# Patient Record
Sex: MALE | Race: WHITE | NOT HISPANIC OR LATINO | Employment: FULL TIME | URBAN - METROPOLITAN AREA
[De-identification: names, ages, dates, MRNs, and addresses within clinical notes are randomized per-mention and may not be internally consistent; named-entity substitution may affect disease eponyms.]

---

## 2017-06-08 ENCOUNTER — OFFICE VISIT (OUTPATIENT)
Dept: UROLOGY | Facility: CLINIC | Age: 47
End: 2017-06-08
Payer: COMMERCIAL

## 2017-06-08 VITALS
HEART RATE: 62 BPM | DIASTOLIC BLOOD PRESSURE: 86 MMHG | BODY MASS INDEX: 52.48 KG/M2 | TEMPERATURE: 99 F | WEIGHT: 315 LBS | SYSTOLIC BLOOD PRESSURE: 132 MMHG | HEIGHT: 65 IN

## 2017-06-08 DIAGNOSIS — R86.9 SEMEN ABNORMALITY: ICD-10-CM

## 2017-06-08 DIAGNOSIS — Z12.5 PROSTATE CANCER SCREENING: ICD-10-CM

## 2017-06-08 DIAGNOSIS — N50.819 TESTICULAR PAIN: Primary | ICD-10-CM

## 2017-06-08 DIAGNOSIS — Z78.9 MALE-TO-FEMALE TRANSGENDER PERSON: ICD-10-CM

## 2017-06-08 DIAGNOSIS — Q54.9 HYPOSPADIAS, UNSPECIFIED HYPOSPADIAS TYPE: ICD-10-CM

## 2017-06-08 PROCEDURE — 99999 PR PBB SHADOW E&M-NEW PATIENT-LVL III: CPT | Mod: PBBFAC,,, | Performed by: UROLOGY

## 2017-06-08 PROCEDURE — 87086 URINE CULTURE/COLONY COUNT: CPT

## 2017-06-08 PROCEDURE — 99204 OFFICE O/P NEW MOD 45 MIN: CPT | Mod: KX,S$GLB,, | Performed by: UROLOGY

## 2017-06-08 RX ORDER — PROPRANOLOL HYDROCHLORIDE 80 MG/1
CAPSULE, EXTENDED RELEASE ORAL
Refills: 1 | COMMUNITY
Start: 2017-03-31 | End: 2018-07-05 | Stop reason: DRUGHIGH

## 2017-06-08 RX ORDER — HYDROCHLOROTHIAZIDE 12.5 MG/1
12.5 CAPSULE ORAL DAILY
Refills: 1 | COMMUNITY
Start: 2017-05-22 | End: 2022-07-01

## 2017-06-08 NOTE — PROGRESS NOTES
HPI:  Martha Shah is a 46 y.o. year old male that  presents with No chief complaint on file.  .  This patient referred by his PCP for testicular pain.  Patient is a male to female transgender.  He states that he has never had any hormonal manipulation and has never had any reconstructive surgery.  Patient states for approximately 6 months he has had left testicular pain which is intermittent in nature.  He gives no history of trauma.  He is not sure if there is any swelling.  He has no dysuria or gross hematuria and has no hematospermia.  He has noticed some change in the character of his ejaculate.  He states that it is more watery than it has been before.  He has no specific complaints concerning volume of ejaculate.  He denies blood in the ejaculate    He has okay strength of stream with nocturia ×0 no straining to void    Patient states he had hypospadias repair performed as a child.    There is no family history of prostate cancer.    He is on blood pressure medicine and migraine medication.    The only surgery that he is had other than the hypospadias was tonsil and adenoidectomy    Patient is new to the [facility] system and there is no information in the chart      Past Medical History:   Diagnosis Date    Allergy     Urinary tract infection      Social History     Social History    Marital status: Significant Other     Spouse name: N/A    Number of children: N/A    Years of education: N/A     Occupational History    Not on file.     Social History Main Topics    Smoking status: Never Smoker    Smokeless tobacco: Not on file    Alcohol use Yes    Drug use: No    Sexual activity: Yes     Partners: Female     Other Topics Concern    Not on file     Social History Narrative    No narrative on file     History reviewed. No pertinent surgical history.  Family History   Problem Relation Age of Onset    Prostate cancer Neg Hx     Kidney disease Neg Hx            Review of Systems  The patient has no  "chest pains.  The patient has no shortness of breath  Patient wears        glasses.  All other review of systems are negative.      Physical Exam:  /86   Pulse 62   Temp 98.7 °F (37.1 °C)   Ht 5' 5" (1.651 m)   Wt (!) 145.2 kg (320 lb)   BMI 53.25 kg/m²   General appearance: alert, cooperative, no distress.  Patient obese  Constitutional:Oriented to person, place, and time.appears well-developed and well-nourished.   HEENT: Normocephalic, atraumatic, neck symmetrical, no nasal discharge   Eyes: conjunctivae/corneas clear, PERRL, EOM's intact  Lungs: clear to auscultation bilaterally, no dullness to percussion bilaterally  Heart: regular rate and rhythm without rub; no displacement of the PMI   Abdomen: soft, non-tender; bowel sounds normoactive; no organomegaly  :Penis/perineum without lesions, scrotum without rash/cysts, epididymis nontender bilaterally, urethral meatus in normal location normal size, no penile plaques palpated, prostate:      Smooth and minimally enlarged and benign feeling                 seminal vesicles not palpated.  No rectal masses, sphincter tone normal.  Testes equal in size without masses  Extremities: extremities symmetric; no clubbing, cyanosis, or edema  Integument: Skin color, texture, turgor normal; no rashes; hair distrubution normal  Neurologic: Alert and oriented X 3, normal strength, normal coordination and gait  Psychiatric: no pressured speech; normal affect; no evidence of impaired cognition     LABS:    Complete Blood Count  No results found for: RBC, HGB, HCT, MCV, MCH, MCHC, RDW, PLT, MPV, GRAN, LYMPH, MONO, EOS, BASO, GRAN, LYMPH, MONO, EOSINOPHIL, BASOPHIL, DIFFMETHOD    Comprehensive Metabolic Panel  No results found for: GLU, BUN, CREATININE, NA, K, CL, PROT, ALBUMIN, BILITOT, AST, ALKPHOS, CO2, ALT, ANIONGAP, EGFRNONAA, ESTGFRAFRICA    PSA  No results found for: PSA      Assessment:    ICD-10-CM ICD-9-CM    1. Testicular pain N50.819 608.9 Urine culture "   2. Male-to-female transgender person F64.0 302.85    3. Hypospadias, unspecified hypospadias type Q54.9 752.61    4. Semen abnormality R86.9 792.2    5. Prostate cancer screening Z12.5 V76.44      The primary encounter diagnosis was Testicular pain. Diagnoses of Male-to-female transgender person, Hypospadias, unspecified hypospadias type, Semen abnormality, and Prostate cancer screening were also pertinent to this visit.      Plan: 1.Testicular pain.  Plan.  I discussed at length and in detail with the patient that it is often difficult to explain the exact cause of testicular pain.  I reassured the patient that his exam shows no masses.  I recommend that he use Tylenol or Motrin and consider using a jockstrap or jockey type underwear. Patient's urine will be cultured and once results are available ,we will contact the patient if antibiotics are indicated.    #2.  Male to female transgender*.  Patient gives no history of hormonal manipulation or surgery which would be related to his testicular complaint    #3.  Hypospadias.  Plan.  No therapy needed.  Patient status post repair as child    #4.  Change in character of semen.  I discussed with the patient I cannot explain this symptom and that I further discussed that it is not of any clinical significance    #5.  Prostate cancer screening.  Plan.  I discussed with the patient that starting at age 49 he should discuss with his PCP whether not to obtain screening PSAs    At this point follow-up is on an as-needed basis  Orders Placed This Encounter   Procedures    Urine culture           Hoang Hinojosa MD    PLEASE NOTE:  Please be advised that portions of this note were dictated using voice recognition software and may contain dictation related errors in spelling/grammar/appropriate pronouns/syntax or other errors that might have not been found and or corrected on text review.

## 2017-06-08 NOTE — LETTER
June 8, 2017      Dot Daigle NP  [address] [facility] [place] [address]           [facility]  [address] [facility]  [place] [address]  Phone: [phone]          Patient: Martha Shah   MR Number: [id number]   YOB: 1970   Date of Visit: 6/8/2017       Dear Dot Daigle:    Thank you for referring Martha Shah to me for evaluation. Attached you will find relevant portions of my assessment and plan of care.    If you have questions, please do not hesitate to call me. I look forward to following Martha Shah along with you.    Sincerely,    Hoang Hinojosa MD    Enclosure  CC:  No Recipients    If you would like to receive this communication electronically, please contact [email or web address] or [phone] to request more information on [employer] access.    For providers and/or their staff who would like to refer a patient to [facility], please contact us through our one-stop-shop provider referral line, [facility], at [phone].    If you feel you have received this communication in error or would no longer like to receive these types of communications, please e-mail [email or web address]

## 2017-06-09 LAB — BACTERIA UR CULT: NO GROWTH

## 2018-07-05 ENCOUNTER — TELEPHONE (OUTPATIENT)
Dept: SLEEP MEDICINE | Facility: CLINIC | Age: 48
End: 2018-07-05

## 2018-07-05 ENCOUNTER — OFFICE VISIT (OUTPATIENT)
Dept: SLEEP MEDICINE | Facility: CLINIC | Age: 48
End: 2018-07-05
Payer: COMMERCIAL

## 2018-07-05 VITALS
BODY MASS INDEX: 48.7 KG/M2 | HEIGHT: 65 IN | HEART RATE: 59 BPM | SYSTOLIC BLOOD PRESSURE: 126 MMHG | DIASTOLIC BLOOD PRESSURE: 80 MMHG | WEIGHT: 292.31 LBS

## 2018-07-05 DIAGNOSIS — G47.33 OBSTRUCTIVE SLEEP APNEA: Primary | ICD-10-CM

## 2018-07-05 PROCEDURE — 99203 OFFICE O/P NEW LOW 30 MIN: CPT | Mod: S$GLB,,, | Performed by: NURSE PRACTITIONER

## 2018-07-05 PROCEDURE — 99999 PR PBB SHADOW E&M-EST. PATIENT-LVL III: CPT | Mod: PBBFAC,,, | Performed by: NURSE PRACTITIONER

## 2018-07-05 PROCEDURE — 3008F BODY MASS INDEX DOCD: CPT | Mod: CPTII,S$GLB,, | Performed by: NURSE PRACTITIONER

## 2018-07-05 RX ORDER — PROPRANOLOL HYDROCHLORIDE 40 MG/1
40 TABLET ORAL 2 TIMES DAILY
Refills: 1 | COMMUNITY
Start: 2018-06-18 | End: 2023-01-13 | Stop reason: SDUPTHER

## 2018-07-05 NOTE — LETTER
July 5, 2018      DAVID Nascimento  [address] [facility] [place] [address]           [facility]  [address]  [facility] [address]  Phone: [phone]          Patient: Martha Shah   MR Number: [id number]   YOB: 1970   Date of Visit: 7/5/2018       Dear Ginette Jara:    Thank you for referring Martha Shah to me for evaluation. Attached you will find relevant portions of my assessment and plan of care.    If you have questions, please do not hesitate to call me. I look forward to following Martha Shah along with you.    Sincerely,    Angelica Herrera, LINDSAY    Enclosure  CC:  No Recipients    If you would like to receive this communication electronically, please contact [email or web address] or [phone] to request more information on [employer] Link access.    For providers and/or their staff who would like to refer a patient to [facility], please contact us through our one-stop-shop provider referral line, [facility], at [phone].    If you feel you have received this communication in error or would no longer like to receive these types of communications, please e-mail [email or web address]

## 2018-07-05 NOTE — PROGRESS NOTES
"Martha Shah  was seen as a new patient at the request of  Ginette Jara PA for the management of obstructive sleep apnea.    CHIEF COMPLAINT:    Chief Complaint   Patient presents with    Sleep Apnea       07/05/2018 GARRISON Herrera NP: Initial HISTORY OF PRESENT ILLNESS: Martha Shah is a 47 y.o. male is here for sleep evaluation.       Per patient's PCP: "She's had her current CPAP for over 10 years and states that it's working well but desires a follow-up sleep study" She states she is not able to get the machine calibrated since [place]".     Patient complaints include: snoring, witnessed apneas, and unrefreshing sleep, resolved with CPAP.     Previously diagnosed 10 years ago. Has been on same CPAP since  Denies breakthrough symptoms, but would like replacement unit  No records for my review    Elite II, CPAP 6 - 13 cm, Used Hours: 19, 776, Usage: 8:15 hours/day, 6:49 hours/week, 6:19 hours/month, Leak 0:52 L, Predicted AHI 7.2    Last replaced supplies "years ago". Denies overt air leaks, but mask integrity not quiet as it used to be   Prefers FFM due to occasional mouth breathing, usually with rhinitis flare up     Denies symptoms of restless legs or kicking during sleep.    Occupation: , days only     EPWORTH SLEEPINESS SCALE 7/5/2018   Sitting and reading 2   Watching TV 1   Sitting, inactive in a public place (e.g. a theatre or a meeting) 0   As a passenger in a car for an hour without a break 2   Lying down to rest in the afternoon when circumstances permit 3   Sitting and talking to someone 0   Sitting quietly after a lunch without alcohol 1   In a car, while stopped for a few minutes in traffic 0   Total score 9       SLEEP ROUTINE:    Sleep Clinic New Patient 7/5/2018   What time do you go to bed on a week day? (Give a range) 10:30-11:30   What time do you go to bed on a day off? (Give a range) 10:30-12:30   How long does it take you to fall asleep? (Give a " range) 2-5 mins   On average, how many times per night do you wake up? 1   How long does it take you to fall back into sleep? (Give a range) 2-5 mins   What time do you wake up to start your day on a week day? (Give a range) 6:30   What time do you wake up to start your day on a day off? (Give a range) 8:30-9:30   What time do you get out of bed? (Give a range) 6:30-7:00   On average, how many hours do you sleep? 6-8   On average, how many naps do you take per day? 0   Rate your sleep quality from 0 to 5 (0-poor, 5-great). 4   Have you experienced:  Weight loss?   How much weight have you lost or gained (in lbs.) in the last year? 45 lbs lost (on Nutrisystem diet)   On average, how many times per night do you go to the bathroom?  0-1   Have you ever had a sleep study/CPAP machine/surgery for sleep apnea? Yes   Have you ever had a CPAP machine for sleep apnea? Yes   Have you ever had surgery for sleep apnea? No         PAST MEDICAL HISTORY:    Active Ambulatory Problems     Diagnosis Date Noted    No Active Ambulatory Problems     Resolved Ambulatory Problems     Diagnosis Date Noted    No Resolved Ambulatory Problems     Past Medical History:   Diagnosis Date    Allergy     Urinary tract infection                 PAST SURGICAL HISTORY:    No past surgical history on file.      FAMILY HISTORY:                Family History   Problem Relation Age of Onset    Prostate cancer Neg Hx     Kidney disease Neg Hx        SOCIAL HISTORY:          Tobacco:   History   Smoking Status    Never Smoker   Smokeless Tobacco    Not on file       Alcohol use:    History   Alcohol Use    Yes                 ALLERGIES:    Review of patient's allergies indicates:   Allergen Reactions    Sulfa (sulfonamide antibiotics) Shortness Of Breath    Compazine [prochlorperazine] Hallucinations    Penicillins Rash       CURRENT MEDICATIONS:    Current Outpatient Prescriptions   Medication Sig Dispense Refill    hydrochlorothiazide  "(MICROZIDE) 12.5 mg capsule Take 12.5 mg by mouth once daily.  1    propranolol (INDERAL LA) 80 MG 24 hr capsule TAKE 1 CAPSULE (80 MG) BY ORAL ROUTE ONCE DAILY  1     No current facility-administered medications for this visit.                   REVIEW OF SYSTEMS:     Sleep related symptoms as per HPI.  Sleep Clinic ROS  7/5/2018   Difficulty breathing through the nose?  Yes   Sore throat or dry mouth in the morning? Sometimes   Irregular or very fast heart beat?  No   Shortness of breath?  No   Acid reflux? Yes   Body aches and pains?  Sometimes   Morning headaches? No   Dizziness? No   Mood changes?  No   Do you exercise?  No   Do you feel like moving your legs a lot?  Sometimes     Otherwise, a balance of systems reviewed is negative.          PHYSICAL EXAM:  Vitals:    07/05/18 1302   BP: 126/80   Pulse: (!) 59   Weight: 132.6 kg (292 lb 5.3 oz)   Height: 5' 5" (1.651 m)   PainSc: 0-No pain     Body mass index is 48.65 kg/m².     GENERAL: Normal development, well groomed  NECK: Supple.  No thyromegaly. No palpable nodes.    SKIN: On face and neck: No abrasions, no rashes, no lesions.  No subcutaneous nodules are palpable.  RESPIRATORY:  Normal chest expansion and non-labored breathing at rest.  CARDIOVASCULAR: Normal rate  EXTREMITIES: No edema. No clubbing. No cyanosis. Station normal. Gait normal.        NEURO/PSYCH: Oriented to time, place and person. Normal attention span and concentration. Affect is full. Mood is normal.                                              ASSESSMENT:    Obstructive sleep apnea, previously diagnosed, severity unknown. The patient symptomatically has snoring, witnessed apneas, and unrefreshing sleep, r with findings of crowded oral airway and elevated body mass index. Medical co-morbidities: GERD, systemic HTN and obesity.  This warrants treatment for obstructive sleep apnea.  CPAP machine has reached reasonable useful lifetime and needs to be replaced.     Allergic rhinitis, " controlled, daily Zyrtec     PLAN:    Treatment:  -DOC signed to obtain sleep records from [place]   -After reviewing sleep records, order new APAP machine @ [facility]  -Meanwhile, may get replacement supplies in interim; provided supplies Rx  -RTC 31 - 90 days after PAP  - pt to make appt ~ 5 weeks from set up date   -If patient has difficulty with CPAP adherence or ongoing ALFREDO symptoms despite CPAP adherence, then consider an in-lab titration sleep study in order to determine optimal fixed CPAP setting.    Education: During our discussion today, we talked about the etiology of obstructive sleep apnea as well as the potential ramifications of untreated sleep apnea, which could include daytime sleepiness, hypertension, heart disease and/or stroke. We discussed potential treatment options, which could include weight loss, body positioning, continuous positive airway pressure (CPAP), OA, EPAP, or referral for surgical consideration.     Precautions: The patient was advised to abstain from driving should they feel sleepy  or drowsy.     Thank you for allowing me the opportunity to participate in the care of your patient.

## 2018-07-12 ENCOUNTER — TELEPHONE (OUTPATIENT)
Dept: SLEEP MEDICINE | Facility: CLINIC | Age: 48
End: 2018-07-12

## 2018-07-12 NOTE — TELEPHONE ENCOUNTER
----- Message from Maegan Saldaña sent at 7/12/2018  3:00 PM CDT -----  Name of Who is Calling: BURAK GARCÍA [[id number]]      What is the request in detail: Pt is checking on the status  of the order for a new C-PAP machine.       Can the clinic reply by [facility]:   No       What Number to Call Back if not in [facility]: [phone]

## 2018-07-13 NOTE — TELEPHONE ENCOUNTER
Called [facility] To inquire sleep study report At [phone] and was inform they're not open until 8 am [place] time. So I re-faxed DOC at [phone] and [phone]

## 2018-07-13 NOTE — TELEPHONE ENCOUNTER
DOC signed 07/05/2018    Can not order new PAP Machine without sleep records    Sleep records available?

## 2018-07-17 ENCOUNTER — TELEPHONE (OUTPATIENT)
Dept: SLEEP MEDICINE | Facility: CLINIC | Age: 48
End: 2018-07-17

## 2018-07-17 NOTE — TELEPHONE ENCOUNTER
Called [facility] to inquire patient's sleep study report, but Pati stated that pt wasn't found on their file;so I called pt and LVM requesting that he call facility and have them release his information.... If maybe pt is under a different name

## 2018-07-18 ENCOUNTER — TELEPHONE (OUTPATIENT)
Dept: SLEEP MEDICINE | Facility: CLINIC | Age: 48
End: 2018-07-18

## 2018-07-18 DIAGNOSIS — G47.33 OBSTRUCTIVE SLEEP APNEA: Primary | ICD-10-CM

## 2018-07-18 NOTE — TELEPHONE ENCOUNTER
Patient misunderstood my last message for her, stating to call [facility] and have them release sleep study report to sleep clinic here, instead pt came here.  So I set patient up with [facility], to help stay her/him connected.    Patient also requested for new mask  I've already advised pt that it might not be possible to get new supplies Rx without sleep study report

## 2018-07-18 NOTE — TELEPHONE ENCOUNTER
Re-faxed patient DOC to [facility] in [place] at [phone]  P;[phone]  And also got patient started with [facility] to stay connected

## 2018-07-18 NOTE — TELEPHONE ENCOUNTER
CPAP supplies rx ordered on 07/05/2018 same day as consultation visit. Pt may contact [facility] regarding obtaining replacement supplies.     New CPAP machine can not be ordered until baseline PSG received and reviewed.

## 2018-07-19 ENCOUNTER — TELEPHONE (OUTPATIENT)
Dept: SLEEP MEDICINE | Facility: CLINIC | Age: 48
End: 2018-07-19

## 2018-07-19 NOTE — TELEPHONE ENCOUNTER
Received notification from [facility] that pt did not complete sleep study at facility.     Zac,    1) Please notify pt no sleep study records from reported [facility]    2) Please verify that DOC request sent to correct facility    3) Advise patient that a new CPAP machine can not be ordered without record of sleep study. In the event prior sleep study is not available, patient must repeat sleep test. If so, let me know so that I can order sleep test.

## 2018-07-19 NOTE — TELEPHONE ENCOUNTER
Left voice message notifying pt to either personally find sleep study and fax over to sleep clinic.  Otherwise patient has to repeat sleep study.      Requested call back with decision

## 2018-07-19 NOTE — TELEPHONE ENCOUNTER
Receive fax back from [facility] [place] and they wrote that pt didn't complete a sleep study there.

## 2018-07-26 ENCOUNTER — PATIENT MESSAGE (OUTPATIENT)
Dept: SLEEP MEDICINE | Facility: CLINIC | Age: 48
End: 2018-07-26

## 2018-07-27 NOTE — TELEPHONE ENCOUNTER
Faxed DOC to Dr Oumou Gillespie  Office Phone: [phone]  Fax: [phone]    Notified patient

## 2018-07-27 NOTE — TELEPHONE ENCOUNTER
Zac, patient signed DOC during clinic visit, inquire from facility provided whether that is acceptable DOC, if so send. And update pt accordingly.

## 2018-10-22 ENCOUNTER — TELEPHONE (OUTPATIENT)
Dept: SLEEP MEDICINE | Facility: CLINIC | Age: 48
End: 2018-10-22

## 2018-10-22 ENCOUNTER — PATIENT MESSAGE (OUTPATIENT)
Dept: SLEEP MEDICINE | Facility: CLINIC | Age: 48
End: 2018-10-22

## 2018-10-22 NOTE — TELEPHONE ENCOUNTER
Please attempt again to get copy of patient's sleep records. Call Dr. Vaughan's office to inquire if it is even available. If unavailable, please let me know so that I may proceed with ordering repeat sleep test for pt.

## 2018-10-22 NOTE — TELEPHONE ENCOUNTER
Pt left a message regarding her sleep study/CPAP machine. I called  Her to get clarity on the message but she didn't answer so I left a message. I also contacted the clearing house to find out what was going on. I was told that the pt was contacted on 07/18/18 to  machine but declined due to outstanding deductible.

## 2018-10-22 NOTE — TELEPHONE ENCOUNTER
I still cant find a sleep study for this pt. I called her to find out where she had it done but she didn't answer so I left a VM.

## 2018-12-05 ENCOUNTER — PATIENT MESSAGE (OUTPATIENT)
Dept: SLEEP MEDICINE | Facility: CLINIC | Age: 48
End: 2018-12-05

## 2018-12-05 DIAGNOSIS — G47.30 SLEEP APNEA, UNSPECIFIED TYPE: Primary | ICD-10-CM

## 2018-12-05 NOTE — TELEPHONE ENCOUNTER
Unable to locate previous sleep study. Repeat sleep test necessary prior to getting new CPAP machine.     HST ordered.

## 2018-12-13 ENCOUNTER — TELEPHONE (OUTPATIENT)
Dept: SLEEP MEDICINE | Facility: OTHER | Age: 48
End: 2018-12-13

## 2019-01-08 ENCOUNTER — TELEPHONE (OUTPATIENT)
Dept: SLEEP MEDICINE | Facility: OTHER | Age: 49
End: 2019-01-08

## 2019-01-09 ENCOUNTER — HOSPITAL ENCOUNTER (OUTPATIENT)
Dept: SLEEP MEDICINE | Facility: OTHER | Age: 49
Discharge: HOME | End: 2019-01-09
Attending: NURSE PRACTITIONER
Payer: COMMERCIAL

## 2019-01-09 DIAGNOSIS — G47.33 OSA (OBSTRUCTIVE SLEEP APNEA): ICD-10-CM

## 2019-01-09 DIAGNOSIS — G47.30 SLEEP APNEA, UNSPECIFIED TYPE: ICD-10-CM

## 2019-01-09 PROCEDURE — 95800 SLP STDY UNATTENDED: CPT

## 2019-01-09 PROCEDURE — 95800 SLP STDY UNATTENDED: CPT | Mod: 26,,, | Performed by: PSYCHIATRY & NEUROLOGY

## 2019-01-09 PROCEDURE — 95800 PR SLEEP STUDY, UNATTENDED, RECORD HEART RATE/O2 SAT/RESP ANAL/SLEEP TIME: ICD-10-PCS | Mod: 26,,, | Performed by: PSYCHIATRY & NEUROLOGY

## 2019-01-16 NOTE — PROCEDURES
"Dear Referring Provider,    The sleep study that you ordered is complete. You have ordered sleep LAB services to perform the sleep study for Martha Shah.     Please find Sleep Study result in "Chart Review" under the "Media tab."     As the ordering provider, you are responsible for reviewing the results and implementing a treatment plan with your patient. If you need a Sleep Medicine provider to explain the sleep study findings and arrange treatment for the patient, please refer patient for consultation to our Sleep Clinic via [facility] with Ambulatory Consult Sleep.    To do that please place an order for an "Ambulatory Consult Sleep" - it will go to our clinic work queue for our Medical Assistant to contact the patient for an appointment.     For any questions, please contact our clinic staff at [phone] to talk to clinical staff.      "

## 2019-01-17 DIAGNOSIS — G47.33 OBSTRUCTIVE SLEEP APNEA: Primary | ICD-10-CM

## 2021-03-12 ENCOUNTER — TELEPHONE (OUTPATIENT)
Dept: ENDOSCOPY | Facility: HOSPITAL | Age: 51
End: 2021-03-12

## 2021-04-13 ENCOUNTER — TELEPHONE (OUTPATIENT)
Dept: ENDOSCOPY | Facility: HOSPITAL | Age: 51
End: 2021-04-13

## 2021-04-26 ENCOUNTER — PATIENT MESSAGE (OUTPATIENT)
Dept: RESEARCH | Facility: HOSPITAL | Age: 51
End: 2021-04-26

## 2021-05-28 ENCOUNTER — OFFICE VISIT (OUTPATIENT)
Dept: UROLOGY | Facility: CLINIC | Age: 51
End: 2021-05-28
Payer: COMMERCIAL

## 2021-05-28 VITALS
HEART RATE: 68 BPM | DIASTOLIC BLOOD PRESSURE: 74 MMHG | WEIGHT: 303 LBS | SYSTOLIC BLOOD PRESSURE: 119 MMHG | BODY MASS INDEX: 50.48 KG/M2 | HEIGHT: 65 IN

## 2021-05-28 DIAGNOSIS — Z78.9 MALE-TO-FEMALE TRANSGENDER PERSON: ICD-10-CM

## 2021-05-28 DIAGNOSIS — Z12.5 ENCOUNTER FOR PROSTATE CANCER SCREENING: Primary | ICD-10-CM

## 2021-05-28 DIAGNOSIS — R30.0 DYSURIA: ICD-10-CM

## 2021-05-28 LAB
BACTERIA #/AREA URNS HPF: ABNORMAL /HPF
BILIRUB SERPL-MCNC: ABNORMAL MG/DL
BLOOD URINE, POC: 50
CLARITY, POC UA: CLEAR
COLOR, POC UA: YELLOW
GLUCOSE UR QL STRIP: NORMAL
KETONES UR QL STRIP: ABNORMAL
LEUKOCYTE ESTERASE URINE, POC: ABNORMAL
MICROSCOPIC COMMENT: ABNORMAL
NITRITE, POC UA: ABNORMAL
PH, POC UA: 6
POC RESIDUAL URINE VOLUME: 97 ML (ref 0–100)
PROTEIN, POC: ABNORMAL
RBC #/AREA URNS HPF: 4 /HPF (ref 0–4)
SPECIFIC GRAVITY, POC UA: ABNORMAL
UROBILINOGEN, POC UA: ABNORMAL
WBC #/AREA URNS HPF: 8 /HPF (ref 0–5)

## 2021-05-28 PROCEDURE — 99204 OFFICE O/P NEW MOD 45 MIN: CPT | Mod: S$GLB,,, | Performed by: NURSE PRACTITIONER

## 2021-05-28 PROCEDURE — 1126F PR PAIN SEVERITY QUANTIFIED, NO PAIN PRESENT: ICD-10-PCS | Mod: S$GLB,,, | Performed by: NURSE PRACTITIONER

## 2021-05-28 PROCEDURE — 51798 POCT BLADDER SCAN: ICD-10-PCS | Mod: S$GLB,,, | Performed by: NURSE PRACTITIONER

## 2021-05-28 PROCEDURE — 99204 PR OFFICE/OUTPT VISIT, NEW, LEVL IV, 45-59 MIN: ICD-10-PCS | Mod: S$GLB,,, | Performed by: NURSE PRACTITIONER

## 2021-05-28 PROCEDURE — 87077 CULTURE AEROBIC IDENTIFY: CPT | Performed by: NURSE PRACTITIONER

## 2021-05-28 PROCEDURE — 81001 URINALYSIS AUTO W/SCOPE: CPT | Performed by: NURSE PRACTITIONER

## 2021-05-28 PROCEDURE — 87086 URINE CULTURE/COLONY COUNT: CPT | Performed by: NURSE PRACTITIONER

## 2021-05-28 PROCEDURE — 51798 US URINE CAPACITY MEASURE: CPT | Mod: S$GLB,,, | Performed by: NURSE PRACTITIONER

## 2021-05-28 PROCEDURE — 81002 URINALYSIS NONAUTO W/O SCOPE: CPT | Mod: S$GLB,,, | Performed by: NURSE PRACTITIONER

## 2021-05-28 PROCEDURE — 99999 PR PBB SHADOW E&M-EST. PATIENT-LVL III: CPT | Mod: PBBFAC,,, | Performed by: NURSE PRACTITIONER

## 2021-05-28 PROCEDURE — 99999 PR PBB SHADOW E&M-EST. PATIENT-LVL III: ICD-10-PCS | Mod: PBBFAC,,, | Performed by: NURSE PRACTITIONER

## 2021-05-28 PROCEDURE — 3008F PR BODY MASS INDEX (BMI) DOCUMENTED: ICD-10-PCS | Mod: CPTII,S$GLB,, | Performed by: NURSE PRACTITIONER

## 2021-05-28 PROCEDURE — 1126F AMNT PAIN NOTED NONE PRSNT: CPT | Mod: S$GLB,,, | Performed by: NURSE PRACTITIONER

## 2021-05-28 PROCEDURE — 87088 URINE BACTERIA CULTURE: CPT | Performed by: NURSE PRACTITIONER

## 2021-05-28 PROCEDURE — 3008F BODY MASS INDEX DOCD: CPT | Mod: CPTII,S$GLB,, | Performed by: NURSE PRACTITIONER

## 2021-05-28 PROCEDURE — 81002 POCT URINE DIPSTICK WITHOUT MICROSCOPE: ICD-10-PCS | Mod: S$GLB,,, | Performed by: NURSE PRACTITIONER

## 2021-05-28 PROCEDURE — 87186 SC STD MICRODIL/AGAR DIL: CPT | Performed by: NURSE PRACTITIONER

## 2021-05-31 DIAGNOSIS — N30.00 ACUTE CYSTITIS WITHOUT HEMATURIA: Primary | ICD-10-CM

## 2021-05-31 LAB — BACTERIA UR CULT: ABNORMAL

## 2021-05-31 RX ORDER — NITROFURANTOIN 25; 75 MG/1; MG/1
100 CAPSULE ORAL 2 TIMES DAILY
Qty: 14 CAPSULE | Refills: 0 | Status: SHIPPED | OUTPATIENT
Start: 2021-05-31 | End: 2021-06-07

## 2021-06-09 ENCOUNTER — PATIENT MESSAGE (OUTPATIENT)
Dept: UROLOGY | Facility: CLINIC | Age: 51
End: 2021-06-09

## 2021-07-22 ENCOUNTER — PATIENT MESSAGE (OUTPATIENT)
Dept: SLEEP MEDICINE | Facility: CLINIC | Age: 51
End: 2021-07-22

## 2022-07-01 ENCOUNTER — OFFICE VISIT (OUTPATIENT)
Dept: GASTROENTEROLOGY | Facility: CLINIC | Age: 52
End: 2022-07-01
Payer: COMMERCIAL

## 2022-07-01 VITALS
HEIGHT: 65 IN | DIASTOLIC BLOOD PRESSURE: 71 MMHG | SYSTOLIC BLOOD PRESSURE: 138 MMHG | HEART RATE: 57 BPM | OXYGEN SATURATION: 98 % | BODY MASS INDEX: 49.81 KG/M2 | WEIGHT: 298.94 LBS

## 2022-07-01 DIAGNOSIS — R19.7 DIARRHEA, UNSPECIFIED TYPE: ICD-10-CM

## 2022-07-01 DIAGNOSIS — Z12.11 SCREENING FOR COLON CANCER: Primary | ICD-10-CM

## 2022-07-01 PROCEDURE — 3075F SYST BP GE 130 - 139MM HG: CPT | Mod: CPTII,S$GLB,, | Performed by: NURSE PRACTITIONER

## 2022-07-01 PROCEDURE — 3078F DIAST BP <80 MM HG: CPT | Mod: CPTII,S$GLB,, | Performed by: NURSE PRACTITIONER

## 2022-07-01 PROCEDURE — 99204 OFFICE O/P NEW MOD 45 MIN: CPT | Mod: S$GLB,,, | Performed by: NURSE PRACTITIONER

## 2022-07-01 PROCEDURE — 3078F PR MOST RECENT DIASTOLIC BLOOD PRESSURE < 80 MM HG: ICD-10-PCS | Mod: CPTII,S$GLB,, | Performed by: NURSE PRACTITIONER

## 2022-07-01 PROCEDURE — 3008F PR BODY MASS INDEX (BMI) DOCUMENTED: ICD-10-PCS | Mod: CPTII,S$GLB,, | Performed by: NURSE PRACTITIONER

## 2022-07-01 PROCEDURE — 1159F MED LIST DOCD IN RCRD: CPT | Mod: CPTII,S$GLB,, | Performed by: NURSE PRACTITIONER

## 2022-07-01 PROCEDURE — 3075F PR MOST RECENT SYSTOLIC BLOOD PRESS GE 130-139MM HG: ICD-10-PCS | Mod: CPTII,S$GLB,, | Performed by: NURSE PRACTITIONER

## 2022-07-01 PROCEDURE — 99204 PR OFFICE/OUTPT VISIT, NEW, LEVL IV, 45-59 MIN: ICD-10-PCS | Mod: S$GLB,,, | Performed by: NURSE PRACTITIONER

## 2022-07-01 PROCEDURE — 99999 PR PBB SHADOW E&M-EST. PATIENT-LVL III: ICD-10-PCS | Mod: PBBFAC,,, | Performed by: NURSE PRACTITIONER

## 2022-07-01 PROCEDURE — 1159F PR MEDICATION LIST DOCUMENTED IN MEDICAL RECORD: ICD-10-PCS | Mod: CPTII,S$GLB,, | Performed by: NURSE PRACTITIONER

## 2022-07-01 PROCEDURE — 99999 PR PBB SHADOW E&M-EST. PATIENT-LVL III: CPT | Mod: PBBFAC,,, | Performed by: NURSE PRACTITIONER

## 2022-07-01 PROCEDURE — 3008F BODY MASS INDEX DOCD: CPT | Mod: CPTII,S$GLB,, | Performed by: NURSE PRACTITIONER

## 2022-07-01 RX ORDER — HYOSCYAMINE SULFATE 0.12 MG/1
0.12 TABLET SUBLINGUAL EVERY 4 HOURS PRN
Qty: 30 TABLET | Refills: 0 | Status: ON HOLD | OUTPATIENT
Start: 2022-07-01 | End: 2022-09-14

## 2022-07-01 RX ORDER — FAMOTIDINE 20 MG/1
20 TABLET, FILM COATED ORAL 2 TIMES DAILY
COMMUNITY
End: 2022-09-27

## 2022-07-01 RX ORDER — SODIUM, POTASSIUM,MAG SULFATES 17.5-3.13G
1 SOLUTION, RECONSTITUTED, ORAL ORAL DAILY
Qty: 1 KIT | Refills: 0 | Status: SHIPPED | OUTPATIENT
Start: 2022-07-01 | End: 2022-07-03

## 2022-07-01 RX ORDER — HYDROCHLOROTHIAZIDE 25 MG/1
25 TABLET ORAL DAILY
COMMUNITY
Start: 2022-06-16 | End: 2022-09-27 | Stop reason: SDUPTHER

## 2022-07-01 NOTE — PROGRESS NOTES
GASTROENTEROLOGY CLINIC NOTE    Chief Complaint: The primary encounter diagnosis was Screening for colon cancer. A diagnosis of Diarrhea, unspecified type was also pertinent to this visit.  Referring provider/PCP: Juanita Skinner MD    HPI:  Martha Shah is a new patient to me without a significant GI PMH.  She is here today to establish care for reflux and change in bowel habits. Reflux has been ongoing for several years. Change in bowel habits has been ongoing for over a year but has become more frequent recently. She reports flares of soft/loose bowel movements that occur 2 to 3 times per month.  Stool described as Millard Type 5-6 in consistency during these episodes. She will have 2 to 3 bowel movements per day with some urgency.  Also reports mucus in stool, foul smelling stool, and some undigested food is noted in her bowel movements.These flares last 2 to 3 days and then bowel movements return back to normal (soft, daily bowel movements). Denies Melana or hematochezia. She has noted coffee, fried food, meat, and oily foods as triggers to symptoms. Reflux is currently under control with Pepcid 20 mg b.i.d. Reports if she stops Pepcid reflux symptoms quickly return. She has never had an EGD or colonoscopy.    Treatments Tried: Pepcid 20mg BID (currently controlling reflux)  Imodium and Pepto Bismol without any relief for loose bowel movements.   NSAIDs: No  Anticoagulation or Antiplatelet: No    Prior Upper Endoscopy: No  Prior Colonoscopy: No  Family h/o Colon Cancer: No  Family h/o Crohn's Disease or Ulcerative Colitis: No  Family h/o Celiac Sprue: No  Abdominal Surgeries: No    Review of Systems   Constitutional: Negative for weight loss.   HENT: Negative for sore throat.    Eyes: Negative for blurred vision.   Respiratory: Negative for cough.    Cardiovascular: Negative for chest pain.   Gastrointestinal: Positive for abdominal pain (lower cramping), diarrhea and heartburn. Negative for blood in stool,  constipation, melena, nausea and vomiting.   Genitourinary: Negative for dysuria.   Musculoskeletal: Negative for myalgias.   Skin: Negative for rash.   Neurological: Negative for headaches.   Endo/Heme/Allergies: Negative for environmental allergies.   Psychiatric/Behavioral: Negative for suicidal ideas. The patient is not nervous/anxious.        Past Medical History: has a past medical history of Allergy and Urinary tract infection.    Past Surgical History: has no past surgical history on file.    Family History:family history is not on file.    Allergies:   Review of patient's allergies indicates:   Allergen Reactions    Sulfa (sulfonamide antibiotics) Shortness Of Breath    Compazine [prochlorperazine] Hallucinations    Penicillins Rash       Social History: reports that he has never smoked. He has never used smokeless tobacco. He reports current alcohol use. He reports that he does not use drugs.    Home medications:   Current Outpatient Medications on File Prior to Visit   Medication Sig Dispense Refill    hydrochlorothiazide (MICROZIDE) 12.5 mg capsule Take 12.5 mg by mouth once daily.  1    propranolol (INDERAL) 40 MG tablet Take 40 mg by mouth 2 (two) times daily.  1     No current facility-administered medications on file prior to visit.       Vital signs:  There were no vitals taken for this visit.    Physical Exam  Vitals reviewed.   Constitutional:       General: He is not in acute distress.     Appearance: Normal appearance. He is not ill-appearing.   HENT:      Head: Normocephalic.   Cardiovascular:      Rate and Rhythm: Normal rate and regular rhythm.      Heart sounds: Normal heart sounds. No murmur heard.  Pulmonary:      Effort: Pulmonary effort is normal. No respiratory distress.      Breath sounds: Normal breath sounds.   Chest:      Chest wall: No tenderness.   Abdominal:      General: Bowel sounds are normal. There is no distension.      Palpations: Abdomen is soft.      Tenderness:  There is no abdominal tenderness. Negative signs include Shelley's sign.      Hernia: No hernia is present.   Skin:     General: Skin is warm.   Neurological:      Mental Status: He is alert and oriented to person, place, and time.   Psychiatric:         Mood and Affect: Mood normal.         Behavior: Behavior normal.         Routine labs:  No results found for: WBC, HGB, HCT, MCV, PLT  No results found for: INR  No results found for: IRON, FERRITIN, TIBC, FESATURATED  No results found for: NA, K, CL, CO2, BUN, CREATININE, GLUF  No results found for: ALBUMIN, ALT, AST, GGT, ALKPHOS, BILITOT  No results found for: GLUCOSE  No results found for: TSH  No results found for: CALCIUM, PHOS    Imaging:      I have reviewed prior labs, imaging, and notes.      Assessment:  1. Screening for colon cancer    2. Diarrhea, unspecified type        Plan:  Orders Placed This Encounter    Clostridium difficile EIA    Stool culture    Calprotectin, Stool    H. pylori antigen, stool    Giardia / Cryptosporidum, EIA    Pancreatic elastase, fecal    IgA    Tissue Transglutaminase, IgA    Stool Exam-Ova,Cysts,Parasites    Rotavirus antigen, stool    WBC, Stool    hyoscyamine 0.125 mg Subl    sodium,potassium,mag sulfates (SUPREP BOWEL PREP KIT) 17.5-3.13-1.6 gram SolR    Case Request Endoscopy: COLONOSCOPY, EGD (ESOPHAGOGASTRODUODENOSCOPY)     Celiac Labs  Stool Studies  EGD d/t h/o reflux.  Colonoscopy for colon cancer screening. Suprep.  Levsin as needed for abdominal cramping.   Start Metamucil daily to help bulk up stool.       Plan of care discussed with patient who is in agreement and verbalized understanding.     I have explained the planned procedures to the patient.The risks, benefits and alternatives of the procedure were also explained in detail. Patient verbalized understanding, all questions were answered. The patient agrees to proceed as planned    Follow Up: As Needed Pending Above Workup          Dariana  ANGELLA Tang,FNP-BC  [facility]

## 2022-07-01 NOTE — PATIENT INSTRUCTIONS
Schedule EGD and Colonoscopy  Stool Studies  Celiac Labs  Start Metamucil daily  Levsin (hyocyamine) for spasms as needed

## 2022-07-22 ENCOUNTER — PATIENT MESSAGE (OUTPATIENT)
Dept: GASTROENTEROLOGY | Facility: CLINIC | Age: 52
End: 2022-07-22
Payer: COMMERCIAL

## 2022-08-08 ENCOUNTER — OFFICE VISIT (OUTPATIENT)
Dept: PODIATRY | Facility: CLINIC | Age: 52
End: 2022-08-08
Payer: COMMERCIAL

## 2022-08-08 VITALS
BODY MASS INDEX: 49.92 KG/M2 | SYSTOLIC BLOOD PRESSURE: 130 MMHG | HEART RATE: 58 BPM | WEIGHT: 300 LBS | DIASTOLIC BLOOD PRESSURE: 79 MMHG

## 2022-08-08 DIAGNOSIS — M76.62 ACHILLES TENDINITIS, LEFT LEG: Primary | ICD-10-CM

## 2022-08-08 DIAGNOSIS — M77.8 ENTHESOPATHY OF LEFT FOOT: ICD-10-CM

## 2022-08-08 DIAGNOSIS — E66.01 OBESITY, CLASS III, BMI 40-49.9 (MORBID OBESITY): ICD-10-CM

## 2022-08-08 DIAGNOSIS — M79.672 PAIN OF LEFT HEEL: ICD-10-CM

## 2022-08-08 DIAGNOSIS — M77.32 POSTERIOR CALCANEAL SPUR OF LEFT FOOT: ICD-10-CM

## 2022-08-08 PROCEDURE — 1159F MED LIST DOCD IN RCRD: CPT | Mod: CPTII,S$GLB,, | Performed by: PODIATRIST

## 2022-08-08 PROCEDURE — 3075F SYST BP GE 130 - 139MM HG: CPT | Mod: CPTII,S$GLB,, | Performed by: PODIATRIST

## 2022-08-08 PROCEDURE — 1159F PR MEDICATION LIST DOCUMENTED IN MEDICAL RECORD: ICD-10-PCS | Mod: CPTII,S$GLB,, | Performed by: PODIATRIST

## 2022-08-08 PROCEDURE — 3078F PR MOST RECENT DIASTOLIC BLOOD PRESSURE < 80 MM HG: ICD-10-PCS | Mod: CPTII,S$GLB,, | Performed by: PODIATRIST

## 2022-08-08 PROCEDURE — 99999 PR PBB SHADOW E&M-EST. PATIENT-LVL III: ICD-10-PCS | Mod: PBBFAC,,, | Performed by: PODIATRIST

## 2022-08-08 PROCEDURE — 1160F RVW MEDS BY RX/DR IN RCRD: CPT | Mod: CPTII,S$GLB,, | Performed by: PODIATRIST

## 2022-08-08 PROCEDURE — 99203 PR OFFICE/OUTPT VISIT, NEW, LEVL III, 30-44 MIN: ICD-10-PCS | Mod: S$GLB,,, | Performed by: PODIATRIST

## 2022-08-08 PROCEDURE — 3075F PR MOST RECENT SYSTOLIC BLOOD PRESS GE 130-139MM HG: ICD-10-PCS | Mod: CPTII,S$GLB,, | Performed by: PODIATRIST

## 2022-08-08 PROCEDURE — 3008F PR BODY MASS INDEX (BMI) DOCUMENTED: ICD-10-PCS | Mod: CPTII,S$GLB,, | Performed by: PODIATRIST

## 2022-08-08 PROCEDURE — 1160F PR REVIEW ALL MEDS BY PRESCRIBER/CLIN PHARMACIST DOCUMENTED: ICD-10-PCS | Mod: CPTII,S$GLB,, | Performed by: PODIATRIST

## 2022-08-08 PROCEDURE — 99999 PR PBB SHADOW E&M-EST. PATIENT-LVL III: CPT | Mod: PBBFAC,,, | Performed by: PODIATRIST

## 2022-08-08 PROCEDURE — 3078F DIAST BP <80 MM HG: CPT | Mod: CPTII,S$GLB,, | Performed by: PODIATRIST

## 2022-08-08 PROCEDURE — 99203 OFFICE O/P NEW LOW 30 MIN: CPT | Mod: S$GLB,,, | Performed by: PODIATRIST

## 2022-08-08 PROCEDURE — 3008F BODY MASS INDEX DOCD: CPT | Mod: CPTII,S$GLB,, | Performed by: PODIATRIST

## 2022-08-08 RX ORDER — DICLOFENAC SODIUM 10 MG/G
2 GEL TOPICAL 4 TIMES DAILY PRN
Qty: 350 G | Refills: 2 | Status: SHIPPED | OUTPATIENT
Start: 2022-08-08

## 2022-08-08 NOTE — PROGRESS NOTES
Subjective:      Patient ID: Martha Shah is a 51 y.o. male.    Chief Complaint: Foot Pain and Heel Pain (F/u from ER)    Ms. Shah is a 51 y.o. who presents to the clinic complaining of heel pain in the left foot, onset 2 days prior to ED visit 7/11/22 - x-rays taken, given tramadol and ibuprofen; instructed to follow-up with PCP.  Bought new shoes but states cannot really attribute to cause. Usually in shoes as has to wear inserts for bottom of foot. The pain is described as settled down - using an elastic brace that helps. Stabbing pain & dull, throbbing when not walking. The onset of the pain was sudden and has improved over the past several days. Martha rates the pain as 7/10 at worst. She denies a history of trauma. Prior treatments include as above ED visit and for flare up L heel pain due to plantar fasciitis, including ibuprofen and icing. Was seeing a DPM @ [facility] last year - given inserts for plantar that helped.  Has had no change in activity other than decreased-states that they (wife) were going to the gym until COVID; they were doing circuit weights and treadmill.  Currently using a cane for assistance with ambulation p.r.n. pain.  Also has a stretch ankle brace    [facility]MD [facility], usually yearly but not yet this year for blood work.    Past Medical History:   Diagnosis Date    Allergy     Urinary tract infection      Patient Active Problem List   Diagnosis    ALFREDO (obstructive sleep apnea)    Obesity, Class III, BMI 40-49.9 (morbid obesity)      Objective:         X-Ray Foot Complete Left  Narrative: EXAMINATION:  XR FOOT COMPLETE 3 VIEW LEFT    CLINICAL HISTORY:  Pain in left foot.    TECHNIQUE:  Three views of the left foot.    COMPARISON:  None.    FINDINGS:  No acute fracture, dislocation, or bony erosion.  Enthesopathic changes at the Achilles tendon insertion.  Mild degenerative changes in the midfoot.  Soft tissues are symmetric.  No radiopaque  foreign body.  Impression: No acute bony abnormality.    Electronically signed by: Darwin Hernandez MD  Date:    07/11/2022  Time:    11:36  I independently reviewed the x-ray images.  Small enthesophyte distal insertion of Achilles in area of CC.    Review of Systems   Constitutional: Positive for weight gain. Negative for malaise/fatigue.   Cardiovascular: Negative for claudication and leg swelling.   Musculoskeletal: Positive for joint pain and myalgias. Negative for falls, joint swelling and muscle weakness.     Physical Exam  Vitals reviewed.   Constitutional:       General: He is not in acute distress.     Appearance: He is well-developed. He is morbidly obese.   Cardiovascular:      Pulses:           Dorsalis pedis pulses are 2+ on the right side and 2+ on the left side.   Musculoskeletal:         General: Swelling and tenderness present. No signs of injury.      Left lower leg: No edema.      Left ankle:      Left Achilles Tendon: Tenderness present. No defects. Abdul's test negative.      Left foot: Normal range of motion. Swelling and bony tenderness present. No deformity or tenderness.        Feet:       Comments: Equinus L ankles with < 90 deg foot to leg noted with knees extended.   Musculoskeletal strength of extrinsics to foot and ankle + 5/5 in DF/PF/Inv/Ev to resistance with no reproduction of pain in any direction.   Passive ROM of ankle and pedal joints is painless and without crepitation L.   Feet:      Right foot:      Protective Sensation: 2 sites tested. 2 sites sensed.      Skin integrity: Skin integrity normal.      Left foot:      Protective Sensation: 2 sites tested. 2 sites sensed.      Skin integrity: Skin integrity normal.      Comments: Insertional tenderness along the Achilles into the posterior heel both medial and laterally with no calor nor any erythema.  Also some tenderness to palpation but no palpable edema plantar medial anterior aspect of the left heel.  Skin:     General:  Skin is warm and dry.      Findings: No bruising, erythema or signs of injury.   Neurological:      Mental Status: He is alert and oriented to person, place, and time.      Sensory: No sensory deficit.      Motor: Motor function is intact. No weakness.      Gait: Gait is intact. Gait normal.   Psychiatric:         Attention and Perception: He is inattentive.         Mood and Affect: Mood and affect normal.         Behavior: Behavior normal. Behavior is cooperative.         Assessment:      Encounter Diagnoses   Name Primary?    Achilles tendinitis, left leg Yes    Posterior calcaneal spur of left foot     Obesity, Class III, BMI 40-49.9 (morbid obesity)     Enthesopathy of left foot     Pain of left heel        Problem List Items Addressed This Visit        Endocrine    Obesity, Class III, BMI 40-49.9 (morbid obesity)    Current Assessment & Plan     BMI 49.92 kg/M2-follows with PCP on an annual basis             Other Visit Diagnoses     Achilles tendinitis, left leg    -  Primary    Relevant Medications    diclofenac sodium (VOLTAREN) 1 % Gel    Other Relevant Orders    HEEL LIFTS FOR HOME USE    Posterior calcaneal spur of left foot        Enthesopathy of left foot        Pain of left heel            Plan:       Martha was seen today for foot pain and heel pain.    Diagnoses and all orders for this visit:    Achilles tendinitis, left leg  -     diclofenac sodium (VOLTAREN) 1 % Gel; Apply 2 g topically 4 (four) times daily as needed.  -     HEEL LIFTS FOR HOME USE    Posterior calcaneal spur of left foot    Obesity, Class III, BMI 40-49.9 (morbid obesity)    Enthesopathy of left foot    Pain of left heel    I counseled the patient on his conditions, their implications and medical management.    Advised patient on appropriate supportive shoes including heel and medial arch bilaterally at all times weight-bearing.    Follow-up in 3-4 weeks, sooner p.r.n.

## 2022-08-10 PROBLEM — E66.01 OBESITY, CLASS III, BMI 40-49.9 (MORBID OBESITY): Status: ACTIVE | Noted: 2022-08-10

## 2022-08-10 PROBLEM — E66.813 OBESITY, CLASS III, BMI 40-49.9 (MORBID OBESITY): Status: ACTIVE | Noted: 2022-08-10

## 2022-08-22 ENCOUNTER — PATIENT MESSAGE (OUTPATIENT)
Dept: PODIATRY | Facility: CLINIC | Age: 52
End: 2022-08-22
Payer: COMMERCIAL

## 2022-08-22 NOTE — TELEPHONE ENCOUNTER
Try the ice ,ibuprofen and diclofenac cream for 2-3 weeks, or until the next appointment and we will re-evaluate. It is likely a shoe that is not adequately supportive or too flat.

## 2022-09-07 ENCOUNTER — OFFICE VISIT (OUTPATIENT)
Dept: PODIATRY | Facility: CLINIC | Age: 52
End: 2022-09-07
Payer: COMMERCIAL

## 2022-09-07 VITALS
WEIGHT: 300 LBS | HEIGHT: 65 IN | DIASTOLIC BLOOD PRESSURE: 80 MMHG | BODY MASS INDEX: 49.98 KG/M2 | SYSTOLIC BLOOD PRESSURE: 125 MMHG | HEART RATE: 52 BPM

## 2022-09-07 DIAGNOSIS — M77.8 ENTHESOPATHY OF LEFT FOOT: ICD-10-CM

## 2022-09-07 DIAGNOSIS — M77.32 POSTERIOR CALCANEAL SPUR OF LEFT FOOT: Primary | ICD-10-CM

## 2022-09-07 DIAGNOSIS — M72.2 PLANTAR FASCIITIS, LEFT: ICD-10-CM

## 2022-09-07 DIAGNOSIS — L60.0 INGROWN NAIL: ICD-10-CM

## 2022-09-07 PROCEDURE — 3074F SYST BP LT 130 MM HG: CPT | Mod: CPTII,S$GLB,, | Performed by: PODIATRIST

## 2022-09-07 PROCEDURE — 99999 PR PBB SHADOW E&M-EST. PATIENT-LVL III: CPT | Mod: PBBFAC,,, | Performed by: PODIATRIST

## 2022-09-07 PROCEDURE — 99999 PR PBB SHADOW E&M-EST. PATIENT-LVL III: ICD-10-PCS | Mod: PBBFAC,,, | Performed by: PODIATRIST

## 2022-09-07 PROCEDURE — 3079F PR MOST RECENT DIASTOLIC BLOOD PRESSURE 80-89 MM HG: ICD-10-PCS | Mod: CPTII,S$GLB,, | Performed by: PODIATRIST

## 2022-09-07 PROCEDURE — 99214 PR OFFICE/OUTPT VISIT, EST, LEVL IV, 30-39 MIN: ICD-10-PCS | Mod: S$GLB,,, | Performed by: PODIATRIST

## 2022-09-07 PROCEDURE — 3079F DIAST BP 80-89 MM HG: CPT | Mod: CPTII,S$GLB,, | Performed by: PODIATRIST

## 2022-09-07 PROCEDURE — 99214 OFFICE O/P EST MOD 30 MIN: CPT | Mod: S$GLB,,, | Performed by: PODIATRIST

## 2022-09-07 PROCEDURE — 3008F PR BODY MASS INDEX (BMI) DOCUMENTED: ICD-10-PCS | Mod: CPTII,S$GLB,, | Performed by: PODIATRIST

## 2022-09-07 PROCEDURE — 3074F PR MOST RECENT SYSTOLIC BLOOD PRESSURE < 130 MM HG: ICD-10-PCS | Mod: CPTII,S$GLB,, | Performed by: PODIATRIST

## 2022-09-07 PROCEDURE — 3008F BODY MASS INDEX DOCD: CPT | Mod: CPTII,S$GLB,, | Performed by: PODIATRIST

## 2022-09-07 NOTE — PROGRESS NOTES
Subjective:      Patient ID: Martha Shah is a 51 y.o. male.    Chief Complaint: No chief complaint on file.    Ms. Shah is a 51 y.o. who presents for 1 month f/u of heel pain L foot, onset 2 days prior to ED visit 7/11/22. Prior treatments include includes IB, Tramadol & icing. DPM @ [facility] last yr - given inserts. Also has a stretch ankle brace.  Seen here as a new patient last month; given heel lifts, Rx Voltaren gel & advised on supportive shoes w/ arch support.  States much better except when she hasn't been wearing shoes for 2 hrs.or in different tennis shoes.  Also, c/o ingrown toe nails.    PCP: [facility] - MD Yesica.    Past Medical History:   Diagnosis Date    Allergy     Urinary tract infection      Patient Active Problem List   Diagnosis    ALFREDO (obstructive sleep apnea)    Obesity, Class III, BMI 40-49.9 (morbid obesity)      Objective:       X-Ray Foot Complete Left  Narrative: EXAMINATION:  XR FOOT COMPLETE 3 VIEW LEFT    CLINICAL HISTORY:  Pain in left foot.    TECHNIQUE:  Three views of the left foot.    COMPARISON:  None.    FINDINGS:  No acute fracture, dislocation, or bony erosion.  Enthesopathic changes at the Achilles tendon insertion.  Mild degenerative changes in the midfoot.  Soft tissues are symmetric.  No radiopaque foreign body.  Impression: No acute bony abnormality.    Electronically signed by: Darwin Hernandez MD  Date:    07/11/2022  Time:    11:36  I independently reviewed the x-ray images.  Small enthesophyte distal insertion of Achilles in area of CC.    Physical Exam  Vitals reviewed.   Constitutional:       Appearance: He is well-developed. He is morbidly obese.   Cardiovascular:      Pulses:           Dorsalis pedis pulses are 2+ on the right side and 2+ on the left side.   Musculoskeletal:         General: Swelling and tenderness present. No signs of injury.      Left ankle:      Left Achilles Tendon: Tenderness present. No defects. Abdul's test  negative.      Left foot: Normal range of motion. Swelling and bony tenderness present. No deformity or tenderness.        Feet:       Comments: Equinus L ankles with < 90 deg foot to leg noted with knees extended.   Musculoskeletal strength of extrinsics to foot and ankle + 5/5 in DF/PF/Inv/Ev to resistance with no reproduction of pain in any direction.   Passive ROM of ankle and pedal joints is painless and without crepitation L.   Feet:      Left foot:      Skin integrity: Skin integrity normal.      Toenail Condition: Left toenails are ingrown.      Comments: Insertional tenderness Achilles into the posterior heel as well as TTP plantar medial fascia insertion L heel.    Tennis shoes w/out support.    Toenails 1st, 2nd, 3rd, 4th, 5th  B/L are hypertrophic, wide & cryptotic; tender to distal nail plate pressure, w/out periungual skin abnormality of each.     Skin:     General: Skin is warm and dry.      Findings: No bruising, erythema or signs of injury.   Neurological:      Mental Status: He is alert and oriented to person, place, and time.      Sensory: No sensory deficit.      Motor: Motor function is intact. No weakness.      Gait: Gait is intact. Gait normal.   Psychiatric:         Attention and Perception: He is inattentive.         Mood and Affect: Mood and affect normal.         Behavior: Behavior normal. Behavior is cooperative.       Assessment:      Encounter Diagnoses   Name Primary?    Posterior calcaneal spur of left foot Yes    Enthesopathy of left foot     Ingrown nail     Plantar fasciitis, left          Problem List Items Addressed This Visit    None  Visit Diagnoses       Posterior calcaneal spur of left foot    -  Primary    Enthesopathy of left foot        Ingrown nail        Plantar fasciitis, left              Plan:       Diagnoses and all orders for this visit:    Posterior calcaneal spur of left foot    Enthesopathy of left foot    Ingrown nail    Plantar fasciitis, left    I counseled  the patient on his conditions, their implications and medical management.    Advised patient on appropriate supportive shoes including heel and medial arch B/L  @ all times WB.    Dispensed gelstrap & PPT arch pad for MLA.    Continue w/ heel lifts.    Continue Voltaren gel up to qid prn.    F/u 4-6 wks., sooner prn        Follow-up in 3-4 weeks, sooner p.r.n.

## 2022-09-15 ENCOUNTER — PATIENT MESSAGE (OUTPATIENT)
Dept: GASTROENTEROLOGY | Facility: CLINIC | Age: 52
End: 2022-09-15
Payer: COMMERCIAL

## 2022-09-15 DIAGNOSIS — K29.70 GASTRITIS DETERMINED BY ENDOSCOPY: Primary | ICD-10-CM

## 2022-09-19 DIAGNOSIS — K20.0 EOSINOPHILIC ESOPHAGITIS: Primary | ICD-10-CM

## 2022-09-20 ENCOUNTER — TELEPHONE (OUTPATIENT)
Dept: GASTROENTEROLOGY | Facility: CLINIC | Age: 52
End: 2022-09-20
Payer: COMMERCIAL

## 2022-09-20 NOTE — TELEPHONE ENCOUNTER
Patient notified of results and she has a  allergist she will contact about the referral.      ----- Message from Jordy Simons MD sent at 9/19/2022  8:48 PM CDT -----  I placed referral to allergy

## 2022-09-27 ENCOUNTER — OFFICE VISIT (OUTPATIENT)
Dept: ALLERGY | Facility: CLINIC | Age: 52
End: 2022-09-27
Payer: COMMERCIAL

## 2022-09-27 VITALS — HEART RATE: 59 BPM | HEIGHT: 65 IN | WEIGHT: 297.63 LBS | OXYGEN SATURATION: 99 % | BODY MASS INDEX: 49.59 KG/M2

## 2022-09-27 DIAGNOSIS — K20.0 EOSINOPHILIC ESOPHAGITIS: ICD-10-CM

## 2022-09-27 PROCEDURE — 1160F PR REVIEW ALL MEDS BY PRESCRIBER/CLIN PHARMACIST DOCUMENTED: ICD-10-PCS | Mod: CPTII,S$GLB,, | Performed by: ALLERGY & IMMUNOLOGY

## 2022-09-27 PROCEDURE — 1159F PR MEDICATION LIST DOCUMENTED IN MEDICAL RECORD: ICD-10-PCS | Mod: CPTII,S$GLB,, | Performed by: ALLERGY & IMMUNOLOGY

## 2022-09-27 PROCEDURE — 1159F MED LIST DOCD IN RCRD: CPT | Mod: CPTII,S$GLB,, | Performed by: ALLERGY & IMMUNOLOGY

## 2022-09-27 PROCEDURE — 1160F RVW MEDS BY RX/DR IN RCRD: CPT | Mod: CPTII,S$GLB,, | Performed by: ALLERGY & IMMUNOLOGY

## 2022-09-27 PROCEDURE — 99203 PR OFFICE/OUTPT VISIT, NEW, LEVL III, 30-44 MIN: ICD-10-PCS | Mod: S$GLB,,, | Performed by: ALLERGY & IMMUNOLOGY

## 2022-09-27 PROCEDURE — 99999 PR PBB SHADOW E&M-EST. PATIENT-LVL III: ICD-10-PCS | Mod: PBBFAC,,, | Performed by: ALLERGY & IMMUNOLOGY

## 2022-09-27 PROCEDURE — 99203 OFFICE O/P NEW LOW 30 MIN: CPT | Mod: S$GLB,,, | Performed by: ALLERGY & IMMUNOLOGY

## 2022-09-27 PROCEDURE — 3008F BODY MASS INDEX DOCD: CPT | Mod: CPTII,S$GLB,, | Performed by: ALLERGY & IMMUNOLOGY

## 2022-09-27 PROCEDURE — 3008F PR BODY MASS INDEX (BMI) DOCUMENTED: ICD-10-PCS | Mod: CPTII,S$GLB,, | Performed by: ALLERGY & IMMUNOLOGY

## 2022-09-27 PROCEDURE — 99999 PR PBB SHADOW E&M-EST. PATIENT-LVL III: CPT | Mod: PBBFAC,,, | Performed by: ALLERGY & IMMUNOLOGY

## 2022-09-27 RX ORDER — GLUCOSAM/CHONDRO/HERB 149/HYAL 750-100 MG
TABLET ORAL
COMMUNITY

## 2022-09-27 RX ORDER — CETIRIZINE HYDROCHLORIDE 10 MG/1
10 TABLET ORAL DAILY
COMMUNITY

## 2022-09-27 RX ORDER — HYDROCHLOROTHIAZIDE 25 MG/1
TABLET ORAL
COMMUNITY
Start: 2022-05-27 | End: 2023-01-13 | Stop reason: SDUPTHER

## 2022-09-27 NOTE — PROGRESS NOTES
Subjective:       Patient ID: Martha Shah is a 51 y.o. male.    Chief Complaint:  No chief complaint on file.      50 yo woman presents for consult from Dr Jordy Simons for EoE. Was having esophageal pain when eating and some trouble swallowing. Had EGD and saw eos about 30 per high power field so referred here. Has noticed pain with dried fruit and occ trouble swallowing rice and bread but eats things with rice flour and fine. She does avoid gluten as wife has celiac. Avoids some dairy but eats yogurt. No H/O hives or angioedema from food, does have rhinitis with nasal congestion, sneeze, runny nose, watery eyes especially in winter. Takes zyrtec daily for this. No asthma. Has sleep apnea and HTN as well. Has T&A in past      Environmental History: see history section for home environment  Review of Systems   Constitutional:  Negative for activity change, appetite change, chills, fatigue, fever and unexpected weight change.   HENT:  Positive for congestion, rhinorrhea, sneezing, sore throat and trouble swallowing. Negative for ear discharge, ear pain, facial swelling, hearing loss, mouth sores, nosebleeds, postnasal drip, sinus pressure, tinnitus and voice change.    Eyes:  Positive for discharge. Negative for redness, itching and visual disturbance.   Respiratory:  Negative for cough, chest tightness, shortness of breath and wheezing.    Cardiovascular:  Negative for chest pain, palpitations and leg swelling.   Gastrointestinal:  Negative for abdominal distention, abdominal pain, constipation, diarrhea, nausea and vomiting.   Genitourinary:  Negative for difficulty urinating.   Musculoskeletal:  Negative for arthralgias, back pain, joint swelling and myalgias.   Skin:  Negative for color change, pallor and rash.   Neurological:  Negative for dizziness, tremors, speech difficulty, weakness, light-headedness and headaches.   Hematological:  Negative for adenopathy. Does not bruise/bleed easily.    Psychiatric/Behavioral:  Negative for agitation, confusion, decreased concentration and sleep disturbance. The patient is not nervous/anxious.       Objective:      Physical Exam  Vitals and nursing note reviewed.   Constitutional:       General: He is not in acute distress.     Appearance: Normal appearance. He is not ill-appearing.   HENT:      Head: Normocephalic and atraumatic.      Right Ear: External ear normal.      Left Ear: External ear normal.      Nose: No rhinorrhea.   Eyes:      General:         Right eye: No discharge.         Left eye: No discharge.      Conjunctiva/sclera: Conjunctivae normal.   Cardiovascular:      Rate and Rhythm: Normal rate and regular rhythm.   Pulmonary:      Effort: Pulmonary effort is normal. No respiratory distress.   Abdominal:      General: There is no distension.   Musculoskeletal:         General: Normal range of motion.      Cervical back: Normal range of motion.   Skin:     General: Skin is warm and dry.      Findings: No erythema or rash.   Neurological:      Mental Status: He is alert and oriented to person, place, and time.   Psychiatric:         Mood and Affect: Mood normal.         Behavior: Behavior normal.         Thought Content: Thought content normal.         Judgment: Judgment normal.       Laboratory:   none performed   Assessment:       1. Eosinophilic esophagitis         Plan:       Advised EOE is likely food allergy trigger, advised only half of time is testing positive but will plan skin test foods and if negative then milk, egg, wheat and soy avoidance  For rhinitis can continue zyrtec daily but hold 1 week prior to skin test and max also skin test select inhalants

## 2022-10-03 ENCOUNTER — TELEPHONE (OUTPATIENT)
Dept: ALLERGY | Facility: CLINIC | Age: 52
End: 2022-10-03
Payer: COMMERCIAL

## 2022-10-03 ENCOUNTER — PATIENT MESSAGE (OUTPATIENT)
Dept: ALLERGY | Facility: CLINIC | Age: 52
End: 2022-10-03
Payer: COMMERCIAL

## 2022-10-03 NOTE — TELEPHONE ENCOUNTER
----- Message from Jacinta Barroso MD sent at 10/3/2022  8:57 AM CDT -----  Please call pt, she scheduled in a 30 minute spot but need an hour for skin test

## 2022-10-11 ENCOUNTER — OFFICE VISIT (OUTPATIENT)
Dept: ALLERGY | Facility: CLINIC | Age: 52
End: 2022-10-11
Payer: COMMERCIAL

## 2022-10-11 VITALS — WEIGHT: 297.38 LBS | BODY MASS INDEX: 49.55 KG/M2 | HEIGHT: 65 IN

## 2022-10-11 DIAGNOSIS — K20.0 EOSINOPHILIC ESOPHAGITIS: Primary | ICD-10-CM

## 2022-10-11 PROCEDURE — 99999 PR PBB SHADOW E&M-EST. PATIENT-LVL III: CPT | Mod: PBBFAC,,, | Performed by: ALLERGY & IMMUNOLOGY

## 2022-10-11 PROCEDURE — 95004 PR ALLERGY SKIN TESTS,ALLERGENS: ICD-10-PCS | Mod: S$GLB,,, | Performed by: ALLERGY & IMMUNOLOGY

## 2022-10-11 PROCEDURE — 1159F MED LIST DOCD IN RCRD: CPT | Mod: CPTII,S$GLB,, | Performed by: ALLERGY & IMMUNOLOGY

## 2022-10-11 PROCEDURE — 1160F PR REVIEW ALL MEDS BY PRESCRIBER/CLIN PHARMACIST DOCUMENTED: ICD-10-PCS | Mod: CPTII,S$GLB,, | Performed by: ALLERGY & IMMUNOLOGY

## 2022-10-11 PROCEDURE — 95004 PERQ TESTS W/ALRGNC XTRCS: CPT | Mod: S$GLB,,, | Performed by: ALLERGY & IMMUNOLOGY

## 2022-10-11 PROCEDURE — 1160F RVW MEDS BY RX/DR IN RCRD: CPT | Mod: CPTII,S$GLB,, | Performed by: ALLERGY & IMMUNOLOGY

## 2022-10-11 PROCEDURE — 99213 OFFICE O/P EST LOW 20 MIN: CPT | Mod: 25,S$GLB,, | Performed by: ALLERGY & IMMUNOLOGY

## 2022-10-11 PROCEDURE — 99213 PR OFFICE/OUTPT VISIT, EST, LEVL III, 20-29 MIN: ICD-10-PCS | Mod: 25,S$GLB,, | Performed by: ALLERGY & IMMUNOLOGY

## 2022-10-11 PROCEDURE — 3008F PR BODY MASS INDEX (BMI) DOCUMENTED: ICD-10-PCS | Mod: CPTII,S$GLB,, | Performed by: ALLERGY & IMMUNOLOGY

## 2022-10-11 PROCEDURE — 1159F PR MEDICATION LIST DOCUMENTED IN MEDICAL RECORD: ICD-10-PCS | Mod: CPTII,S$GLB,, | Performed by: ALLERGY & IMMUNOLOGY

## 2022-10-11 PROCEDURE — 3008F BODY MASS INDEX DOCD: CPT | Mod: CPTII,S$GLB,, | Performed by: ALLERGY & IMMUNOLOGY

## 2022-10-11 PROCEDURE — 99999 PR PBB SHADOW E&M-EST. PATIENT-LVL III: ICD-10-PCS | Mod: PBBFAC,,, | Performed by: ALLERGY & IMMUNOLOGY

## 2022-10-11 NOTE — PROGRESS NOTES
Subjective:       Patient ID: Martha Shah is a 51 y.o. male.    Chief Complaint:  Follow-up (Skin testing)      52 yo woman presents for continued evaluation of EoE. She was last seen 9/27/2022. She is off antihistamines for skin test foods. No new complaints.     Prior History taken 9/27/2022: consult from Dr Jordy Simons for EoE. Was having esophageal pain when eating and some trouble swallowing. Had EGD and saw eos about 30 per high power field so referred here. Has noticed pain with dried fruit and occ trouble swallowing rice and bread but eats things with rice flour and fine. She does avoid gluten as wife has celiac. Avoids some dairy but eats yogurt. No H/O hives or angioedema from food, does have rhinitis with nasal congestion, sneeze, runny nose, watery eyes especially in winter. Takes zyrtec daily for this. No asthma. Has sleep apnea and HTN as well. Has T&A in past      Environmental History: see history section for home environment  Review of Systems   Constitutional:  Negative for activity change, appetite change, chills, fatigue, fever and unexpected weight change.   HENT:  Positive for congestion, rhinorrhea, sneezing, sore throat and trouble swallowing. Negative for ear discharge, ear pain, facial swelling, hearing loss, mouth sores, nosebleeds, postnasal drip, sinus pressure, tinnitus and voice change.    Eyes:  Positive for discharge. Negative for redness, itching and visual disturbance.   Respiratory:  Negative for cough, chest tightness, shortness of breath and wheezing.    Cardiovascular:  Negative for chest pain, palpitations and leg swelling.   Gastrointestinal:  Negative for abdominal distention, abdominal pain, constipation, diarrhea, nausea and vomiting.   Genitourinary:  Negative for difficulty urinating.   Musculoskeletal:  Negative for arthralgias, back pain, joint swelling and myalgias.   Skin:  Negative for color change, pallor and rash.   Neurological:  Negative for dizziness,  tremors, speech difficulty, weakness, light-headedness and headaches.   Hematological:  Negative for adenopathy. Does not bruise/bleed easily.   Psychiatric/Behavioral:  Negative for agitation, confusion, decreased concentration and sleep disturbance. The patient is not nervous/anxious.       Objective:      Physical Exam  Vitals and nursing note reviewed.   Constitutional:       General: He is not in acute distress.     Appearance: Normal appearance. He is not ill-appearing.   HENT:      Head: Normocephalic and atraumatic.      Right Ear: External ear normal.      Left Ear: External ear normal.      Nose: No rhinorrhea.   Eyes:      General:         Right eye: No discharge.         Left eye: No discharge.      Conjunctiva/sclera: Conjunctivae normal.   Cardiovascular:      Rate and Rhythm: Normal rate and regular rhythm.   Pulmonary:      Effort: Pulmonary effort is normal. No respiratory distress.   Abdominal:      General: There is no distension.   Musculoskeletal:         General: Normal range of motion.      Cervical back: Normal range of motion.   Skin:     General: Skin is warm and dry.      Findings: No erythema or rash.   Neurological:      Mental Status: He is alert and oriented to person, place, and time.   Psychiatric:         Mood and Affect: Mood normal.         Behavior: Behavior normal.         Thought Content: Thought content normal.         Judgment: Judgment normal.       Laboratory:   Percutaneous Skin Testing: prick skin test foods #60, 10/11/2022: 3+ histamine control and remainder tested negative, see flow sheet   Assessment:       1. Eosinophilic esophagitis         Plan:       Advised EOE is likely food allergy trigger, however test is all negative, advised only half of time is testing positive when negative then need milk, egg, wheat and soy avoidance  For rhinitis can continue zyrtec daily

## 2022-12-28 ENCOUNTER — TELEPHONE (OUTPATIENT)
Dept: GASTROENTEROLOGY | Facility: CLINIC | Age: 52
End: 2022-12-28
Payer: COMMERCIAL

## 2022-12-28 NOTE — TELEPHONE ENCOUNTER
-Gave patient an appt for 2/15/2023 at 1:00 pm.      ---- Message from Jordy Simons MD sent at 12/27/2022 10:18 PM CST -----  Routine follow up in 2 or so months.

## 2023-01-03 ENCOUNTER — OFFICE VISIT (OUTPATIENT)
Dept: PRIMARY CARE CLINIC | Facility: CLINIC | Age: 53
End: 2023-01-03
Payer: COMMERCIAL

## 2023-01-03 VITALS
OXYGEN SATURATION: 98 % | DIASTOLIC BLOOD PRESSURE: 74 MMHG | SYSTOLIC BLOOD PRESSURE: 138 MMHG | WEIGHT: 297.75 LBS | HEIGHT: 65 IN | TEMPERATURE: 97 F | RESPIRATION RATE: 18 BRPM | BODY MASS INDEX: 49.61 KG/M2 | HEART RATE: 55 BPM

## 2023-01-03 DIAGNOSIS — M79.671 ACUTE FOOT PAIN, RIGHT: Primary | ICD-10-CM

## 2023-01-03 DIAGNOSIS — Z23 NEED FOR VACCINATION: ICD-10-CM

## 2023-01-03 PROCEDURE — 3078F PR MOST RECENT DIASTOLIC BLOOD PRESSURE < 80 MM HG: ICD-10-PCS | Mod: CPTII,S$GLB,, | Performed by: STUDENT IN AN ORGANIZED HEALTH CARE EDUCATION/TRAINING PROGRAM

## 2023-01-03 PROCEDURE — 1159F MED LIST DOCD IN RCRD: CPT | Mod: CPTII,S$GLB,, | Performed by: STUDENT IN AN ORGANIZED HEALTH CARE EDUCATION/TRAINING PROGRAM

## 2023-01-03 PROCEDURE — 3078F DIAST BP <80 MM HG: CPT | Mod: CPTII,S$GLB,, | Performed by: STUDENT IN AN ORGANIZED HEALTH CARE EDUCATION/TRAINING PROGRAM

## 2023-01-03 PROCEDURE — 3008F PR BODY MASS INDEX (BMI) DOCUMENTED: ICD-10-PCS | Mod: CPTII,S$GLB,, | Performed by: STUDENT IN AN ORGANIZED HEALTH CARE EDUCATION/TRAINING PROGRAM

## 2023-01-03 PROCEDURE — 3075F SYST BP GE 130 - 139MM HG: CPT | Mod: CPTII,S$GLB,, | Performed by: STUDENT IN AN ORGANIZED HEALTH CARE EDUCATION/TRAINING PROGRAM

## 2023-01-03 PROCEDURE — 99214 OFFICE O/P EST MOD 30 MIN: CPT | Mod: 25,S$GLB,, | Performed by: STUDENT IN AN ORGANIZED HEALTH CARE EDUCATION/TRAINING PROGRAM

## 2023-01-03 PROCEDURE — 3075F PR MOST RECENT SYSTOLIC BLOOD PRESS GE 130-139MM HG: ICD-10-PCS | Mod: CPTII,S$GLB,, | Performed by: STUDENT IN AN ORGANIZED HEALTH CARE EDUCATION/TRAINING PROGRAM

## 2023-01-03 PROCEDURE — 99214 PR OFFICE/OUTPT VISIT, EST, LEVL IV, 30-39 MIN: ICD-10-PCS | Mod: 25,S$GLB,, | Performed by: STUDENT IN AN ORGANIZED HEALTH CARE EDUCATION/TRAINING PROGRAM

## 2023-01-03 PROCEDURE — 1159F PR MEDICATION LIST DOCUMENTED IN MEDICAL RECORD: ICD-10-PCS | Mod: CPTII,S$GLB,, | Performed by: STUDENT IN AN ORGANIZED HEALTH CARE EDUCATION/TRAINING PROGRAM

## 2023-01-03 PROCEDURE — 99999 PR PBB SHADOW E&M-EST. PATIENT-LVL V: ICD-10-PCS | Mod: PBBFAC,,, | Performed by: STUDENT IN AN ORGANIZED HEALTH CARE EDUCATION/TRAINING PROGRAM

## 2023-01-03 PROCEDURE — 90686 FLU VACCINE (QUAD) GREATER THAN OR EQUAL TO 3YO PRESERVATIVE FREE IM: ICD-10-PCS | Mod: S$GLB,,, | Performed by: STUDENT IN AN ORGANIZED HEALTH CARE EDUCATION/TRAINING PROGRAM

## 2023-01-03 PROCEDURE — 90471 FLU VACCINE (QUAD) GREATER THAN OR EQUAL TO 3YO PRESERVATIVE FREE IM: ICD-10-PCS | Mod: S$GLB,,, | Performed by: STUDENT IN AN ORGANIZED HEALTH CARE EDUCATION/TRAINING PROGRAM

## 2023-01-03 PROCEDURE — 3008F BODY MASS INDEX DOCD: CPT | Mod: CPTII,S$GLB,, | Performed by: STUDENT IN AN ORGANIZED HEALTH CARE EDUCATION/TRAINING PROGRAM

## 2023-01-03 PROCEDURE — 99999 PR PBB SHADOW E&M-EST. PATIENT-LVL V: CPT | Mod: PBBFAC,,, | Performed by: STUDENT IN AN ORGANIZED HEALTH CARE EDUCATION/TRAINING PROGRAM

## 2023-01-03 PROCEDURE — 90686 IIV4 VACC NO PRSV 0.5 ML IM: CPT | Mod: S$GLB,,, | Performed by: STUDENT IN AN ORGANIZED HEALTH CARE EDUCATION/TRAINING PROGRAM

## 2023-01-03 PROCEDURE — 1160F PR REVIEW ALL MEDS BY PRESCRIBER/CLIN PHARMACIST DOCUMENTED: ICD-10-PCS | Mod: CPTII,S$GLB,, | Performed by: STUDENT IN AN ORGANIZED HEALTH CARE EDUCATION/TRAINING PROGRAM

## 2023-01-03 PROCEDURE — 90471 IMMUNIZATION ADMIN: CPT | Mod: S$GLB,,, | Performed by: STUDENT IN AN ORGANIZED HEALTH CARE EDUCATION/TRAINING PROGRAM

## 2023-01-03 PROCEDURE — 1160F RVW MEDS BY RX/DR IN RCRD: CPT | Mod: CPTII,S$GLB,, | Performed by: STUDENT IN AN ORGANIZED HEALTH CARE EDUCATION/TRAINING PROGRAM

## 2023-01-03 NOTE — PROGRESS NOTES
Verified pt ID using name and . YOUSIF Rodriguez. Administered Influenza 6month+ in Left Deltoid per physician order using aseptic technique. Aspirated and no blood return noted. Pt tolerated well with no adverse reactions noted.

## 2023-01-03 NOTE — PROGRESS NOTES
Subjective:       Patient ID: Martha Shah is a 52 y.o. male.    Chief Complaint: Foot Pain    HPI:  52 y.o. male presents to [facility] with complaints of right foot pain    Patient reports right foot pain began 1/2/2022. Flared in morning and worsened in evening. Pain so severe kept up overnight.    Reports history of plantar fasciitis L > R. Wearing inserts in both shoes.    Onset?: 1/2/2023  Progression?: Worsenedd 1/2/2023, now stable and persistent  Inciting incident/new physical activity?: None  Past trauma/surgery?: None  Recurrent?: Different than past past. Mostly in anterior ball of foot  Description?: Throbbing, sore, sharp when stepping, dull ache when off  Radiates?: No  Severity?: 5-6/10 at worst, hasn't gone away since starting 1/2/2023  Worsening factors?: Pressure makes worse, weighted blanket worsens  Interventions?: ibuprofen, inserts, and elevation  Did interventions help?: Ibuprofen some  Numbness/weakness?: None  History of MI, renal disease, or GI bleed/ulcer?: No  History imaging?: Only to left foot.    Patient had x-ray left foot 7/11/2022 which revealed enthesopathic changes at this achilles tendon and arthritic changes of forefoot.    Review of Systems   Constitutional:  Positive for chills. Negative for diaphoresis, fatigue and fever.   Respiratory:  Negative for cough and shortness of breath.    Cardiovascular:  Negative for chest pain and palpitations.   Gastrointestinal:  Positive for nausea (Overnight, feels vagus related). Negative for abdominal pain, diarrhea and vomiting.   Musculoskeletal:  Positive for arthralgias (anterior ball right foot) and joint swelling.   Skin:  Negative for rash.   Neurological:  Negative for dizziness and weakness.     Objective:      Vitals:    01/03/23 1050   BP: 138/74   BP Location: Left arm   Patient Position: Sitting   BP Method: Medium (Manual)   Pulse: (!) 55   Resp: 18   Temp: 96.8 °F (36 °C)   TempSrc: Temporal   SpO2: 98%   Weight: 135.1 kg  "(297 lb 11.7 oz)   Height: 5' 5" (1.651 m)     Physical Exam  Vitals reviewed.   Constitutional:       General: He is not in acute distress.     Appearance: Normal appearance. He is not ill-appearing.   HENT:      Head: Normocephalic and atraumatic.   Eyes:      General:         Right eye: No discharge.         Left eye: No discharge.      Conjunctiva/sclera: Conjunctivae normal.   Cardiovascular:      Rate and Rhythm: Normal rate.      Pulses:           Dorsalis pedis pulses are 2+ on the right side.   Pulmonary:      Effort: Pulmonary effort is normal.   Musculoskeletal:         General: No deformity.      Right foot: Normal range of motion. No deformity or bunion.        Feet:    Feet:      Right foot:      Skin integrity: Skin integrity normal. No ulcer, blister, skin breakdown, erythema, warmth, callus, dry skin or fissure.      Toenail Condition: Right toenails are normal.      Comments: Pain and swelling to anterior ball of right foot. Tenderness greatest at 3rd and 4th MTP joints plantar side  Skin:     Coloration: Skin is not jaundiced or pale.   Neurological:      General: No focal deficit present.      Mental Status: He is alert and oriented to person, place, and time.   Psychiatric:         Mood and Affect: Mood normal.         Behavior: Behavior normal.           No results found for: NA, K, CL, CO2, BUN, CREATININE, GLUCOSE, ANIONGAP  No results found for: HGBA1C  No results found for: BNP, BNPTRIAGEBLO    No results found for: WBC, HGB, HCT, PLT, GRAN  No results found for: CHOL, HDL, LDLCALC, TRIG       Current Outpatient Medications:     BABY ASPIRIN ORAL, , Disp: , Rfl:     cetirizine (ZYRTEC) 10 MG tablet, Take 10 mg by mouth once daily., Disp: , Rfl:     hydroCHLOROthiazide (HYDRODIURIL) 25 MG tablet, hydroCHLOROthiazide 25 MG Oral Tablet QTY: 90 tablet Days: 90 Refills: 1  Written: 05/27/22 Patient Instructions: take 1 tablet once per day, Disp: , Rfl:     omega 3-dha-epa-fish oil 1,000 mg (120 " mg-180 mg) Cap, , Disp: , Rfl:     pantoprazole (PROTONIX) 40 MG tablet, Take 1 tablet (40 mg total) by mouth 2 (two) times daily., Disp: 60 tablet, Rfl: 11    propranolol (INDERAL) 40 MG tablet, Take 40 mg by mouth 2 (two) times daily., Disp: , Rfl: 1    diclofenac sodium (VOLTAREN) 1 % Gel, Apply 2 g topically 4 (four) times daily as needed. (Patient not taking: Reported on 10/11/2022), Disp: 350 g, Rfl: 2        Assessment:       1. Acute foot pain, right    2. Need for vaccination           Plan:       Acute foot pain, right  -     X-Ray Foot Complete 3 view Right; Future; Expected date: 01/03/2023  - Suspect Grace's Neuroma vs fracture vs osteophyte  - Will attempt relief with topical diclofenac, if no relief, will reach out to clinic and can Rx oral diclofenac for pain  - If no relief with conservative measures will have follow-up arranged with Dr. Mccall    Need for vaccination  -     Influenza - Quadrivalent (PF)    RTC PRN

## 2023-01-13 ENCOUNTER — OFFICE VISIT (OUTPATIENT)
Dept: PRIMARY CARE CLINIC | Facility: CLINIC | Age: 53
End: 2023-01-13
Payer: COMMERCIAL

## 2023-01-13 VITALS
BODY MASS INDEX: 50.12 KG/M2 | HEART RATE: 54 BPM | TEMPERATURE: 97 F | HEIGHT: 65 IN | WEIGHT: 300.81 LBS | DIASTOLIC BLOOD PRESSURE: 72 MMHG | SYSTOLIC BLOOD PRESSURE: 132 MMHG | RESPIRATION RATE: 18 BRPM | OXYGEN SATURATION: 100 %

## 2023-01-13 DIAGNOSIS — Z11.4 SCREENING FOR HIV (HUMAN IMMUNODEFICIENCY VIRUS): ICD-10-CM

## 2023-01-13 DIAGNOSIS — Z00.00 ANNUAL PHYSICAL EXAM: Primary | ICD-10-CM

## 2023-01-13 DIAGNOSIS — K21.00 GASTROESOPHAGEAL REFLUX DISEASE WITH ESOPHAGITIS WITHOUT HEMORRHAGE: ICD-10-CM

## 2023-01-13 DIAGNOSIS — G47.33 OBSTRUCTIVE SLEEP APNEA ON CPAP: ICD-10-CM

## 2023-01-13 DIAGNOSIS — Z13.1 SCREENING FOR DIABETES MELLITUS: ICD-10-CM

## 2023-01-13 DIAGNOSIS — Z11.59 NEED FOR HEPATITIS C SCREENING TEST: ICD-10-CM

## 2023-01-13 DIAGNOSIS — K20.0 EOSINOPHILIC ESOPHAGITIS: ICD-10-CM

## 2023-01-13 DIAGNOSIS — I10 PRIMARY HYPERTENSION: ICD-10-CM

## 2023-01-13 DIAGNOSIS — G43.109 MIGRAINE WITH AURA AND WITHOUT STATUS MIGRAINOSUS, NOT INTRACTABLE: ICD-10-CM

## 2023-01-13 DIAGNOSIS — E66.01 MORBID OBESITY WITH BMI OF 50.0-59.9, ADULT: ICD-10-CM

## 2023-01-13 PROCEDURE — 1160F PR REVIEW ALL MEDS BY PRESCRIBER/CLIN PHARMACIST DOCUMENTED: ICD-10-PCS | Mod: CPTII,S$GLB,, | Performed by: FAMILY MEDICINE

## 2023-01-13 PROCEDURE — 3008F PR BODY MASS INDEX (BMI) DOCUMENTED: ICD-10-PCS | Mod: CPTII,S$GLB,, | Performed by: FAMILY MEDICINE

## 2023-01-13 PROCEDURE — 1160F RVW MEDS BY RX/DR IN RCRD: CPT | Mod: CPTII,S$GLB,, | Performed by: FAMILY MEDICINE

## 2023-01-13 PROCEDURE — 3075F PR MOST RECENT SYSTOLIC BLOOD PRESS GE 130-139MM HG: ICD-10-PCS | Mod: CPTII,S$GLB,, | Performed by: FAMILY MEDICINE

## 2023-01-13 PROCEDURE — 99396 PR PREVENTIVE VISIT,EST,40-64: ICD-10-PCS | Mod: S$GLB,,, | Performed by: FAMILY MEDICINE

## 2023-01-13 PROCEDURE — 1159F MED LIST DOCD IN RCRD: CPT | Mod: CPTII,S$GLB,, | Performed by: FAMILY MEDICINE

## 2023-01-13 PROCEDURE — 3075F SYST BP GE 130 - 139MM HG: CPT | Mod: CPTII,S$GLB,, | Performed by: FAMILY MEDICINE

## 2023-01-13 PROCEDURE — 3008F BODY MASS INDEX DOCD: CPT | Mod: CPTII,S$GLB,, | Performed by: FAMILY MEDICINE

## 2023-01-13 PROCEDURE — 99396 PREV VISIT EST AGE 40-64: CPT | Mod: S$GLB,,, | Performed by: FAMILY MEDICINE

## 2023-01-13 PROCEDURE — 1159F PR MEDICATION LIST DOCUMENTED IN MEDICAL RECORD: ICD-10-PCS | Mod: CPTII,S$GLB,, | Performed by: FAMILY MEDICINE

## 2023-01-13 PROCEDURE — 3078F DIAST BP <80 MM HG: CPT | Mod: CPTII,S$GLB,, | Performed by: FAMILY MEDICINE

## 2023-01-13 PROCEDURE — 3078F PR MOST RECENT DIASTOLIC BLOOD PRESSURE < 80 MM HG: ICD-10-PCS | Mod: CPTII,S$GLB,, | Performed by: FAMILY MEDICINE

## 2023-01-13 PROCEDURE — 99999 PR PBB SHADOW E&M-EST. PATIENT-LVL IV: CPT | Mod: PBBFAC,,, | Performed by: FAMILY MEDICINE

## 2023-01-13 PROCEDURE — 99999 PR PBB SHADOW E&M-EST. PATIENT-LVL IV: ICD-10-PCS | Mod: PBBFAC,,, | Performed by: FAMILY MEDICINE

## 2023-01-13 RX ORDER — PROPRANOLOL HYDROCHLORIDE 40 MG/1
40 TABLET ORAL 2 TIMES DAILY
Qty: 180 TABLET | Refills: 3 | Status: ON HOLD | OUTPATIENT
Start: 2023-01-13 | End: 2024-01-08 | Stop reason: HOSPADM

## 2023-01-13 RX ORDER — HYDROCHLOROTHIAZIDE 25 MG/1
25 TABLET ORAL DAILY
Qty: 90 TABLET | Refills: 3 | Status: SHIPPED | OUTPATIENT
Start: 2023-01-13 | End: 2024-01-17

## 2023-01-13 NOTE — PROGRESS NOTES
"Subjective:       Patient ID: Martha Shah is a 52 y.o. male.    Chief Complaint: Establish Care (Pt in to establish care)    Here to establish care.  History of migraines, has been on prophylactic propranolol for many years, has worked very well.  Has not had a disabling migraine in quite some time.  Diagnosed with hypertension several years ago, controlled with hydrochlorothiazide.  Recently diagnosed with eosinophilic esophagitis, follow-up EGD improved.  Still taking PPI b.i.d. most days, trying to decrease to once daily.  No melena or hematochezia.    Review of Systems   Constitutional:  Negative for chills, fatigue and fever.   HENT:  Negative for congestion.    Eyes:  Negative for visual disturbance.   Respiratory:  Negative for cough and shortness of breath.    Cardiovascular:  Negative for chest pain.   Gastrointestinal:  Negative for abdominal pain, blood in stool, nausea and vomiting.   Genitourinary:  Negative for difficulty urinating.   Musculoskeletal:  Positive for arthralgias.   Skin:  Negative for rash.   Neurological:  Negative for dizziness.   Psychiatric/Behavioral:  Negative for agitation, confusion and sleep disturbance.      Objective:      Vitals:    01/13/23 0839   BP: 132/72   BP Location: Right arm   Patient Position: Sitting   BP Method: Large (Manual)   Pulse: (!) 54   Resp: 18   Temp: 97.1 °F (36.2 °C)   TempSrc: Temporal   SpO2: 100%   Weight: (!) 136.4 kg (300 lb 13.1 oz)   Height: 5' 5" (1.651 m)     Physical Exam  Vitals and nursing note reviewed.   Constitutional:       General: He is not in acute distress.     Appearance: Normal appearance. He is well-developed. He is obese.   HENT:      Head: Normocephalic and atraumatic.   Neck:      Vascular: No carotid bruit.   Cardiovascular:      Rate and Rhythm: Normal rate and regular rhythm.      Heart sounds: Normal heart sounds.   Pulmonary:      Effort: Pulmonary effort is normal.      Breath sounds: Normal breath sounds.   Abdominal: "      General: There is no distension.      Tenderness: There is no abdominal tenderness.   Musculoskeletal:      Right lower leg: No edema.      Left lower leg: No edema.   Skin:     General: Skin is warm and dry.   Neurological:      Mental Status: He is alert and oriented to person, place, and time.   Psychiatric:         Mood and Affect: Mood normal.         Behavior: Behavior normal.       No results found for: WBC, HGB, HCT, PLT, CHOL, TRIG, HDL, LDLDIRECT, ALT, AST, NA, K, CL, CREATININE, BUN, CO2, TSH, PSA, INR, GLUF, HGBA1C, MICROALBUR   Assessment:       1. Annual physical exam    2. Primary hypertension    3. Migraine with aura and without status migrainosus, not intractable    4. Obstructive sleep apnea on CPAP    5. Morbid obesity with BMI of 50.0-59.9, adult    6. Eosinophilic esophagitis    7. Gastroesophageal reflux disease with esophagitis without hemorrhage    8. Need for hepatitis C screening test    9. Screening for HIV (human immunodeficiency virus)    10. Screening for diabetes mellitus          Plan:       Annual physical exam  -     CBC Auto Differential; Future; Expected date: 01/13/2023  -     Comprehensive Metabolic Panel; Future; Expected date: 01/13/2023  -     Lipid Panel; Future; Expected date: 01/13/2023  -     Hemoglobin A1C; Future; Expected date: 01/13/2023  -     TSH; Future; Expected date: 01/13/2023  -     Hepatitis C Antibody; Future; Expected date: 01/13/2023  -     HIV 1/2 Ag/Ab (4th Gen); Future; Expected date: 01/13/2023    Primary hypertension  -     hydroCHLOROthiazide (HYDRODIURIL) 25 MG tablet; Take 1 tablet (25 mg total) by mouth once daily.  Dispense: 90 tablet; Refill: 3  Well controlled  Migraine with aura and without status migrainosus, not intractable  -     propranoloL (INDERAL) 40 MG tablet; Take 1 tablet (40 mg total) by mouth 2 (two) times daily.  Dispense: 180 tablet; Refill: 3  Stable on current regimen  Obstructive sleep apnea on CPAP  Stable on CPAP  Morbid  obesity with BMI of 50.0-59.9, adult  -     TSH; Future; Expected date: 01/13/2023  Discussed role of carbohydrate reduction to facilitate weight loss  Eosinophilic esophagitis  -     CBC Auto Differential; Future; Expected date: 01/13/2023  -     Comprehensive Metabolic Panel; Future; Expected date: 01/13/2023  Continue PPI, Management as per GI  Gastroesophageal reflux disease with esophagitis without hemorrhage  As above  Need for hepatitis C screening test  -     Hepatitis C Antibody; Future; Expected date: 01/13/2023    Screening for HIV (human immunodeficiency virus)  -     HIV 1/2 Ag/Ab (4th Gen); Future; Expected date: 01/13/2023    Screening for diabetes mellitus  -     Hemoglobin A1C; Future; Expected date: 01/13/2023      Medication List with Changes/Refills   Current Medications    BABY ASPIRIN ORAL        CETIRIZINE (ZYRTEC) 10 MG TABLET    Take 10 mg by mouth once daily.    DICLOFENAC SODIUM (VOLTAREN) 1 % GEL    Apply 2 g topically 4 (four) times daily as needed.    OMEGA 3-DHA-EPA-FISH OIL 1,000 MG (120 MG-180 MG) CAP        PANTOPRAZOLE (PROTONIX) 40 MG TABLET    Take 1 tablet (40 mg total) by mouth 2 (two) times daily.   Changed and/or Refilled Medications    Modified Medication Previous Medication    HYDROCHLOROTHIAZIDE (HYDRODIURIL) 25 MG TABLET hydroCHLOROthiazide (HYDRODIURIL) 25 MG tablet       Take 1 tablet (25 mg total) by mouth once daily.    hydroCHLOROthiazide 25 MG Oral Tablet QTY: 90 tablet Days: 90 Refills: 1  Written: 05/27/22 Patient Instructions: take 1 tablet once per day    PROPRANOLOL (INDERAL) 40 MG TABLET propranolol (INDERAL) 40 MG tablet       Take 1 tablet (40 mg total) by mouth 2 (two) times daily.    Take 40 mg by mouth 2 (two) times daily.

## 2023-02-14 ENCOUNTER — OFFICE VISIT (OUTPATIENT)
Dept: PODIATRY | Facility: CLINIC | Age: 53
End: 2023-02-14
Payer: COMMERCIAL

## 2023-02-14 VITALS
DIASTOLIC BLOOD PRESSURE: 90 MMHG | HEIGHT: 65 IN | HEART RATE: 51 BPM | SYSTOLIC BLOOD PRESSURE: 137 MMHG | WEIGHT: 300 LBS | BODY MASS INDEX: 49.98 KG/M2

## 2023-02-14 DIAGNOSIS — M72.2 PLANTAR FASCIITIS: ICD-10-CM

## 2023-02-14 DIAGNOSIS — M79.674 PAIN AND SWELLING OF TOE OF RIGHT FOOT: ICD-10-CM

## 2023-02-14 DIAGNOSIS — M79.89 PAIN AND SWELLING OF TOE OF RIGHT FOOT: ICD-10-CM

## 2023-02-14 DIAGNOSIS — G57.61 PLANTAR NEUROMA, RIGHT: ICD-10-CM

## 2023-02-14 DIAGNOSIS — L60.0 INGROWN NAIL: Primary | ICD-10-CM

## 2023-02-14 PROCEDURE — 3008F PR BODY MASS INDEX (BMI) DOCUMENTED: ICD-10-PCS | Mod: CPTII,S$GLB,, | Performed by: PODIATRIST

## 2023-02-14 PROCEDURE — 99999 PR PBB SHADOW E&M-EST. PATIENT-LVL III: ICD-10-PCS | Mod: PBBFAC,,, | Performed by: PODIATRIST

## 2023-02-14 PROCEDURE — 1160F PR REVIEW ALL MEDS BY PRESCRIBER/CLIN PHARMACIST DOCUMENTED: ICD-10-PCS | Mod: CPTII,S$GLB,, | Performed by: PODIATRIST

## 2023-02-14 PROCEDURE — 3008F BODY MASS INDEX DOCD: CPT | Mod: CPTII,S$GLB,, | Performed by: PODIATRIST

## 2023-02-14 PROCEDURE — 1160F RVW MEDS BY RX/DR IN RCRD: CPT | Mod: CPTII,S$GLB,, | Performed by: PODIATRIST

## 2023-02-14 PROCEDURE — 99214 PR OFFICE/OUTPT VISIT, EST, LEVL IV, 30-39 MIN: ICD-10-PCS | Mod: S$GLB,,, | Performed by: PODIATRIST

## 2023-02-14 PROCEDURE — 99999 PR PBB SHADOW E&M-EST. PATIENT-LVL III: CPT | Mod: PBBFAC,,, | Performed by: PODIATRIST

## 2023-02-14 PROCEDURE — 3080F DIAST BP >= 90 MM HG: CPT | Mod: CPTII,S$GLB,, | Performed by: PODIATRIST

## 2023-02-14 PROCEDURE — 3080F PR MOST RECENT DIASTOLIC BLOOD PRESSURE >= 90 MM HG: ICD-10-PCS | Mod: CPTII,S$GLB,, | Performed by: PODIATRIST

## 2023-02-14 PROCEDURE — 1159F PR MEDICATION LIST DOCUMENTED IN MEDICAL RECORD: ICD-10-PCS | Mod: CPTII,S$GLB,, | Performed by: PODIATRIST

## 2023-02-14 PROCEDURE — 3044F HG A1C LEVEL LT 7.0%: CPT | Mod: CPTII,S$GLB,, | Performed by: PODIATRIST

## 2023-02-14 PROCEDURE — 3075F SYST BP GE 130 - 139MM HG: CPT | Mod: CPTII,S$GLB,, | Performed by: PODIATRIST

## 2023-02-14 PROCEDURE — 3075F PR MOST RECENT SYSTOLIC BLOOD PRESS GE 130-139MM HG: ICD-10-PCS | Mod: CPTII,S$GLB,, | Performed by: PODIATRIST

## 2023-02-14 PROCEDURE — 3044F PR MOST RECENT HEMOGLOBIN A1C LEVEL <7.0%: ICD-10-PCS | Mod: CPTII,S$GLB,, | Performed by: PODIATRIST

## 2023-02-14 PROCEDURE — 1159F MED LIST DOCD IN RCRD: CPT | Mod: CPTII,S$GLB,, | Performed by: PODIATRIST

## 2023-02-14 PROCEDURE — 99214 OFFICE O/P EST MOD 30 MIN: CPT | Mod: S$GLB,,, | Performed by: PODIATRIST

## 2023-02-14 NOTE — PROGRESS NOTES
Subjective:      Patient ID: Martha Shah is a 52 y.o. male.    Chief Complaint: No chief complaint on file.      Ms. Shah is a 52 y.o. who presents for 1 month f/u of Urgent care R foot x 1 day. Had sudden onset of pain, swelling & redness 1st MPJ especially plantar and ball of the foot. Was seen 01/03/2023. Xrays negative. Prelim.dx Grace's neuroma.  Advised topical diclofenac intercostal p.r.n. for p.o. States has had some degree of pain for a year but this sudden increase appeared overnight 'like gout'. Did respond to Diclofenc gel but still constant dull ache since parades this wk.end. Pain level 2/10 currently. No cramping.Tennis shoes regularly because hard to find wide or comfortable enough.  Also ingrown toenail bilateral hallux and 2nd.  Previously seen in this clinic about 5 months ago for retrocalcaneal spur, plantar fasciitis L & IGTN - advised on appropriate supportive shoes with arch support and heel lift & topical Voltaren gel.  Did not come in for one-month follow-up.    Shoe gear:  Tennis shoes.  Has inserts for plantar fasciitis but did not bring them in.    PCP: [facility] - [facility] [place].  PCP: Estrada Partida MD 1/13/23    Past Medical History:   Diagnosis Date    Allergy     Hypertension     Urinary tract infection      Patient Active Problem List   Diagnosis    Obstructive sleep apnea on CPAP    Morbid obesity with BMI of 50.0-59.9, adult    Primary hypertension    Migraine with aura and without status migrainosus, not intractable    Eosinophilic esophagitis    Gastroesophageal reflux disease with esophagitis without hemorrhage      Objective:       X-Ray Foot Complete 3 view Right  Narrative: EXAMINATION:  XR FOOT COMPLETE 3 VIEW RIGHT    CLINICAL HISTORY:  . Pain in right foot    TECHNIQUE:  AP, lateral, and oblique views of the right foot were performed.    COMPARISON:  None.    FINDINGS:  No displaced fracture or dislocation.  No destructive osseous process.  Cartilage  spaces appear adequately maintained.  Minimal enthesopathic changes at distal Achilles insertion site.  Tiny plantar calcaneal spur.  Soft tissues are unremarkable.  Impression: As above.    Electronically signed by: Gian Campbell  Date:    01/03/2023  Time:    14:25  I independently reviewed the x-ray images.  No acute concern in area of chief complaint.    Physical Exam  Vitals reviewed.   Constitutional:       Appearance: He is well-developed. He is morbidly obese.   Cardiovascular:      Pulses:           Dorsalis pedis pulses are 2+ on the right side and 2+ on the left side.   Musculoskeletal:         General: Swelling and tenderness present. No signs of injury.      Left ankle:      Left Achilles Tendon: Tenderness present. No defects. Abdul's test negative.      Left foot: Normal range of motion. Swelling and bony tenderness present. No deformity or tenderness.        Feet:       Comments: Equinus L ankles with < 90 deg foot to leg noted with knees extended.   Musculoskeletal strength of extrinsics to foot and ankle + 5/5 in DF/PF/Inv/Ev to resistance with no reproduction of pain in any direction.   Passive ROM of ankle and pedal joints is painless and without crepitation L.   Feet:      Right foot:      Skin integrity: Skin integrity normal.      Toenail Condition: Right toenails are ingrown.      Left foot:      Skin integrity: Skin integrity normal.      Toenail Condition: Left toenails are ingrown.      Comments: Tennis shoes w/out support.    Toenails 1st, 2nd B/L are hypertrophic, wide & cryptotic; tender to distal nail plate pressure, w/out periungual skin abnormality of each.     Skin:     General: Skin is warm and dry.      Findings: No bruising, erythema or signs of injury.   Neurological:      Mental Status: He is alert and oriented to person, place, and time.      Sensory: No sensory deficit.      Motor: Motor function is intact. No weakness.      Gait: Gait is intact. Gait normal.       Comments: Tenderness to palpation 2nd 3rd IMS right foot with no reproducible cramping to plantar flexion.  No increase in discomfort with lateral met head compression no palpable clicking   Psychiatric:         Mood and Affect: Mood and affect normal.         Behavior: Behavior normal. Behavior is cooperative.       Assessment:      Encounter Diagnoses   Name Primary?    Ingrown nail Yes    Plantar neuroma, right     Pain and swelling of toe of right foot        Problem List Items Addressed This Visit    None  Visit Diagnoses       Ingrown nail    -  Primary    Plantar neuroma, right        Pain and swelling of toe of right foot              Plan:       Diagnoses and all orders for this visit:    Ingrown nail    Plantar neuroma, right    Pain and swelling of toe of right foot    I counseled the patient on his conditions, their implications and medical management.    Advised patient on appropriate supportive shoes including heel and medial arch B/L @ all times WB.    Dispensed metatarsal gelstrap R - patient states immediately feels better with WB.    Advised to bring in his inserts for addition of metatarsal pads at next visit (has previously had ppt arch pads added).    Continue Voltaren gel up to qid prn.    Debrided and reduced bilateral hallux and 2nd toenails.  Instructions patient on appropriate self debridement of nails to reduce symptomatology by keeping them shorter and rounding the edges utilizing appropriate instrumentation.    F/u 3-4 wks., sooner prn

## 2023-03-08 ENCOUNTER — OFFICE VISIT (OUTPATIENT)
Dept: GASTROENTEROLOGY | Facility: CLINIC | Age: 53
End: 2023-03-08
Payer: COMMERCIAL

## 2023-03-08 VITALS
HEART RATE: 59 BPM | SYSTOLIC BLOOD PRESSURE: 118 MMHG | BODY MASS INDEX: 49.88 KG/M2 | DIASTOLIC BLOOD PRESSURE: 79 MMHG | HEIGHT: 65 IN | WEIGHT: 299.38 LBS

## 2023-03-08 DIAGNOSIS — K20.0 EOSINOPHILIC ESOPHAGITIS: Primary | ICD-10-CM

## 2023-03-08 DIAGNOSIS — K21.00 GASTROESOPHAGEAL REFLUX DISEASE WITH ESOPHAGITIS WITHOUT HEMORRHAGE: ICD-10-CM

## 2023-03-08 PROCEDURE — 3044F HG A1C LEVEL LT 7.0%: CPT | Mod: CPTII,S$GLB,, | Performed by: INTERNAL MEDICINE

## 2023-03-08 PROCEDURE — 99999 PR PBB SHADOW E&M-EST. PATIENT-LVL III: CPT | Mod: PBBFAC,,, | Performed by: INTERNAL MEDICINE

## 2023-03-08 PROCEDURE — 3008F PR BODY MASS INDEX (BMI) DOCUMENTED: ICD-10-PCS | Mod: CPTII,S$GLB,, | Performed by: INTERNAL MEDICINE

## 2023-03-08 PROCEDURE — 3078F PR MOST RECENT DIASTOLIC BLOOD PRESSURE < 80 MM HG: ICD-10-PCS | Mod: CPTII,S$GLB,, | Performed by: INTERNAL MEDICINE

## 2023-03-08 PROCEDURE — 3044F PR MOST RECENT HEMOGLOBIN A1C LEVEL <7.0%: ICD-10-PCS | Mod: CPTII,S$GLB,, | Performed by: INTERNAL MEDICINE

## 2023-03-08 PROCEDURE — 3008F BODY MASS INDEX DOCD: CPT | Mod: CPTII,S$GLB,, | Performed by: INTERNAL MEDICINE

## 2023-03-08 PROCEDURE — 99214 OFFICE O/P EST MOD 30 MIN: CPT | Mod: S$GLB,,, | Performed by: INTERNAL MEDICINE

## 2023-03-08 PROCEDURE — 3078F DIAST BP <80 MM HG: CPT | Mod: CPTII,S$GLB,, | Performed by: INTERNAL MEDICINE

## 2023-03-08 PROCEDURE — 99999 PR PBB SHADOW E&M-EST. PATIENT-LVL III: ICD-10-PCS | Mod: PBBFAC,,, | Performed by: INTERNAL MEDICINE

## 2023-03-08 PROCEDURE — 1159F PR MEDICATION LIST DOCUMENTED IN MEDICAL RECORD: ICD-10-PCS | Mod: CPTII,S$GLB,, | Performed by: INTERNAL MEDICINE

## 2023-03-08 PROCEDURE — 3074F SYST BP LT 130 MM HG: CPT | Mod: CPTII,S$GLB,, | Performed by: INTERNAL MEDICINE

## 2023-03-08 PROCEDURE — 3074F PR MOST RECENT SYSTOLIC BLOOD PRESSURE < 130 MM HG: ICD-10-PCS | Mod: CPTII,S$GLB,, | Performed by: INTERNAL MEDICINE

## 2023-03-08 PROCEDURE — 1159F MED LIST DOCD IN RCRD: CPT | Mod: CPTII,S$GLB,, | Performed by: INTERNAL MEDICINE

## 2023-03-08 PROCEDURE — 99214 PR OFFICE/OUTPT VISIT, EST, LEVL IV, 30-39 MIN: ICD-10-PCS | Mod: S$GLB,,, | Performed by: INTERNAL MEDICINE

## 2023-03-08 NOTE — PROGRESS NOTES
"Subjective:       Patient ID: Martha Shah is a 52 y.o. male.    Chief Complaint: Follow-up    Patient here today to follow-up with aforementioned complaints.  She was previously seen by LINDSAY Mayes in 2022 with complaints of dysphagia and diarrhea.  She was sent for EGD/colonoscopy which were performed in September.  Exams were significant for EOE and patient was started on b.i.d. PPI and referred to allergy.  Subsequent EGD performed in December showed significant improvement in eosinophils.  She continues to take Protonix daily.  She does continue to experience "phlegm" in her throat about an hour after eating.  She denies further dysphagia or odynophagia.  She was also started on elimination diet, avoiding wheat and dairy.    Review of Systems   HENT:  Negative for trouble swallowing.    Cardiovascular:  Negative for chest pain.   Gastrointestinal:  Negative for abdominal distention and abdominal pain.       The following portions of the patient's history were reviewed and updated as appropriate: allergies, current medications, past family history, past medical history, past social history, past surgical history and problem list.    Objective:      Physical Exam  Constitutional:       Appearance: He is well-developed.   HENT:      Head: Normocephalic and atraumatic.   Eyes:      Conjunctiva/sclera: Conjunctivae normal.   Pulmonary:      Effort: Pulmonary effort is normal. No respiratory distress.   Neurological:      Mental Status: He is alert and oriented to person, place, and time.   Psychiatric:         Behavior: Behavior normal.         Thought Content: Thought content normal.         Judgment: Judgment normal.         Pertinent labs and imaging studies reviewed    Assessment:       1. Eosinophilic esophagitis    2. Gastroesophageal reflux disease with esophagitis without hemorrhage        Plan:       Doing well.  Continue dietary changes and PPI  Monitor for return in dysphagia.  If recurs patient is to call " the office an EGD can be repeated  Up-to-date with colon cancer screening.  Repeat colonoscopy in 2032 recommended by previous endoscopist    (Portions of this note were dictated using voice recognition software and may contain dictation related errors in spelling/grammar/syntax not found on text review)

## 2023-05-15 ENCOUNTER — OFFICE VISIT (OUTPATIENT)
Dept: PODIATRY | Facility: CLINIC | Age: 53
End: 2023-05-15
Payer: COMMERCIAL

## 2023-05-15 VITALS
HEIGHT: 65 IN | BODY MASS INDEX: 49.55 KG/M2 | WEIGHT: 297.38 LBS | HEART RATE: 49 BPM | DIASTOLIC BLOOD PRESSURE: 74 MMHG | SYSTOLIC BLOOD PRESSURE: 121 MMHG

## 2023-05-15 DIAGNOSIS — E66.01 MORBID OBESITY WITH BMI OF 50.0-59.9, ADULT: ICD-10-CM

## 2023-05-15 DIAGNOSIS — G57.61 PLANTAR NEUROMA, RIGHT: Primary | ICD-10-CM

## 2023-05-15 DIAGNOSIS — M77.41 METATARSALGIA, RIGHT FOOT: ICD-10-CM

## 2023-05-15 DIAGNOSIS — M72.2 PLANTAR FASCIITIS: ICD-10-CM

## 2023-05-15 PROCEDURE — 3008F PR BODY MASS INDEX (BMI) DOCUMENTED: ICD-10-PCS | Mod: CPTII,S$GLB,, | Performed by: PODIATRIST

## 2023-05-15 PROCEDURE — 99999 PR PBB SHADOW E&M-EST. PATIENT-LVL III: CPT | Mod: PBBFAC,,, | Performed by: PODIATRIST

## 2023-05-15 PROCEDURE — 3044F PR MOST RECENT HEMOGLOBIN A1C LEVEL <7.0%: ICD-10-PCS | Mod: CPTII,S$GLB,, | Performed by: PODIATRIST

## 2023-05-15 PROCEDURE — 1159F PR MEDICATION LIST DOCUMENTED IN MEDICAL RECORD: ICD-10-PCS | Mod: CPTII,S$GLB,, | Performed by: PODIATRIST

## 2023-05-15 PROCEDURE — 3074F SYST BP LT 130 MM HG: CPT | Mod: CPTII,S$GLB,, | Performed by: PODIATRIST

## 2023-05-15 PROCEDURE — 3074F PR MOST RECENT SYSTOLIC BLOOD PRESSURE < 130 MM HG: ICD-10-PCS | Mod: CPTII,S$GLB,, | Performed by: PODIATRIST

## 2023-05-15 PROCEDURE — 3078F PR MOST RECENT DIASTOLIC BLOOD PRESSURE < 80 MM HG: ICD-10-PCS | Mod: CPTII,S$GLB,, | Performed by: PODIATRIST

## 2023-05-15 PROCEDURE — 1160F RVW MEDS BY RX/DR IN RCRD: CPT | Mod: CPTII,S$GLB,, | Performed by: PODIATRIST

## 2023-05-15 PROCEDURE — 99214 PR OFFICE/OUTPT VISIT, EST, LEVL IV, 30-39 MIN: ICD-10-PCS | Mod: S$GLB,,, | Performed by: PODIATRIST

## 2023-05-15 PROCEDURE — 3078F DIAST BP <80 MM HG: CPT | Mod: CPTII,S$GLB,, | Performed by: PODIATRIST

## 2023-05-15 PROCEDURE — 3044F HG A1C LEVEL LT 7.0%: CPT | Mod: CPTII,S$GLB,, | Performed by: PODIATRIST

## 2023-05-15 PROCEDURE — 99214 OFFICE O/P EST MOD 30 MIN: CPT | Mod: S$GLB,,, | Performed by: PODIATRIST

## 2023-05-15 PROCEDURE — 99999 PR PBB SHADOW E&M-EST. PATIENT-LVL III: ICD-10-PCS | Mod: PBBFAC,,, | Performed by: PODIATRIST

## 2023-05-15 PROCEDURE — 1159F MED LIST DOCD IN RCRD: CPT | Mod: CPTII,S$GLB,, | Performed by: PODIATRIST

## 2023-05-15 PROCEDURE — 1160F PR REVIEW ALL MEDS BY PRESCRIBER/CLIN PHARMACIST DOCUMENTED: ICD-10-PCS | Mod: CPTII,S$GLB,, | Performed by: PODIATRIST

## 2023-05-15 PROCEDURE — 3008F BODY MASS INDEX DOCD: CPT | Mod: CPTII,S$GLB,, | Performed by: PODIATRIST

## 2023-05-15 NOTE — PROGRESS NOTES
Subjective:      Patient ID: Martha Shah is a 52 y.o. male.    Chief Complaint: Follow-up    Ms. Shah is a 52 y.o. who presents for f/u R foot pain. Last seen though over 3 months ago w/ pain, swelling & redness 1st MPJ & plantar ball of the foot. Xrays negative. Prelim.dx Grace's neuroma.  Advised topical diclofenac p.r.n. since did respond to Diclofenc gel but for still constant dull ache after [place] parades this wk.end. Pain level was 2/10; no cramping.  Previously seen in this clinic 5 months prior for retrocalcaneal spur, plantar fasciitis L & IGTN - advised on appropriate supportive shoes w/ arch support, heel lift & topical Voltaren gel.  In both cases, no f/u in a month as had stated condition improved.  However, now states has no pain unless she has any change in her routine - then has heel pain plantar as well as forefoot. States has to wear shoes all day to prevent pain. 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 Wearing new shoes w/ gelstrap previously dispensed.     Shoe gear:  Tennis shoes regularly because hard to find wide enough. Has inserts for plantar fasciitis but did not bring them in.    PCP: [facility] - MD [place].  PCP: Estrada Partida MD 1/13/23    Past Medical History:   Diagnosis Date    Allergy     Hypertension     Urinary tract infection      Patient Active Problem List   Diagnosis    Obstructive sleep apnea on CPAP    Morbid obesity with BMI of 50.0-59.9, adult    Primary hypertension    Migraine with aura and without status migrainosus, not intractable    Eosinophilic esophagitis    Gastroesophageal reflux disease with esophagitis without hemorrhage      Objective:       X-Ray Foot Complete 3 view Right  Narrative: EXAMINATION:  XR FOOT COMPLETE 3 VIEW RIGHT    CLINICAL HISTORY:  . Pain in right  foot    TECHNIQUE:  AP, lateral, and oblique views of the right foot were performed.    COMPARISON:  None.    FINDINGS:  No displaced fracture or dislocation.  No destructive osseous process.  Cartilage spaces appear adequately maintained.  Minimal enthesopathic changes at distal Achilles insertion site.  Tiny plantar calcaneal spur.  Soft tissues are unremarkable.  Impression: As above.    Electronically signed by: Gian Campbell  Date:    01/03/2023  Time:    14:25  I independently reviewed the x-ray images.    Physical Exam  Vitals reviewed.   Constitutional:       General: He is not in acute distress.     Appearance: He is well-developed. He is morbidly obese.   Cardiovascular:      Pulses:           Dorsalis pedis pulses are 2+ on the right side and 2+ on the left side.   Musculoskeletal:         General: No swelling, tenderness or signs of injury.      Left ankle:      Left Achilles Tendon: Tenderness present. No defects. Abdul's test negative.      Left foot: Normal range of motion. Swelling and bony tenderness present. No deformity or tenderness.      Comments: Equinus L ankles with < 90 deg foot to leg noted with knees extended.   Musculoskeletal strength of extrinsics to foot and ankle + 5/5 in DF/PF/Inv/Ev to resistance with no reproduction of pain in any direction.   Passive ROM of ankle and pedal joints is painless and without crepitation L.   Feet:      Right foot:      Skin integrity: Skin integrity normal.      Toenail Condition: Right toenails are ingrown.      Left foot:      Skin integrity: Skin integrity normal.      Toenail Condition: Left toenails are ingrown.      Comments: New shoes w/out support.    Equinus R ankles with < 90 deg foot to leg noted with knees extended.      MS strength of extrinsics to foot & ankle R + 5/5 in DF/PF/Inv/Ev to resistance w/ no reproduction of pain in any direction.   No TTP medial nor plantar heel insertion of fascia nor posterior medial Achilles tendon  insertion R.    Passive ROM of ankle & pedal joints is painless w/out crepitation B/L.  Skin:     General: Skin is warm and dry.      Findings: No bruising, erythema or signs of injury.   Neurological:      Mental Status: He is alert and oriented to person, place, and time.      Sensory: No sensory deficit.      Motor: Motor function is intact. No weakness.      Gait: Gait is intact. Gait normal.      Comments: No TTP 2nd & 3rd IMS R foot nor palpable clicking/ - Renetta's sign.  No increase in discomfort w/ lateral met head compression.   Psychiatric:         Mood and Affect: Mood and affect normal.         Behavior: Behavior normal. Behavior is cooperative.       Assessment:      Encounter Diagnoses   Name Primary?    Plantar neuroma, right Yes    Plantar fasciitis     Morbid obesity with BMI of 50.0-59.9, adult     Metatarsalgia, right foot        Problem List Items Addressed This Visit          Endocrine    Morbid obesity with BMI of 50.0-59.9, adult    Current Assessment & Plan     BMI 49.49kg/m2 - follows w/ PCP.            Other Visit Diagnoses       Plantar neuroma, right    -  Primary    Plantar fasciitis        Metatarsalgia, right foot              Plan:       Martha was seen today for follow-up.    Diagnoses and all orders for this visit:    Plantar neuroma, right    Plantar fasciitis    Morbid obesity with BMI of 50.0-59.9, adult    Metatarsalgia, right foot    I counseled the patient on his conditions, their implications and medical management.    Advised patient on appropriate supportive shoes including heel & medial arch B/L @ all times WB including in home.    Continue met.gelstrap R previousl dispensed  OR   PPT met.bar as added today to current shoe R.    Also, PPT arch pads added to B/L shoes & to add to all shoes for MLA support.    Continue Voltaren gel up to qid prn.    F/u prn since Asx (as long as compliant w/ appropriate supportive shoe gear).        A total of 34 mins.was spent on chart  review, patient visit & documentation.

## 2023-06-16 ENCOUNTER — PATIENT MESSAGE (OUTPATIENT)
Dept: PODIATRY | Facility: CLINIC | Age: 53
End: 2023-06-16
Payer: COMMERCIAL

## 2023-06-26 ENCOUNTER — OFFICE VISIT (OUTPATIENT)
Dept: PODIATRY | Facility: CLINIC | Age: 53
End: 2023-06-26
Payer: COMMERCIAL

## 2023-06-26 VITALS
DIASTOLIC BLOOD PRESSURE: 72 MMHG | HEIGHT: 65 IN | WEIGHT: 302.94 LBS | BODY MASS INDEX: 50.47 KG/M2 | HEART RATE: 50 BPM | SYSTOLIC BLOOD PRESSURE: 124 MMHG

## 2023-06-26 DIAGNOSIS — E66.01 MORBID OBESITY WITH BMI OF 50.0-59.9, ADULT: ICD-10-CM

## 2023-06-26 DIAGNOSIS — S96.111A STRAIN OF MUSCLE AND TENDON OF LONG EXTENSOR MUSCLE OF TOE AT ANKLE AND FOOT LEVEL, RIGHT FOOT, INITIAL ENCOUNTER: ICD-10-CM

## 2023-06-26 DIAGNOSIS — M77.41 METATARSALGIA, RIGHT FOOT: ICD-10-CM

## 2023-06-26 DIAGNOSIS — M79.671 PAIN IN RIGHT FOOT: ICD-10-CM

## 2023-06-26 DIAGNOSIS — M25.471 RIGHT ANKLE SWELLING: ICD-10-CM

## 2023-06-26 DIAGNOSIS — G57.61 PLANTAR NEUROMA, RIGHT: ICD-10-CM

## 2023-06-26 DIAGNOSIS — M21.611 BUNION, RIGHT: Primary | ICD-10-CM

## 2023-06-26 PROCEDURE — 3078F PR MOST RECENT DIASTOLIC BLOOD PRESSURE < 80 MM HG: ICD-10-PCS | Mod: CPTII,S$GLB,, | Performed by: PODIATRIST

## 2023-06-26 PROCEDURE — 1159F PR MEDICATION LIST DOCUMENTED IN MEDICAL RECORD: ICD-10-PCS | Mod: CPTII,S$GLB,, | Performed by: PODIATRIST

## 2023-06-26 PROCEDURE — 1159F MED LIST DOCD IN RCRD: CPT | Mod: CPTII,S$GLB,, | Performed by: PODIATRIST

## 2023-06-26 PROCEDURE — 99999 PR PBB SHADOW E&M-EST. PATIENT-LVL III: ICD-10-PCS | Mod: PBBFAC,,, | Performed by: PODIATRIST

## 2023-06-26 PROCEDURE — 99999 PR PBB SHADOW E&M-EST. PATIENT-LVL III: CPT | Mod: PBBFAC,,, | Performed by: PODIATRIST

## 2023-06-26 PROCEDURE — 3008F BODY MASS INDEX DOCD: CPT | Mod: CPTII,S$GLB,, | Performed by: PODIATRIST

## 2023-06-26 PROCEDURE — 3074F PR MOST RECENT SYSTOLIC BLOOD PRESSURE < 130 MM HG: ICD-10-PCS | Mod: CPTII,S$GLB,, | Performed by: PODIATRIST

## 2023-06-26 PROCEDURE — 3008F PR BODY MASS INDEX (BMI) DOCUMENTED: ICD-10-PCS | Mod: CPTII,S$GLB,, | Performed by: PODIATRIST

## 2023-06-26 PROCEDURE — 3044F PR MOST RECENT HEMOGLOBIN A1C LEVEL <7.0%: ICD-10-PCS | Mod: CPTII,S$GLB,, | Performed by: PODIATRIST

## 2023-06-26 PROCEDURE — 99213 PR OFFICE/OUTPT VISIT, EST, LEVL III, 20-29 MIN: ICD-10-PCS | Mod: S$GLB,,, | Performed by: PODIATRIST

## 2023-06-26 PROCEDURE — 3078F DIAST BP <80 MM HG: CPT | Mod: CPTII,S$GLB,, | Performed by: PODIATRIST

## 2023-06-26 PROCEDURE — 3044F HG A1C LEVEL LT 7.0%: CPT | Mod: CPTII,S$GLB,, | Performed by: PODIATRIST

## 2023-06-26 PROCEDURE — 99213 OFFICE O/P EST LOW 20 MIN: CPT | Mod: S$GLB,,, | Performed by: PODIATRIST

## 2023-06-26 PROCEDURE — 3074F SYST BP LT 130 MM HG: CPT | Mod: CPTII,S$GLB,, | Performed by: PODIATRIST

## 2023-07-03 NOTE — PROGRESS NOTES
Subjective:      Patient ID: Martha Shah is a 52 y.o. male.    Chief Complaint: Bunions and Foot Pain (Right foot)    Ms. Shah is a 52 y.o. who presents for f/u R foot pain. Last seen 6 wks.ago for f/u neuroma/ metatarsalgia R; previous plantar fasciitis L & retrocalcaneal spur improved w/ arch pads, heel lifts & topical Diclofenac.  States metatarsalgia R had resolved including no crampng. However, had flareup to pint could barely walk followed by dorsolateral R foot/ ankle swelling, started 2 wks.ago so went into BAW boot which helped. Took out met.pad from orthotics as it was too far back.   Noticed bunion R foot but currently no symptoms.  Last visit 5/15/23 - Previously seen in this clinic 5 months prior for retrocalcaneal spur, plantar fasciitis L & IGTN - advised on appropriate supportive shoes w/ arch support, heel lift & topical Voltaren gel.   Treatment: advised on continue met.gelstrap or met.bar. Additional PPT arch pads added. Was to call prn as Asx as long as wears appropriate support & padding.  In both cases, no f/u in a month as had stated condition improved.     Shoe gear:  Tennis shoes regularly because wide enough.     PCP: [facility] - [place].  PCP: Estrada Partida MD 1/13/23    Past Medical History:   Diagnosis Date    Allergy     Hypertension     Urinary tract infection      Patient Active Problem List   Diagnosis    Obstructive sleep apnea on CPAP    Morbid obesity with BMI of 50.0-59.9, adult    Primary hypertension    Migraine with aura and without status migrainosus, not intractable    Eosinophilic esophagitis    Gastroesophageal reflux disease with esophagitis without hemorrhage      Objective:       X-Ray Foot Complete 3 view Right  Narrative: EXAMINATION:  XR FOOT COMPLETE 3 VIEW RIGHT    CLINICAL HISTORY:  . Pain in right foot    TECHNIQUE:  AP, lateral, and oblique views of the right foot were performed.    COMPARISON:  None.    FINDINGS:  No displaced  fracture or dislocation.  No destructive osseous process.  Cartilage spaces appear adequately maintained.  Minimal enthesopathic changes at distal Achilles insertion site.  Tiny plantar calcaneal spur.  Soft tissues are unremarkable.  Impression: As above.    Electronically signed by: Gian Campbell  Date:    01/03/2023  Time:    14:25  I independently reviewed the x-ray images.    Physical Exam  Vitals reviewed.   Constitutional:       Appearance: He is well-developed. He is morbidly obese.   Cardiovascular:      Pulses:           Dorsalis pedis pulses are 2+ on the right side and 2+ on the left side.   Musculoskeletal:         General: Swelling and tenderness present. No signs of injury.      Right lower leg: No edema.      Left lower leg: No edema.      Left ankle:      Left Achilles Tendon: Tenderness present. No defects. Abdul's test negative.      Left foot: Swelling and bony tenderness present. No tenderness.      Comments: Equinus L ankles with < 90 deg foot to leg noted with knees extended.   Musculoskeletal strength of extrinsics to foot and ankle + 5/5 in DF/PF/Inv/Ev to resistance with no reproduction of pain in any direction.   Passive ROM of ankle and pedal joints is painless and without crepitation L.   Feet:      Right foot:      Skin integrity: Skin integrity normal.      Toenail Condition: Right toenails are ingrown.      Left foot:      Skin integrity: Skin integrity normal.      Toenail Condition: Left toenails are ingrown.      Comments: Inserts w/ PPT arch pads & heel lift but no met.pad.(states was hurting in arch as too far back).    Equinus R ankles w/ < 90 deg foot to leg noted with knees extended.      MS strength of extrinsics to foot & ankle R + 5/5 in DF/PF/Inv/Ev to resistance w/ no reproduction of pain in any direction.   No TTP midfoot dorsolateral, no STJ, lateral AJ R. Edema lateral rearfoot overlying EDB muscle belly.   No TTP peronei R.    Passive ROM of ankle & pedal joints  is painless w/out crepitation B/L.  Skin:     General: Skin is warm and dry.      Capillary Refill: Capillary refill takes less than 2 seconds.      Findings: No bruising, erythema or signs of injury.   Neurological:      Mental Status: He is alert and oriented to person, place, and time.      Motor: Motor function is intact. No weakness.      Gait: Gait is intact. Gait (short BAW R) normal.      Comments: TTP & fullness distal 3rd IMS>2nd R foot but no palpable clicking/ - Renetta's sign.    Previously placed PPT met.pad has been removed from orthotics.   Psychiatric:         Mood and Affect: Affect normal. Mood is anxious.         Behavior: Behavior normal. Behavior is cooperative.       Assessment:      Encounter Diagnoses   Name Primary?    Bunion, right Yes    Plantar neuroma, right     Metatarsalgia, right foot     Pain in right foot     Morbid obesity with BMI of 50.0-59.9, adult     Right ankle swelling     Strain of muscle and tendon of long extensor muscle of toe at ankle and foot level, right foot, initial encounter        Problem List Items Addressed This Visit          Endocrine    Morbid obesity with BMI of 50.0-59.9, adult    Current Assessment & Plan     BMI 50.41kg/m2 - follows w/ PCP          Other Visit Diagnoses       Bunion, right    -  Primary    Plantar neuroma, right        Metatarsalgia, right foot        Pain in right foot        Relevant Orders    IMMOBILIZER FOR HOME USE    Right ankle swelling        Relevant Orders    IMMOBILIZER FOR HOME USE    Strain of muscle and tendon of long extensor muscle of toe at ankle and foot level, right foot, initial encounter        Relevant Orders    IMMOBILIZER FOR HOME USE          Plan:       Martha was seen today for bunions and foot pain.    Diagnoses and all orders for this visit:    Bunion, right    Plantar neuroma, right    Metatarsalgia, right foot    Pain in right foot  -     IMMOBILIZER FOR HOME USE    Morbid obesity with BMI of 50.0-59.9,  adult    Right ankle swelling  -     IMMOBILIZER FOR HOME USE    Strain of muscle and tendon of long extensor muscle of toe at ankle and foot level, right foot, initial encounter  -     IMMOBILIZER FOR HOME USE    I counseled the patient on his conditions, their implications and medical management.    Advised patient on appropriate supportive shoes w/ supports @ all times WB including in home.    Continue met.gelstrap R prn padding in shoes w/out offloading prn.    PPT arch pad R foot switched out to incorporate a met.bar instead of previous  arch pad & met.egg pad - patient found more comfortable w/ offloading in transverse arch.    Continue heel lifts.    Continue Voltaren gel up to qid prn.    Dispensed ASO ankle brace (w/ Tubigrip) for use in tennis shoes,  in place of BAW for R lateral ankle/ hindfoot - 4-6 wks.prn, prior to return to adjusted orthotics..        A total of 34 mins.was spent on chart review, patient visit & documentation.

## 2023-07-07 ENCOUNTER — OFFICE VISIT (OUTPATIENT)
Dept: PRIMARY CARE CLINIC | Facility: CLINIC | Age: 53
End: 2023-07-07
Payer: COMMERCIAL

## 2023-07-07 VITALS
HEART RATE: 53 BPM | HEIGHT: 65 IN | TEMPERATURE: 97 F | RESPIRATION RATE: 16 BRPM | DIASTOLIC BLOOD PRESSURE: 88 MMHG | WEIGHT: 303.69 LBS | OXYGEN SATURATION: 99 % | SYSTOLIC BLOOD PRESSURE: 130 MMHG | BODY MASS INDEX: 50.6 KG/M2

## 2023-07-07 DIAGNOSIS — H00.012 HORDEOLUM EXTERNUM OF RIGHT LOWER EYELID: Primary | ICD-10-CM

## 2023-07-07 PROCEDURE — 3044F HG A1C LEVEL LT 7.0%: CPT | Mod: CPTII,S$GLB,, | Performed by: NURSE PRACTITIONER

## 2023-07-07 PROCEDURE — 99999 PR PBB SHADOW E&M-EST. PATIENT-LVL IV: CPT | Mod: PBBFAC,,, | Performed by: NURSE PRACTITIONER

## 2023-07-07 PROCEDURE — 1159F MED LIST DOCD IN RCRD: CPT | Mod: CPTII,S$GLB,, | Performed by: NURSE PRACTITIONER

## 2023-07-07 PROCEDURE — 1159F PR MEDICATION LIST DOCUMENTED IN MEDICAL RECORD: ICD-10-PCS | Mod: CPTII,S$GLB,, | Performed by: NURSE PRACTITIONER

## 2023-07-07 PROCEDURE — 3008F BODY MASS INDEX DOCD: CPT | Mod: CPTII,S$GLB,, | Performed by: NURSE PRACTITIONER

## 2023-07-07 PROCEDURE — 99999 PR PBB SHADOW E&M-EST. PATIENT-LVL IV: ICD-10-PCS | Mod: PBBFAC,,, | Performed by: NURSE PRACTITIONER

## 2023-07-07 PROCEDURE — 99214 OFFICE O/P EST MOD 30 MIN: CPT | Mod: S$GLB,,, | Performed by: NURSE PRACTITIONER

## 2023-07-07 PROCEDURE — 3075F SYST BP GE 130 - 139MM HG: CPT | Mod: CPTII,S$GLB,, | Performed by: NURSE PRACTITIONER

## 2023-07-07 PROCEDURE — 3044F PR MOST RECENT HEMOGLOBIN A1C LEVEL <7.0%: ICD-10-PCS | Mod: CPTII,S$GLB,, | Performed by: NURSE PRACTITIONER

## 2023-07-07 PROCEDURE — 3075F PR MOST RECENT SYSTOLIC BLOOD PRESS GE 130-139MM HG: ICD-10-PCS | Mod: CPTII,S$GLB,, | Performed by: NURSE PRACTITIONER

## 2023-07-07 PROCEDURE — 3008F PR BODY MASS INDEX (BMI) DOCUMENTED: ICD-10-PCS | Mod: CPTII,S$GLB,, | Performed by: NURSE PRACTITIONER

## 2023-07-07 PROCEDURE — 3079F DIAST BP 80-89 MM HG: CPT | Mod: CPTII,S$GLB,, | Performed by: NURSE PRACTITIONER

## 2023-07-07 PROCEDURE — 99214 PR OFFICE/OUTPT VISIT, EST, LEVL IV, 30-39 MIN: ICD-10-PCS | Mod: S$GLB,,, | Performed by: NURSE PRACTITIONER

## 2023-07-07 PROCEDURE — 3079F PR MOST RECENT DIASTOLIC BLOOD PRESSURE 80-89 MM HG: ICD-10-PCS | Mod: CPTII,S$GLB,, | Performed by: NURSE PRACTITIONER

## 2023-07-07 RX ORDER — ERYTHROMYCIN 5 MG/G
OINTMENT OPHTHALMIC 3 TIMES DAILY
Qty: 1 G | Refills: 0 | Status: SHIPPED | OUTPATIENT
Start: 2023-07-07 | End: 2023-10-12

## 2023-07-07 NOTE — PROGRESS NOTES
"Chief Complaint  Chief Complaint   Patient presents with    Eye Problem     Right eye       HPI    In today for right eye  redness and itching. Started 2 days ago with no precipitating factors that she can remember. Since that time she has used no drops or medication. Reports "sand" in eye several times a day since this has happened, that is greenish in color. Denies any excessive tearing. Mostly feels like there is a sore at the base of her eye. Denies any feelings of something being in her eye. Denies any blurry or double vision. Denies any new spots or floaters seen. Endorses photophobia. Wears glassess baseline, but denies any contact use. Has began taking plant sterols chews for gallbladder pain. Denies any diarrhea, nausea, vomiting, constipation.     Eye Problem   Associated symptoms include an eye discharge (scant, "sand-like"), eye redness and itching. Pertinent negatives include no fever, nausea, photophobia or vomiting.     PAST MEDICAL HISTORY:  Past Medical History:   Diagnosis Date    Allergy     Hypertension     Urinary tract infection        PAST SURGICAL HISTORY:  Past Surgical History:   Procedure Laterality Date    ADENOIDECTOMY      ADENOIDECTOMY      BIOPSY OF ADENOIDS      COLONOSCOPY N/A 09/14/2022    Procedure: COLONOSCOPY;  Surgeon: Jordy Simons MD;  Location: [facility];  Service: Endoscopy;  Laterality: N/A;    ESOPHAGOGASTRODUODENOSCOPY N/A 09/14/2022    Procedure: EGD (ESOPHAGOGASTRODUODENOSCOPY);  Surgeon: Jordy Simons MD;  Location: [facility];  Service: Endoscopy;  Laterality: N/A;    ESOPHAGOGASTRODUODENOSCOPY  12/19/2022    with biopsy    ESOPHAGOGASTRODUODENOSCOPY N/A 12/19/2022    Procedure: EGD (ESOPHAGOGASTRODUODENOSCOPY);  Surgeon: Jordy Simons MD;  Location: [facility];  Service: Endoscopy;  Laterality: N/A;    TONSILLECTOMY         SOCIAL HISTORY:  Social History     Socioeconomic History    Marital status:    Tobacco Use    Smoking status: Never    Smokeless " "tobacco: Never   Substance and Sexual Activity    Alcohol use: Yes     Comment: social    Drug use: No    Sexual activity: Yes     Partners: Female       FAMILY HISTORY:  Family History   Problem Relation Age of Onset    Lupus Mother     Heart disease Father     Prostate cancer Neg Hx     Kidney disease Neg Hx        ALLERGIES AND MEDICATIONS: updated and reviewed.  Review of patient's allergies indicates:   Allergen Reactions    Sulfa (sulfonamide antibiotics) Shortness Of Breath    Prochlorperazine Hallucinations     Other reaction(s): Compazine  Note:  Adverse Reaction: Other    Terbinafine     Terbinafine hcl      Other reaction(s): terbinafine 250 mg tablet  Note:  Adverse Reaction: Upset Stomach    Penicillins Rash     Other reaction(s): Penicillins  Note:  Adverse Reaction: Rash     Current Outpatient Medications   Medication Sig Dispense Refill    BABY ASPIRIN ORAL       cetirizine (ZYRTEC) 10 MG tablet Take 10 mg by mouth once daily.      diclofenac sodium (VOLTAREN) 1 % Gel Apply 2 g topically 4 (four) times daily as needed. 350 g 2    hydroCHLOROthiazide (HYDRODIURIL) 25 MG tablet Take 1 tablet (25 mg total) by mouth once daily. 90 tablet 3    omega 3-dha-epa-fish oil 1,000 mg (120 mg-180 mg) Cap       pantoprazole (PROTONIX) 40 MG tablet TAKE 1 TABLET BY MOUTH TWICE A  tablet 1    propranoloL (INDERAL) 40 MG tablet Take 1 tablet (40 mg total) by mouth 2 (two) times daily. 180 tablet 3    erythromycin (ROMYCIN) ophthalmic ointment Place into the right eye 3 (three) times daily. 1 g 0     No current facility-administered medications for this visit.         ROS  Review of Systems   Constitutional:  Negative for chills, fatigue and fever.   HENT:  Negative for congestion, sinus pressure, sinus pain and sneezing.    Eyes:  Positive for discharge (scant, "sand-like"), redness and itching. Negative for photophobia, pain and visual disturbance.   Respiratory:  Negative for chest tightness and shortness of " "breath.    Cardiovascular:  Negative for chest pain.   Gastrointestinal:  Negative for constipation, diarrhea, nausea and vomiting.   Genitourinary:  Negative for dysuria.   Skin:  Negative for color change.   Neurological:  Negative for dizziness, light-headedness and headaches.   Psychiatric/Behavioral:  Negative for agitation.          PHYSICAL EXAM  Vitals:    07/07/23 0947   BP: 130/88   BP Location: Right arm   Patient Position: Sitting   BP Method: Medium (Manual)   Pulse: (!) 53   Resp: 16   Temp: 97.1 °F (36.2 °C)   TempSrc: Temporal   SpO2: 99%   Weight: (!) 137.8 kg (303 lb 10.9 oz)   Height: 5' 5" (1.651 m)    Body mass index is 50.54 kg/m².  Weight: (!) 137.8 kg (303 lb 10.9 oz)   Height: 5' 5" (165.1 cm)     Physical Exam  Constitutional:       Appearance: Normal appearance.   HENT:      Head: Normocephalic.      Nose: Nose normal.      Mouth/Throat:      Mouth: Mucous membranes are moist.   Eyes:      General:         Right eye: Hordeolum present. No foreign body or discharge.      Extraocular Movements: Extraocular movements intact.      Conjunctiva/sclera: Conjunctivae normal.      Right eye: No exudate or hemorrhage.     Pupils: Pupils are equal, round, and reactive to light.   Cardiovascular:      Rate and Rhythm: Normal rate.   Musculoskeletal:         General: Normal range of motion.      Cervical back: Normal range of motion.   Skin:     Capillary Refill: Capillary refill takes 2 to 3 seconds.   Neurological:      Mental Status: He is alert.   Psychiatric:         Mood and Affect: Mood normal.       Health Maintenance         Date Due Completion Date    COVID-19 Vaccine (5 - Mixed Product series) 01/03/2024 (Originally 6/5/2021) 4/10/2021    Influenza Vaccine (1) 09/01/2023 1/3/2023    Hemoglobin A1c (Diabetic Prevention Screening) 01/13/2026 1/13/2023    Lipid Panel 01/13/2028 1/13/2023    TETANUS VACCINE 06/14/2028 6/14/2018    Colorectal Cancer Screening 09/14/2032 9/14/2022      "         Assessment & Plan    Problem List Items Addressed This Visit    None  Visit Diagnoses       Hordeolum externum of right lower eyelid    -  Primary    Relevant Medications    erythromycin (ROMYCIN) ophthalmic ointment  Discussed with patient that hordeolum is generally inflammatory in nature and responds well to warm compresses and time. Erythromycin ointment for worsening.         I have reviewed the notes, assessments, and/or procedures performed by this student, I concur with her/his documentation.      Follow-up: Follow up if symptoms worsen or fail to improve.    Mairanne Monsalve    Medication List with Changes/Refills   New Medications    ERYTHROMYCIN (ROMYCIN) OPHTHALMIC OINTMENT    Place into the right eye 3 (three) times daily.   Current Medications    BABY ASPIRIN ORAL        CETIRIZINE (ZYRTEC) 10 MG TABLET    Take 10 mg by mouth once daily.    DICLOFENAC SODIUM (VOLTAREN) 1 % GEL    Apply 2 g topically 4 (four) times daily as needed.    HYDROCHLOROTHIAZIDE (HYDRODIURIL) 25 MG TABLET    Take 1 tablet (25 mg total) by mouth once daily.    OMEGA 3-DHA-EPA-FISH OIL 1,000 MG (120 MG-180 MG) CAP        PANTOPRAZOLE (PROTONIX) 40 MG TABLET    TAKE 1 TABLET BY MOUTH TWICE A DAY    PROPRANOLOL (INDERAL) 40 MG TABLET    Take 1 tablet (40 mg total) by mouth 2 (two) times daily.

## 2023-09-18 ENCOUNTER — PATIENT MESSAGE (OUTPATIENT)
Dept: PRIMARY CARE CLINIC | Facility: CLINIC | Age: 53
End: 2023-09-18
Payer: COMMERCIAL

## 2023-10-03 ENCOUNTER — OFFICE VISIT (OUTPATIENT)
Dept: NEUROLOGY | Facility: CLINIC | Age: 53
End: 2023-10-03
Payer: COMMERCIAL

## 2023-10-03 ENCOUNTER — TELEPHONE (OUTPATIENT)
Dept: NEUROLOGY | Facility: CLINIC | Age: 53
End: 2023-10-03

## 2023-10-03 DIAGNOSIS — R51.9 NONINTRACTABLE HEADACHE, UNSPECIFIED CHRONICITY PATTERN, UNSPECIFIED HEADACHE TYPE: ICD-10-CM

## 2023-10-03 DIAGNOSIS — G43.109 MIGRAINE WITH AURA AND WITHOUT STATUS MIGRAINOSUS, NOT INTRACTABLE: Primary | ICD-10-CM

## 2023-10-03 PROCEDURE — 1160F PR REVIEW ALL MEDS BY PRESCRIBER/CLIN PHARMACIST DOCUMENTED: ICD-10-PCS | Mod: CPTII,95,, | Performed by: PHYSICIAN ASSISTANT

## 2023-10-03 PROCEDURE — 3044F PR MOST RECENT HEMOGLOBIN A1C LEVEL <7.0%: ICD-10-PCS | Mod: CPTII,95,, | Performed by: PHYSICIAN ASSISTANT

## 2023-10-03 PROCEDURE — 3044F HG A1C LEVEL LT 7.0%: CPT | Mod: CPTII,95,, | Performed by: PHYSICIAN ASSISTANT

## 2023-10-03 PROCEDURE — 1159F PR MEDICATION LIST DOCUMENTED IN MEDICAL RECORD: ICD-10-PCS | Mod: CPTII,95,, | Performed by: PHYSICIAN ASSISTANT

## 2023-10-03 PROCEDURE — 99205 OFFICE O/P NEW HI 60 MIN: CPT | Mod: 95,,, | Performed by: PHYSICIAN ASSISTANT

## 2023-10-03 PROCEDURE — 99205 PR OFFICE/OUTPT VISIT, NEW, LEVL V, 60-74 MIN: ICD-10-PCS | Mod: 95,,, | Performed by: PHYSICIAN ASSISTANT

## 2023-10-03 PROCEDURE — 1160F RVW MEDS BY RX/DR IN RCRD: CPT | Mod: CPTII,95,, | Performed by: PHYSICIAN ASSISTANT

## 2023-10-03 PROCEDURE — 1159F MED LIST DOCD IN RCRD: CPT | Mod: CPTII,95,, | Performed by: PHYSICIAN ASSISTANT

## 2023-10-03 NOTE — PROGRESS NOTES
"New Patient     The patient location is: [place]  The chief complaint leading to consultation is: headache     Visit type: audiovisual    Face to Face time with patient: 28 min  79 minutes of total time spent on the encounter, which includes face to face time and non-face to face time preparing to see the patient (eg, review of tests), Obtaining and/or reviewing separately obtained history, Documenting clinical information in the electronic or other health record, Independently interpreting results (not separately reported) and communicating results to the patient/family/caregiver, or Care coordination (not separately reported).     Each patient to whom he or she provides medical services by telemedicine is:  (1) informed of the relationship between the physician and patient and the respective role of any other health care provider with respect to management of the patient; and (2) notified that he or she may decline to receive medical services by telemedicine and may withdraw from such care at any time.    Notes:       SUBJECTIVE:  Patient ID: Martha Shah   MRN: [id number]  Referred By: No ref. provider found  Chief Complaint: Headache      History of Present Illness:   52 y.o. with migraine, HTN, eosinophilic esophagitis, GERD, ALFREDO on cpap, obesity, who presents to virtual visit alone for evaluation of headaches.   PMHx negative for TBI, Meningitis, Aneurysms, Kidney Stones, asthma, GI bleed, osteoporosis, CAD/MI, CVA/TIA, DM, cancer  Family Hx + for Migraines in mother    Pt has a h/o griffith's since 20's. Pt has difficulty describing characteristics of their headaches often endorsing "I dont know" or "hard for me to say". But they do recall HA's described as a b/l or unilateral frontalis throbbing like pain with associated nausea, photophobia, and phonophobia that used to occur 2-3 times a week, lasting up to 6 hrs, improved on daily propranolol.  Pt cannot recall current frequency of this HA, "been a long time" since " "their last one. Pt also recalls experiencing "flashing tiger stripes or spiral" in vision prior to HA.This pain can be triggered by stress. Pt also recalls other  ha's in their lifetime described as follows: pain that travels up from neck that is described as a "soreness" in their occipitalis region with associated stiffness in neck aggravated by turning the head; another HA occurs a couple of times a month triggered by allergies/congestion in the frontalis region also described as a "soreness"; another HA can be head band like in distribution resolving with ibuprofen. However, pt presenting today due to expreiencing last month w/ a respiratory illness a HA that lasted 3-4 days, but has since resolved. It may have lasted a few seconds at a time, occurring multiple times in a day. It was in the left temple and was possibly a mild to moderate "warm, dull, sore" type of pain. Pressure and ibuprofen helped. Does not recall triggers or aggravating factors. May have had a "few twinges" around the time that "felt carmen migrainey". May have also had some eye watering and a spreading "warmth" like feeling in their left occipitalis region. But denies all other ROS including photophobia, phonophobia, osmophobia, nausea, vomiting, rhinorrhea, nasal congestion, blurry vision, diplopia, spots in vision, dizziness, vertigo, confusion, impaired concentration, neck pain, swollen/droopy eyelid, swelling around the eye, conjunctival injection, red/flushed face, facial sweating, tinnitus, whooshing sounds, dysarthria, ataxia, paresthesias, loc, sz's, weakness, trauma, fevers.     Treatments Tried   Propranolol  Ibuprofen     Social History  Alcohol - denies  Smoke - denies  Recreational Drug Use- denies    Current Medications:    Current Outpatient Medications:     BABY ASPIRIN ORAL, , Disp: , Rfl:     cetirizine (ZYRTEC) 10 MG tablet, Take 10 mg by mouth once daily., Disp: , Rfl:     diclofenac sodium (VOLTAREN) 1 % Gel, Apply 2 g " topically 4 (four) times daily as needed., Disp: 350 g, Rfl: 2    erythromycin (ROMYCIN) ophthalmic ointment, Place into the right eye 3 (three) times daily., Disp: 1 g, Rfl: 0    hydroCHLOROthiazide (HYDRODIURIL) 25 MG tablet, Take 1 tablet (25 mg total) by mouth once daily., Disp: 90 tablet, Rfl: 3    omega 3-dha-epa-fish oil 1,000 mg (120 mg-180 mg) Cap, , Disp: , Rfl:     pantoprazole (PROTONIX) 40 MG tablet, TAKE 1 TABLET BY MOUTH TWICE A DAY, Disp: 180 tablet, Rfl: 1    propranoloL (INDERAL) 40 MG tablet, Take 1 tablet (40 mg total) by mouth 2 (two) times daily., Disp: 180 tablet, Rfl: 3    Review of Systems - as per HPI, otherwise a balanced 10 systems review is negative.    OBJECTIVE:  Vitals:  There were no vitals taken for this visit.    Physical Exam: certain limitations due to video visit platform, able to perform the following with the patient's assistance:  Constitutional: patient appears well-developed and well-nourished is well groomed. NAD   HENT:    Head: Normocephalic and atraumatic  Eyes: Conjunctivae and EOM are normal  Musculoskeletal: Normal range of motion. No joint stiffness.   Psychiatric: Mood and affect are normal    Neuro: Patient is alert and oriented to person, place, and time. Language is intact and fluent. Speech is clear and fluent. Recent and remote memory are intact.  Normal attention and concentration.  Facial movement is symmetric. Moves all 4 extremities against gravity.     Review of Data:   Notes from pcp reviewed   Labs:  No visits with results within 3 Month(s) from this visit.   Latest known visit with results is:   Lab Visit on 01/13/2023   Component Date Value Ref Range Status    WBC 01/13/2023 8.49  3.90 - 12.70 K/uL Final    RBC 01/13/2023 4.85  4.60 - 6.20 M/uL Final    Hemoglobin 01/13/2023 14.2  14.0 - 18.0 g/dL Final    Hematocrit 01/13/2023 42.5  40.0 - 54.0 % Final    MCV 01/13/2023 88  82 - 98 fL Final    MCH 01/13/2023 29.3  27.0 - 31.0 pg Final    MCHC  01/13/2023 33.4  32.0 - 36.0 g/dL Final    RDW 01/13/2023 13.7  11.5 - 14.5 % Final    Platelets 01/13/2023 255  150 - 450 K/uL Final    MPV 01/13/2023 10.8  9.2 - 12.9 fL Final    Immature Granulocytes 01/13/2023 0.2  0.0 - 0.5 % Final    Gran # (ANC) 01/13/2023 5.3  1.8 - 7.7 K/uL Final    Immature Grans (Abs) 01/13/2023 0.02  0.00 - 0.04 K/uL Final    Lymph # 01/13/2023 2.3  1.0 - 4.8 K/uL Final    Mono # 01/13/2023 0.6  0.3 - 1.0 K/uL Final    Eos # 01/13/2023 0.3  0.0 - 0.5 K/uL Final    Baso # 01/13/2023 0.05  0.00 - 0.20 K/uL Final    nRBC 01/13/2023 0  0 /100 WBC Final    Gran % 01/13/2023 61.8  38.0 - 73.0 % Final    Lymph % 01/13/2023 27.1  18.0 - 48.0 % Final    Mono % 01/13/2023 7.2  4.0 - 15.0 % Final    Eosinophil % 01/13/2023 3.1  0.0 - 8.0 % Final    Basophil % 01/13/2023 0.6  0.0 - 1.9 % Final    Differential Method 01/13/2023 Automated   Final    Sodium 01/13/2023 139  136 - 145 mmol/L Final    Potassium 01/13/2023 4.0  3.5 - 5.1 mmol/L Final    Chloride 01/13/2023 100  95 - 110 mmol/L Final    CO2 01/13/2023 29  23 - 29 mmol/L Final    Glucose 01/13/2023 100  70 - 110 mg/dL Final    BUN 01/13/2023 9  6 - 20 mg/dL Final    Creatinine 01/13/2023 1.1  0.5 - 1.4 mg/dL Final    Calcium 01/13/2023 9.5  8.7 - 10.5 mg/dL Final    Total Protein 01/13/2023 7.6  6.0 - 8.4 g/dL Final    Albumin 01/13/2023 3.8  3.5 - 5.2 g/dL Final    Total Bilirubin 01/13/2023 0.7  0.1 - 1.0 mg/dL Final    Alkaline Phosphatase 01/13/2023 109  55 - 135 U/L Final    AST 01/13/2023 25  10 - 40 U/L Final    ALT 01/13/2023 31  10 - 44 U/L Final    Anion Gap 01/13/2023 10  8 - 16 mmol/L Final    eGFR 01/13/2023 >60.0  >60 mL/min/1.73 m^2 Final    Cholesterol 01/13/2023 195  120 - 199 mg/dL Final    Triglycerides 01/13/2023 148  30 - 150 mg/dL Final    HDL 01/13/2023 34 (L)  40 - 75 mg/dL Final    LDL Cholesterol 01/13/2023 131.4  63.0 - 159.0 mg/dL Final    HDL/Cholesterol Ratio 01/13/2023 17.4 (L)  20.0 - 50.0 % Final    Total  Cholesterol/HDL Ratio 01/13/2023 5.7 (H)  2.0 - 5.0 Final    Non-HDL Cholesterol 01/13/2023 161  mg/dL Final    Hemoglobin A1C 01/13/2023 5.2  4.0 - 5.6 % Final    Estimated Avg Glucose 01/13/2023 103  68 - 131 mg/dL Final    TSH 01/13/2023 3.456  0.400 - 4.000 uIU/mL Final    Hepatitis C Ab 01/13/2023 Non-reactive  Non-reactive Final    HIV 1/2 Ag/Ab 01/13/2023 Non-reactive  Non-reactive Final     Imaging:  No results found for this or any previous visit.  Note: I have independently reviewed any/all imaging/labs/tests and agree with the report (s) as documented.  Any discrepancies will be as noted/demarcated by free text.  KRYSTAL JERNIGAN 10/3/2023    ASSESSMENT:  1. Migraine with aura and without status migrainosus, not intractable    2. Nonintractable headache, unspecified chronicity pattern, unspecified headache type          PLAN:  - Discussed symptoms appear to be consistent with migraines and tension ha's, Ddx includes ON, cervicogenic ha's, TACs. Discussed this with patient along with treatment options and patient agreed with the following plan  - advised pt to begin tracking ha's for further differentiation   - continue propranolol daily  - donato - continue cpap nightly  - importance of healthy diet, regular exercise and sleep hygiene in the treatment of headaches    - RTC in 3 mo w/ HA diary          I have discussed realistic goals of care with patient at length as well as medication options, and need for lifestyle adjustment. I have explained that treatment will take time. We have agreed that the goal will be to reduce frequency/intensity/quantity of HA, not to be completely HA free. I have explained my non narcotic policy regarding headache treatment.    Patient agreeable to work on lifestyle adjustments.    Questions and concerns were sought and answered to the patient's stated verbal satisfaction.  The patient verbalizes understanding and agreement with the above stated treatment plan.     CC: Estrada Partida  MD Malissa RICO PA-C  [facility]  [phone]    Dr. Pires was available during today's encounter.

## 2023-10-03 NOTE — TELEPHONE ENCOUNTER
----- Message from Malissa Mancia PA-C sent at 10/3/2023  3:52 PM CDT -----  3 mo f/up, in person, 1 hr slot

## 2023-10-11 ENCOUNTER — OFFICE VISIT (OUTPATIENT)
Dept: URGENT CARE | Facility: CLINIC | Age: 53
End: 2023-10-11
Payer: COMMERCIAL

## 2023-10-11 VITALS
TEMPERATURE: 97 F | WEIGHT: 303 LBS | OXYGEN SATURATION: 97 % | RESPIRATION RATE: 20 BRPM | SYSTOLIC BLOOD PRESSURE: 138 MMHG | BODY MASS INDEX: 50.48 KG/M2 | HEART RATE: 68 BPM | DIASTOLIC BLOOD PRESSURE: 84 MMHG | HEIGHT: 65 IN

## 2023-10-11 DIAGNOSIS — J32.9 RHINOSINUSITIS: Primary | ICD-10-CM

## 2023-10-11 LAB
CTP QC/QA: YES
SARS-COV-2 AG RESP QL IA.RAPID: NEGATIVE

## 2023-10-11 PROCEDURE — 99213 OFFICE O/P EST LOW 20 MIN: CPT | Mod: S$GLB,,, | Performed by: NURSE PRACTITIONER

## 2023-10-11 PROCEDURE — 99213 PR OFFICE/OUTPT VISIT, EST, LEVL III, 20-29 MIN: ICD-10-PCS | Mod: S$GLB,,, | Performed by: NURSE PRACTITIONER

## 2023-10-11 PROCEDURE — 87811 SARS CORONAVIRUS 2 ANTIGEN POCT, MANUAL READ: ICD-10-PCS | Mod: QW,S$GLB,, | Performed by: NURSE PRACTITIONER

## 2023-10-11 PROCEDURE — 87811 SARS-COV-2 COVID19 W/OPTIC: CPT | Mod: QW,S$GLB,, | Performed by: NURSE PRACTITIONER

## 2023-10-11 RX ORDER — MONTELUKAST SODIUM 10 MG/1
10 TABLET ORAL NIGHTLY
Qty: 30 TABLET | Refills: 0 | Status: SHIPPED | OUTPATIENT
Start: 2023-10-11 | End: 2023-11-10

## 2023-10-11 NOTE — PROGRESS NOTES
"Subjective:      Patient ID: Martha Shah is a 52 y.o. male.    Vitals:  height is 5' 5" (1.651 m) and weight is 137.4 kg (303 lb) (abnormal). His oral temperature is 97.3 °F (36.3 °C). His blood pressure is 138/84 and his pulse is 68. His respiration is 20 and oxygen saturation is 97%.     Chief Complaint: Nasal Congestion (Yellow/bloody mucus, cough, sore throat, fever off and on - COVID test negative - Entered by patient)    Negative covid test at home      Cough  This is a new problem. The current episode started 1 to 4 weeks ago (3 weeks now). The problem has been gradually worsening. The problem occurs every few minutes. The cough is Productive of sputum (brown). Associated symptoms include nasal congestion and postnasal drip. Pertinent negatives include no chest pain, chills, ear congestion, ear pain, fever, headaches, heartburn, hemoptysis, myalgias, rash, rhinorrhea, sore throat, shortness of breath, sweats, weight loss or wheezing. Nothing aggravates the symptoms. Treatments tried: tylenol. The treatment provided mild relief. There is no history of asthma, bronchitis, COPD or pneumonia.       Constitution: Negative for chills and fever.   HENT:  Positive for postnasal drip. Negative for ear pain and sore throat.    Cardiovascular:  Negative for chest pain.   Respiratory:  Positive for cough. Negative for bloody sputum, shortness of breath and wheezing.    Gastrointestinal:  Negative for heartburn.   Musculoskeletal:  Negative for muscle ache.   Skin:  Negative for rash.   Neurological:  Negative for headaches.      Objective:     Physical Exam   Constitutional: obesity  HENT:   Head: Normocephalic.   Ears:   Right Ear: Tympanic membrane and ear canal normal.   Left Ear: Tympanic membrane and ear canal normal.   Nose: Nose normal.   Mouth/Throat: Mucous membranes are moist. No oropharyngeal exudate or posterior oropharyngeal erythema. Oropharynx is clear.   Cardiovascular: Normal rate and regular rhythm. "   Pulmonary/Chest: Effort normal and breath sounds normal.   Abdominal: Normal appearance.   Neurological: He is alert.       Assessment:     1. Cough, unspecified type        Plan:       Cough, unspecified type  -     SARS Coronavirus 2 Antigen, POCT Manual Read      Results for orders placed or performed in visit on 10/11/23   SARS Coronavirus 2 Antigen, POCT Manual Read   Result Value Ref Range    SARS Coronavirus 2 Antigen Negative Negative     Acceptable Yes      Patient Instructions   Coricidin OTC  Increase fluids  Good Handwashing

## 2023-10-12 ENCOUNTER — ON-DEMAND VIRTUAL (OUTPATIENT)
Dept: URGENT CARE | Facility: CLINIC | Age: 53
End: 2023-10-12
Payer: COMMERCIAL

## 2023-10-12 DIAGNOSIS — L50.9 URTICARIA: Primary | ICD-10-CM

## 2023-10-12 PROCEDURE — 99213 OFFICE O/P EST LOW 20 MIN: CPT | Mod: 95,,, | Performed by: NURSE PRACTITIONER

## 2023-10-12 PROCEDURE — 99213 PR OFFICE/OUTPT VISIT, EST, LEVL III, 20-29 MIN: ICD-10-PCS | Mod: 95,,, | Performed by: NURSE PRACTITIONER

## 2023-10-12 RX ORDER — PREDNISONE 10 MG/1
10 TABLET ORAL 2 TIMES DAILY
Qty: 6 TABLET | Refills: 0 | Status: SHIPPED | OUTPATIENT
Start: 2023-10-12 | End: 2023-10-15

## 2023-10-12 NOTE — PROGRESS NOTES
Subjective:      Patient ID: Martha Shah is a 52 y.o. male.    Vitals:  vitals were not taken for this visit.     Chief Complaint: Allergic Reaction      Visit Type: TELE AUDIOVISUAL    Present with the patient at the time of consultation: TELEMED PRESENT WITH PATIENT: None    Past Medical History:   Diagnosis Date    Allergy     Hypertension     Urinary tract infection      Past Surgical History:   Procedure Laterality Date    ADENOIDECTOMY      ADENOIDECTOMY      BIOPSY OF ADENOIDS      COLONOSCOPY N/A 09/14/2022    Procedure: COLONOSCOPY;  Surgeon: Jordy Simons MD;  Location: [facility];  Service: Endoscopy;  Laterality: N/A;    ESOPHAGOGASTRODUODENOSCOPY N/A 09/14/2022    Procedure: EGD (ESOPHAGOGASTRODUODENOSCOPY);  Surgeon: Jordy Simons MD;  Location: [facility];  Service: Endoscopy;  Laterality: N/A;    ESOPHAGOGASTRODUODENOSCOPY  12/19/2022    with biopsy    ESOPHAGOGASTRODUODENOSCOPY N/A 12/19/2022    Procedure: EGD (ESOPHAGOGASTRODUODENOSCOPY);  Surgeon: Jordy Simons MD;  Location: [facility];  Service: Endoscopy;  Laterality: N/A;    TONSILLECTOMY       Review of patient's allergies indicates:   Allergen Reactions    Sulfa (sulfonamide antibiotics) Shortness Of Breath    Prochlorperazine Hallucinations     Other reaction(s): Compazine  Note:  Adverse Reaction: Other    Terbinafine     Terbinafine hcl      Other reaction(s): terbinafine 250 mg tablet  Note:  Adverse Reaction: Upset Stomach    Penicillins Rash     Other reaction(s): Penicillins  Note:  Adverse Reaction: Rash     Current Outpatient Medications on File Prior to Visit   Medication Sig Dispense Refill    BABY ASPIRIN ORAL       cetirizine (ZYRTEC) 10 MG tablet Take 10 mg by mouth once daily.      diclofenac sodium (VOLTAREN) 1 % Gel Apply 2 g topically 4 (four) times daily as needed. 350 g 2    hydroCHLOROthiazide (HYDRODIURIL) 25 MG tablet Take 1 tablet (25 mg total) by mouth once daily. 90 tablet 3    montelukast (SINGULAIR) 10 mg  tablet Take 1 tablet (10 mg total) by mouth every evening. 30 tablet 0    omega 3-dha-epa-fish oil 1,000 mg (120 mg-180 mg) Cap       pantoprazole (PROTONIX) 40 MG tablet TAKE 1 TABLET BY MOUTH TWICE A  tablet 1    propranoloL (INDERAL) 40 MG tablet Take 1 tablet (40 mg total) by mouth 2 (two) times daily. 180 tablet 3    [DISCONTINUED] erythromycin (ROMYCIN) ophthalmic ointment Place into the right eye 3 (three) times daily. 1 g 0     No current facility-administered medications on file prior to visit.     Family History   Problem Relation Age of Onset    Lupus Mother     Heart disease Father     Prostate cancer Neg Hx     Kidney disease Neg Hx        Medications Ordered                [id number] - [place] - [address]   [address] [facility] [address]    Telephone: [phone]   Fax: [phone]   Hours: Not open 24 hours                         E-Prescribed (1 of 1)              predniSONE (DELTASONE) 10 MG tablet    Sig: Take 1 tablet (10 mg total) by mouth 2 (two) times daily. for 3 days       Start: 10/12/23     Quantity: 6 tablet Refills: 0                           Ohs Peq Odvv Intake    10/12/2023  7:30 AM CDT - Filed by Patient   Describe your reason for todays visit medicine I started yesterday for a cold has given me hives   What is your current physical address in the event of a medical emergency? [address] [place] [address]   Are you able to take your vital signs? Yes   Systolic Blood Pressure: 137   Diastolic Blood Pressure: 86   Weight: 295   Height: 65   Pulse: 61   Temperature: 97.5   Respiration rate:    Pulse Oxygen:    Please attach any relevant images or files          Seen in  yesterday for sinus symptoms. Given Singulair.This AM woke up with hives. +itching. No airway issues. No other associated symptoms to report.    Allergic Reaction  Pertinent negatives include no stridor, trouble swallowing or wheezing. His past  medical history is significant for seasonal allergies.       Constitution: Negative for chills and fever.   HENT:  Positive for congestion. Negative for trouble swallowing and voice change.    Respiratory:  Negative for shortness of breath, stridor and wheezing.    Skin:  Positive for erythema and hives.   Allergic/Immunologic: Positive for seasonal allergies, hives and itching.        Objective:   The physical exam was conducted virtually.  Physical Exam   Constitutional: He is oriented to person, place, and time. He does not appear ill. No distress.   HENT:   Head: Normocephalic and atraumatic.   Nose: Nose normal.   Eyes: Extraocular movement intact   Pulmonary/Chest: Effort normal.   Abdominal: Normal appearance.   Musculoskeletal: Normal range of motion.         General: Normal range of motion.   Neurological: no focal deficit. He is alert and oriented to person, place, and time.   Skin: Skin is rash and urticarial. erythema   Psychiatric: His behavior is normal. Mood normal.   Vitals reviewed.      Assessment:     1. Urticaria        Plan:   Patient encouraged to monitor symptoms closely and instructed to follow-up for new or worsening symptoms. Further, in-person, evaluation may be necessary for continued treatment. Please follow up with your primary care doctor or specialist as needed. Verbally discussed plan. Patient confirms understanding and is in agreement with treatment and plan.     You must understand that you've received a Virtual Care evaluation only and that you may be released before all your medical problems are known or treated. You, the patient, will arrange for follow up care as instructed.      Urticaria  -     predniSONE (DELTASONE) 10 MG tablet; Take 1 tablet (10 mg total) by mouth 2 (two) times daily. for 3 days  Dispense: 6 tablet; Refill: 0      Patient Instructions   Recommendations for Allergic Reaction:     . Over the counter, as directed, Zyrtec, Claritin or Allegra for daytime use.  Benadryl is ok to take at night. Pepcid has also been found to help with allergic reactions. You can take as directed.     . Calamine lotion, Oatmeal baths, Hydrocortisone cream, or cool compresses may be soothing to relieve the itching.     . Keep skin moisturized as well with a mild lotion. Dryness may worsen the itching.        FOR SINUS  OVER THE COUNTER RECOMMENDATIONS/SUGGESTIONS (IF NO CONTRAINDICATIONS).     ·         Make sure to stay well hydrated.     ·         Use Nasal Saline to mechanically move any post nasal drip from your eustachian tube or from the back of your throat.     ·         Use warm saltwater gargles to ease your throat pain. Warm saltwater gargles as needed for sore throat-  1/2 tsp salt to 1 cup warm water, gargle as desired. Warm fluids tend to relieve a sore throat.     .         Throat lozenges, Chloraseptic spray or other over the counter treatments are ok to use as well. Use as directed.     ·         Use an antihistamine such as Claritin, Zyrtec or Allegra to dry you out.     ·         Use pseudoephedrine (behind the counter) to decongest. Pseudoephedrine  30 mg up to 240 mg /day. It can raise your blood pressure and give you palpitations.     ·         Use Mucinex (guaifenesin) to break up mucous up to 2400mg/day to loosen any mucous.     ·         The Mucinex DM pill has a cough suppressant that can be sedating. It can be used at night to stop the tickle at the back of your throat.     ·         You can use Mucinex D (it has guaifenesin and a high dose of pseudoephedrine) in the mornings to help decongest.     ·         Use Afrin (oxymetazoline) in each nare for no longer than 3 days, as it is addictive. It can also dry out your mucous membranes and cause elevated blood pressure. This is especially useful if you are flying.     ·         Use Flonase 1-2 sprays/nostril per day. It is a local acting steroid nasal spray, if you develop a bloody nose, stop using the medication  immediately.     ·         Sometimes Nyquil at night is beneficial to help you get some rest, however it is sedating, and it does have an antihistamine, and Tylenol.     ·         Honey is a natural cough suppressant that can be used.     ·         Tylenol up to 4,000 mg a day is safe for short periods and can be used for body aches, pain, and fever. However, in high doses and prolonged use it can cause liver irritation.     ·         Ibuprofen is a non-steroidal anti-inflammatory that can be used for body aches, pain, and fever. However, it can also cause stomach irritation if overused.          Hives Discharge Instructions   About this topic   Hives are itchy red bumps or welts on the skin. They are your bodys response to an allergen. Hives can be caused by things like:  Food.  Medicine.  Exercise  Latex.  Triggers that cant be identified at the time.  Some people can come in contact with these things and have no problems. But when you have an allergy to something, your body acts as if the substance is harming you. This causes symptoms.  Hives may fade after a few hours but then new ones may appear. Hives are often gone within a few days, but some may last for weeks.     What care is needed at home?   Ask your doctor what you need to do when you go home. Make sure you ask questions if you do not understand what the doctor says.  If you were told to see an allergist, ask your regular doctor for a referral.  If you were prescribed an epinephrine autoinjector, fill the prescription right away. Make sure you know how to use it.  Avoid the allergen if you know what caused your reaction. You may need to work with your regular doctor or an allergist to find what has caused your reaction. Then you can try to avoid it in the future.  Use a cool washcloth on your eyes or skin.  If possible, rest for the next few days.  Use over-the-counter medicines to help with your milder symptoms.  Use antihistamine eye drops to help  with itching and hives.  What follow-up care is needed?   Your doctor may ask you to make visits to the office to check on your progress. Be sure to keep these visits. You may need to see a special doctor to find out what you are allergic to.  What drugs may be needed?   The doctor may order drugs to:  Treat allergies  Relieve itching and decrease the size of your hives  If your hives are bad, the doctor may give you a drug called steroids. This will help ease itching and swelling. These steroids are different from the drugs athletes take to build muscle. You will only take these for a short time.  What changes to diet are needed?   Do not eat any food that causes you to get hives. These foods may include eggs, shellfish, nuts, chocolate, tomatoes, fresh berries, and milk.  What problems could happen?   Sometimes, hives can cause serious problems. Your throat, lips, or tongue may swell or you can have a total body allergy called anaphylaxis. Both problems make it hard for you to breathe. If you have trouble breathing, chest pain, fast or irregular heartbeat, confusion, fainting, collapse, or seizures go to the ER right way.  What can be done to prevent this health problem?   You can prevent hives if you know what causes them. Stay away from anything that gives you hives. If you do not know what causes your hives, it can be hard to prevent them.  When do I need to call the doctor?   Call for emergency help right away if:   You have signs of a severe reaction like:  You have trouble breathing, wheezing, or have a cough that wont stop.  You feel like your throat is closing or your lips or tongue are swelling.  You feel very weak like you are going to pass out, or actually pass out.  If you have signs of a severe allergic reaction, use your epinephrine autoinjector right away if you have one, then call for an ambulance.  When do I need to call the doctor?   You are not  having a severe reaction but do have other signs  of an allergic reaction, such as:  You have a rash, skin redness, flushing, or hives.  Your skin itches.  You have belly pain or an upset stomach.  You are not feeling better in 2 to 3 days.  Teach Back: Helping You Understand   The Teach Back Method helps you understand the information we are giving you. After you talk with the staff, tell them in your own words what you learned. This helps to make sure the staff has described each thing clearly. It also helps to explain things that may have been confusing. Before going home, make sure you can do these:  I can tell you about my condition.  I can tell you what may help me feel better.  I can tell you what I will do if I suddenly have trouble breathing.  Where can I learn more?   American Academy of Allergy Asthma & Immunology  http://Aaaai.org/conditions-and-treatments/library/allergy-library/hives-angioedema   American Academy of Dermatology  http://www.aad.org/skin-conditions/wmivuvgsqsx-i-ya-z/hives   KidsHealth  http://kidshealth.org/parent/infections/skin/hives.html   NHS Choices  http://www.nhs.uk/conditions/nettle-rash/pages/introduction.aspx   UpToDate  http://panpan.PowerCard/contents/hives-urticaria-beyond-the-basics   [facility]  [email or web address] [email or web address]   Last Reviewed Date   2021-06-16  Consumer Information Use and Disclaimer   This information is not specific medical advice and does not replace information you receive from your health care provider. This is only a brief summary of general information. It does NOT include all information about conditions, illnesses, injuries, tests, procedures, treatments, therapies, discharge instructions or life-style choices that may apply to you. You must talk with your health care provider for complete information about your health and treatment options. This information should not be used to decide whether or not to accept your health care providers advice, instructions or  recommendations. Only your health care provider has the knowledge and training to provide advice that is right for you.  Copyright   Copyright © 2021 [employer] and its affiliates and/or licensors. All rights reserved.

## 2023-10-12 NOTE — PATIENT INSTRUCTIONS
Recommendations for Allergic Reaction:     . Over the counter, as directed, Zyrtec, Claritin or Allegra for daytime use. Benadryl is ok to take at night. Pepcid has also been found to help with allergic reactions. You can take as directed.     . Calamine lotion, Oatmeal baths, Hydrocortisone cream, or cool compresses may be soothing to relieve the itching.     . Keep skin moisturized as well with a mild lotion. Dryness may worsen the itching.        FOR SINUS  OVER THE COUNTER RECOMMENDATIONS/SUGGESTIONS (IF NO CONTRAINDICATIONS).     ·         Make sure to stay well hydrated.     ·         Use Nasal Saline to mechanically move any post nasal drip from your eustachian tube or from the back of your throat.     ·         Use warm saltwater gargles to ease your throat pain. Warm saltwater gargles as needed for sore throat-  1/2 tsp salt to 1 cup warm water, gargle as desired. Warm fluids tend to relieve a sore throat.     .         Throat lozenges, Chloraseptic spray or other over the counter treatments are ok to use as well. Use as directed.     ·         Use an antihistamine such as Claritin, Zyrtec or Allegra to dry you out.     ·         Use pseudoephedrine (behind the counter) to decongest. Pseudoephedrine  30 mg up to 240 mg /day. It can raise your blood pressure and give you palpitations.     ·         Use Mucinex (guaifenesin) to break up mucous up to 2400mg/day to loosen any mucous.     ·         The Mucinex DM pill has a cough suppressant that can be sedating. It can be used at night to stop the tickle at the back of your throat.     ·         You can use Mucinex D (it has guaifenesin and a high dose of pseudoephedrine) in the mornings to help decongest.     ·         Use Afrin (oxymetazoline) in each nare for no longer than 3 days, as it is addictive. It can also dry out your mucous membranes and cause elevated blood pressure. This is especially useful if you are flying.     ·         Use Flonase 1-2  sprays/nostril per day. It is a local acting steroid nasal spray, if you develop a bloody nose, stop using the medication immediately.     ·         Sometimes Nyquil at night is beneficial to help you get some rest, however it is sedating, and it does have an antihistamine, and Tylenol.     ·         Honey is a natural cough suppressant that can be used.     ·         Tylenol up to 4,000 mg a day is safe for short periods and can be used for body aches, pain, and fever. However, in high doses and prolonged use it can cause liver irritation.     ·         Ibuprofen is a non-steroidal anti-inflammatory that can be used for body aches, pain, and fever. However, it can also cause stomach irritation if overused.          Hives Discharge Instructions   About this topic   Hives are itchy red bumps or welts on the skin. They are your bodys response to an allergen. Hives can be caused by things like:  Food.  Medicine.  Exercise  Latex.  Triggers that cant be identified at the time.  Some people can come in contact with these things and have no problems. But when you have an allergy to something, your body acts as if the substance is harming you. This causes symptoms.  Hives may fade after a few hours but then new ones may appear. Hives are often gone within a few days, but some may last for weeks.     What care is needed at home?   Ask your doctor what you need to do when you go home. Make sure you ask questions if you do not understand what the doctor says.  If you were told to see an allergist, ask your regular doctor for a referral.  If you were prescribed an epinephrine autoinjector, fill the prescription right away. Make sure you know how to use it.  Avoid the allergen if you know what caused your reaction. You may need to work with your regular doctor or an allergist to find what has caused your reaction. Then you can try to avoid it in the future.  Use a cool washcloth on your eyes or skin.  If possible, rest for  the next few days.  Use over-the-counter medicines to help with your milder symptoms.  Use antihistamine eye drops to help with itching and hives.  What follow-up care is needed?   Your doctor may ask you to make visits to the office to check on your progress. Be sure to keep these visits. You may need to see a special doctor to find out what you are allergic to.  What drugs may be needed?   The doctor may order drugs to:  Treat allergies  Relieve itching and decrease the size of your hives  If your hives are bad, the doctor may give you a drug called steroids. This will help ease itching and swelling. These steroids are different from the drugs athletes take to build muscle. You will only take these for a short time.  What changes to diet are needed?   Do not eat any food that causes you to get hives. These foods may include eggs, shellfish, nuts, chocolate, tomatoes, fresh berries, and milk.  What problems could happen?   Sometimes, hives can cause serious problems. Your throat, lips, or tongue may swell or you can have a total body allergy called anaphylaxis. Both problems make it hard for you to breathe. If you have trouble breathing, chest pain, fast or irregular heartbeat, confusion, fainting, collapse, or seizures go to the ER right way.  What can be done to prevent this health problem?   You can prevent hives if you know what causes them. Stay away from anything that gives you hives. If you do not know what causes your hives, it can be hard to prevent them.  When do I need to call the doctor?   Call for emergency help right away if:   You have signs of a severe reaction like:  You have trouble breathing, wheezing, or have a cough that wont stop.  You feel like your throat is closing or your lips or tongue are swelling.  You feel very weak like you are going to pass out, or actually pass out.  If you have signs of a severe allergic reaction, use your epinephrine autoinjector right away if you have one, then  call for an ambulance.  When do I need to call the doctor?   You are not  having a severe reaction but do have other signs of an allergic reaction, such as:  You have a rash, skin redness, flushing, or hives.  Your skin itches.  You have belly pain or an upset stomach.  You are not feeling better in 2 to 3 days.  Teach Back: Helping You Understand   The Teach Back Method helps you understand the information we are giving you. After you talk with the staff, tell them in your own words what you learned. This helps to make sure the staff has described each thing clearly. It also helps to explain things that may have been confusing. Before going home, make sure you can do these:  I can tell you about my condition.  I can tell you what may help me feel better.  I can tell you what I will do if I suddenly have trouble breathing.  Where can I learn more?   American Academy of Allergy Asthma & Immunology  http://Aaaai.org/conditions-and-treatments/library/allergy-library/hives-angioedema   American Academy of Dermatology  http://www.aad.org/skin-conditions/eylaypqjobb-n-us-z/hives   KidsHealth  http://kidshealth.org/parent/infections/skin/hives.html   NHS Choices  http://www.nhs.uk/conditions/nettle-rash/pages/introduction.aspx   UpToDate  http://AVAST Software.mySBX/contents/hives-urticaria-beyond-the-basics   Communication ScienceNew [facility]  http://Pumant.mySBX/urticaria-signs-symptoms-6490555   Last Reviewed Date   2021-06-16  Consumer Information Use and Disclaimer   This information is not specific medical advice and does not replace information you receive from your health care provider. This is only a brief summary of general information. It does NOT include all information about conditions, illnesses, injuries, tests, procedures, treatments, therapies, discharge instructions or life-style choices that may apply to you. You must talk with your health care provider for complete information about your health and treatment options. This  information should not be used to decide whether or not to accept your health care providers advice, instructions or recommendations. Only your health care provider has the knowledge and training to provide advice that is right for you.  Copyright   Copyright © 2021 UpToDate, Inc. and its affiliates and/or licensors. All rights reserved.

## 2023-10-18 ENCOUNTER — PATIENT MESSAGE (OUTPATIENT)
Dept: CARDIOLOGY | Facility: CLINIC | Age: 53
End: 2023-10-18
Payer: COMMERCIAL

## 2023-10-31 ENCOUNTER — OFFICE VISIT (OUTPATIENT)
Dept: SLEEP MEDICINE | Facility: CLINIC | Age: 53
End: 2023-10-31
Payer: COMMERCIAL

## 2023-10-31 VITALS — BODY MASS INDEX: 50.31 KG/M2 | WEIGHT: 302 LBS | HEIGHT: 65 IN

## 2023-10-31 DIAGNOSIS — G47.33 OSA (OBSTRUCTIVE SLEEP APNEA): Primary | ICD-10-CM

## 2023-10-31 PROCEDURE — 3044F HG A1C LEVEL LT 7.0%: CPT | Mod: CPTII,S$GLB,, | Performed by: NURSE PRACTITIONER

## 2023-10-31 PROCEDURE — 3008F PR BODY MASS INDEX (BMI) DOCUMENTED: ICD-10-PCS | Mod: CPTII,S$GLB,, | Performed by: NURSE PRACTITIONER

## 2023-10-31 PROCEDURE — 99999 PR PBB SHADOW E&M-EST. PATIENT-LVL III: CPT | Mod: PBBFAC,,, | Performed by: NURSE PRACTITIONER

## 2023-10-31 PROCEDURE — 99999 PR PBB SHADOW E&M-EST. PATIENT-LVL III: ICD-10-PCS | Mod: PBBFAC,,, | Performed by: NURSE PRACTITIONER

## 2023-10-31 PROCEDURE — 3008F BODY MASS INDEX DOCD: CPT | Mod: CPTII,S$GLB,, | Performed by: NURSE PRACTITIONER

## 2023-10-31 PROCEDURE — 1159F PR MEDICATION LIST DOCUMENTED IN MEDICAL RECORD: ICD-10-PCS | Mod: CPTII,S$GLB,, | Performed by: NURSE PRACTITIONER

## 2023-10-31 PROCEDURE — 99214 PR OFFICE/OUTPT VISIT, EST, LEVL IV, 30-39 MIN: ICD-10-PCS | Mod: S$GLB,,, | Performed by: NURSE PRACTITIONER

## 2023-10-31 PROCEDURE — 3044F PR MOST RECENT HEMOGLOBIN A1C LEVEL <7.0%: ICD-10-PCS | Mod: CPTII,S$GLB,, | Performed by: NURSE PRACTITIONER

## 2023-10-31 PROCEDURE — 1159F MED LIST DOCD IN RCRD: CPT | Mod: CPTII,S$GLB,, | Performed by: NURSE PRACTITIONER

## 2023-10-31 PROCEDURE — 99214 OFFICE O/P EST MOD 30 MIN: CPT | Mod: S$GLB,,, | Performed by: NURSE PRACTITIONER

## 2023-10-31 NOTE — PROGRESS NOTES
"Cc:ALFREDO    Been adherent with cpap since dx'd 2000. During recall she ended up buying a brijesh machine and quality of life not as good. Finally got DS2 machine but not begun using it. FFM hurting teeth. Occasional rls. Slept/felt better when was using former respironics machine    Remote 60d 2021 avg 6:19h/n AHI 3.5, 90% tile 10.7cm  ESS=12    HST 2019 AHI 16  Dx previously 2000, had also repeat study [place] 2008    Ht 5' 5" (1.651 m)   Wt (!) 137 kg (302 lb 0.5 oz)   BMI 50.26 kg/m²     Assessment:  ALFREDO, adherent, benefited from therapy with former respironics machine, ready to use replacement machine 6-18cm. Needs new mask  HTN      Plan:  Continue cpap 6-18cm,. Get new mask/[facility] DME  Denies provigil prn, notify me if worsening rls  Rtc otherwise 12mos, sooner if needed    "

## 2023-11-16 ENCOUNTER — OFFICE VISIT (OUTPATIENT)
Dept: GASTROENTEROLOGY | Facility: CLINIC | Age: 53
End: 2023-11-16
Payer: COMMERCIAL

## 2023-11-16 VITALS — WEIGHT: 301.5 LBS | BODY MASS INDEX: 50.23 KG/M2 | HEIGHT: 65 IN

## 2023-11-16 DIAGNOSIS — K20.0 EOSINOPHILIC ESOPHAGITIS: Primary | ICD-10-CM

## 2023-11-16 PROCEDURE — 3044F PR MOST RECENT HEMOGLOBIN A1C LEVEL <7.0%: ICD-10-PCS | Mod: CPTII,S$GLB,, | Performed by: INTERNAL MEDICINE

## 2023-11-16 PROCEDURE — 3008F BODY MASS INDEX DOCD: CPT | Mod: CPTII,S$GLB,, | Performed by: INTERNAL MEDICINE

## 2023-11-16 PROCEDURE — 99214 OFFICE O/P EST MOD 30 MIN: CPT | Mod: S$GLB,,, | Performed by: INTERNAL MEDICINE

## 2023-11-16 PROCEDURE — 99999 PR PBB SHADOW E&M-EST. PATIENT-LVL III: ICD-10-PCS | Mod: PBBFAC,,, | Performed by: INTERNAL MEDICINE

## 2023-11-16 PROCEDURE — 3008F PR BODY MASS INDEX (BMI) DOCUMENTED: ICD-10-PCS | Mod: CPTII,S$GLB,, | Performed by: INTERNAL MEDICINE

## 2023-11-16 PROCEDURE — 1159F PR MEDICATION LIST DOCUMENTED IN MEDICAL RECORD: ICD-10-PCS | Mod: CPTII,S$GLB,, | Performed by: INTERNAL MEDICINE

## 2023-11-16 PROCEDURE — 3044F HG A1C LEVEL LT 7.0%: CPT | Mod: CPTII,S$GLB,, | Performed by: INTERNAL MEDICINE

## 2023-11-16 PROCEDURE — 1159F MED LIST DOCD IN RCRD: CPT | Mod: CPTII,S$GLB,, | Performed by: INTERNAL MEDICINE

## 2023-11-16 PROCEDURE — 99999 PR PBB SHADOW E&M-EST. PATIENT-LVL III: CPT | Mod: PBBFAC,,, | Performed by: INTERNAL MEDICINE

## 2023-11-16 PROCEDURE — 99214 PR OFFICE/OUTPT VISIT, EST, LEVL IV, 30-39 MIN: ICD-10-PCS | Mod: S$GLB,,, | Performed by: INTERNAL MEDICINE

## 2023-11-16 NOTE — PROGRESS NOTES
Attended community meeting shared goal for the day as to be more confident. Reinaldo Reed Subjective:       Patient ID: Martha Shah is a 52 y.o. male.    Chief Complaint: eosinophillic esophagitis    Patient here today to follow-up with aforementioned complaints.  She was previously seen by me in March in follow-up of EoE.  At that time she was doing very well, compliant with dietary changes and daily PPI dosing.  However since patient reports    Increased phmlem. Trouble swallowing: pills in particular, sometimes meats or breads.  Also reports recent worsening in GERD.    Taking protonix bid.       Review of Systems   HENT:  Positive for trouble swallowing.    Cardiovascular:  Positive for chest pain.   Gastrointestinal:  Positive for abdominal pain.         The following portions of the patient's history were reviewed and updated as appropriate: allergies, current medications, past family history, past medical history, past social history, past surgical history and problem list.    Objective:      Physical Exam  Constitutional:       Appearance: He is well-developed.   HENT:      Head: Normocephalic and atraumatic.   Eyes:      Conjunctiva/sclera: Conjunctivae normal.   Pulmonary:      Effort: Pulmonary effort is normal. No respiratory distress.   Neurological:      Mental Status: He is alert and oriented to person, place, and time.   Psychiatric:         Behavior: Behavior normal.         Thought Content: Thought content normal.         Judgment: Judgment normal.           Pertinent labs and imaging studies reviewed    Assessment:       1. Eosinophilic esophagitis        Plan:       Repeat EGD   Continue PPI      (Portions of this note were dictated using voice recognition software and may contain dictation related errors in spelling/grammar/syntax not found on text review)

## 2024-01-07 PROBLEM — I21.4 NSTEMI (NON-ST ELEVATED MYOCARDIAL INFARCTION): Status: ACTIVE | Noted: 2024-01-07

## 2024-01-09 ENCOUNTER — TELEPHONE (OUTPATIENT)
Dept: PRIMARY CARE CLINIC | Facility: CLINIC | Age: 54
End: 2024-01-09
Payer: COMMERCIAL

## 2024-01-09 NOTE — TELEPHONE ENCOUNTER
----- Message from Martha Anderson sent at 1/8/2024  4:06 PM CST -----  Contact: [phone]  [facility] is calling for a HFU for the pt discharging today please advise

## 2024-01-10 ENCOUNTER — TELEPHONE (OUTPATIENT)
Dept: PRIMARY CARE CLINIC | Facility: CLINIC | Age: 54
End: 2024-01-10
Payer: COMMERCIAL

## 2024-01-10 NOTE — TELEPHONE ENCOUNTER
----- Message from Valerie Helms sent at 1/10/2024  3:35 PM CST -----  Contact: self  [phone]  Pt requesting a call regarding hosp f/u    Please call and advise

## 2024-01-11 PROBLEM — R06.02 SOB (SHORTNESS OF BREATH): Status: ACTIVE | Noted: 2024-01-11

## 2024-01-11 PROBLEM — R29.818 ACUTE FOCAL NEUROLOGICAL DEFICIT: Status: ACTIVE | Noted: 2024-01-11

## 2024-01-12 PROBLEM — R06.02 SOB (SHORTNESS OF BREATH): Status: RESOLVED | Noted: 2024-01-11 | Resolved: 2024-01-12

## 2024-01-12 PROBLEM — I63.9 ACUTE STROKE DUE TO ISCHEMIA: Status: ACTIVE | Noted: 2024-01-12

## 2024-01-12 PROBLEM — E78.5 HYPERLIPIDEMIA: Status: ACTIVE | Noted: 2024-01-12

## 2024-01-16 DIAGNOSIS — I10 PRIMARY HYPERTENSION: ICD-10-CM

## 2024-01-16 NOTE — TELEPHONE ENCOUNTER
No care due was identified.  [facility] Embedded Care Due Messages. Reference number: [id number].   1/16/2024 12:27:54 AM CST

## 2024-01-17 RX ORDER — HYDROCHLOROTHIAZIDE 25 MG/1
25 TABLET ORAL
Qty: 90 TABLET | Refills: 0 | Status: SHIPPED | OUTPATIENT
Start: 2024-01-17 | End: 2024-01-24 | Stop reason: SDUPTHER

## 2024-01-17 NOTE — TELEPHONE ENCOUNTER
Refill Routing Note   Medication(s) are not appropriate for processing by [facility] for the following reason(s):        ED/Hospital Visit since last OV with provider  Required labs abnormal    [facility] action(s):  Defer               Appointments  past 12m or future 3m with PCP    Date Provider   Last Visit   1/13/2023 Estrada Partida MD   Next Visit   Visit date not found Estrada Partida MD   ED visits in past 90 days: 0        Note composed:10:14 AM 01/17/2024

## 2024-01-19 ENCOUNTER — TELEPHONE (OUTPATIENT)
Dept: NEUROLOGY | Facility: CLINIC | Age: 54
End: 2024-01-19
Payer: COMMERCIAL

## 2024-01-19 ENCOUNTER — OFFICE VISIT (OUTPATIENT)
Dept: PRIMARY CARE CLINIC | Facility: CLINIC | Age: 54
End: 2024-01-19
Payer: COMMERCIAL

## 2024-01-19 VITALS
BODY MASS INDEX: 48.8 KG/M2 | HEIGHT: 65 IN | OXYGEN SATURATION: 99 % | TEMPERATURE: 96 F | RESPIRATION RATE: 18 BRPM | DIASTOLIC BLOOD PRESSURE: 88 MMHG | SYSTOLIC BLOOD PRESSURE: 124 MMHG | HEART RATE: 55 BPM | WEIGHT: 292.88 LBS

## 2024-01-19 DIAGNOSIS — K20.0 EOSINOPHILIC ESOPHAGITIS: ICD-10-CM

## 2024-01-19 DIAGNOSIS — Z23 NEED FOR VACCINATION: ICD-10-CM

## 2024-01-19 DIAGNOSIS — I63.9 CEREBROVASCULAR ACCIDENT (CVA), UNSPECIFIED MECHANISM: ICD-10-CM

## 2024-01-19 DIAGNOSIS — Z09 FOLLOW-UP EXAM: Primary | ICD-10-CM

## 2024-01-19 PROCEDURE — 3044F HG A1C LEVEL LT 7.0%: CPT | Mod: CPTII,S$GLB,, | Performed by: STUDENT IN AN ORGANIZED HEALTH CARE EDUCATION/TRAINING PROGRAM

## 2024-01-19 PROCEDURE — 1159F MED LIST DOCD IN RCRD: CPT | Mod: CPTII,S$GLB,, | Performed by: STUDENT IN AN ORGANIZED HEALTH CARE EDUCATION/TRAINING PROGRAM

## 2024-01-19 PROCEDURE — 99214 OFFICE O/P EST MOD 30 MIN: CPT | Mod: 25,S$GLB,, | Performed by: STUDENT IN AN ORGANIZED HEALTH CARE EDUCATION/TRAINING PROGRAM

## 2024-01-19 PROCEDURE — 1160F RVW MEDS BY RX/DR IN RCRD: CPT | Mod: CPTII,S$GLB,, | Performed by: STUDENT IN AN ORGANIZED HEALTH CARE EDUCATION/TRAINING PROGRAM

## 2024-01-19 PROCEDURE — 3008F BODY MASS INDEX DOCD: CPT | Mod: CPTII,S$GLB,, | Performed by: STUDENT IN AN ORGANIZED HEALTH CARE EDUCATION/TRAINING PROGRAM

## 2024-01-19 PROCEDURE — 1111F DSCHRG MED/CURRENT MED MERGE: CPT | Mod: CPTII,S$GLB,, | Performed by: STUDENT IN AN ORGANIZED HEALTH CARE EDUCATION/TRAINING PROGRAM

## 2024-01-19 PROCEDURE — 90471 IMMUNIZATION ADMIN: CPT | Mod: S$GLB,,, | Performed by: STUDENT IN AN ORGANIZED HEALTH CARE EDUCATION/TRAINING PROGRAM

## 2024-01-19 PROCEDURE — 90686 IIV4 VACC NO PRSV 0.5 ML IM: CPT | Mod: S$GLB,,, | Performed by: STUDENT IN AN ORGANIZED HEALTH CARE EDUCATION/TRAINING PROGRAM

## 2024-01-19 PROCEDURE — 3074F SYST BP LT 130 MM HG: CPT | Mod: CPTII,S$GLB,, | Performed by: STUDENT IN AN ORGANIZED HEALTH CARE EDUCATION/TRAINING PROGRAM

## 2024-01-19 PROCEDURE — 3079F DIAST BP 80-89 MM HG: CPT | Mod: CPTII,S$GLB,, | Performed by: STUDENT IN AN ORGANIZED HEALTH CARE EDUCATION/TRAINING PROGRAM

## 2024-01-19 PROCEDURE — 99999 PR PBB SHADOW E&M-EST. PATIENT-LVL V: CPT | Mod: PBBFAC,,, | Performed by: STUDENT IN AN ORGANIZED HEALTH CARE EDUCATION/TRAINING PROGRAM

## 2024-01-19 RX ORDER — PANTOPRAZOLE SODIUM 40 MG/1
40 TABLET, DELAYED RELEASE ORAL 2 TIMES DAILY
Qty: 180 TABLET | Refills: 1 | Status: SHIPPED | OUTPATIENT
Start: 2024-01-19

## 2024-01-19 NOTE — PROGRESS NOTES
Subjective:       Patient ID: Martha Shah is a 53 y.o. adult.    Chief Complaint: Follow-up (Hospital )    HPI:  53 y.o. adult presents to [facility] for hospital follow-up of stroke    Patient seen in [facility] ED 1/11/2024 with right hemiparesis and word finding difficulty.    Echocardiogram 1/11/2024 without evident shunt, though technically difficulty. US legs without evidence of DVT.     MRI brain with early/subacute ischemic foci to left corona radiata.    CTA head/neck revealed no significant stenosis of neck vessels and mild plaque to distal left vertebral artery    Scheduled with follow-up in Cardiology 1/24/2024 and Holter monitor performed for 24 hours without concerning finding.    Today reports that she feels that she is using her right arm and leg normally at this time. Attempted treadmill at home, did feel fatigued with time. PT evaluation in hospital occurred.    Hasn't been driving yet. Playing video games made her feel off and had to quit. Does not feel that word finding is still an issue.    Review of Systems   Constitutional:  Negative for chills, diaphoresis, fatigue and fever.   HENT:  Positive for sore throat (Intermittent, mild). Negative for congestion, sinus pressure and sneezing.    Respiratory:  Negative for cough and shortness of breath.    Cardiovascular:  Negative for chest pain and palpitations.   Gastrointestinal:  Negative for abdominal pain, diarrhea, nausea and vomiting.        Had a couple episodes with nausea and fatigue, worse in ER/hospital. Has improved some since, but still low   Musculoskeletal:  Positive for arthralgias (Intermittent in feet, known plantar fasciitis and Grace's. No requesting intervention at this time.). Negative for joint swelling and myalgias.        Had a couple brief episodes of numbness to left arm that she is uncertain if is due to positioning and can also feel trembling/mild weakness in left hand when balling fist when present. Noticed  "Sunday Morning (Discharged Friday). Hasn't occurres since Sunday   Skin:  Negative for rash and wound.        Nothing new   Neurological:  Positive for weakness. Negative for dizziness and numbness.       Objective:      Vitals:    01/19/24 0906   BP: 124/88   BP Location: Left arm   Patient Position: Sitting   BP Method: Large (Manual)   Pulse: (!) 55   Resp: 18   Temp: 96 °F (35.6 °C)   TempSrc: Temporal   SpO2: 99%   Weight: 132.9 kg (292 lb 14.1 oz)   Height: 5' 5" (1.651 m)     Physical Exam  Vitals reviewed.   Constitutional:       General: She is not in acute distress.     Appearance: Normal appearance. She is not ill-appearing.   HENT:      Head: Normocephalic and atraumatic.   Eyes:      General:         Right eye: No discharge.         Left eye: No discharge.      Conjunctiva/sclera: Conjunctivae normal.   Cardiovascular:      Rate and Rhythm: Normal rate and regular rhythm.      Heart sounds: Normal heart sounds. No murmur heard.  Pulmonary:      Effort: Pulmonary effort is normal.   Musculoskeletal:         General: No deformity.      Right hand: Normal range of motion. Normal strength.      Left hand: Normal range of motion. Normal strength.   Skin:     General: Skin is warm and dry.      Coloration: Skin is not jaundiced.   Neurological:      General: No focal deficit present.      Mental Status: She is alert and oriented to person, place, and time.   Psychiatric:         Mood and Affect: Mood normal.         Behavior: Behavior normal.             Lab Results   Component Value Date     01/12/2024    K 3.4 (L) 01/12/2024     01/12/2024    CO2 22 (L) 01/12/2024    BUN 11 01/12/2024    CREATININE 1.3 01/12/2024    ANIONGAP 15 01/12/2024     Lab Results   Component Value Date    HGBA1C 5.4 01/11/2024     Lab Results   Component Value Date    BNP 45 01/07/2024       Lab Results   Component Value Date    WBC 9.01 01/12/2024    HGB 13.1 (L) 01/12/2024    HCT 39.5 (L) 01/12/2024     " 01/12/2024    GRAN 5.2 01/12/2024    GRAN 57.8 01/12/2024     Lab Results   Component Value Date    CHOL 144 01/12/2024    HDL 28 (L) 01/12/2024    LDLCALC 91.6 01/12/2024    TRIG 122 01/12/2024          Current Outpatient Medications:     aspirin (ECOTRIN) 81 MG EC tablet, Take 1 tablet (81 mg total) by mouth once daily., Disp: 360 tablet, Rfl: 0    atorvastatin (LIPITOR) 80 MG tablet, Take 1 tablet (80 mg total) by mouth every evening., Disp: 90 tablet, Rfl: 3    cetirizine (ZYRTEC) 10 MG tablet, Take 10 mg by mouth once daily., Disp: , Rfl:     clopidogreL (PLAVIX) 75 mg tablet, Take 1 tablet (75 mg total) by mouth once daily. for 21 days, Disp: 21 tablet, Rfl: 0    diclofenac sodium (VOLTAREN) 1 % Gel, Apply 2 g topically 4 (four) times daily as needed., Disp: 350 g, Rfl: 2    hydroCHLOROthiazide (HYDRODIURIL) 25 MG tablet, TAKE 1 TABLET BY MOUTH EVERY DAY, Disp: 90 tablet, Rfl: 0    metoprolol tartrate (LOPRESSOR) 25 MG tablet, Take 1 tablet (25 mg total) by mouth 2 (two) times daily., Disp: 60 tablet, Rfl: 11    multivitamin (ONE DAILY MULTIVITAMIN) per tablet, Take 1 tablet by mouth once daily., Disp: , Rfl:     omega 3-dha-epa-fish oil 1,000 mg (120 mg-180 mg) Cap, , Disp: , Rfl:     pantoprazole (PROTONIX) 40 MG tablet, Take 1 tablet (40 mg total) by mouth 2 (two) times daily., Disp: 180 tablet, Rfl: 1  No current facility-administered medications for this visit.    Facility-Administered Medications Ordered in Other Visits:     0.9%  NaCl infusion, , Intravenous, Continuous, Vinay Bourgeois MD    LIDOcaine (PF) 10 mg/ml (1%) injection 10 mg, 1 mL, Intradermal, Once PRN, Vinay Bourgeois MD        Assessment:       1. Follow-up exam    2. Cerebrovascular accident (CVA), unspecified mechanism    3. Eosinophilic esophagitis           Plan:       Follow-up exam    Cerebrovascular accident (CVA), unspecified mechanism  -     Ambulatory referral/consult to Physical/Occupational Therapy; Future;  Expected date: 01/26/2024  -     Ambulatory referral/consult to Neurology; Future; Expected date: 01/26/2024  - Keep follow-up with Cardiology 1/24/2024  - Will schedule follow-up with Neurology  - With exercise fatigue at home, will refer for PT and can form plan with treatment goals outpatient    Eosinophilic esophagitis  -     pantoprazole (PROTONIX) 40 MG tablet; Take 1 tablet (40 mg total) by mouth 2 (two) times daily.  Dispense: 180 tablet; Refill: 1    RTC PRN

## 2024-01-19 NOTE — PROGRESS NOTES
Verified pt ID using name and . YOUSIF Wood. Administered Influenza  in Left Deltoid per physician order using aseptic technique. Aspirated and no blood return noted. Pt tolerated well with no adverse reactions noted.

## 2024-01-19 NOTE — TELEPHONE ENCOUNTER
----- Message from Millie Oconnor sent at 1/19/2024  9:41 AM CST -----  Cerebrovascular accident (CVA), unspecified mechanism [I63.9] please call patient back to schedule appointment

## 2024-01-24 ENCOUNTER — OFFICE VISIT (OUTPATIENT)
Dept: CARDIOLOGY | Facility: CLINIC | Age: 54
End: 2024-01-24
Payer: COMMERCIAL

## 2024-01-24 VITALS
DIASTOLIC BLOOD PRESSURE: 84 MMHG | HEIGHT: 65 IN | WEIGHT: 291.69 LBS | OXYGEN SATURATION: 99 % | HEART RATE: 65 BPM | SYSTOLIC BLOOD PRESSURE: 126 MMHG | BODY MASS INDEX: 48.6 KG/M2

## 2024-01-24 DIAGNOSIS — Z86.73 HISTORY OF CVA (CEREBROVASCULAR ACCIDENT): ICD-10-CM

## 2024-01-24 DIAGNOSIS — I25.10 CORONARY ARTERY DISEASE INVOLVING NATIVE CORONARY ARTERY OF NATIVE HEART WITHOUT ANGINA PECTORIS: ICD-10-CM

## 2024-01-24 DIAGNOSIS — E78.5 HYPERLIPIDEMIA, UNSPECIFIED HYPERLIPIDEMIA TYPE: Primary | ICD-10-CM

## 2024-01-24 DIAGNOSIS — I77.819 AORTIC DILATATION: ICD-10-CM

## 2024-01-24 DIAGNOSIS — I10 PRIMARY HYPERTENSION: ICD-10-CM

## 2024-01-24 DIAGNOSIS — I21.4 NSTEMI (NON-ST ELEVATED MYOCARDIAL INFARCTION): ICD-10-CM

## 2024-01-24 PROCEDURE — 1160F RVW MEDS BY RX/DR IN RCRD: CPT | Mod: CPTII,S$GLB,, | Performed by: INTERNAL MEDICINE

## 2024-01-24 PROCEDURE — 1159F MED LIST DOCD IN RCRD: CPT | Mod: CPTII,S$GLB,, | Performed by: INTERNAL MEDICINE

## 2024-01-24 PROCEDURE — 3044F HG A1C LEVEL LT 7.0%: CPT | Mod: CPTII,S$GLB,, | Performed by: INTERNAL MEDICINE

## 2024-01-24 PROCEDURE — 3079F DIAST BP 80-89 MM HG: CPT | Mod: CPTII,S$GLB,, | Performed by: INTERNAL MEDICINE

## 2024-01-24 PROCEDURE — 1111F DSCHRG MED/CURRENT MED MERGE: CPT | Mod: CPTII,S$GLB,, | Performed by: INTERNAL MEDICINE

## 2024-01-24 PROCEDURE — 99999 PR PBB SHADOW E&M-EST. PATIENT-LVL V: CPT | Mod: PBBFAC,,, | Performed by: INTERNAL MEDICINE

## 2024-01-24 PROCEDURE — 3008F BODY MASS INDEX DOCD: CPT | Mod: CPTII,S$GLB,, | Performed by: INTERNAL MEDICINE

## 2024-01-24 PROCEDURE — 3074F SYST BP LT 130 MM HG: CPT | Mod: CPTII,S$GLB,, | Performed by: INTERNAL MEDICINE

## 2024-01-24 PROCEDURE — 99214 OFFICE O/P EST MOD 30 MIN: CPT | Mod: S$GLB,,, | Performed by: INTERNAL MEDICINE

## 2024-01-24 RX ORDER — HYDROCHLOROTHIAZIDE 25 MG/1
25 TABLET ORAL DAILY
Qty: 90 TABLET | Refills: 3 | Status: SHIPPED | OUTPATIENT
Start: 2024-01-24 | End: 2024-04-10

## 2024-01-24 RX ORDER — NITROGLYCERIN 0.4 MG/1
0.4 TABLET SUBLINGUAL EVERY 5 MIN PRN
Qty: 25 TABLET | Refills: 3 | Status: SHIPPED | OUTPATIENT
Start: 2024-01-24 | End: 2025-01-23

## 2024-01-24 NOTE — PROGRESS NOTES
[facility]  Cardiology Clinic Note      Chief Complaint  Chief Complaint   Patient presents with    Hospital Follow Up     angiogram       HPI:      53-year-old female with past medical history morbid obesity, GERD, eosinophilic esophagitis, ALFREDO, hypertension, Holter 01/2024 short nonsustained atrial run possible nonconducted PAC, coronary angiogram 01/2024 following elevated troponin mild-to-moderate nonobstructive coronary artery disease some ectasia noted, echo 01/2024 normal EF mild-to-moderate LVH normal diastolic function left atrium mildly dilated likely normal RV CVP 8 ascending aorta 3.6 subsequent bubble study performed no evidence for intracardiac shunt    Recent admission for NSTEMI no culprit could be found on angiogram  Subsequent admission for CVA placed on dual antiplatelet therapy for 21 days    Doing well FND improved    Medications  Current Outpatient Medications   Medication Sig Dispense Refill    aspirin (ECOTRIN) 81 MG EC tablet Take 1 tablet (81 mg total) by mouth once daily. 360 tablet 0    atorvastatin (LIPITOR) 80 MG tablet Take 1 tablet (80 mg total) by mouth every evening. 90 tablet 3    cetirizine (ZYRTEC) 10 MG tablet Take 10 mg by mouth once daily.      clopidogreL (PLAVIX) 75 mg tablet Take 1 tablet (75 mg total) by mouth once daily. for 21 days 21 tablet 0    metoprolol tartrate (LOPRESSOR) 25 MG tablet Take 1 tablet (25 mg total) by mouth 2 (two) times daily. 60 tablet 11    multivitamin (ONE DAILY MULTIVITAMIN) per tablet Take 1 tablet by mouth once daily.      omega 3-dha-epa-fish oil 1,000 mg (120 mg-180 mg) Cap       pantoprazole (PROTONIX) 40 MG tablet Take 1 tablet (40 mg total) by mouth 2 (two) times daily. 180 tablet 1    diclofenac sodium (VOLTAREN) 1 % Gel Apply 2 g topically 4 (four) times daily as needed. 350 g 2    hydroCHLOROthiazide (HYDRODIURIL) 25 MG tablet Take 1 tablet (25 mg total) by mouth once daily. 90 tablet 3    nitroGLYCERIN  (NITROSTAT) 0.4 MG SL tablet Place 1 tablet (0.4 mg total) under the tongue every 5 (five) minutes as needed for Chest pain. Call the 911 after the 3rd dose. 25 tablet 3     No current facility-administered medications for this visit.     Facility-Administered Medications Ordered in Other Visits   Medication Dose Route Frequency Provider Last Rate Last Admin    0.9%  NaCl infusion   Intravenous Continuous Vinay Bourgeois MD        LIDOcaine (PF) 10 mg/ml (1%) injection 10 mg  1 mL Intradermal Once PRN Vinay Bourgeois MD            History  Past Medical History:   Diagnosis Date    Allergy     Hypertension     Sleep apnea, unspecified     Urinary tract infection      Past Surgical History:   Procedure Laterality Date    ADENOIDECTOMY      ADENOIDECTOMY      BIOPSY OF ADENOIDS      COLONOSCOPY N/A 09/14/2022    Procedure: COLONOSCOPY;  Surgeon: Jordy Simons MD;  Location: [facility];  Service: Endoscopy;  Laterality: N/A;    CORONARY ANGIOGRAPHY Right 1/8/2024    Procedure: ANGIOGRAM, CORONARY ARTERY;  Surgeon: Estrada Ojeda MD;  Location: [facility] CATH LAB;  Service: Cardiology;  Laterality: Right;    EGD, WITH CLOSED BIOPSY  11/30/2023    ESOPHAGOGASTRODUODENOSCOPY N/A 09/14/2022    Procedure: EGD (ESOPHAGOGASTRODUODENOSCOPY);  Surgeon: Jordy Simons MD;  Location: [facility];  Service: Endoscopy;  Laterality: N/A;    ESOPHAGOGASTRODUODENOSCOPY  12/19/2022    with biopsy    ESOPHAGOGASTRODUODENOSCOPY N/A 12/19/2022    Procedure: EGD (ESOPHAGOGASTRODUODENOSCOPY);  Surgeon: Jordy Simons MD;  Location: [facility];  Service: Endoscopy;  Laterality: N/A;    ESOPHAGOGASTRODUODENOSCOPY N/A 11/30/2023    Procedure: EGD (ESOPHAGOGASTRODUODENOSCOPY);  Surgeon: Vinay Bourgeois MD;  Location: [facility];  Service: Endoscopy;  Laterality: N/A;    TONSILLECTOMY       Social History     Socioeconomic History    Marital status:    Tobacco Use    Smoking status: Never    Smokeless tobacco:  Never   Substance and Sexual Activity    Alcohol use: Not Currently     Comment: social    Drug use: No    Sexual activity: Yes     Partners: Female     Social Determinants of Health     Financial Resource Strain: Low Risk  (1/22/2024)    Overall Financial Resource Strain (CARDIA)     Difficulty of Paying Living Expenses: Not hard at all   Food Insecurity: No Food Insecurity (1/22/2024)    Hunger Vital Sign     Worried About Running Out of Food in the Last Year: Never true     Ran Out of Food in the Last Year: Never true   Transportation Needs: No Transportation Needs (1/22/2024)    PRAPARE - Transportation     Lack of Transportation (Medical): No     Lack of Transportation (Non-Medical): No   Physical Activity: Inactive (1/22/2024)    Exercise Vital Sign     Days of Exercise per Week: 0 days     Minutes of Exercise per Session: 0 min   Stress: Stress Concern Present (1/22/2024)    [employer] [place] [employer] - Occupational Stress Questionnaire     Feeling of Stress : To some extent   Social Connections: Unknown (1/22/2024)    Social Connection and Isolation Panel [NHANES]     Frequency of Communication with Friends and Family: Never     Frequency of Social Gatherings with Friends and Family: Never     Active Member of Clubs or Organizations: No     Attends Club or Organization Meetings: Never     Marital Status:    Housing Stability: Low Risk  (1/22/2024)    Housing Stability Vital Sign     Unable to Pay for Housing in the Last Year: No     Number of Places Lived in the Last Year: 1     Unstable Housing in the Last Year: No     Family History   Problem Relation Age of Onset    Lupus Mother     Heart disease Father     Prostate cancer Neg Hx     Kidney disease Neg Hx         Allergies  Review of patient's allergies indicates:   Allergen Reactions    Sulfa (sulfonamide antibiotics) Shortness Of Breath    Coconut     Guaifenesin     Milk containing products (dairy)     Prochlorperazine  Hallucinations     Other reaction(s): Compazine  Note:  Adverse Reaction: Other    Terbinafine hcl      Other reaction(s): terbinafine 250 mg tablet  Note:  Adverse Reaction: Upset Stomach    Wheat containing prod     Penicillins Rash     Other reaction(s): Penicillins  Note:  Adverse Reaction: Rash       Review of Systems   Review of Systems   Constitutional: Negative for fever.   HENT:  Negative for nosebleeds.    Eyes:  Negative for visual disturbance.   Cardiovascular:  Positive for chest pain. Negative for claudication, dyspnea on exertion, palpitations and syncope.   Respiratory:  Negative for cough, hemoptysis and wheezing.    Endocrine: Negative for cold intolerance, heat intolerance, polyphagia and polyuria.   Hematologic/Lymphatic: Negative for bleeding problem.   Skin:  Negative for rash.   Musculoskeletal:  Negative for myalgias.   Gastrointestinal:  Negative for hematemesis, hematochezia, nausea and vomiting.   Genitourinary:  Negative for dysuria.   Neurological:  Positive for focal weakness. Negative for sensory change.   Psychiatric/Behavioral:  Negative for altered mental status.        Physical Exam  Vitals:    01/24/24 0830   BP: 126/84   Pulse: 65     Wt Readings from Last 1 Encounters:   01/24/24 132.3 kg (291 lb 10.7 oz)     Physical Exam  Constitutional:       General: She is not in acute distress.  HENT:      Head: Normocephalic and atraumatic.      Mouth/Throat:      Mouth: Mucous membranes are moist.   Eyes:      Extraocular Movements: Extraocular movements intact.      Pupils: Pupils are equal, round, and reactive to light.   Neck:      Vascular: No carotid bruit or JVD.   Cardiovascular:      Rate and Rhythm: Normal rate and regular rhythm.      Heart sounds: No murmur heard.     No friction rub. No gallop.   Pulmonary:      Effort: Pulmonary effort is normal.      Breath sounds: Normal breath sounds.   Abdominal:      Tenderness: There is no abdominal tenderness. There is no guarding or  rebound.   Musculoskeletal:      Right lower leg: No edema.      Left lower leg: No edema.   Skin:     General: Skin is warm and dry.      Capillary Refill: Capillary refill takes less than 2 seconds.   Neurological:      Mental Status: She is alert. Mental status is at baseline.   Psychiatric:         Mood and Affect: Mood normal.         Labs  Hospital Outpatient Visit on 01/12/2024   Component Date Value Ref Range Status    Event Monitor Day 01/12/2024 1   Final    holter length minutes 01/12/2024 59   Final    Holter length hours 01/12/2024 23   Final    holter length dec hours 01/12/2024 47.98   Final    Sinus min HR 01/12/2024 41   Final    Sinus max hr 01/12/2024 145   Final    Sinus avg hr 01/12/2024 69   Final   Admission on 01/11/2024, Discharged on 01/12/2024   Component Date Value Ref Range Status    POCT Glucose 01/11/2024 112 (H)  70 - 110 mg/dL Final    WBC 01/11/2024 8.13  3.90 - 12.70 K/uL Final    RBC 01/11/2024 5.03  4.60 - 6.20 M/uL Final    Hemoglobin 01/11/2024 15.2  14.0 - 18.0 g/dL Final    Hematocrit 01/11/2024 45.5  40.0 - 54.0 % Final    MCV 01/11/2024 91  82 - 98 fL Final    MCH 01/11/2024 30.2  27.0 - 31.0 pg Final    MCHC 01/11/2024 33.4  32.0 - 36.0 g/dL Final    RDW 01/11/2024 13.6  11.5 - 14.5 % Final    Platelets 01/11/2024 236  150 - 450 K/uL Final    MPV 01/11/2024 11.6  9.2 - 12.9 fL Final    Immature Granulocytes 01/11/2024 0.2  0.0 - 0.5 % Final    Gran # (ANC) 01/11/2024 5.5  1.8 - 7.7 K/uL Final    Immature Grans (Abs) 01/11/2024 0.02  0.00 - 0.04 K/uL Final    Comment: Mild elevation in immature granulocytes is non specific and   can be seen in a variety of conditions including stress response,   acute inflammation, trauma and pregnancy. Correlation with other   laboratory and clinical findings is essential.      Lymph # 01/11/2024 1.9  1.0 - 4.8 K/uL Final    Mono # 01/11/2024 0.6  0.3 - 1.0 K/uL Final    Eos # 01/11/2024 0.2  0.0 - 0.5 K/uL Final    Baso # 01/11/2024 0.05   0.00 - 0.20 K/uL Final    nRBC 01/11/2024 0  0 /100 WBC Final    Gran % 01/11/2024 67.1  38.0 - 73.0 % Final    Lymph % 01/11/2024 23.0  18.0 - 48.0 % Final    Mono % 01/11/2024 7.3  4.0 - 15.0 % Final    Eosinophil % 01/11/2024 1.8  0.0 - 8.0 % Final    Basophil % 01/11/2024 0.6  0.0 - 1.9 % Final    Differential Method 01/11/2024 Automated   Final    Sodium 01/11/2024 140  136 - 145 mmol/L Final    Potassium 01/11/2024 4.1  3.5 - 5.1 mmol/L Final    Chloride 01/11/2024 102  95 - 110 mmol/L Final    CO2 01/11/2024 24  23 - 29 mmol/L Final    Glucose 01/11/2024 108  70 - 110 mg/dL Final    BUN 01/11/2024 9  6 - 20 mg/dL Final    Creatinine 01/11/2024 1.3  0.5 - 1.4 mg/dL Final    Calcium 01/11/2024 9.6  8.7 - 10.5 mg/dL Final    Total Protein 01/11/2024 8.2  6.0 - 8.4 g/dL Final    Albumin 01/11/2024 3.9  3.5 - 5.2 g/dL Final    Total Bilirubin 01/11/2024 0.8  0.1 - 1.0 mg/dL Final    Comment: For infants and newborns, interpretation of results should be based  on gestational age, weight and in agreement with clinical  observations.    Premature Infant recommended reference ranges:  Up to 24 hours.............<8.0 mg/dL  Up to 48 hours............<12.0 mg/dL  3-5 days..................<15.0 mg/dL  6-29 days.................<15.0 mg/dL      Alkaline Phosphatase 01/11/2024 115  55 - 135 U/L Final    AST 01/11/2024 40  10 - 40 U/L Final    ALT 01/11/2024 43  10 - 44 U/L Final    eGFR 01/11/2024 >60.0  >60 mL/min/1.73 m^2 Final    Anion Gap 01/11/2024 14  8 - 16 mmol/L Final    Prothrombin Time 01/11/2024 11.1  9.0 - 12.5 sec Final    INR 01/11/2024 1.0  0.8 - 1.2 Final    Comment: Coumadin Therapy:  2.0 - 3.0 for INR for all indicators except mechanical heart valves  and antiphospholipid syndromes which should use 2.5 - 3.5.  LOT^040^PT Inn^[id number]      TSH 01/11/2024 3.616  0.400 - 4.000 uIU/mL Final    Cholesterol 01/11/2024 164  120 - 199 mg/dL Final    Comment: The National Cholesterol Education Program (NCEP) has  set the  following guidelines (reference ranges) for Cholesterol:  Optimal.....................<200 mg/dL  Borderline High.............200-239 mg/dL  High........................> or = 240 mg/dL      Triglycerides 01/11/2024 129  30 - 150 mg/dL Final    Comment: The National Cholesterol Education Program (NCEP) has set the  following guidelines (reference values) for triglycerides:  Normal......................<150 mg/dL  Borderline High.............150-199 mg/dL  High........................200-499 mg/dL      HDL 01/11/2024 32 (L)  40 - 75 mg/dL Final    Comment: The National Cholesterol Education Program (NCEP) has set the  following guidelines (reference values) for HDL Cholesterol:  Low...............<40 mg/dL  Optimal...........>60 mg/dL      LDL Cholesterol 01/11/2024 106.2  63.0 - 159.0 mg/dL Final    Comment: The National Cholesterol Education Program (NCEP) has set the  following guidelines (reference values) for LDL Cholesterol:  Optimal.......................<130 mg/dL  Borderline High...............130-159 mg/dL  High..........................160-189 mg/dL  Very High.....................>190 mg/dL      HDL/Cholesterol Ratio 01/11/2024 19.5 (L)  20.0 - 50.0 % Final    Total Cholesterol/HDL Ratio 01/11/2024 5.1 (H)  2.0 - 5.0 Final    Non-HDL Cholesterol 01/11/2024 132  mg/dL Final    Comment: Risk category and Non-HDL cholesterol goals:  Coronary heart disease (CHD)or equivalent (10-year risk of CHD >20%):  Non-HDL cholesterol goal     <130 mg/dL  Two or more CHD risk factors and 10-year risk of CHD <= 20%:  Non-HDL cholesterol goal     <160 mg/dL  0 to 1 CHD risk factor:  Non-HDL cholesterol goal     <190 mg/dL      POC Molecular Influenza A Ag 01/11/2024 Negative  Negative, Not Reported Final    POC Molecular Influenza B Ag 01/11/2024 Negative  Negative, Not Reported Final     Acceptable 01/11/2024 Yes   Final    POC Rapid COVID 01/11/2024 Negative  Negative Final      Acceptable 01/11/2024 Yes   Final    Specimen UA 01/11/2024 Urine, Clean Catch   Final    Color, UA 01/11/2024 Yellow  Yellow, Straw, [place] Final    Appearance, UA 01/11/2024 Clear  Clear Final    pH, UA 01/11/2024 8.0  5.0 - 8.0 Final    Specific Gravity, UA 01/11/2024 >1.030 (A)  1.005 - 1.030 Final    Protein, UA 01/11/2024 Negative  Negative Final    Comment: Recommend a 24 hour urine protein or a urine   protein/creatinine ratio if globulin induced proteinuria is  clinically suspected.      Glucose, UA 01/11/2024 Negative  Negative Final    Ketones, UA 01/11/2024 Negative  Negative Final    Bilirubin (UA) 01/11/2024 Negative  Negative Final    Occult Blood UA 01/11/2024 Negative  Negative Final    Nitrite, UA 01/11/2024 Negative  Negative Final    Urobilinogen, UA 01/11/2024 Negative  Negative EU/dL Final    Leukocytes, UA 01/11/2024 Negative  Negative Final    Hemoglobin A1C 01/11/2024 5.4  4.0 - 5.6 % Final    Comment: ADA Screening Guidelines:  5.7-6.4%  Consistent with prediabetes  >or=6.5%  Consistent with diabetes    High levels of fetal hemoglobin interfere with the HbA1C  assay. Heterozygous hemoglobin variants (HbS, HgC, etc)do  not significantly interfere with this assay.   However, presence of multiple variants may affect accuracy.      Estimated Avg Glucose 01/11/2024 108  68 - 131 mg/dL Final    BSA 01/11/2024 2.5  m2 Final    Sodium 01/12/2024 140  136 - 145 mmol/L Final    Potassium 01/12/2024 3.4 (L)  3.5 - 5.1 mmol/L Final    Chloride 01/12/2024 103  95 - 110 mmol/L Final    CO2 01/12/2024 22 (L)  23 - 29 mmol/L Final    Glucose 01/12/2024 96  70 - 110 mg/dL Final    BUN 01/12/2024 11  6 - 20 mg/dL Final    Creatinine 01/12/2024 1.3  0.5 - 1.4 mg/dL Final    Calcium 01/12/2024 9.1  8.7 - 10.5 mg/dL Final    Total Protein 01/12/2024 7.3  6.0 - 8.4 g/dL Final    Albumin 01/12/2024 3.5  3.5 - 5.2 g/dL Final    Total Bilirubin 01/12/2024 0.9  0.1 - 1.0 mg/dL Final    Comment: For infants and  newborns, interpretation of results should be based  on gestational age, weight and in agreement with clinical  observations.    Premature Infant recommended reference ranges:  Up to 24 hours.............<8.0 mg/dL  Up to 48 hours............<12.0 mg/dL  3-5 days..................<15.0 mg/dL  6-29 days.................<15.0 mg/dL      Alkaline Phosphatase 01/12/2024 101  55 - 135 U/L Final    AST 01/12/2024 29  10 - 40 U/L Final    ALT 01/12/2024 36  10 - 44 U/L Final    eGFR 01/12/2024 >60.0  >60 mL/min/1.73 m^2 Final    Anion Gap 01/12/2024 15  8 - 16 mmol/L Final    Magnesium 01/12/2024 1.9  1.6 - 2.6 mg/dL Final    Phosphorus 01/12/2024 3.6  2.7 - 4.5 mg/dL Final    WBC 01/12/2024 9.01  3.90 - 12.70 K/uL Final    RBC 01/12/2024 4.35 (L)  4.60 - 6.20 M/uL Final    Hemoglobin 01/12/2024 13.1 (L)  14.0 - 18.0 g/dL Final    Hematocrit 01/12/2024 39.5 (L)  40.0 - 54.0 % Final    MCV 01/12/2024 91  82 - 98 fL Final    MCH 01/12/2024 30.1  27.0 - 31.0 pg Final    MCHC 01/12/2024 33.2  32.0 - 36.0 g/dL Final    RDW 01/12/2024 13.5  11.5 - 14.5 % Final    Platelets 01/12/2024 220  150 - 450 K/uL Final    MPV 01/12/2024 11.7  9.2 - 12.9 fL Final    Immature Granulocytes 01/12/2024 0.2  0.0 - 0.5 % Final    Gran # (ANC) 01/12/2024 5.2  1.8 - 7.7 K/uL Final    Immature Grans (Abs) 01/12/2024 0.02  0.00 - 0.04 K/uL Final    Comment: Mild elevation in immature granulocytes is non specific and   can be seen in a variety of conditions including stress response,   acute inflammation, trauma and pregnancy. Correlation with other   laboratory and clinical findings is essential.      Lymph # 01/12/2024 2.7  1.0 - 4.8 K/uL Final    Mono # 01/12/2024 0.7  0.3 - 1.0 K/uL Final    Eos # 01/12/2024 0.3  0.0 - 0.5 K/uL Final    Baso # 01/12/2024 0.06  0.00 - 0.20 K/uL Final    nRBC 01/12/2024 0  0 /100 WBC Final    Gran % 01/12/2024 57.8  38.0 - 73.0 % Final    Lymph % 01/12/2024 30.2  18.0 - 48.0 % Final    Mono % 01/12/2024 7.4  4.0 -  15.0 % Final    Eosinophil % 01/12/2024 3.7  0.0 - 8.0 % Final    Basophil % 01/12/2024 0.7  0.0 - 1.9 % Final    Differential Method 01/12/2024 Automated   Final    Troponin I 01/12/2024 0.031 (H)  0.000 - 0.026 ng/mL Final    Comment: The reference interval for Troponin I represents the 99th percentile   cutoff   for our facility and is consistent with 3rd generation assay   performance.      aPTT 01/12/2024 28.6  21.0 - 32.0 sec Final    Comment: Refer to local heparin nomogram for intensity/dose specific   therapeutic   range.  LOT^050^APTT FSL^[id number]      Prothrombin Time 01/12/2024 11.6  9.0 - 12.5 sec Final    INR 01/12/2024 1.1  0.8 - 1.2 Final    Comment: Coumadin Therapy:  2.0 - 3.0 for INR for all indicators except mechanical heart valves  and antiphospholipid syndromes which should use 2.5 - 3.5.  LOT^040^PT Inn^[id number]      Cholesterol 01/12/2024 144  120 - 199 mg/dL Final    Comment: The National Cholesterol Education Program (NCEP) has set the  following guidelines (reference ranges) for Cholesterol:  Optimal.....................<200 mg/dL  Borderline High.............200-239 mg/dL  High........................> or = 240 mg/dL      Triglycerides 01/12/2024 122  30 - 150 mg/dL Final    Comment: The National Cholesterol Education Program (NCEP) has set the  following guidelines (reference values) for triglycerides:  Normal......................<150 mg/dL  Borderline High.............150-199 mg/dL  High........................200-499 mg/dL      HDL 01/12/2024 28 (L)  40 - 75 mg/dL Final    Comment: The National Cholesterol Education Program (NCEP) has set the  following guidelines (reference values) for HDL Cholesterol:  Low...............<40 mg/dL  Optimal...........>60 mg/dL      LDL Cholesterol 01/12/2024 91.6  63.0 - 159.0 mg/dL Final    Comment: The National Cholesterol Education Program (NCEP) has set the  following guidelines (reference values) for LDL  Cholesterol:  Optimal.......................<130 mg/dL  Borderline High...............130-159 mg/dL  High..........................160-189 mg/dL  Very High.....................>190 mg/dL      HDL/Cholesterol Ratio 01/12/2024 19.4 (L)  20.0 - 50.0 % Final    Total Cholesterol/HDL Ratio 01/12/2024 5.1 (H)  2.0 - 5.0 Final    Non-HDL Cholesterol 01/12/2024 116  mg/dL Final    Comment: Risk category and Non-HDL cholesterol goals:  Coronary heart disease (CHD)or equivalent (10-year risk of CHD >20%):  Non-HDL cholesterol goal     <130 mg/dL  Two or more CHD risk factors and 10-year risk of CHD <= 20%:  Non-HDL cholesterol goal     <160 mg/dL  0 to 1 CHD risk factor:  Non-HDL cholesterol goal     <190 mg/dL      TSH 01/12/2024 3.642  0.400 - 4.000 uIU/mL Final    D-Dimer 01/12/2024 0.53 (H)  <0.50 mg/L FEU Final    Comment: The quantitative D-dimer assay should be used as an aid in   the diagnosis of deep vein thrombosis and pulmonary embolism  in patients with the appropriate presentation and clinical  history. The upper limit of the reference interval and the clinical   cut off   point are identical. Causes of a positive (>0.50 mg/L FEU) D-Dimer   test  include, but are not limited to: DVT, PE, DIC, thrombolytic   therapy, anticoagulant therapy, recent surgery, trauma, or   pregnancy, disseminated malignancy, aortic aneurysm, cirrhosis,  and severe infection. False negative results may occur in   patients with distal DVT.  BELEN^612^^9^  LOT^610^DDiSup^[id number]\DDiBuf^[id number]\DDiReag^[id number]     Admission on 01/07/2024, Discharged on 01/08/2024   Component Date Value Ref Range Status    WBC 01/07/2024 9.73  3.90 - 12.70 K/uL Final    RBC 01/07/2024 4.62  4.60 - 6.20 M/uL Final    Hemoglobin 01/07/2024 13.6 (L)  14.0 - 18.0 g/dL Final    Hematocrit 01/07/2024 41.5  40.0 - 54.0 % Final    MCV 01/07/2024 90  82 - 98 fL Final    MCH 01/07/2024 29.4  27.0 - 31.0 pg Final    MCHC 01/07/2024 32.8  32.0 - 36.0 g/dL Final    RDW  01/07/2024 13.7  11.5 - 14.5 % Final    Platelets 01/07/2024 236  150 - 450 K/uL Final    MPV 01/07/2024 11.6  9.2 - 12.9 fL Final    Immature Granulocytes 01/07/2024 0.2  0.0 - 0.5 % Final    Gran # (ANC) 01/07/2024 6.2  1.8 - 7.7 K/uL Final    Immature Grans (Abs) 01/07/2024 0.02  0.00 - 0.04 K/uL Final    Comment: Mild elevation in immature granulocytes is non specific and   can be seen in a variety of conditions including stress response,   acute inflammation, trauma and pregnancy. Correlation with other   laboratory and clinical findings is essential.      Lymph # 01/07/2024 2.5  1.0 - 4.8 K/uL Final    Mono # 01/07/2024 0.7  0.3 - 1.0 K/uL Final    Eos # 01/07/2024 0.3  0.0 - 0.5 K/uL Final    Baso # 01/07/2024 0.06  0.00 - 0.20 K/uL Final    nRBC 01/07/2024 0  0 /100 WBC Final    Gran % 01/07/2024 64.0  38.0 - 73.0 % Final    Lymph % 01/07/2024 25.5  18.0 - 48.0 % Final    Mono % 01/07/2024 7.1  4.0 - 15.0 % Final    Eosinophil % 01/07/2024 2.6  0.0 - 8.0 % Final    Basophil % 01/07/2024 0.6  0.0 - 1.9 % Final    Differential Method 01/07/2024 Automated   Final    Sodium 01/07/2024 140  136 - 145 mmol/L Final    Potassium 01/07/2024 3.7  3.5 - 5.1 mmol/L Final    Chloride 01/07/2024 104  95 - 110 mmol/L Final    CO2 01/07/2024 24  23 - 29 mmol/L Final    Glucose 01/07/2024 86  70 - 110 mg/dL Final    BUN 01/07/2024 11  6 - 20 mg/dL Final    Creatinine 01/07/2024 1.2  0.5 - 1.4 mg/dL Final    Calcium 01/07/2024 8.9  8.7 - 10.5 mg/dL Final    Total Protein 01/07/2024 7.3  6.0 - 8.4 g/dL Final    Albumin 01/07/2024 3.5  3.5 - 5.2 g/dL Final    Total Bilirubin 01/07/2024 0.5  0.1 - 1.0 mg/dL Final    Comment: For infants and newborns, interpretation of results should be based  on gestational age, weight and in agreement with clinical  observations.    Premature Infant recommended reference ranges:  Up to 24 hours.............<8.0 mg/dL  Up to 48 hours............<12.0 mg/dL  3-5 days..................<15.0  mg/dL  6-29 days.................<15.0 mg/dL      Alkaline Phosphatase 01/07/2024 112  55 - 135 U/L Final    AST 01/07/2024 29  10 - 40 U/L Final    ALT 01/07/2024 29  10 - 44 U/L Final    eGFR 01/07/2024 >60.0  >60 mL/min/1.73 m^2 Final    Anion Gap 01/07/2024 12  8 - 16 mmol/L Final    BNP 01/07/2024 45  0 - 99 pg/mL Final    Values of less than 100 pg/ml are consistent with non-CHF populations.    Troponin I 01/07/2024 0.039 (H)  0.000 - 0.026 ng/mL Final    Comment: The reference interval for Troponin I represents the 99th percentile   cutoff   for our facility and is consistent with 3rd generation assay   performance.      Prothrombin Time 01/07/2024 10.3  9.0 - 12.5 sec Final    INR 01/07/2024 1.0  0.8 - 1.2 Final    Comment: Coumadin Therapy:  2.0 - 3.0 for INR for all indicators except mechanical heart valves  and antiphospholipid syndromes which should use 2.5 - 3.5.  LOT^040^PT Inn^[id number]      POC Rapid COVID 01/07/2024 Negative  Negative Final     Acceptable 01/07/2024 Yes   Final    POC Molecular Influenza A Ag 01/07/2024 Negative  Negative, Not Reported Final    POC Molecular Influenza B Ag 01/07/2024 Negative  Negative, Not Reported Final     Acceptable 01/07/2024 Yes   Final    TSH 01/07/2024 2.274  0.400 - 4.000 uIU/mL Final    D-Dimer 01/07/2024 1.18 (H)  <0.50 mg/L FEU Final    Comment: The quantitative D-dimer assay should be used as an aid in   the diagnosis of deep vein thrombosis and pulmonary embolism  in patients with the appropriate presentation and clinical  history. The upper limit of the reference interval and the clinical   cut off   point are identical. Causes of a positive (>0.50 mg/L FEU) D-Dimer   test  include, but are not limited to: DVT, PE, DIC, thrombolytic   therapy, anticoagulant therapy, recent surgery, trauma, or   pregnancy, disseminated malignancy, aortic aneurysm, cirrhosis,  and severe infection. False negative results may occur in    patients with distal DVT.  BELEN^612^^9^  LOT^610^DDiSup^[id number]\DDiBuf^[id number]\DDiReag^[id number]      Troponin I 01/07/2024 0.481 (H)  0.000 - 0.026 ng/mL Final    Comment: The reference interval for Troponin I represents the 99th percentile   cutoff   for our facility and is consistent with 3rd generation assay   performance.      aPTT 01/07/2024 25.1  21.0 - 32.0 sec Final    Comment: Refer to local heparin nomogram for intensity/dose specific   therapeutic   range.  LOT^050^APTT [facility]^[id number]      Prothrombin Time 01/07/2024 10.8  9.0 - 12.5 sec Final    INR 01/07/2024 1.0  0.8 - 1.2 Final    Comment: Coumadin Therapy:  2.0 - 3.0 for INR for all indicators except mechanical heart valves  and antiphospholipid syndromes which should use 2.5 - 3.5.  LOT^040^PT Inn^[id number]      BSA 01/08/2024 2.48  m2 Final    LVOT stroke volume 01/08/2024 58.99  cm3 Final    LVIDd 01/08/2024 4.38  3.5 - 6.0 cm Final    LV Systolic Volume 01/08/2024 25.20  mL Final    LV Systolic Volume Index 01/08/2024 10.8  mL/m2 Final    LVIDs 01/08/2024 2.63  2.1 - 4.0 cm Final    LV Diastolic Volume 01/08/2024 86.86  mL Final    LV Diastolic Volume Index 01/08/2024 37.12  mL/m2 Final    IVS 01/08/2024 1.00  0.6 - 1.1 cm Final    LVOT diameter 01/08/2024 1.91  cm Final    LVOT area 01/08/2024 2.9  cm2 Final    FS 01/08/2024 40  28 - 44 % Final    Left Ventricle Relative Wall Thick* 01/08/2024 0.61  cm Final    Posterior Wall 01/08/2024 1.33 (A)  0.6 - 1.1 cm Final    LV mass 01/08/2024 182.05  g Final    LV Mass Index 01/08/2024 78  g/m2 Final    MV Peak E Philip 01/08/2024 1.02  m/s Final    TDI LATERAL 01/08/2024 0.11  m/s Final    TDI SEPTAL 01/08/2024 0.08  m/s Final    E/E' ratio 01/08/2024 10.74  m/s Final    MV Peak A Philip 01/08/2024 0.68  m/s Final    E/A ratio 01/08/2024 1.50   Final    E wave deceleration time 01/08/2024 185.15  msec Final    LV SEPTAL E/E' RATIO 01/08/2024 12.75  m/s Final    LV LATERAL E/E' RATIO 01/08/2024 9.27  m/s Final     LVOT peak shagufta 01/08/2024 0.72  m/s Final    Left Ventricular Outflow Tract Aleyda* 01/08/2024 0.50  cm/s Final    Left Ventricular Outflow Tract Aleyda* 01/08/2024 1.17  mmHg Final    RVDD 01/08/2024 1.85  cm Final    LA size 01/08/2024 2.76  cm Final    Left Atrium Minor Axis 01/08/2024 5.05  cm Final    Left Atrium Major Axis 01/08/2024 4.93  cm Final    RA Major Axis 01/08/2024 4.29  cm Final    AV mean gradient 01/08/2024 2  mmHg Final    AV peak gradient 01/08/2024 5  mmHg Final    Ao peak shagufta 01/08/2024 1.09  m/s Final    Ao VTI 01/08/2024 28.30  cm Final    LVOT peak VTI 01/08/2024 20.60  cm Final    AV valve area 01/08/2024 2.08  cm² Final    AV Velocity Ratio 01/08/2024 0.66   Final    AV index (prosthetic) 01/08/2024 0.73   Final    MICKEY by Velocity Ratio 01/08/2024 1.89  cm² Final    MV stenosis pressure 1/2 time 01/08/2024 53.69  ms Final    MV valve area p 1/2 method 01/08/2024 4.10  cm2 Final    PV PEAK VELOCITY 01/08/2024 1.03  m/s Final    PV peak gradient 01/08/2024 4  mmHg Final    Pulmonary Valve Mean Velocity 01/08/2024 0.67  m/s Final    Ao root annulus 01/08/2024 3.39  cm Final    Ascending aorta 01/08/2024 3.6  cm Final    IVC diameter 01/08/2024 1.69  cm Final    Mean e' 01/08/2024 0.10  m/s Final    ZLVIDS 01/08/2024 -6.24   Final    ZLVIDD 01/08/2024 -7.88   Final    AORTIC VALVE CUSP SEPERATION 01/08/2024 1.81  cm Final    LA Volume Index 01/08/2024 16.5  mL/m2 Final    LA volume 01/08/2024 38.63  cm3 Final    LA volume (mod) 01/08/2024 83.00  cm3 Final    LA WIDTH 01/08/2024 3.3  cm Final    LA Volume Index (Mod) 01/08/2024 35.5  mL/m2 Final    RA Width 01/08/2024 2.8  cm Final    Est. RA pres 01/08/2024 8  mmHg Final    WBC 01/08/2024 9.08  3.90 - 12.70 K/uL Final    RBC 01/08/2024 4.32 (L)  4.60 - 6.20 M/uL Final    Hemoglobin 01/08/2024 12.7 (L)  14.0 - 18.0 g/dL Final    Hematocrit 01/08/2024 38.2 (L)  40.0 - 54.0 % Final    MCV 01/08/2024 88  82 - 98 fL Final    MCH 01/08/2024 29.4  27.0 -  31.0 pg Final    MCHC 01/08/2024 33.2  32.0 - 36.0 g/dL Final    RDW 01/08/2024 13.4  11.5 - 14.5 % Final    Platelets 01/08/2024 201  150 - 450 K/uL Final    MPV 01/08/2024 11.3  9.2 - 12.9 fL Final    Immature Granulocytes 01/08/2024 0.3  0.0 - 0.5 % Final    Gran # (ANC) 01/08/2024 5.3  1.8 - 7.7 K/uL Final    Immature Grans (Abs) 01/08/2024 0.03  0.00 - 0.04 K/uL Final    Comment: Mild elevation in immature granulocytes is non specific and   can be seen in a variety of conditions including stress response,   acute inflammation, trauma and pregnancy. Correlation with other   laboratory and clinical findings is essential.      Lymph # 01/08/2024 3.0  1.0 - 4.8 K/uL Final    Mono # 01/08/2024 0.5  0.3 - 1.0 K/uL Final    Eos # 01/08/2024 0.2  0.0 - 0.5 K/uL Final    Baso # 01/08/2024 0.05  0.00 - 0.20 K/uL Final    nRBC 01/08/2024 0  0 /100 WBC Final    Gran % 01/08/2024 58.7  38.0 - 73.0 % Final    Lymph % 01/08/2024 33.1  18.0 - 48.0 % Final    Mono % 01/08/2024 5.4  4.0 - 15.0 % Final    Eosinophil % 01/08/2024 1.9  0.0 - 8.0 % Final    Basophil % 01/08/2024 0.6  0.0 - 1.9 % Final    Differential Method 01/08/2024 Automated   Final    Sodium 01/08/2024 139  136 - 145 mmol/L Final    Potassium 01/08/2024 3.4 (L)  3.5 - 5.1 mmol/L Final    Chloride 01/08/2024 103  95 - 110 mmol/L Final    CO2 01/08/2024 25  23 - 29 mmol/L Final    Glucose 01/08/2024 107  70 - 110 mg/dL Final    BUN 01/08/2024 10  6 - 20 mg/dL Final    Creatinine 01/08/2024 1.1  0.5 - 1.4 mg/dL Final    Calcium 01/08/2024 8.6 (L)  8.7 - 10.5 mg/dL Final    Anion Gap 01/08/2024 11  8 - 16 mmol/L Final    eGFR 01/08/2024 >60.0  >60 mL/min/1.73 m^2 Final    Troponin I 01/08/2024 0.313 (H)  0.000 - 0.026 ng/mL Final    Comment: The reference interval for Troponin I represents the 99th percentile   cutoff   for our facility and is consistent with 3rd generation assay   performance.      aPTT 01/08/2024 38.6 (H)  21.0 - 32.0 sec Final    Comment: Refer  to local heparin nomogram for intensity/dose specific   therapeutic   range.  LOT^050^APTT [facility]^[id number]      Magnesium 01/08/2024 1.9  1.6 - 2.6 mg/dL Final    Phosphorus 01/08/2024 2.6 (L)  2.7 - 4.5 mg/dL Final    aPTT 01/08/2024 25.8  21.0 - 32.0 sec Final    Comment: Refer to local heparin nomogram for intensity/dose specific   therapeutic   range.  LOT^050^APTT [facility]^[id number]     Admission on 11/30/2023, Discharged on 11/30/2023   Component Date Value Ref Range Status    Final Pathologic Diagnosis 11/30/2023    Final                    Value:1. Distal esophagus (biopsy):  No intestinal metaplasia, no dysplasia  Squamous mucosa with minimal reactive changes, nonspecific  No support for eosinophilic esophagitis     2. Proximal esophagus (biopsy):  Squamous mucosa with no significant histopathologic changes  No support for eosinophilic esophagitis      Interp By Mariana Mccormack M.D., Signed on 12/04/2023 at 11:38    Gross 11/30/2023    Final                    Value:Pathology ID:  [id number]  Patient ID:  [id number]  Received in 2 parts  Part 1:  Pathology ID:  [id number]  Patient ID:  [id number]  The specimen is received in formalin labeled &quot;distal esophagus&quot;.  The specimen consists of 4 white fragments of soft tissue measuring 0.7 x 0.2 x 0.1 cm in aggregate.  The specimen is submitted entirely in cassette SBS--1-A.    Part 2:  Pathology ID:  [id number]  Patient ID:  [id number]  The specimen is received in formalin labeled &quot;proximal esophagus&quot;.  The specimen consists of 2 tan fragments of soft tissue measuring 0.4 x 0.2 x 0.1 cm in aggregate.  The specimen is submitted entirely in cassette BEB--2-A.  Patrice Madison      Disclaimer 11/30/2023 Unless the case is a 'gross only' or additional testing only, the final diagnosis for each specimen is based on a microscopic examination of appropriate tissue sections.   Final   Office Visit on 10/11/2023   Component Date Value Ref Range Status    SARS  Coronavirus 2 Antigen 10/11/2023 Negative  Negative Final     Acceptable 10/11/2023 Yes   Final       EKG  01/11/2024 sinus bradycardia nonspecific ST-T changes    Echo   Results for orders placed or performed during the hospital encounter of 01/11/24   Echo Saline Bubble? Yes   Result Value Ref Range    BSA 2.5 m2    Narrative      Limited exam for bubble study.    No evidence of intracardiac shunt.    Technically difficult limited study.         Imaging  Holter monitor - 24 hour    Result Date: 1/19/2024    The predominant rhythm is sinus.   The average heart rate was 69 BPM. The minimum heart rate was 41 BPM, occurring at 8:13:09 AM (in the context of sinus arrhythmia). The maximum heart rate was 145 BPM, occurring at 2:43:05 PM.   Longest R-R interval 1.7 seconds at 8:13:08 AM.   No ventricular ectopy.   Rare supraventricular ectopic activity.  Short nonsustained atrial run.   There appear to be some aberrantly conducted beats on a few of the rhythm strips.   Possible nonconducted PAC.     US Lower Extremity Veins Bilateral    Result Date: 1/12/2024  EXAMINATION: US LOWER EXTREMITY VEINS BILATERAL CLINICAL HISTORY: elevated d dimer, concern for DVT; FINDINGS: All veins demonstrate flow, compressibility, and waveform.  No popliteal fossa mass, cyst, or aneurysm seen.     No evidence of DVT bilaterally. Electronically signed by: Jamarcus Marin MD Date:    01/12/2024 Time:    12:55    Echo Saline Bubble? Yes    Result Date: 1/11/2024    Limited exam for bubble study.   No evidence of intracardiac shunt.   Technically difficult limited study.     MRI Brain Without Contrast    Result Date: 1/11/2024  EXAMINATION: MRI BRAIN WITHOUT CONTRAST CLINICAL HISTORY: Stroke, follow up; TECHNIQUE: Multiplanar multisequence MR imaging of the brain was performed without contrast. COMPARISON: CT head and CTA head neck performed 01/11/2024. FINDINGS: Parenchyma: Small focus of restricted diffusion and T2 weighted  signal abnormality in the left corona radiata in keeping with acute to early subacute infarct.  No associated hemorrhage or mass effect at this region of presumed cytotoxic edema. Elsewhere, no definite additional areas of acute ischemia or recent infarction are appreciated.  Few additional areas of nonspecific T2/FLAIR hyperintense signal the frontoparietal white matter are identified. Additional comments: There is no midline shift, abnormal extra-axial fluid collection, or acute intracranial hemorrhage. The basal cisterns are patent. Ventricles: Normal. Flow voids: The normal major intracranial arterial flow voids are visualized. Sinuses and mastoid air cells: Essentially clear Orbits: Normal Midline structures: The pituitary and craniocervical junction are normal. Marrow: Normal     Small focus of acute to early subacute ischemia in the left corona radiata.  No associated hemorrhage or mass effect. This report was flagged in Epic as abnormal. Electronically signed by: Harish Benitez Date:    01/11/2024 Time:    13:58    CTA Head and Neck (xpd)    Result Date: 1/11/2024  EXAMINATION: CTA HEAD AND NECK (XPD) CLINICAL HISTORY: Stroke/TIA, determine embolic source; TECHNIQUE: Axial CT images obtained throughout the region of the head before and after the administration of intravenous contrast.  CT angiogram was performed through the cervical and intracranial vasculature during the IV bolus administration of 80mL of Omnipaque 350.  Multiplanar MPR and MIP reformats were performed. COMPARISON: None FINDINGS: Head CT without and with contrast: The ventricles are normal in size without evidence of hydrocephalus. The brain appears within normal limits. No parenchymal mass, hemorrhage, edema or major vascular distribution infarct. No extra-axial blood or fluid collections. The cranium appears intact. Mastoid air cells and paranasal sinuses are essentially clear. Right external ear cerumen. Moderate left foraminal narrowing  at C3-4. CTA NECK: ARCH: Normal arch anatomy. COMMON CAROTIDS: Normal. EXTERNAL CAROTIDS: Normal. INTERNAL CAROTIDS: Normal. VERTEBRALS: Patent, there is mild calcified atherosclerotic plaque at the distal left vertebral artery. CTA HEAD: INTERNAL CAROTIDS: Normal. ANTERIOR CEREBRALS: Normal. ANTERIOR COMMUNICATING: Normal. MIDDLE CEREBRALS: Normal. POSTERIOR COMMUNICATING ARTERIES: Normal bilaterally. POSTERIOR CEREBRALS: Normal. VERTEBROBASILAR AND BRANCH VESSELS: Patent     No CT evidence of large territorial infarction. No large vessel occlusion in the intracranial circulation. No hemodynamically significant stenosis of the neck vessels. Mild calcified atherosclerotic plaque distal left vertebral artery. Note that MRI has greater sensitivity to detect small acute infarction and be done if clinically warranted. Electronically signed by: Chantale Tavarez MD Date:    01/11/2024 Time:    12:24    X-Ray Chest PA And Lateral    Result Date: 1/11/2024  EXAMINATION: XR CHEST PA AND LATERAL CLINICAL HISTORY: Shortness of breath TECHNIQUE: PA and lateral views of the chest were performed. COMPARISON: Chest radiograph from 01/07/2024 FINDINGS: The lungs are clear, with normal appearance of pulmonary vasculature and no pleural effusion or pneumothorax. The cardiac silhouette is normal in size. The hilar and mediastinal contours are unremarkable. Bones are intact.     No acute abnormality. Electronically signed by: Harish Mckeon MD Date:    01/11/2024 Time:    11:51    CT Head Without Contrast    Result Date: 1/11/2024  EXAMINATION: CT HEAD WITHOUT CONTRAST CLINICAL HISTORY: Neuro deficit, acute, stroke suspected; TECHNIQUE: Low dose axial images were obtained through the head.  Coronal and sagittal reformations were also performed. Contrast was not administered. COMPARISON: None. FINDINGS: Blood: No acute intracranial hemorrhage. Parenchyma: No definite loss of gray-white differentiation to suggest acute or subacute  transcortical infarct. Ventricles/Extra-axial spaces: No abnormal extra-axial fluid collection. Basal cisterns are patent. Vessels: Mild atherosclerotic calcifications. Orbits: Unremarkable. Scalp: Unremarkable. Skull: There are no depressed skull fractures or destructive bone lesions. Sinuses and mastoids: Essentially clear. Other findings: Soft tissue attenuation within the right external auditory canal may relate to cerumen.     No acute intracranial findings by noncontrast CT. Electronically signed by: Harish Benitez Date:    01/11/2024 Time:    11:02    Cardiac catheterization    Result Date: 1/8/2024    Mild-to-moderate nonobstructive coronary artery disease.   Ectasia noted involving the right coronary artery and distal left main. The procedure log was documented by Documenter: Marianne Olsen RT and verified by Estrada Ojeda MD. Date: 1/8/2024  Time: 12:08 PM     Echo    Result Date: 1/8/2024    Left Ventricle: The left ventricle is normal in size.  Mildly to moderately increased wall thickness. Unable to assess wall motion. There is normal systolic function with a visually estimated ejection fraction of 55 - 60%. There is normal diastolic function.   Left Atrium: Left atrium is mildly dilated.   Right Ventricle: Right ventricle was not well visualized due to poor acoustic window. Normal right ventricular cavity size. Systolic function is normal.   IVC/SVC: Intermediate venous pressure at 8 mmHg.   Aorta: Ascending aorta is mildly dilated measuring 3.6 cm.     CTA Chest Non-Coronary (PE Studies)    Result Date: 1/7/2024  EXAMINATION: CTA CHEST NON CORONARY (PE STUDIES) CLINICAL HISTORY: Pulmonary embolism (PE) suspected, positive D-dimer; TECHNIQUE: Following the intravenous administration of 75 cc of Omnipaque 350 contrast material, 1.25 mm contiguous axial images were acquired through the chest utilizing the CT pulmonary angiogram protocol.  2-D coronal and sagittal reconstructed images of the chest and  sagittal oblique MIP images of the pulmonary arterial system were generated from the source data. COMPARISON: None. FINDINGS: Pulmonary arterial system: This is a good quality examination for the evaluation of pulmonary thromboembolism with good opacification of the pulmonary arteries to the level of the segmental branches of the pulmonary arterial system. There is no evidence of acute pulmonary thromboembolism. No large saddle pulmonary embolism, enlargement of the main pulmonary artery, or secondary findings of right ventricular strain. Aorta and heart: The heart is normal in size.  No pericardial fluid.  No thoracic aortic aneurysm.  No thoracic aortic dissection. Airways: Unremarkable. Lymph nodes: No pathologically enlarged lymph nodes in the chest or axilla. Lungs: The lungs are clear with no evidence of airspace consolidation, mass, or bronchiectasis.  No emphysematous lung architecture. Pleura: No significant volume of pleural fluid or pneumothorax.  No pleural based masses. Visualized upper abdominal soft tissues: No significant abnormalities appreciated. Bones: There is degenerative change of the thoracic spine.     1. No evidence of acute pulmonary thromboembolism. 2. No acute findings evident elsewhere in the chest. Electronically signed by: Elie Quan Date:    01/07/2024 Time:    21:33    X-Ray Chest 1 View    Result Date: 1/7/2024  EXAMINATION: XR CHEST 1 VIEW CLINICAL HISTORY: chest pain; TECHNIQUE: Single frontal view of the chest was performed. COMPARISON: 01/26/2021 FINDINGS: Heart is not enlarged the trachea is midline.  The lungs and pleural spaces are clear.  The bony structures are intact.     Stable and negative chest Electronically signed by: Elie Quan Date:    01/07/2024 Time:    18:53      Prior coronary angiogram / intervention:  See above    Assessment and Plan  1. NSTEMI (non-ST elevated myocardial infarction)  No culprit on angiogram will perform cardiac MRI  Continue  antithrombotic with high-dose statin and beta-blocker  - LIPID PANEL; Future  - MRI Cardiac Morphology Function W WO; Future    2. Primary hypertension  Hydrochlorothiazide beta-blocker controlled  - hydroCHLOROthiazide (HYDRODIURIL) 25 MG tablet; Take 1 tablet (25 mg total) by mouth once daily.  Dispense: 90 tablet; Refill: 3    3. Hyperlipidemia, unspecified hyperlipidemia type  Continue high-dose statin recheck lipid panel in 6 weeks    4. History of CVA (cerebrovascular accident)  Currently on dual antiplatelet therapy for 21 days then single antiplatelet therapy with high-dose statin neurology follow up pending    5. Aortic dilatation  Continue beta-blocker blood pressure control surveillance    6. Coronary artery disease involving native coronary artery of native heart without angina pectoris  As above antithrombotic high-dose statin beta-blocker  - nitroGLYCERIN (NITROSTAT) 0.4 MG SL tablet; Place 1 tablet (0.4 mg total) under the tongue every 5 (five) minutes as needed for Chest pain. Call the 911 after the 3rd dose.  Dispense: 25 tablet; Refill: 3        Follow Up  Follow up in about 3 months (around 4/24/2024).      Estrada Ojeda MD, FACC, [facility]  Interventional Cardiology     Total professional time spent for the encounter: 30 minutes  Time was spent preparing to see the patient, reviewing results of prior testing, obtaining and/or reviewing separately obtained history, performing a medically appropriate examination and interview, counseling and educating the patient/family, ordering medications/tests/procedures, referring and communicating with other health care professionals, documenting clinical information in the electronic health record, and independently interpreting results.

## 2024-01-29 ENCOUNTER — CLINICAL SUPPORT (OUTPATIENT)
Dept: REHABILITATION | Facility: HOSPITAL | Age: 54
End: 2024-01-29
Payer: COMMERCIAL

## 2024-01-29 DIAGNOSIS — Z74.09 DECREASED FUNCTIONAL MOBILITY AND ENDURANCE: Primary | ICD-10-CM

## 2024-01-29 DIAGNOSIS — I63.9 CEREBROVASCULAR ACCIDENT (CVA), UNSPECIFIED MECHANISM: ICD-10-CM

## 2024-01-29 PROCEDURE — 97530 THERAPEUTIC ACTIVITIES: CPT | Mod: PN

## 2024-01-29 PROCEDURE — 97162 PT EVAL MOD COMPLEX 30 MIN: CPT | Mod: PN

## 2024-01-29 NOTE — PATIENT INSTRUCTIONS
Please walk in safe environments without treacherous ground and walk with a family member if you have balance or CV concerns.  Walk when the weather permits at the cool hours of the day in the summer and during rainy seasons.  PT recommends a HR monitor or pedometer to track levels of fitness daily and weekly.  Please do not exceed heart rate max when walking.  See AHA for Heart rate range recommendations for someone your age.

## 2024-01-29 NOTE — PLAN OF CARE
[facility]  Physical Therapy Neurological Rehabilitation Initial Evaluation     Name: Martha Shah  [facility] Number: [id number]    Therapy Diagnosis:   Encounter Diagnoses   Name Primary?    Cerebrovascular accident (CVA), unspecified mechanism     Decreased functional mobility and endurance Yes     Physician: Roverto Ackerman MD    Physician Orders: PT Eval and Treat   Medical Diagnosis from Referral: Cerebrovascular accident (CVA), unspecified mechanism [I63.9]   Evaluation Date: 1/29/2024  Authorization Period Expiration: 12/31/2024  Plan of Care Expiration: 3/8/2024  Progress Note Due: 2/29/2024  Date of Surgery: N/A  Visit # / Visits authorized: 1/1  FOTO: 1/3    Precautions: Standard and Recent MI (monitor vitals as needed)    Time In: 9:30AM  Time Out: 10:20AM  Total Billable Time: 50 minutes (1 mod eval; 1 TA)    Subjective      Date of onset: 1/7/2024; 1/11/2024    History of current condition - Martha reports: Had a heart attack on 1/7 and stroke on 1/11. For CVA, she experienced symptoms of right sided weakness. Feels like these symptoms have largely resolved but is feeling leg fatigue with return to light exercise. Feels back to baseline from cognitive standpoint. Returned to driving this morning. Playing video games (a common hobby of hers) was originally making her feel foggy; has now improved, but still having eye fatigue.      Imaging:   - MRI Brain (1/11/2024): Small focus of acute to early subacute ischemia in the left corona radiata.  No associated hemorrhage or mass effect. This report was flagged in Epic as abnormal.    Prior Therapy: in the past for other complaints  Social History: lives with wife   Falls: no   DME: CPAP machine; was sent home with walker but no longer using; uses cane seldomly when having foot pain  Home Environment: single story home with 4 steps to enter  Exercise Routine / History: treadmill walking   Family Present at time of Eval: no  "  Occupation:   Prior Level of Function: independent   Current Level of Function: independent    Pain:  Current 0/10, worst 2/10, best 0/10   Location: bilateral feet, plantar surfaces (R>L)  Description: Aching  Aggravating Factors: standing, walking, foods that aggravated inflammation  Easing Factors: cutting out foods that caused inflammation    Patient's goals: "to see if there's anything left that needs to be addressed"; wants to get into exercise program as well    Medical History:   Past Medical History:   Diagnosis Date    Allergy     Hypertension     Sleep apnea, unspecified     Urinary tract infection      Surgical History:   Martha Shah  has a past surgical history that includes Tonsillectomy; Biopsy of adenoids; Adenoidectomy; Colonoscopy (N/A, 09/14/2022); Esophagogastroduodenoscopy (N/A, 09/14/2022); Adenoidectomy; Esophagogastroduodenoscopy (12/19/2022); Esophagogastroduodenoscopy (N/A, 12/19/2022); egd, with closed biopsy (11/30/2023); Esophagogastroduodenoscopy (N/A, 11/30/2023); and Coronary angiography (Right, 1/8/2024).    Medications:   Martha has a current medication list which includes the following prescription(s): aspirin, atorvastatin, cetirizine, clopidogrel, diclofenac sodium, hydrochlorothiazide, metoprolol tartrate, multivitamin, nitroglycerin, omega 3-dha-epa-fish oil, and pantoprazole, and the following Facility-Administered Medications: sodium chloride 0.9% and lidocaine (pf) 10 mg/ml (1%).    Allergies:   Review of patient's allergies indicates:   Allergen Reactions    Sulfa (sulfonamide antibiotics) Shortness Of Breath    Coconut     Guaifenesin     Milk containing products (dairy)     Prochlorperazine Hallucinations     Other reaction(s): Compazine  Note:  Adverse Reaction: Other    Terbinafine hcl      Other reaction(s): terbinafine 250 mg tablet  Note:  Adverse Reaction: Upset Stomach    Wheat containing prod     Penicillins Rash     Other reaction(s): " Penicillins  Note:  Adverse Reaction: Rash      Objective      - Command followin% simple and complex   - Speech: no deficits     Mental status: alert, oriented to person, place, and time, normal mood, behavior, speech, dress, motor activity, and thought processes  Behavior:  calm, cooperative, and adequate rapport can be established  Attention Span and Concentration:  Normal    Dominant hand:  right     Tone: 0 - No increase in muscle tone  Limbs/muscles affected: major muscle groups of bilateral upper extremity    Visual/Auditory:   - Smooth Pursuits: Reports eye fatigue but movement quality normal  - Saccades: Normal movement quality but decreased speed; patient reports horizontal saccades take more effort  - Convergence: Normal  - VOR: Normal with self paced assessment    Coordination:   - fine motor: Intact opposition  - UE coordination: Intact rapid alternating movement    - LE coordination:  Intact rapid alternating movement    ROM:   UPPER EXTREMITY--AROM/PROM  (R) UE: WNLs  (L) UE: WNLs         RANGE OF MOTION--LOWER EXTREMITIES  (R) LE moderately reduced hip internal rotation  (L) LE: moderately reduced hip internal rotation    Upper Extremity Strength   RUE LUE   Shoulder Flexion: 5/5 5/5   Shoulder Abduction: 5/5 5/5   Elbow Flexion:     5/5 5/5   Elbow Extension: 5/5 5/5   Wrist Extension: 5/5 5/5     Lower Extremity Strength   RLE LLE   Hip Flexion: 4/5 4/5   Hip Extension:  See 30-Second Sit to Stand See 30-Second Sit to Stand   Hip Abduction: Max resistance in sitting position Max resistance in sitting position   Hip Adduction: Max resistance in sitting position Max resistance in sitting position   Knee Extension: 4+/5 5/5   Knee Flexion: 4+/5 4+/5   Ankle Dorsiflexion: 4+/5 4+/5     30-Second Sit to Stand: 13 reps with bilateral upper extremity assist    Timed Up and Go: 7.1 seconds without AD    Six-Minute Walk Test: 1195 feet without AD  -Pre Vitals: O2 = 99% / HR = 73bpm  -Post Vitals: O2 =  98% / HR = 94bpm    Functional Gait Assessment:     1. Gait on level surface =  3   (3) Normal: less than 5.5 sec, no A.D., no imbalance, normal gait pattern, deviates <6in   (2) Mild impairment: 7-5.6 sec, uses A.D., mild gait deviations, or deviates 6-10 in   (1) Moderate impairment: > 7 sec, slow speed, imbalance, deviates 10-15 in.   (0) Severe impairment: needs assist, deviates >15 in, reach/touch wall  2. Change in Gait Speed = 3   (3) Normal: smooth change w/o loss of balance or gait deviation, deviates < 6 in, significant difference between speeds   (2) Mild impairment: changes speed, but demonstrates mild gait deviations, deviates 6-10 in, OR no deviations but unable to significantly speed, OR uses A.D.   (1) Moderate impairment: minor changes to speed, OR changes speed w/ significant deviations, deviates 10-15 in, OR  Changes speed , but loses balance & recovers   (0) Severe impairment: cannot change speed, deviates >15 in, or loses balance & needs assist  3. Gait with horizontal head turns  = 2   (3) Normal: no change in gait, deviates <6 in   (2) Mild impairment: slight change in speed, deviates 6-10 in, OR uses A.D.   (1) Moderate impairment: moderate change in speed, deviates 10-15 in   (0) Severe impairment: severe disruption of gait, deviates >15in  4. Gait with vertical head turns = 2   (3) Normal: no change in gait, deviates <6 in   (2) Mild impairment: slight change in speed, deviates 6-10 in OR uses A.D.   (1) Moderate impairment: moderate change in speed, deviates 10-15 in   (0) Severe impairment: severe disruption of gait, deviates >15 in  5. Gait with pivot turns = 3   (3) Normal: performs safely in 3 sec, no LOB   (2) Mild impairment: performs in >3 sec & no LOB, OR turns safely & requires several steps to regain LOB   (1) Moderate impairment: turns slow, OR requires several small steps for balance following turn & stop   (0) Severe impairment: cannot turn safely, needs assist  6. Step over  obstacle = 3   (3) Normal: steps over 2 stacked boxes w/o change in speed or LOB   (2) Mild impairment: able to step over 1 box w/o change in speed or LOB   (1) Moderate impairment: steps over 1 box but must slow down, may require VC   (0) Severe impairment: cannot perform w/o assist  7. Gait with Narrow GÓMEZ = 3   (3) Normal: 10 steps no staggering   (2) Mild impairment: 7-9 steps   (1) Moderate impairment: 4-7 steps   (0) Severe impairment: < 4 steps or cannot perform w/o assist  8. Gait with eyes closed = 2   (3) Normal: < 7 sec, no A.D., no LOB, normal gait pattern, deviates <6 in   (2) Mild impairment: 7.1-9 sec, mild gait deviations, deviates 6-10 in   (1) Moderate impairment: > 9 sec, abnormal pattern, LOB, deviates 10-15 in   (0) Severe impairment: cannot perform w/o assist, LOB, deviates >15in  9. Ambulating Backwards = 2   (3) Normal: no A.D., no LOB, normal gait pattern, deviates <6in   (2) Mild impairment: uses A.D., slower speed, mild gait deviations, deviates 6-10 in   (1) Moderate impairment: slow speed, abnormal gait pattern, LOB, deviates 10-15 in   (0) Severe impairment: severe gait deviations or LOB, deviates >15in  10. Steps = 3   (3) Normal: alternating feet, no rail   (2) Mild Impairment: alternating feet, uses rail   (1) Moderate impairment: step-to, uses rail   (0) Severe impairment: cannot perform safely    Score 26/30     Cutoffs:   <22/30 fall risk in older adults  <18/30 fall risk in Parkinsons    MDC/MCID:  Stroke = 4.2 points (MDC)  Vestibular = 6 points (MDC)  Geriatric = 4 points (MCID)  Parkinson's = 4 points (MDC)     Gait Assessment:   - AD used: none   - Assistance: independent  - Distance: see 6MWT  - Stairs: see FGA    Gait Analysis:  Deviations noted: no major deviations noted    Intake Outcome Measure for FOTO Stroke Upper Extremity Survey    Therapist reviewed FOTO scores for Martha Shah on 1/29/2024.   FOTO report - see Media section or FOTO account episode  details.    Intake Score: 67%       Treatment     Total Treatment time separate from Evaluation: 8 minutes    Martha received the treatments listed below:      therapeutic activities to improve functional performance for 8  minutes, including:  - Instruction on [facility] walking program; options for self-progression; various types of cardiovascular/aerobic activities  - Instruction on oculomotor exercise program (saccades, horizontal and vertical / smooth pursuits, horizontal and vertical); options for self-progression and recommended volume    Patient Education and Home Exercises     Education provided:   - PT plan of care  - HEP instruction  - Role of physical therapy in outpatient setting    Written Home Exercises Provided: yes.  Exercises were reviewed and Martha was able to demonstrate them prior to the end of the session.  Martha demonstrated good  understanding of the education provided.     See EMR under Patient Instructions for exercises provided 1/29/2024.    Assessment     Martha is a 53 y.o. adult referred to outpatient Physical Therapy with a medical diagnosis of Cerebrovascular accident (CVA), unspecified mechanism [I63.9] . Patient presents with mild deficits in bilateral lower extremity strength; higher level balance impairments; impaired cardiovascular endurance considering age; and decreased self-perception of functional mobility per FOTO. While deficits are mild, she would benefit from skilled physical therapy services to address listed impairments, return to prior level of function, improve independence with regular fitness program, receive education/intervention geared toward secondary stroke prevention, and improve overall quality of life.    Patient prognosis is Good.   Patient will benefit from skilled outpatient Physical Therapy to address the deficits stated above and in the chart below, provide patient /family education, and to maximize patient's level of independence.     Plan of  care discussed with patient: Yes  Patient's spiritual, cultural and educational needs considered and patient is agreeable to the plan of care and goals as stated below:     Anticipated Barriers for therapy: Co-morbidities; recent medical history    Medical Necessity is demonstrated by the following  History  Co-morbidities and personal factors that may impact the plan of care [] LOW: no personal factors / co-morbidities  [x] MODERATE: 1-2 personal factors / co-morbidities  [] HIGH: 3+ personal factors / co-morbidities    Moderate / High Support Documentation:   Co-morbidities affecting plan of care: history of Hypertension; history of recent MI    Personal Factors:   no deficits     Examination  Body Structures and Functions, activity limitations and participation restrictions that may impact the plan of care [] LOW: addressing 1-2 elements  [] MODERATE: 3+ elements  [x] HIGH: 4+ elements (please support below)    Moderate / High Support Documentation: See above     Clinical Presentation [] LOW: stable  [x] MODERATE: Evolving  [] HIGH: Unstable     Decision Making/ Complexity Score: moderate       Goals:  Short Term Goals: 2 weeks   Patient will be 100% compliant with original HEP in order to maximize PT benefits  Patient will score >/=70% on FOTO survey in order to improve self-perception of functional mobility    Long Term Goals: 5 weeks   Patient will begin some form of home/community fitness in order to sustain PT benefits post-discharge  Patient will score >/=29/30 on FGA in order to improve postural control with community mobility  Patient will walk >/=1400 feet on Six-Minute Walk Test in order to improve cardiovascular endurance for community mobility  Patient will score >/=80% on FOTO survey in order to improve self-perception of functional mobility  Plan     Plan of care Certification: 1/29/2024 to 3/8/2024.    Outpatient Physical Therapy 2 times weekly for 5 weeks to include the following interventions:  Gait Training, Manual Therapy, Moist Heat/ Ice, Neuromuscular Re-ed, Patient Education, Self Care, Therapeutic Activities, Therapeutic Exercise, and Modalities PRN .     RAD OSEI, PT        Physician's Signature: _________________________________________ Date: ________________

## 2024-01-30 ENCOUNTER — NURSE TRIAGE (OUTPATIENT)
Dept: ADMINISTRATIVE | Facility: CLINIC | Age: 54
End: 2024-01-30
Payer: COMMERCIAL

## 2024-01-30 ENCOUNTER — OFFICE VISIT (OUTPATIENT)
Dept: PODIATRY | Facility: CLINIC | Age: 54
End: 2024-01-30
Payer: COMMERCIAL

## 2024-01-30 ENCOUNTER — TELEPHONE (OUTPATIENT)
Dept: PODIATRY | Facility: CLINIC | Age: 54
End: 2024-01-30
Payer: COMMERCIAL

## 2024-01-30 VITALS
BODY MASS INDEX: 49.46 KG/M2 | HEART RATE: 81 BPM | HEIGHT: 65 IN | DIASTOLIC BLOOD PRESSURE: 80 MMHG | SYSTOLIC BLOOD PRESSURE: 128 MMHG | WEIGHT: 296.88 LBS

## 2024-01-30 DIAGNOSIS — E66.01 OBESITY, CLASS III, BMI 40-49.9 (MORBID OBESITY): ICD-10-CM

## 2024-01-30 DIAGNOSIS — M21.611 BUNION OF GREAT TOE OF RIGHT FOOT: ICD-10-CM

## 2024-01-30 DIAGNOSIS — Z79.01 CURRENT USE OF ANTICOAGULANT THERAPY: ICD-10-CM

## 2024-01-30 DIAGNOSIS — M10.071 ACUTE IDIOPATHIC GOUT INVOLVING TOE OF RIGHT FOOT: ICD-10-CM

## 2024-01-30 DIAGNOSIS — M79.674 PAIN AND SWELLING OF TOE OF RIGHT FOOT: Primary | ICD-10-CM

## 2024-01-30 DIAGNOSIS — M79.89 PAIN AND SWELLING OF TOE OF RIGHT FOOT: Primary | ICD-10-CM

## 2024-01-30 DIAGNOSIS — I21.4 NSTEMI (NON-ST ELEVATED MYOCARDIAL INFARCTION): ICD-10-CM

## 2024-01-30 DIAGNOSIS — Z86.73 RECENT CEREBROVASCULAR ACCIDENT (CVA): ICD-10-CM

## 2024-01-30 PROCEDURE — 3079F DIAST BP 80-89 MM HG: CPT | Mod: CPTII,S$GLB,, | Performed by: PODIATRIST

## 2024-01-30 PROCEDURE — 1111F DSCHRG MED/CURRENT MED MERGE: CPT | Mod: CPTII,S$GLB,, | Performed by: PODIATRIST

## 2024-01-30 PROCEDURE — 3044F HG A1C LEVEL LT 7.0%: CPT | Mod: CPTII,S$GLB,, | Performed by: PODIATRIST

## 2024-01-30 PROCEDURE — 99214 OFFICE O/P EST MOD 30 MIN: CPT | Mod: S$GLB,,, | Performed by: PODIATRIST

## 2024-01-30 PROCEDURE — 3008F BODY MASS INDEX DOCD: CPT | Mod: CPTII,S$GLB,, | Performed by: PODIATRIST

## 2024-01-30 PROCEDURE — 99999 PR PBB SHADOW E&M-EST. PATIENT-LVL III: CPT | Mod: PBBFAC,,, | Performed by: PODIATRIST

## 2024-01-30 PROCEDURE — 3074F SYST BP LT 130 MM HG: CPT | Mod: CPTII,S$GLB,, | Performed by: PODIATRIST

## 2024-01-30 RX ORDER — COLCHICINE 0.6 MG/1
0.6 TABLET ORAL DAILY
Qty: 24 TABLET | Refills: 2 | Status: SHIPPED | OUTPATIENT
Start: 2024-01-30 | End: 2024-02-06

## 2024-01-30 RX ORDER — INDOMETHACIN 25 MG/1
25 CAPSULE ORAL
Qty: 15 CAPSULE | Refills: 1 | Status: SHIPPED | OUTPATIENT
Start: 2024-01-30 | End: 2024-03-05

## 2024-01-30 NOTE — PROGRESS NOTES
Subjective:      Patient ID: Martha Shah is a 53 y.o. adult.    Chief Complaint: Bunions    Patient states had pain & swelling R great toe for a day. Previous h/o gout base of toes. Started eating leans foods & walking since recent MI. Drinks about 3-4 tall glasses water daily. Taking Plavix for recent CVA.  Last here > 7 months ago. Previously seen for metatarsalgia w/ neuroma R, plantar fasciitis L & retrocalcaneal enthesopathy. Had bunion R but asx then. Was to continue met.gelstrap R OR PPT met.bar instead of arch pad & met.egg pad - transverse arch, heel lifts & Voltaren gel up to qid prn. ASO ankle brace (w/ Tubigrip) dispensed fo tennis shoes, in place of BAW for R lateral ankle/ hindfoot 4-6 wks.prn, prior to return to orthotics (removed met.pad as not tolerated).  6/26/23  Ms. Shah is a 53 y.o. who presents for f/u R foot pain. Last seen 6 wks.ago for f/u neuroma/ metatarsalgia R; previous plantar fasciitis L & retrocalcaneal spur improved w/ arch pads, heel lifts & topical Diclofenac.  States metatarsalgia R had resolved including no crampng. However, had flareup to pint could barely walk followed by dorsolateral R foot/ ankle swelling, started 2 wks.ago so went into BAW boot which helped. Took out met.pad from orthotics as it was too far back.   Noticed bunion R foot but currently no symptoms.  Last visit 5/15/23 - Previously seen in this clinic 5 months prior for retrocalcaneal spur, plantar fasciitis L & IGTN - advised on appropriate supportive shoes w/ arch support, heel lift & topical Voltaren gel.   Treatment: advised on continue met.gelstrap or met.bar. Additional PPT arch pads added. Was to call prn as Asx as long as wears appropriate support & padding.  In both cases, no f/u in a month as had stated condition improved.     Shoe gear:  Tennis shoes regularly because wide enough.     PCP: [facility] - MD Yesica.  PCP: Estrada Partida MD 1/13/23    Past Medical History:    Diagnosis Date    Allergy     Hypertension     NSTEMI (non-ST elevated myocardial infarction) 01/07/2024    Sleep apnea, unspecified     Urinary tract infection      Patient Active Problem List   Diagnosis    Obstructive sleep apnea on CPAP    Morbid obesity with BMI of 45.0-49.9, adult    Hypertension    Migraine with aura and without status migrainosus, not intractable    Eosinophilic esophagitis    Gastroesophageal reflux disease with esophagitis without hemorrhage    Acute focal neurological deficit    Acute stroke due to ischemia    Hyperlipidemia    History of CVA (cerebrovascular accident)    Aortic dilatation    Coronary artery disease involving native coronary artery of native heart without angina pectoris    Decreased functional mobility and endurance      Objective:       X-Ray Foot Complete 3 view Right  Order: [id number]  Status: Final result       Visible to patient: Yes (seen)       Next appt: 02/09/2024 at 07:15 AM in Outpatient Rehab (RAD OSEI PT)       Dx: Acute foot pain, right    0 Result Notes       1 Patient Communication  Details  Reading Physician Reading Date Result Priority   Gian Campbell IV, MD  [phone] 1/3/2023 Routine     Narrative & Impression  EXAMINATION:  XR FOOT COMPLETE 3 VIEW RIGHT     CLINICAL HISTORY:  . Pain in right foot     TECHNIQUE:  AP, lateral, and oblique views of the right foot were performed.     COMPARISON:  None.     FINDINGS:  No displaced fracture or dislocation.  No destructive osseous process.  Cartilage spaces appear adequately maintained.  Minimal enthesopathic changes at distal Achilles insertion site.  Tiny plantar calcaneal spur.  Soft tissues are unremarkable.     Impression:     As above.     Electronically signed by: Gian Campbell  Date:                                            01/03/2023  Time:                                           14:25   I independently reviewed the x-ray images.    Physical Exam  Vitals reviewed.    Constitutional:       Appearance: She is well-developed. She is morbidly obese.   Cardiovascular:      Pulses:           Dorsalis pedis pulses are 2+ on the right side.   Musculoskeletal:         General: Swelling and tenderness present. No signs of injury.      Right lower leg: No edema.      Left lower leg: No edema.      Left ankle:      Left Achilles Tendon: Tenderness present. No defects. Abdul's test negative.      Right foot: Decreased range of motion.      Left foot: Swelling and bony tenderness present. No tenderness.      Comments: Equinus L ankles with < 90 deg foot to leg noted with knees extended.   Musculoskeletal strength of extrinsics to foot and ankle + 5/5 in DF/PF/Inv/Ev to resistance with no reproduction of pain in any direction.   Passive ROM of ankle and pedal joints is painless and without crepitation L.   Feet:      Right foot:      Skin integrity: Erythema and warmth present.      Comments: Inserts w/ PPT arch pads & heel lift but no met.pad.(states was hurting in arch as too far back).    Equinus R ankles w/ < 90 deg foot to leg noted w/ knees extended.      MS strength of extrinsics to foot & ankle R + 5/5 in DF/PF/Inv/Ev to resistance w/ no reproduction of pain in any direction.     Passive ROM of ankle & pedal joints is painless w/out crepitation except R 1st MPJ.  Skin:     General: Skin is warm and dry.      Capillary Refill: Capillary refill takes less than 2 seconds.      Findings: No bruising, erythema or signs of injury.   Neurological:      Mental Status: She is alert and oriented to person, place, and time.      Motor: Motor function is intact. No weakness.      Gait: Gait is intact. Gait (short BAW R) normal.      Comments: Previous TTP & fullness distal 3rd IMS>2nd R foot but no palpable clicking/ - Renetta's sign.    Previously placed PPT met.pad has been removed from orthotics.   Psychiatric:         Mood and Affect: Affect normal. Mood is anxious.         Behavior:  Behavior normal. Behavior is cooperative.         Assessment:      Encounter Diagnoses   Name Primary?    Pain and swelling of toe of right foot Yes    Acute idiopathic gout involving toe of right foot     Current use of anticoagulant therapy     Recent cerebrovascular accident (CVA)     NSTEMI (non-ST elevated myocardial infarction)     Obesity, Class III, BMI 40-49.9 (morbid obesity)     Bunion of great toe of right foot        Problem List Items Addressed This Visit          Cardiac/Vascular    RESOLVED: NSTEMI (non-ST elevated myocardial infarction)     Other Visit Diagnoses       Pain and swelling of toe of right foot    -  Primary    Relevant Orders    Uric acid (Completed)    Acute idiopathic gout involving toe of right foot        Relevant Medications    indomethacin (INDOCIN) 25 MG capsule    Current use of anticoagulant therapy        Recent cerebrovascular accident (CVA)        Obesity, Class III, BMI 40-49.9 (morbid obesity)        Bunion of great toe of right foot              Plan:       I counseled the patient on her conditions, their implications & medical mgmt.    Advised patient on appropriate supportive shoes w/ supports @ all times WB including in home.    The nature of gout is explained. Indications for the use of colchicine to prevent or treat flare-ups is discussed. Proper use of indomethacin for acute attacks discussed, & its side effects. Call if further attacks occur, or this one does not resolve promptly.     Rx Colcrys x 2 wks.as directed & Indocin 25mg tid x 5 days.    Uric acid levels pending.     F/u 2 wks. prn.        A total of 35 mins.was spent on chart review, patient visit & documentation.       Uric acid  Order: [id number]  Status: Final result       Visible to patient: Yes (seen)       Next appt: 02/06/2024 at 03:45 PM in Primary Care (Estrada Partida MD)       Dx: Pain and swelling of toe of right foot    0 Result Notes       Component Ref Range & Units 14:24   Uric Acid 3.4 -  7.0 mg/dL 8.6 High    Resulting Agency  [email or web address]              Specimen Collected: 01/30/24 14:24 CST Last Resulted: 01/30/24 15:14 CST          Patient notified of results by phone.

## 2024-01-30 NOTE — TELEPHONE ENCOUNTER
"Spoke with provider, "it is safe for patient to use voltaren gel with oral plavix".  Spoke with patient, informed of provider response.  Patient verbalized understanding.    "

## 2024-01-30 NOTE — TELEPHONE ENCOUNTER
Spoke with patient informed her that her uric acid was elvated so it is gout. Patient stated pharmacy texted her that her medication was ready and she is going to pick it up. Verbalized understanding and no further issues discussed.

## 2024-01-30 NOTE — TELEPHONE ENCOUNTER
Patient states he is s/p CVA on 01/11/24 and was prescribed Plavix. Patient states he uses Voltaren topically prn for chronic foot pain and has used for several years. Patient called to inquire about the compatibility of Voltaren and Plavix.     Patient advised to Call his Pharmacist to discuss the compatibility of Voltaren and Plavix. Patient also advised that a message will be sent to his Cardiologist and Podiatrist regarding use of both medications.     Patient also c/o chronic nausea. Patient denies vomiting. Care Advice per Nausea Adult Guideline provided to patient and patient states understanding at this time. Patient also advised to contact the [facility] Triage Service for any additional concerns.     Reason for Disposition   Caller has medication question about med NOT prescribed by PCP and triager unable to answer question (e.g., compatibility with other med, storage)   Nausea is a chronic symptom (recurrent or ongoing AND present > 4 weeks)    Additional Information   Negative: Intentional drug overdose and suicidal thoughts or ideas   Negative: Drug overdose and triager unable to answer question   Negative: Caller requesting a renewal or refill of a medicine patient is currently taking   Negative: Caller requesting information unrelated to medicine   Negative: Caller requesting information about COVID-19 Vaccine   Negative: Caller requesting information about Emergency Contraception   Negative: Caller requesting information about Combined Birth Control Pills   Negative: Caller requesting information about Progestin Birth Control Pills   Negative: Caller requesting information about Post-Op pain or medicines   Negative: Caller requesting a prescription antibiotic (such as penicillin) for Strep throat and has a positive culture result   Negative: Caller requesting a prescription anti-viral med (such as Tamiflu) and has influenza (flu) symptoms   Negative: Immunization reaction suspected   Negative: Rash while  taking a medicine or within 3 days of stopping it   Negative: Asthma and having symptoms of asthma (cough, wheezing, etc.)   Negative: Symptom of illness (e.g., headache, abdominal pain, earache, vomiting) that are more than mild   Negative: Breastfeeding questions about mother's medicines and diet   Negative: MORE THAN A DOUBLE DOSE of a prescription or over-the-counter (OTC) drug   Negative: DOUBLE DOSE (an extra dose or lesser amount) of prescription drug and any symptoms (e.g., dizziness, nausea, pain, sleepiness)   Negative: DOUBLE DOSE (an extra dose or lesser amount) of over-the-counter (OTC) drug and any symptoms (e.g., dizziness, nausea, pain, sleepiness)   Negative: Took another person's prescription drug   Negative: DOUBLE DOSE (an extra dose or lesser amount) of prescription drug and NO symptoms  (Exception: A double dose of antibiotics.)   Negative: Diabetes drug error or overdose (e.g., took wrong type of insulin or took extra dose)   Negative: Shock suspected (e.g., cold/pale/clammy skin, too weak to stand, low BP, rapid pulse)   Negative: Sounds like a life-threatening emergency to the triager   Negative: Nausea or vomiting and pregnancy < 20 weeks   Negative: Menstrual Period - Missed or Late (i.e., pregnancy suspected)   Negative: Heat exhaustion suspected (i.e., dehydration from heat exposure)   Negative: Anxiety or stress suspected (i.e., nausea with anxiety attacks or stressful situations)   Negative: Traumatic Brain Injury (TBI) suspected   Negative: Vomiting occurs   Negative: Other symptom is present, see that guideline.  (e.g., chest pain, headache, dizziness, abdominal pain, colds, sore throat, etc.).   Negative: Unable to walk, or can only walk with assistance (e.g., requires support)   Negative: Difficulty breathing   Negative: Insulin-dependent diabetes (Type I) and glucose > 400 mg/dL (22 mmol/L)   Negative: Drinking very little and dehydration suspected (e.g., no urine > 12 hours, very  dry mouth, very lightheaded)   Negative: Patient sounds very sick or weak to the triager   Negative: Fever > 104 F  (40 C)   Negative: Fever > 101 F  (38.3 C) and over 60 years of age   Negative: Fever > 100.0 F  (37.8 C) and bedridden (e.g., CVA, chronic illness, recovering from surgery)   Negative: Fever > 100.0 F  (37.8 C) and diabetes mellitus or weak immune system (e.g., HIV positive, cancer chemo, splenectomy, chronic steroids)   Negative: Taking any of the following medications: digoxin (Lanoxin), lithium, theophylline, phenytoin (Dilantin)   Negative: Yellowish color of the skin or white of the eye (i.e., jaundice)   Negative: Fever present > 3 days (72 hours)   Negative: Patient wants to be seen   Negative: Receiving cancer chemotherapy medication   Negative: Taking prescription medication that could cause nausea (e.g., narcotics/opiates, antibiotics, OCPs, many others)   Negative: Nausea lasts > 1 week   Negative: Substance use (drug use) or unhealthy alcohol use, known or suspected    Protocols used: Medication Question Call-A-OH, Nausea-A-OH

## 2024-01-31 ENCOUNTER — OFFICE VISIT (OUTPATIENT)
Dept: URGENT CARE | Facility: CLINIC | Age: 54
End: 2024-01-31
Payer: COMMERCIAL

## 2024-01-31 ENCOUNTER — NURSE TRIAGE (OUTPATIENT)
Dept: ADMINISTRATIVE | Facility: CLINIC | Age: 54
End: 2024-01-31
Payer: COMMERCIAL

## 2024-01-31 VITALS
WEIGHT: 296.75 LBS | BODY MASS INDEX: 49.44 KG/M2 | HEIGHT: 65 IN | OXYGEN SATURATION: 100 % | TEMPERATURE: 98 F | RESPIRATION RATE: 17 BRPM | DIASTOLIC BLOOD PRESSURE: 86 MMHG | SYSTOLIC BLOOD PRESSURE: 153 MMHG | HEART RATE: 68 BPM

## 2024-01-31 DIAGNOSIS — T50.905A ADVERSE EFFECT OF DRUG, INITIAL ENCOUNTER: Primary | ICD-10-CM

## 2024-01-31 DIAGNOSIS — R11.0 NAUSEA: ICD-10-CM

## 2024-01-31 DIAGNOSIS — K29.70 GASTRITIS, PRESENCE OF BLEEDING UNSPECIFIED, UNSPECIFIED CHRONICITY, UNSPECIFIED GASTRITIS TYPE: ICD-10-CM

## 2024-01-31 PROCEDURE — S0119 ONDANSETRON 4 MG: HCPCS | Mod: S$GLB,,,

## 2024-01-31 PROCEDURE — 99213 OFFICE O/P EST LOW 20 MIN: CPT | Mod: S$GLB,,,

## 2024-01-31 RX ORDER — ONDANSETRON 4 MG/1
4 TABLET, ORALLY DISINTEGRATING ORAL EVERY 8 HOURS PRN
Qty: 9 TABLET | Refills: 0 | Status: SHIPPED | OUTPATIENT
Start: 2024-01-31 | End: 2024-02-03

## 2024-01-31 RX ORDER — LIDOCAINE HYDROCHLORIDE 20 MG/ML
10 SOLUTION OROPHARYNGEAL
Status: COMPLETED | OUTPATIENT
Start: 2024-01-31 | End: 2024-01-31

## 2024-01-31 RX ORDER — ALUMINUM HYDROXIDE, MAGNESIUM HYDROXIDE, AND SIMETHICONE 1200; 120; 1200 MG/30ML; MG/30ML; MG/30ML
30 SUSPENSION ORAL
Status: COMPLETED | OUTPATIENT
Start: 2024-01-31 | End: 2024-01-31

## 2024-01-31 RX ORDER — ONDANSETRON 4 MG/1
4 TABLET, ORALLY DISINTEGRATING ORAL
Status: COMPLETED | OUTPATIENT
Start: 2024-01-31 | End: 2024-01-31

## 2024-01-31 RX ADMIN — LIDOCAINE HYDROCHLORIDE 10 ML: 20 SOLUTION OROPHARYNGEAL at 07:01

## 2024-01-31 RX ADMIN — ONDANSETRON 4 MG: 4 TABLET, ORALLY DISINTEGRATING ORAL at 07:01

## 2024-01-31 RX ADMIN — ALUMINUM HYDROXIDE, MAGNESIUM HYDROXIDE, AND SIMETHICONE 30 ML: 1200; 120; 1200 SUSPENSION ORAL at 07:01

## 2024-01-31 NOTE — TELEPHONE ENCOUNTER
[place]    PCP:  Dr. Estrada Partida    She reports saw Podiatrist yesterday for Rt foot Gout and Colchicine and Indocin.  C/O diarrhea x 3, nausea, upper abdominal pain, dry mouth, pain with urination, and chills.  Denies fever, vomiting, dizziness/lightheadedness, and blood in stool.  She reports that she is drinking plenty of fluids.  She states that she is drinking plenty of fluids and urinating.  Per protocol, care advised is go to C/ED now.  [place] [facility] [place]  Advised to call for worsening/questions/concerns.  VU.    Reason for Disposition   Patient sounds very sick or weak to the triager    Additional Information   Negative: Shock suspected (e.g., cold/pale/clammy skin, too weak to stand, low BP, rapid pulse)   Negative: Difficult to awaken or acting confused (e.g., disoriented, slurred speech)   Negative: Sounds like a life-threatening emergency to the triager   Negative: SEVERE abdominal pain (e.g., excruciating)   Negative: [1] Blood in the stool AND [2] moderate or large amount of blood (e.g., any blood clots, passing blood without stool, toilet water turns red)   Negative: Black or tarry bowel movements  (Exception: Chronic-unchanged black-grey BMs AND is taking iron pills or Pepto-Bismol.)   Negative: [1] Drinking very little AND [2] dehydration suspected (e.g., no urine > 12 hours, very dry mouth, very lightheaded)    Protocols used: Diarrhea on Antibiotics-A-

## 2024-02-01 NOTE — PATIENT INSTRUCTIONS
Discontinue colchicine.     Try a bland diet for the next few days. I included information on this in your discharge paperwork. Avoid acidic/spicy/processed foods.     Take zofran as directed for nausea and vomiting.     You can take over the counter pepto or imodium for symptoms but please understand that this may cause constipation. Drink plenty of fluids---water and electrolyte replacement drinks like Pedialyte.    Try tylenol 650-1000 mg every 8 hours as needed for pain. Avoid NSAIDs.     Increase water intake. The recommended daily fluid intake for women is 2.7 liters per day (five 16 oz bottles of water).     Getting plenty of rest but completing light activities daily, drinking plenty of fluids, eating a nutritious diet, and managing stress levels can aid in faster recovery.      Follow up with PCP for ongoing symptoms past 7 days.    Go to the ER if your abdominal pain becomes severe, unable to orally hydrate, chest pain, shortness of breath, palpitations, dizziness, large blood loss.        ([facility] recommendation)  The Mediterranean diet is based on plant-based foods and healthy fats.     You eat LOTS of vegetables, fruits, beans, lentils, nuts, whole grains (wheat bread, brown rice) & extra virgin olive oil.   ---- These foods are high in fiber and antioxidants.   ---- These nutrients help reduce inflammation.   ---- Fiber helps regulate bowel movements.   ---- Antioxidants protect you against cancer.     Eat a moderate amount of fish (salmon, herring, mackerel, sardines, anchovies, halibut, rainbow trout, tuna)   ---- (fish are high in omega-3 fatty acids)    Eat a moderate amount of cheese and yogurt.    Eat little or no meat-- eat more poultry (baked chicken, grilled chicken, ground turkey)  ---- avoid red meat and pork (causes inflammation and linked to colon cancer)    Eat little or no sweats, sugary drinks or butter.     Limit your sodium intake. A diet high in salt can increase your blood  pressure and predisposes you to a heart attack or stroke.     BENEFITS OF DIET  --- Lowers your risk of cardiovascular disease (heart attack, stroke) and many other diseases.   --- Supports a healthy body weight.   --- Supports a healthy blood sugar, blood pressure and cholesterol.   --- Lowers your risk of metabolic syndrome   --- Supports a heathy balance of good gut bacteria in your digestive system.   --- Lowers your risk for cancer.   --- Helps with brain function and slows decline as we age.   --- Helps you live longer.     Talk to your primary care doctor about a dietician referral for further guidance.

## 2024-02-01 NOTE — PROGRESS NOTES
"      Patient ID: Martha Shah is a 53 y.o. adult.    Vitals:  height is 5' 5" (1.651 m) and weight is 134.6 kg (296 lb 11.8 oz). Her temperature is 98 °F (36.7 °C). Her blood pressure is 153/86 (abnormal) and her pulse is 68. Her respiration is 17 and oxygen saturation is 100%.     Chief Complaint: Medication Reaction    Pt states she was diagnosed with gout on yesterday when she was seen by her pediatrist. Pt states they prescribed her Colchicin 0.6 mg tablet for her symptoms. Pt states she took a total of 5 pills ( 4 yesterday and 1 today).  Pt states that her symptoms started around 2:30 pm today but thinks she had diarrhea last night as well (3 episodes total of diarrhea). She had upper abdominal pain/cramping and nausea without vomiting. Pt states she talked to a nurse and was told to come in for her symptoms due to starting a new medication. Pt states she haven't taken any medications for her symptoms. Toe/gout pain has significantly improved.     Medication Reaction  This is a new problem. The current episode started yesterday. Associated symptoms include abdominal pain, chills and nausea. Pertinent negatives include no anorexia, arthralgias, change in bowel habit, chest pain, congestion, coughing, diaphoresis, fatigue, fever, headaches, joint swelling, myalgias, neck pain, numbness, rash, sore throat, swollen glands, urinary symptoms or vomiting. Associated symptoms comments: diarrhea. Nothing aggravates the symptoms. She has tried nothing for the symptoms.       Constitution: Positive for chills. Negative for sweating, fatigue and fever.   HENT:  Negative for congestion and sore throat.    Neck: Negative for neck pain.   Cardiovascular:  Negative for chest pain.   Respiratory:  Negative for cough.    Gastrointestinal:  Positive for abdominal pain, nausea and diarrhea. Negative for vomiting.   Musculoskeletal:  Negative for joint pain, joint swelling and muscle ache.   Skin:  Negative for rash. "   Neurological:  Negative for headaches and numbness.      Objective:     Physical Exam   Constitutional: She is oriented to person, place, and time. She appears well-developed.   HENT:   Head: Normocephalic and atraumatic.   Ears:   Right Ear: External ear normal.   Left Ear: External ear normal.   Nose: Nose normal.   Mouth/Throat: Mucous membranes are normal.   Eyes: Conjunctivae and lids are normal.   Neck: Trachea normal. Neck supple.   Cardiovascular: Normal rate, regular rhythm and normal heart sounds.   Pulmonary/Chest: Effort normal and breath sounds normal. No stridor. No respiratory distress. She has no wheezes. She has no rhonchi. She has no rales.   Abdominal: Normal appearance and bowel sounds are normal. She exhibits no distension and no mass. Soft. There is generalized abdominal tenderness. There is no rebound and no guarding.   Musculoskeletal: Normal range of motion.         General: Normal range of motion.   Neurological: She is alert and oriented to person, place, and time. She has normal strength.   Skin: Skin is warm, dry, intact, not diaphoretic and not pale.   Psychiatric: Her speech is normal and behavior is normal. Judgment and thought content normal.   Nursing note and vitals reviewed.      Assessment:     1. Adverse effect of drug, initial encounter    2. Gastritis, presence of bleeding unspecified, unspecified chronicity, unspecified gastritis type    3. Nausea        Plan:       Adverse effect of drug, initial encounter    Gastritis, presence of bleeding unspecified, unspecified chronicity, unspecified gastritis type  -     aluminum-magnesium hydroxide-simethicone 200-200-20 mg/5 mL suspension 30 mL  -     LIDOcaine viscous HCl 2% oral solution 10 mL    Nausea  -     ondansetron disintegrating tablet 4 mg  -     ondansetron (ZOFRAN-ODT) 4 MG TbDL; Take 1 tablet (4 mg total) by mouth every 8 (eight) hours as needed (nausea).  Dispense: 9 tablet; Refill: 0            Discussed  results/diagnosis/plan with patient in clinic. Strict precautions given to patient to monitor for worsening signs and symptoms. Advised to follow up with PCP or specialist.  Explained side effects of medications prescribed with patient and informed him/her to discontinue use if he/she has any side effects and to inform UC or PCP if this occurs. All questions answered. Strict ED verses clinic return precautions stressed and given in depth. Advised if symptoms worsens of fail to improve he/she should go to the Emergency Room. Discharge and follow-up instructions given verbally/printed with the patient who expressed understanding and willingness to comply with my recommendations. Patient voiced understanding and in agreement with current treatment plan. Patient exits the exam room in no acute distress. Conversant and engaged during discharge discussion, verbalized understanding.

## 2024-02-06 ENCOUNTER — OFFICE VISIT (OUTPATIENT)
Dept: PRIMARY CARE CLINIC | Facility: CLINIC | Age: 54
End: 2024-02-06
Payer: COMMERCIAL

## 2024-02-06 VITALS
BODY MASS INDEX: 48.82 KG/M2 | TEMPERATURE: 98 F | HEIGHT: 65 IN | RESPIRATION RATE: 18 BRPM | OXYGEN SATURATION: 96 % | WEIGHT: 293 LBS | HEART RATE: 62 BPM | SYSTOLIC BLOOD PRESSURE: 134 MMHG | DIASTOLIC BLOOD PRESSURE: 82 MMHG

## 2024-02-06 DIAGNOSIS — S46.812A STRAIN OF LEFT TRAPEZIUS MUSCLE, INITIAL ENCOUNTER: Primary | ICD-10-CM

## 2024-02-06 DIAGNOSIS — E87.6 HYPOKALEMIA: ICD-10-CM

## 2024-02-06 PROBLEM — I21.4 NSTEMI (NON-ST ELEVATED MYOCARDIAL INFARCTION): Status: RESOLVED | Noted: 2024-01-07 | Resolved: 2024-02-06

## 2024-02-06 PROCEDURE — 3079F DIAST BP 80-89 MM HG: CPT | Mod: CPTII,S$GLB,, | Performed by: FAMILY MEDICINE

## 2024-02-06 PROCEDURE — 3008F BODY MASS INDEX DOCD: CPT | Mod: CPTII,S$GLB,, | Performed by: FAMILY MEDICINE

## 2024-02-06 PROCEDURE — 99214 OFFICE O/P EST MOD 30 MIN: CPT | Mod: S$GLB,,, | Performed by: FAMILY MEDICINE

## 2024-02-06 PROCEDURE — 1159F MED LIST DOCD IN RCRD: CPT | Mod: CPTII,S$GLB,, | Performed by: FAMILY MEDICINE

## 2024-02-06 PROCEDURE — 1111F DSCHRG MED/CURRENT MED MERGE: CPT | Mod: CPTII,S$GLB,, | Performed by: FAMILY MEDICINE

## 2024-02-06 PROCEDURE — 3075F SYST BP GE 130 - 139MM HG: CPT | Mod: CPTII,S$GLB,, | Performed by: FAMILY MEDICINE

## 2024-02-06 PROCEDURE — 1160F RVW MEDS BY RX/DR IN RCRD: CPT | Mod: CPTII,S$GLB,, | Performed by: FAMILY MEDICINE

## 2024-02-06 PROCEDURE — 3044F HG A1C LEVEL LT 7.0%: CPT | Mod: CPTII,S$GLB,, | Performed by: FAMILY MEDICINE

## 2024-02-06 PROCEDURE — 99999 PR PBB SHADOW E&M-EST. PATIENT-LVL IV: CPT | Mod: PBBFAC,,, | Performed by: FAMILY MEDICINE

## 2024-02-06 RX ORDER — TIZANIDINE 4 MG/1
4 TABLET ORAL 3 TIMES DAILY PRN
Qty: 30 TABLET | Refills: 1 | Status: SHIPPED | OUTPATIENT
Start: 2024-02-06 | End: 2024-03-05

## 2024-02-06 NOTE — PROGRESS NOTES
"Subjective:       Patient ID: Martha Shah is a 53 y.o. adult.    Chief Complaint: Back Pain (Started 2 weeks ago), Medication Refill, and Shoulder Pain (Left/Started 2 weeks ago)    Left upper back pain for the past few weeks, nonradiating. Stiff and sore when she wakes up. No meds for it. No known injury. Pain nonradiating  Had MI and CVA last month. No residual symptoms, though energy decreased overall. Back to work, driving.    Back Pain  Pertinent negatives include no chest pain, fever or weakness.   Medication Refill  Pertinent negatives include no chest pain, fever, vomiting or weakness.   Shoulder Pain   Pertinent negatives include no fever.     Review of Systems   Constitutional:  Negative for fever.   HENT:  Negative for trouble swallowing.    Respiratory:  Negative for shortness of breath.    Cardiovascular:  Negative for chest pain.   Gastrointestinal:  Negative for diarrhea and vomiting.   Genitourinary:  Negative for difficulty urinating.   Musculoskeletal:  Positive for back pain.   Allergic/Immunologic: Negative for immunocompromised state.   Neurological:  Negative for weakness.   Hematological:  Does not bruise/bleed easily.   Psychiatric/Behavioral:  Negative for agitation and confusion.        Objective:      Vitals:    02/06/24 1627   BP: 134/82   BP Location: Right arm   Patient Position: Sitting   BP Method: Medium (Manual)   Pulse: 62   Resp: 18   Temp: 97.6 °F (36.4 °C)   TempSrc: Temporal   SpO2: 96%   Weight: 132.9 kg (292 lb 15.9 oz)   Height: 5' 5" (1.651 m)     BP Readings from Last 5 Encounters:   02/06/24 134/82   01/31/24 (!) 153/86   01/30/24 128/80   01/24/24 126/84   01/19/24 124/88     Wt Readings from Last 5 Encounters:   02/06/24 132.9 kg (292 lb 15.9 oz)   01/31/24 134.6 kg (296 lb 11.8 oz)   01/30/24 134.6 kg (296 lb 13.6 oz)   01/24/24 132.3 kg (291 lb 10.7 oz)   01/19/24 132.9 kg (292 lb 14.1 oz)     Physical Exam  Vitals and nursing note reviewed.   Constitutional:       " General: She is not in acute distress.     Appearance: Normal appearance. She is well-developed.   HENT:      Head: Normocephalic and atraumatic.   Cardiovascular:      Rate and Rhythm: Normal rate and regular rhythm.      Heart sounds: Normal heart sounds.   Pulmonary:      Effort: Pulmonary effort is normal.      Breath sounds: Normal breath sounds.   Musculoskeletal:      Cervical back: Spasms and tenderness present. Decreased range of motion.        Back:       Right lower leg: No edema.      Left lower leg: No edema.   Skin:     General: Skin is warm and dry.   Neurological:      Mental Status: She is alert and oriented to person, place, and time.   Psychiatric:         Mood and Affect: Mood normal.         Behavior: Behavior normal.         Lab Results   Component Value Date    WBC 9.01 01/12/2024    HGB 13.1 (L) 01/12/2024    HCT 39.5 (L) 01/12/2024     01/12/2024    CHOL 144 01/12/2024    TRIG 122 01/12/2024    HDL 28 (L) 01/12/2024    ALT 36 01/12/2024    AST 29 01/12/2024     01/12/2024    K 3.4 (L) 01/12/2024     01/12/2024    CREATININE 1.3 01/12/2024    BUN 11 01/12/2024    CO2 22 (L) 01/12/2024    TSH 3.642 01/12/2024    INR 1.1 01/12/2024    HGBA1C 5.4 01/11/2024      Assessment:       1. Strain of left trapezius muscle, initial encounter    2. Hypokalemia        Plan:       Strain of left trapezius muscle, initial encounter  -     tiZANidine (ZANAFLEX) 4 MG tablet; Take 1 tablet (4 mg total) by mouth 3 (three) times daily as needed (muscle spasm).  Dispense: 30 tablet; Refill: 1  Alternate ice/heat, prn muscle relaxers  Hypokalemia  -     Renal Function Panel; Future; Expected date: 02/06/2024      Medication List with Changes/Refills   New Medications    TIZANIDINE (ZANAFLEX) 4 MG TABLET    Take 1 tablet (4 mg total) by mouth 3 (three) times daily as needed (muscle spasm).   Current Medications    ASPIRIN (ECOTRIN) 81 MG EC TABLET    Take 1 tablet (81 mg total) by mouth once  daily.    ATORVASTATIN (LIPITOR) 80 MG TABLET    Take 1 tablet (80 mg total) by mouth every evening.    CETIRIZINE (ZYRTEC) 10 MG TABLET    Take 10 mg by mouth once daily.    DICLOFENAC SODIUM (VOLTAREN) 1 % GEL    Apply 2 g topically 4 (four) times daily as needed.    HYDROCHLOROTHIAZIDE (HYDRODIURIL) 25 MG TABLET    Take 1 tablet (25 mg total) by mouth once daily.    INDOMETHACIN (INDOCIN) 25 MG CAPSULE    Take 1 capsule (25 mg total) by mouth 3 (three) times daily with meals.    METOPROLOL TARTRATE (LOPRESSOR) 25 MG TABLET    Take 1 tablet (25 mg total) by mouth 2 (two) times daily.    MULTIVITAMIN (ONE DAILY MULTIVITAMIN) PER TABLET    Take 1 tablet by mouth once daily.    NITROGLYCERIN (NITROSTAT) 0.4 MG SL TABLET    Place 1 tablet (0.4 mg total) under the tongue every 5 (five) minutes as needed for Chest pain. Call the 911 after the 3rd dose.    OMEGA 3-DHA-EPA-FISH OIL 1,000 MG (120 MG-180 MG) CAP        PANTOPRAZOLE (PROTONIX) 40 MG TABLET    Take 1 tablet (40 mg total) by mouth 2 (two) times daily.   Discontinued Medications    CLOPIDOGREL (PLAVIX) 75 MG TABLET    Take 1 tablet (75 mg total) by mouth once daily. for 21 days    COLCHICINE (COLCRYS) 0.6 MG TABLET    Take 1 tablet (0.6 mg total) by mouth once daily. Take 2 tablets loading dose then 1 tablet in an hour, then 1 tablet bid for a week, then 1 tablet daily until complete

## 2024-02-09 ENCOUNTER — CLINICAL SUPPORT (OUTPATIENT)
Dept: REHABILITATION | Facility: HOSPITAL | Age: 54
End: 2024-02-09
Payer: COMMERCIAL

## 2024-02-09 DIAGNOSIS — Z74.09 DECREASED FUNCTIONAL MOBILITY AND ENDURANCE: Primary | ICD-10-CM

## 2024-02-09 PROCEDURE — 97110 THERAPEUTIC EXERCISES: CPT | Mod: PN

## 2024-02-09 PROCEDURE — 97530 THERAPEUTIC ACTIVITIES: CPT | Mod: PN

## 2024-02-09 PROCEDURE — 97112 NEUROMUSCULAR REEDUCATION: CPT | Mod: PN

## 2024-02-09 NOTE — PROGRESS NOTES
"[facility]   Physical Therapy Treatment Note      Name: Martha [facility] Number: [id number]    Therapy Diagnosis:   Encounter Diagnosis   Name Primary?    Decreased functional mobility and endurance Yes     Physician: Roverto Ackerman MD    Visit Date: 2/9/2024    Physician Orders: PT Eval and Treat   Medical Diagnosis from Referral: Cerebrovascular accident (CVA), unspecified mechanism [I63.9]   Evaluation Date: 1/29/2024  Authorization Period Expiration: 12/31/2024  Plan of Care Expiration: 3/8/2024  Progress Note Due: 2/29/2024  Date of Surgery: N/A  Visit # / Visits authorized: 1/1  FOTO: 1/3     Precautions: Standard and Recent MI (monitor vitals as needed)     Time In: 7:27AM (late arrival)  Time Out: 8:00AM  Total Billable Time: 33 minutes (1 TA + 1 TE + 1 NMR)    PTA Visit #: 0/5     Subjective     Patient reports: Gout flare in right great toe as well as adverse reaction to gout medication. Also strained left upper trap. Toe feeling better today but it did limit her from performing walking program prescribed at evaluation  She  will be  compliant with home exercise program.  Response to previous treatment: Last session was initial evaluation   Functional change: Last session was initial evaluation    Pain: 0/10  Location: right foot    Objective      Objective Measures updated at progress report unless specified.     Treatment     Martha received the treatments listed below:      therapeutic exercises to develop strength and endurance for 8 minutes including:  - Hip 3-way; red theraband 2x10B each direction  - Calf raises edge of green foam disc 2x10    neuromuscular re-education activities to improve: Balance, Coordination, Kinesthetic, Sense, and Proprioception for 17 minutes. The following activities were included:  - Narrow base of support on Airex + head turns x30" each direction (horizontal and vertical)  - Narrow base of support on Airex + eyes closed x30"  - Narrow " "base of support on Airex + head turns + eyes x30" each direction (horizontal and vertical)  - Tandem walking Airex beam x 4 lengths x 10 feet each  - Side stepping on Airex beam + reciprocal cone taps x 4 lengths x 10 feet    therapeutic activities to improve functional performance for 8 minutes, including:  - Reciprocal step ups to 6" step + contralateral knee  2x45"B each leg with 2# ankle weights  - Sit to stands from blue chair + 5.5# med ball shoulder press 2x8    Patient Education and Home Exercises       Education provided:   - Continue HEP    Written Home Exercises Provided: Patient instructed to cont prior HEP. Exercises were reviewed and Martha was able to demonstrate them prior to the end of the session.  Martha demonstrated good  understanding of the education provided. See Electronic Medical Record under Patient Instructions for exercises provided during therapy sessions    Assessment     Martha tolerated first follow up appointment within appropriate fatigue parameters and no adverse response. Good postural control noted with higher level balance interventions. Noted heart rate increase to 140s with reciprocal step up activity; good challenge also noted with 3-way hip exercise. She would benefit from additional PT services to achieve goals established at evaluation.    Martha Is progressing well towards her goals.   Patient prognosis is Good.     Patient will continue to benefit from skilled outpatient physical therapy to address the deficits listed in the problem list box on initial evaluation, provide pt/family education and to maximize pt's level of independence in the home and community environment.     Patient's spiritual, cultural and educational needs considered and pt agreeable to plan of care and goals.     Anticipated barriers to physical therapy: Co-morbidities; recent medical history     Goals:     Short Term Goals: 2 weeks   Patient will be 100% compliant with original HEP in " order to maximize PT benefits (progressing, not met)  Patient will score >/=70% on FOTO survey in order to improve self-perception of functional mobility  (progressing, not met)     Long Term Goals: 5 weeks   Patient will begin some form of home/community fitness in order to sustain PT benefits post-discharge  (progressing, not met)  Patient will score >/=29/30 on FGA in order to improve postural control with community mobility  (progressing, not met)  Patient will walk >/=1400 feet on Six-Minute Walk Test in order to improve cardiovascular endurance for community mobility  (progressing, not met)  Patient will score >/=80% on FOTO survey in order to improve self-perception of functional mobility  (progressing, not met)    Plan     Continue to progress as per plan of care; continue general endurance, bilateral lower extremity strength, high level balance    RAD OSEI PT

## 2024-02-13 ENCOUNTER — PATIENT MESSAGE (OUTPATIENT)
Dept: REHABILITATION | Facility: HOSPITAL | Age: 54
End: 2024-02-13
Payer: COMMERCIAL

## 2024-02-14 ENCOUNTER — TELEPHONE (OUTPATIENT)
Dept: CARDIOLOGY | Facility: CLINIC | Age: 54
End: 2024-02-14
Payer: COMMERCIAL

## 2024-02-14 ENCOUNTER — NURSE TRIAGE (OUTPATIENT)
Dept: ADMINISTRATIVE | Facility: CLINIC | Age: 54
End: 2024-02-14
Payer: COMMERCIAL

## 2024-02-14 ENCOUNTER — PATIENT MESSAGE (OUTPATIENT)
Dept: PRIMARY CARE CLINIC | Facility: CLINIC | Age: 54
End: 2024-02-14
Payer: COMMERCIAL

## 2024-02-14 NOTE — TELEPHONE ENCOUNTER
Pt started taking Lipitor last month. This started a week ago and it got much worse over the weekend she has been having muscle cramps in her left shoulder and now in her arm. She is also having some strange muscle twitching in left shoulder and arm. She saw pcp last Wednesday and was given Zanaflex. She went to  because it was getting worse and was placed on Toradol. It is controlling it now but it hasn't gotten better. She has been applying ice and heat constantly. Pain gets up to 6-7/10. Saturday it was 8/10. Today its 7/10. Sometimes she gets chest pain in the arm pit area. The last time she felt it was Friday night for which she took a nitroglycerin and it didn't respond to that. But denies chest pain at present time. Pain is present even when she doesn't move. Pain is worse when she wakes up. When she is lying on a heating pad is when she feels the least amount of pain.    Pt advised to stop the Lipitor and to make an appt with her PCP for further recommendations. All questions answered. Pt verbalized understanding.

## 2024-02-14 NOTE — TELEPHONE ENCOUNTER
----- Message from Kathy Stubbs sent at 2/14/2024 10:19 AM CST -----  Regarding: Medication side effect  Contact: [phone]  Patient calling requesting a callback from nurse or provider in regards to a question about the medication she was placed on last month. She stated she possibly having a side effect from medication. Please call back as soon as possible.

## 2024-02-14 NOTE — TELEPHONE ENCOUNTER
Pt started taking Lipitor last month. This started a week ago and it got much worse over the weekend she has been having muscle cramps in her left shoulder and now in her arm. She is also having some strange muscle twitching in left shoulder and arm. She saw pcp last Wednesday and was given Zanaflex. She went to  because it was getting worse and was placed on Toradol. It is controlling it now but it hasn't gotten better. She has been applying ice and heat constantly. Pain gets up to 6-7/10. Saturday it was 8/10. Today its 7/10. Sometimes she gets chest pain in the arm pit area. The last time she felt it was Friday night for which she took a nitroglycerin and it didn't respond to that. But denies chest pain at present time. Pain is present even when she doesn't move. Pain is worse when she wakes up. When she is lying on a heating pad is when she feels the least amount of pain. Care advice recommend pt go to the Er. Pt verbalized understanding.   Reason for Disposition   Age > 40 and no obvious cause and pain even when not moving the arm  (Exception: Pain is clearly made worse by moving arm or bending neck.)    Additional Information   Negative: Shock suspected (e.g., cold/pale/clammy skin, too weak to stand, low BP, rapid pulse)   Negative: Similar pain previously and it was from 'heart attack'   Negative: Similar pain previously and it was from 'angina' and not relieved by nitroglycerin   Negative: Sounds like a life-threatening emergency to the triager   Negative: Chest pain   Negative: Difficulty breathing or unusual sweating (e.g., sweating without exertion)   Negative: Pain lasting > 5 minutes and pain also present in chest  (Exception: Pain is clearly made worse by movement.)    Protocols used: Shoulder Pain-A-[place]

## 2024-02-15 ENCOUNTER — CLINICAL SUPPORT (OUTPATIENT)
Dept: REHABILITATION | Facility: HOSPITAL | Age: 54
End: 2024-02-15
Payer: COMMERCIAL

## 2024-02-15 DIAGNOSIS — Z74.09 DECREASED FUNCTIONAL MOBILITY AND ENDURANCE: Primary | ICD-10-CM

## 2024-02-15 DIAGNOSIS — I63.9 ACUTE STROKE DUE TO ISCHEMIA: Primary | ICD-10-CM

## 2024-02-15 DIAGNOSIS — Z86.73 HISTORY OF CVA (CEREBROVASCULAR ACCIDENT): ICD-10-CM

## 2024-02-15 DIAGNOSIS — I25.10 CORONARY ARTERY DISEASE INVOLVING NATIVE CORONARY ARTERY OF NATIVE HEART WITHOUT ANGINA PECTORIS: ICD-10-CM

## 2024-02-15 PROCEDURE — 97530 THERAPEUTIC ACTIVITIES: CPT | Mod: PN,CQ

## 2024-02-15 PROCEDURE — 97110 THERAPEUTIC EXERCISES: CPT | Mod: PN,CQ

## 2024-02-15 PROCEDURE — 97112 NEUROMUSCULAR REEDUCATION: CPT | Mod: PN,CQ

## 2024-02-15 RX ORDER — ROSUVASTATIN CALCIUM 40 MG/1
40 TABLET, COATED ORAL NIGHTLY
Qty: 90 TABLET | Refills: 3 | Status: SHIPPED | OUTPATIENT
Start: 2024-02-15 | End: 2024-06-04

## 2024-02-15 NOTE — PROGRESS NOTES
"[facility]   Physical Therapy Treatment Note      Name: Martha [facility] Number: [id number]    Therapy Diagnosis:   Encounter Diagnosis   Name Primary?    Decreased functional mobility and endurance Yes     Physician: Roverto Ackerman MD    Visit Date: 2/15/2024    Physician Orders: PT Eval and Treat   Medical Diagnosis from Referral: Cerebrovascular accident (CVA), unspecified mechanism [I63.9]   Evaluation Date: 1/29/2024  Authorization Period Expiration: 12/31/2024  Plan of Care Expiration: 3/8/2024  Progress Note Due: 2/29/2024  Date of Surgery: N/A  Visit # / Visits authorized: 2/20 (+eval)  FOTO: 1/3     Precautions: Standard and Recent MI (monitor vitals as needed)     Time In: 7:23 AM (late arrival)  Time Out: 8:03 AM  Total Billable Time: 40 minutes (1 TA + 1 TE + 1 NMR)    PTA Visit #: 1/5     Subjective     Patient reports: Went to urgent care this weekend due to pain/spasms in left upper trap which is now in shoulder and upper arm as well.  Cardiology has told her to pause Lipitor to rule out possible side effect from medication.    She  will be  compliant with home exercise program.  Response to previous treatment: Soreness 1-2 days   Functional change: no change    Pain: 0/10  Location: right foot    Objective      Objective Measures updated at progress report unless specified.     Treatment     Martha received the treatments listed below:      therapeutic exercises to develop strength and endurance for 10 minutes including:  - Nustep   - Hip 3-way; red theraband 2x10B each direction  - Calf raises edge of green foam disc 2x10  - Lateral steps with red theraband 6 lengths x 10 feet  - Hamstring curl: 2x10 2# weights    neuromuscular re-education activities to improve: Balance, Coordination, Kinesthetic, Sense, and Proprioception for 20 minutes. The following activities were included:  - Narrow base of support on Airex + head turns x30" each direction (horizontal and " "vertical)  - Narrow base of support on Airex + eyes closed x30"  - Narrow base of support on Airex + head turns + eyes x30" each direction (horizontal and vertical)  - Tandem walking Airex beam x 4 lengths x 10 feet each  - Side stepping on Airex beam + reciprocal cone taps x 4 lengths x 10 feet    therapeutic activities to improve functional performance for 10 minutes, including:  - Reciprocal step ups to 6" step + contralateral knee  2x45"B each leg with 2# ankle weights  - Sit to stands from blue chair + 5.5# med ball shoulder press 2x8  - Heel walking 4 lengths x 10 feet    Patient Education and Home Exercises       Education provided:   - Continue HEP    Written Home Exercises Provided: Patient instructed to cont prior HEP. Exercises were reviewed and Martha was able to demonstrate them prior to the end of the session.  Martha demonstrated good  understanding of the education provided. See Electronic Medical Record under Patient Instructions for exercises provided during therapy sessions    Assessment     Martha arrived to session with some mild left shoulder and upper arm pain but was agreeable to treatment.  She reported some soreness following last session but otherwise good response to treatment.  Session focused on general lower extremity strengthening and endurance based activity without appropriate levels of fatigue achieved.  Only two brief seated rest breaks for fatigue recovery and water required.  Additional resistance band exercises added to HEP and reviewed.  She would benefit from additional PT services to achieve goals established at evaluation.    Martha Is progressing well towards her goals.   Patient prognosis is Good.     Patient will continue to benefit from skilled outpatient physical therapy to address the deficits listed in the problem list box on initial evaluation, provide pt/family education and to maximize pt's level of independence in the home and community environment. "     Patient's spiritual, cultural and educational needs considered and pt agreeable to plan of care and goals.     Anticipated barriers to physical therapy: Co-morbidities; recent medical history     Goals:     Short Term Goals: 2 weeks   Patient will be 100% compliant with original HEP in order to maximize PT benefits (progressing, not met)  Patient will score >/=70% on FOTO survey in order to improve self-perception of functional mobility  (progressing, not met)     Long Term Goals: 5 weeks   Patient will begin some form of home/community fitness in order to sustain PT benefits post-discharge  (progressing, not met)  Patient will score >/=29/30 on FGA in order to improve postural control with community mobility  (progressing, not met)  Patient will walk >/=1400 feet on Six-Minute Walk Test in order to improve cardiovascular endurance for community mobility  (progressing, not met)  Patient will score >/=80% on FOTO survey in order to improve self-perception of functional mobility  (progressing, not met)    Plan     Continue to progress as per plan of care; continue general endurance, bilateral lower extremity strength, high level balance    Natalya Lew, PTA

## 2024-02-15 NOTE — TELEPHONE ENCOUNTER
Called pt regarding message. Informed pt Rosuvastatin has been sent to pharmacy. This is less likely to causing cramping. Discontinue atorvastatin at this time.

## 2024-02-15 NOTE — TELEPHONE ENCOUNTER
Called pt regarding message. Pt stated she started taking Lipitor on 1/8. Pt stated she's been having muscle cramps in her left shoulder and left arm. Symptoms started one week ago. Pt went to  and was placed on Toradol. Pt stated she saw  on 2/6 and was put on Zanaflex 4 MG. Pt stated the muscle cramps got worse.Pt has been applying ice and heat.

## 2024-02-21 ENCOUNTER — CLINICAL SUPPORT (OUTPATIENT)
Dept: REHABILITATION | Facility: HOSPITAL | Age: 54
End: 2024-02-21
Payer: COMMERCIAL

## 2024-02-21 ENCOUNTER — PATIENT MESSAGE (OUTPATIENT)
Dept: CARDIOLOGY | Facility: CLINIC | Age: 54
End: 2024-02-21
Payer: COMMERCIAL

## 2024-02-21 DIAGNOSIS — Z74.09 DECREASED FUNCTIONAL MOBILITY AND ENDURANCE: Primary | ICD-10-CM

## 2024-02-21 PROCEDURE — 97530 THERAPEUTIC ACTIVITIES: CPT | Mod: PN,CQ

## 2024-02-21 PROCEDURE — 97112 NEUROMUSCULAR REEDUCATION: CPT | Mod: PN,CQ

## 2024-02-21 PROCEDURE — 97110 THERAPEUTIC EXERCISES: CPT | Mod: PN,CQ

## 2024-02-21 NOTE — PROGRESS NOTES
" [facility]   Physical Therapy Treatment Note      Name: Martha [facility] Number: [id number]    Therapy Diagnosis:   Encounter Diagnosis   Name Primary?    Decreased functional mobility and endurance Yes     Physician: Roverto Ackerman MD    Visit Date: 2/21/2024    Physician Orders: PT Eval and Treat   Medical Diagnosis from Referral: Cerebrovascular accident (CVA), unspecified mechanism [I63.9]   Evaluation Date: 1/29/2024  Authorization Period Expiration: 12/31/2024  Plan of Care Expiration: 3/8/2024  Progress Note Due: 2/29/2024  Date of Surgery: N/A  Visit # / Visits authorized: 3/20 (+eval)  FOTO: 1/3     Precautions: Standard and Recent MI (monitor vitals as needed)     Time In: 7:20 AM   Time Out: 8:00 AM  Total Billable Time: 40 minutes (1 TA + 1 TE + 1 NMR)    PTA Visit #: 2/5     Subjective     Patient reports: L shoulder/UT pain has since resolved and no new complaints  She  will be  compliant with home exercise program.  Response to previous treatment: Soreness  Functional change: no change    Pain: 0/10  Location: right foot    Objective      Objective Measures updated at progress report unless specified.     Treatment     Martha received the treatments listed below:      therapeutic exercises to develop strength and endurance for 10 minutes including:  - Nustep level 4.0 progressive levels  - Hip 3-way; red theraband 2x10B each direction  - Calf raises edge of green foam disc 2x10  - Lateral steps with red theraband 6 lengths x 10 feet  - Hamstring curl: 2x10 3# ankle weights    neuromuscular re-education activities to improve: Balance, Coordination, Kinesthetic, Sense, and Proprioception for 20 minutes. The following activities were included:  - Narrow base of support on Airex + head turns x30" each direction (horizontal and vertical)  - Narrow base of support on Airex + eyes closed x30"  - Narrow base of support on Airex + head turns + eyes x30" each direction " "(horizontal and vertical)  - Tandem walking Airex beam x 4 lengths x 10 feet each over 6" hurdles  - Side stepping on Airex beam + cone taps (4) x 4 lengths x 10 feet    therapeutic activities to improve functional performance for 10 minutes, including:  - Reciprocal step ups to 6" step + contralateral knee  2x45"B each leg with 3# ankle weights  - Sit to stands from blue chair + 5.5# med ball shoulder press 2x10  - Heel walking 4 lengths x 10 feet    Patient Education and Home Exercises       Education provided:   - Continue HEP    Written Home Exercises Provided: Patient instructed to cont prior HEP. Exercises were reviewed and Martha was able to demonstrate them prior to the end of the session.  Martha demonstrated good  understanding of the education provided. See Electronic Medical Record under Patient Instructions for exercises provided during therapy sessions    Assessment     Martha arrived to session without any complaints of pain and was agreeable to treatment.  She reported some soreness following last session but otherwise good response to treatment.  Session focused on general lower extremity strengthening and endurance based activity without appropriate levels of fatigue achieved.  Only two brief seated rest breaks for fatigue recovery and water required.  Good tolerance of weighted progressions noted in bold. She would benefit from additional PT services to achieve goals established at evaluation.    Martha Is progressing well towards her goals.   Patient prognosis is Good.     Patient will continue to benefit from skilled outpatient physical therapy to address the deficits listed in the problem list box on initial evaluation, provide pt/family education and to maximize pt's level of independence in the home and community environment.     Patient's spiritual, cultural and educational needs considered and pt agreeable to plan of care and goals.     Anticipated barriers to physical therapy: " Co-morbidities; recent medical history     Goals:     Short Term Goals: 2 weeks   Patient will be 100% compliant with original HEP in order to maximize PT benefits (progressing, not met)  Patient will score >/=70% on FOTO survey in order to improve self-perception of functional mobility  (progressing, not met)     Long Term Goals: 5 weeks   Patient will begin some form of home/community fitness in order to sustain PT benefits post-discharge  (progressing, not met)  Patient will score >/=29/30 on FGA in order to improve postural control with community mobility  (progressing, not met)  Patient will walk >/=1400 feet on Six-Minute Walk Test in order to improve cardiovascular endurance for community mobility  (progressing, not met)  Patient will score >/=80% on FOTO survey in order to improve self-perception of functional mobility  (progressing, not met)    Plan     Continue to progress as per plan of care; continue general endurance, bilateral lower extremity strength, high level balance    Natalya Lew, PTA

## 2024-02-27 ENCOUNTER — CLINICAL SUPPORT (OUTPATIENT)
Dept: REHABILITATION | Facility: HOSPITAL | Age: 54
End: 2024-02-27
Payer: COMMERCIAL

## 2024-02-27 DIAGNOSIS — Z74.09 DECREASED FUNCTIONAL MOBILITY AND ENDURANCE: Primary | ICD-10-CM

## 2024-02-27 PROCEDURE — 97112 NEUROMUSCULAR REEDUCATION: CPT | Mod: PN

## 2024-02-27 PROCEDURE — 97110 THERAPEUTIC EXERCISES: CPT | Mod: PN

## 2024-02-27 PROCEDURE — 97530 THERAPEUTIC ACTIVITIES: CPT | Mod: PN

## 2024-02-27 NOTE — PROGRESS NOTES
"  [facility]   Physical Therapy Treatment Note      Name: Martha [facility] Number: [id number]    Therapy Diagnosis:   Encounter Diagnosis   Name Primary?    Decreased functional mobility and endurance Yes     Physician: Roverto Ackerman MD    Visit Date: 2/27/2024    Physician Orders: PT Eval and Treat   Medical Diagnosis from Referral: Cerebrovascular accident (CVA), unspecified mechanism [I63.9]   Evaluation Date: 1/29/2024  Authorization Period Expiration: 12/31/2024  Plan of Care Expiration: 3/8/2024  Progress Note Due: 2/29/2024  Date of Surgery: N/A  Visit # / Visits authorized: 3/20 (+eval)  FOTO: 1/3     Precautions: Standard and Recent MI (monitor vitals as needed)     Time In: 9:32 AM   Time Out: 10:12 AM  Total Billable Time: 40 minutes (1 TA + 1 TE + 1 NMR)    PTA Visit #: 2/5     Subjective     Patient reports: No new complaints today. Hasn't had a chance to do entirety of home exercise program yet but working toward it. Agreeable to discharge next visit.  She  will be  compliant with home exercise program.  Response to previous treatment: no adverse response  Functional change: pending reassessment next visit    Pain: 0/10  Location: right foot    Objective      Objective Measures updated at progress report unless specified.     Treatment     Martha received the treatments listed below:      therapeutic exercises to develop strength and endurance for 8 minutes including:  - Nustep level 5.0 sprints x 6 minutes  - Calf raises edge of Fitter board 2x20    neuromuscular re-education activities to improve: Balance, Coordination, Kinesthetic, Sense, and Proprioception for 20 minutes. The following activities were included:  - Narrow base of support on Airex + head turns 2x30" each direction (horizontal and vertical)  - Lateral resistance band steps no upper extremity support; red theraband around   - Full tandem stance + 5.5# med ball chest press x10B  - Full tandem stance " "on blue foam discs 2x30" each leg leading     therapeutic activities to improve functional performance for 12 minutes, including:  - Reciprocal step ups to 6" step + contralateral knee  2x45"B each leg with 3# ankle weights  - Sit to stands from blue chair + 5.5# med ball shoulder press 2x10  - Heel walking 6 lengths x 10 feet no upper extremity support    Patient Education and Home Exercises       Education provided:   - Continue HEP    Written Home Exercises Provided: Patient instructed to cont prior HEP. Exercises were reviewed and Martha was able to demonstrate them prior to the end of the session.  Martha demonstrated good  understanding of the education provided. See Electronic Medical Record under Patient Instructions for exercises provided during therapy sessions    Assessment     Martha arrived to session without any complaints of pain and was agreeable to treatment. Appropriate levels of fatigue achieved with all activity and no loss of balance with progressed balance activity. Minimal cuing needed to correct compensatory patterns during pertinent exercise. She will likely be ready for discharge next visit pending goal reassessment.    Martha Is progressing well towards her goals.   Patient prognosis is Good.     Patient will continue to benefit from skilled outpatient physical therapy to address the deficits listed in the problem list box on initial evaluation, provide pt/family education and to maximize pt's level of independence in the home and community environment.     Patient's spiritual, cultural and educational needs considered and pt agreeable to plan of care and goals.     Anticipated barriers to physical therapy: Co-morbidities; recent medical history     Goals:     Short Term Goals: 2 weeks   Patient will be 100% compliant with original HEP in order to maximize PT benefits (progressing, not met)  Patient will score >/=70% on FOTO survey in order to improve self-perception of " functional mobility  (progressing, not met)     Long Term Goals: 5 weeks   Patient will begin some form of home/community fitness in order to sustain PT benefits post-discharge  (progressing, not met)  Patient will score >/=29/30 on FGA in order to improve postural control with community mobility  (progressing, not met)  Patient will walk >/=1400 feet on Six-Minute Walk Test in order to improve cardiovascular endurance for community mobility  (progressing, not met)  Patient will score >/=80% on FOTO survey in order to improve self-perception of functional mobility  (progressing, not met)    Plan     Likely discharge next visit.    RAD OSEI, PT

## 2024-02-28 ENCOUNTER — TELEPHONE (OUTPATIENT)
Dept: NEUROLOGY | Facility: CLINIC | Age: 54
End: 2024-02-28
Payer: COMMERCIAL

## 2024-02-29 ENCOUNTER — PATIENT MESSAGE (OUTPATIENT)
Dept: PRIMARY CARE CLINIC | Facility: CLINIC | Age: 54
End: 2024-02-29
Payer: COMMERCIAL

## 2024-02-29 ENCOUNTER — OFFICE VISIT (OUTPATIENT)
Dept: NEUROLOGY | Facility: CLINIC | Age: 54
End: 2024-02-29
Payer: COMMERCIAL

## 2024-02-29 VITALS
HEIGHT: 65 IN | SYSTOLIC BLOOD PRESSURE: 184 MMHG | WEIGHT: 296.94 LBS | BODY MASS INDEX: 49.47 KG/M2 | HEART RATE: 55 BPM | DIASTOLIC BLOOD PRESSURE: 87 MMHG

## 2024-02-29 DIAGNOSIS — E78.5 HYPERLIPIDEMIA, UNSPECIFIED HYPERLIPIDEMIA TYPE: ICD-10-CM

## 2024-02-29 DIAGNOSIS — I63.9 CEREBROVASCULAR ACCIDENT (CVA), UNSPECIFIED MECHANISM: Primary | ICD-10-CM

## 2024-02-29 DIAGNOSIS — I25.10 CORONARY ARTERY DISEASE INVOLVING NATIVE CORONARY ARTERY OF NATIVE HEART WITHOUT ANGINA PECTORIS: ICD-10-CM

## 2024-02-29 DIAGNOSIS — I10 HYPERTENSION, UNSPECIFIED TYPE: ICD-10-CM

## 2024-02-29 PROCEDURE — 3079F DIAST BP 80-89 MM HG: CPT | Mod: CPTII,S$GLB,, | Performed by: STUDENT IN AN ORGANIZED HEALTH CARE EDUCATION/TRAINING PROGRAM

## 2024-02-29 PROCEDURE — 99999 PR PBB SHADOW E&M-EST. PATIENT-LVL IV: CPT | Mod: PBBFAC,,, | Performed by: STUDENT IN AN ORGANIZED HEALTH CARE EDUCATION/TRAINING PROGRAM

## 2024-02-29 PROCEDURE — 3077F SYST BP >= 140 MM HG: CPT | Mod: CPTII,S$GLB,, | Performed by: STUDENT IN AN ORGANIZED HEALTH CARE EDUCATION/TRAINING PROGRAM

## 2024-02-29 PROCEDURE — 99215 OFFICE O/P EST HI 40 MIN: CPT | Mod: S$GLB,,, | Performed by: STUDENT IN AN ORGANIZED HEALTH CARE EDUCATION/TRAINING PROGRAM

## 2024-02-29 PROCEDURE — 1159F MED LIST DOCD IN RCRD: CPT | Mod: CPTII,S$GLB,, | Performed by: STUDENT IN AN ORGANIZED HEALTH CARE EDUCATION/TRAINING PROGRAM

## 2024-02-29 PROCEDURE — 3044F HG A1C LEVEL LT 7.0%: CPT | Mod: CPTII,S$GLB,, | Performed by: STUDENT IN AN ORGANIZED HEALTH CARE EDUCATION/TRAINING PROGRAM

## 2024-02-29 PROCEDURE — 3008F BODY MASS INDEX DOCD: CPT | Mod: CPTII,S$GLB,, | Performed by: STUDENT IN AN ORGANIZED HEALTH CARE EDUCATION/TRAINING PROGRAM

## 2024-02-29 RX ORDER — ASPIRIN 81 MG/1
81 TABLET ORAL DAILY
Qty: 30 TABLET | Refills: 0
Start: 2024-02-29 | End: 2024-04-15 | Stop reason: SDUPTHER

## 2024-02-29 NOTE — PROGRESS NOTES
Chief Complaint and Duration     Chief Complaint   Patient presents with    Stroke   Back in January of 2024    History of Present Illness     Martha Shah is a 53 y.o. PMHx significant for NSTEMI s/o LHC on 1/8/2024 with no intervention a few days ago, ALFREDO on CPAP, HTN, HLD, Eosinophilic oesophagitis,  GERD, presenting with right arm and leg weakness and word-finding difficulties.  MRI showed restricted diffusion in the left corona radiata concerning for an infarct, symptoms resolved.  Never had this before.  Was taking aspirin before, discharged with aspirin Plavix for 21 days and statin.  Patient continue on aspirin and statin.  Has follow up with Cardiology again soon, plan for cardiac MRI.  Patient's stroke risk factors include hypertension hyperlipidemia as well as her cardiac history.    Doing well.    Review of patient's allergies indicates:   Allergen Reactions    Sulfa (sulfonamide antibiotics) Shortness Of Breath    Coconut     Guaifenesin     Milk containing products (dairy)     Prochlorperazine Hallucinations     Other reaction(s): Compazine  Note:  Adverse Reaction: Other    Terbinafine hcl      Other reaction(s): terbinafine 250 mg tablet  Note:  Adverse Reaction: Upset Stomach    Wheat containing prod     Penicillins Rash     Other reaction(s): Penicillins  Note:  Adverse Reaction: Rash     Current Outpatient Medications   Medication Sig Dispense Refill    cetirizine (ZYRTEC) 10 MG tablet Take 10 mg by mouth once daily.      diclofenac sodium (VOLTAREN) 1 % Gel Apply 2 g topically 4 (four) times daily as needed. 350 g 2    hydroCHLOROthiazide (HYDRODIURIL) 25 MG tablet Take 1 tablet (25 mg total) by mouth once daily. 90 tablet 3    indomethacin (INDOCIN) 25 MG capsule Take 1 capsule (25 mg total) by mouth 3 (three) times daily with meals. 15 capsule 1    metoprolol tartrate (LOPRESSOR) 25 MG tablet Take 1 tablet (25 mg total) by mouth 2 (two) times daily. 60 tablet 11    multivitamin (ONE DAILY  MULTIVITAMIN) per tablet Take 1 tablet by mouth once daily.      nitroGLYCERIN (NITROSTAT) 0.4 MG SL tablet Place 1 tablet (0.4 mg total) under the tongue every 5 (five) minutes as needed for Chest pain. Call the 911 after the 3rd dose. 25 tablet 3    omega 3-dha-epa-fish oil 1,000 mg (120 mg-180 mg) Cap       pantoprazole (PROTONIX) 40 MG tablet Take 1 tablet (40 mg total) by mouth 2 (two) times daily. 180 tablet 1    rosuvastatin (CRESTOR) 40 MG Tab Take 1 tablet (40 mg total) by mouth every evening. 90 tablet 3    tiZANidine (ZANAFLEX) 4 MG tablet Take 1 tablet (4 mg total) by mouth 3 (three) times daily as needed (muscle spasm). 30 tablet 1    aspirin (ECOTRIN) 81 MG EC tablet Take 1 tablet (81 mg total) by mouth once daily. 30 tablet 0     No current facility-administered medications for this visit.       Medical History     Past Medical History:   Diagnosis Date    Allergy     Hypertension     NSTEMI (non-ST elevated myocardial infarction) 01/07/2024    Sleep apnea, unspecified     Urinary tract infection      Past Surgical History:   Procedure Laterality Date    ADENOIDECTOMY      ADENOIDECTOMY      BIOPSY OF ADENOIDS      COLONOSCOPY N/A 09/14/2022    Procedure: COLONOSCOPY;  Surgeon: Jordy Simons MD;  Location: [facility];  Service: Endoscopy;  Laterality: N/A;    CORONARY ANGIOGRAPHY Right 1/8/2024    Procedure: ANGIOGRAM, CORONARY ARTERY;  Surgeon: Estrada Ojeda MD;  Location: [facility] CATH LAB;  Service: Cardiology;  Laterality: Right;    EGD, WITH CLOSED BIOPSY  11/30/2023    ESOPHAGOGASTRODUODENOSCOPY N/A 09/14/2022    Procedure: EGD (ESOPHAGOGASTRODUODENOSCOPY);  Surgeon: Jordy Simons MD;  Location: [facility] ENDO;  Service: Endoscopy;  Laterality: N/A;    ESOPHAGOGASTRODUODENOSCOPY  12/19/2022    with biopsy    ESOPHAGOGASTRODUODENOSCOPY N/A 12/19/2022    Procedure: EGD (ESOPHAGOGASTRODUODENOSCOPY);  Surgeon: Jordy Simons MD;  Location: [facility] ENDO;  Service: Endoscopy;  Laterality: N/A;     ESOPHAGOGASTRODUODENOSCOPY N/A 11/30/2023    Procedure: EGD (ESOPHAGOGASTRODUODENOSCOPY);  Surgeon: Vinay Bourgeois MD;  Location: [facility];  Service: Endoscopy;  Laterality: N/A;    TONSILLECTOMY       Family History   Problem Relation Age of Onset    Lupus Mother     Heart disease Father     Prostate cancer Neg Hx     Kidney disease Neg Hx      Social History     Socioeconomic History    Marital status:    Tobacco Use    Smoking status: Never     Passive exposure: Never    Smokeless tobacco: Never   Substance and Sexual Activity    Alcohol use: Not Currently     Comment: social    Drug use: No    Sexual activity: Yes     Partners: Female     Social Determinants of Health     Financial Resource Strain: Low Risk  (1/22/2024)    Overall Financial Resource Strain (CARDIA)     Difficulty of Paying Living Expenses: Not hard at all   Food Insecurity: No Food Insecurity (1/22/2024)    Hunger Vital Sign     Worried About Running Out of Food in the Last Year: Never true     Ran Out of Food in the Last Year: Never true   Transportation Needs: No Transportation Needs (1/22/2024)    PRAPARE - Transportation     Lack of Transportation (Medical): No     Lack of Transportation (Non-Medical): No   Physical Activity: Inactive (1/22/2024)    Exercise Vital Sign     Days of Exercise per Week: 0 days     Minutes of Exercise per Session: 0 min   Stress: Stress Concern Present (1/22/2024)    [employer] - Occupational Stress Questionnaire     Feeling of Stress : To some extent   Social Connections: Unknown (1/22/2024)    Social Connection and Isolation Panel [NHANES]     Frequency of Communication with Friends and Family: Never     Frequency of Social Gatherings with Friends and Family: Never     Active Member of Clubs or Organizations: No     Attends Club or Organization Meetings: Never     Marital Status:    Housing Stability: Low Risk  (1/22/2024)    Housing Stability Vital Sign      Unable to Pay for Housing in the Last Year: No     Number of Places Lived in the Last Year: 1     Unstable Housing in the Last Year: No       Exam     Vitals:    02/29/24 0808   BP: (!) 184/87   Pulse: (!) 55      Physical Exam:  General: Not in acute distress. Not ill-appearing.   HENT: Normocephalic and atraumatic. Moist mucous membranes.  Eyes: Conjunctivae normal.   Pulmonary: Pulmonary effort is normal.   Abdominal: Abdomen is soft and flat.   Skin: Skin is warm and dry. No rashes.   Psychiatric: Mood normal.        Neurologic Exam   Mental status: oriented to person, place, and time  Attention: Normal. Concentration: normal.  Speech: speech is normal.  Cranial Nerves: PERRL, EOMI intact, V1-V3 Facial sensation intact. Symmetric facies. Hearing grossly intact. Palate and uvula midline, symmetric. No tongue deviation. Trapezius strength intact.     Motor exam: bulk and tone normal. Strength 5/5 in bilateral upper extremities: deltoids, biceps, triceps, wrist flexion/extension, finger abduction/adduction. Strength 5/5 in bilateral lower extremities: hip flexion/extension, thigh adduction/abduction, knee flexion/extension, dorsiflexion/plantarflexion, foot eversion/inversion.    Reflexes: 2+ in bilateral upper extremities: biceps and brachiaradialis, 2+ in bilateral lower extremities: patellar and achilles  Plantar reflex: normal  Stephen's/Clonus: negative    Sensory exam: light touch intact    Gait exam: normal  Romberg: negative  Coordination: normal    Tremor: none  Cogwheel rigidity: none    Labs and Imaging     Labs: reviewed  No results found for this or any previous visit (from the past 24 hour(s)).    Thyroid normal  HgA1C%:  5.4  LDL:  91    Imaging:   I have personally reviewed the images performed.   MRI of the brain in January of 2024 shows a subacute infarct in the left corona radiata  CTA of the head and neck back in January of 2024 with no significant stenosis    Assessment and Plan     Problem  List Items Addressed This Visit          Neuro    Cerebrovascular accident (CVA) - Primary    Relevant Medications    aspirin (ECOTRIN) 81 MG EC tablet       Cardiac/Vascular    Hypertension    Hyperlipidemia    Coronary artery disease involving native coronary artery of native heart without angina pectoris     53-year-old female with PMHx significant for NSTEMI s/o LHC on 1/8/2024 with no intervention a few days ago, ALFREDO on CPAP, HTN, HLD, Eosinophilic oesophagitis,  GERD, presenting with right arm and leg weakness and word-finding difficulties.  MRI showed restricted diffusion in the left corona radiata.  Symptoms resolved.  Patient has follow up with Cardiology, plan for cardiac MRI.  Continue on aspirin and statin.  For secondary stroke prevention.    Patient finished dual antiplatelet aspirin Plavix for 21 days and statin.  Continue on aspirin and statin.  States that her cholesterol medication was reduced as she was having was cramping, discussed with the patient to follow along if she continues to have muscle cramping.  May need to be off of his statin.  Concern for a statin induced myopathy.  Otherwise doing well.  Continue to follow up with PCP for control vascular risk factors, follow up with Cardiology.    Stroke risk factors were discussed with the patient, patient has symptoms concerning for stroke including new onset word-finding difficulties, numbness, sensory changes, motor weakness, patient to present to the ER for evaluation for stroke that may require intervention.  Questions answered.  Chart reviewed at length.    Follow-up:  PCP, Cardiology    Time spent on this encounter:  45 minutes. This includes face to face time and non-face to face time preparing to see the patient (eg, review of tests), obtaining and/or reviewing separately obtained history, documenting clinical information in the electronic or other health record, independently interpreting results and communicating results to the  patient/family/caregiver, or care coordinator.     This note was created by combination of typed  and M-Modal dictation. Transcription and phonetic errors may be present.  If there are any questions, please contact me.

## 2024-03-04 ENCOUNTER — CLINICAL SUPPORT (OUTPATIENT)
Dept: REHABILITATION | Facility: HOSPITAL | Age: 54
End: 2024-03-04
Payer: COMMERCIAL

## 2024-03-04 DIAGNOSIS — Z74.09 DECREASED FUNCTIONAL MOBILITY AND ENDURANCE: Primary | ICD-10-CM

## 2024-03-04 PROCEDURE — 97530 THERAPEUTIC ACTIVITIES: CPT | Mod: PN

## 2024-03-04 NOTE — PROGRESS NOTES
[facility]   Physical Therapy Treatment Note / Discharge Summary     Name: Martha Shah  [facility] Number: [id number]    Therapy Diagnosis:   Encounter Diagnosis   Name Primary?    Decreased functional mobility and endurance Yes     Physician: Roverto Ackerman MD    Visit Date: 3/4/2024    Physician Orders: PT Eval and Treat   Medical Diagnosis from Referral: Cerebrovascular accident (CVA), unspecified mechanism [I63.9]   Evaluation Date: 1/29/2024  Authorization Period Expiration: 12/31/2024  Plan of Care Expiration: 3/8/2024  Progress Note Due: 2/29/2024  Date of Surgery: N/A  Visit # / Visits authorized: 5/20 (+eval)  FOTO: 1/3     Precautions: Standard and Recent MI (monitor vitals as needed)     Time In: 7:28 AM   Time Out: 7:51 AM  Total Billable Time: 23 minutes (2 TA)    PTA Visit #: 0/5     Subjective     Patient reports: Some left upper trap discomfort/pain today but otherwise no new complaints. Agreeable to discharge today.  She  will be  compliant with home exercise program.  Response to previous treatment: no adverse response  Functional change: see goals    Pain: 0/10  Location: right foot    Objective      Objective Measures updated at progress report unless specified.     FOTO: 98%    Six-Minute Walk Test: 1500 feet with RPE = 6/10    Functional Gait Assessment:     1. Gait on level surface =  3   (3) Normal: less than 5.5 sec, no A.D., no imbalance, normal gait pattern, deviates <6in   (2) Mild impairment: 7-5.6 sec, uses A.D., mild gait deviations, or deviates 6-10 in   (1) Moderate impairment: > 7 sec, slow speed, imbalance, deviates 10-15 in.   (0) Severe impairment: needs assist, deviates >15 in, reach/touch wall  2. Change in Gait Speed = 3   (3) Normal: smooth change w/o loss of balance or gait deviation, deviates < 6 in, significant difference between speeds   (2) Mild impairment: changes speed, but demonstrates mild gait deviations, deviates 6-10 in, OR no  deviations but unable to significantly speed, OR uses A.D.   (1) Moderate impairment: minor changes to speed, OR changes speed w/ significant deviations, deviates 10-15 in, OR  Changes speed , but loses balance & recovers   (0) Severe impairment: cannot change speed, deviates >15 in, or loses balance & needs assist  3. Gait with horizontal head turns  = 3   (3) Normal: no change in gait, deviates <6 in   (2) Mild impairment: slight change in speed, deviates 6-10 in, OR uses A.D.   (1) Moderate impairment: moderate change in speed, deviates 10-15 in   (0) Severe impairment: severe disruption of gait, deviates >15in  4. Gait with vertical head turns = 3   (3) Normal: no change in gait, deviates <6 in   (2) Mild impairment: slight change in speed, deviates 6-10 in OR uses A.D.   (1) Moderate impairment: moderate change in speed, deviates 10-15 in   (0) Severe impairment: severe disruption of gait, deviates >15 in  5. Gait with pivot turns = 3   (3) Normal: performs safely in 3 sec, no LOB   (2) Mild impairment: performs in >3 sec & no LOB, OR turns safely & requires several steps to regain LOB   (1) Moderate impairment: turns slow, OR requires several small steps for balance following turn & stop   (0) Severe impairment: cannot turn safely, needs assist  6. Step over obstacle = 3   (3) Normal: steps over 2 stacked boxes w/o change in speed or LOB   (2) Mild impairment: able to step over 1 box w/o change in speed or LOB   (1) Moderate impairment: steps over 1 box but must slow down, may require VC   (0) Severe impairment: cannot perform w/o assist  7. Gait with Narrow GÓMEZ = 3   (3) Normal: 10 steps no staggering   (2) Mild impairment: 7-9 steps   (1) Moderate impairment: 4-7 steps   (0) Severe impairment: < 4 steps or cannot perform w/o assist  8. Gait with eyes closed = 3   (3) Normal: < 7 sec, no A.D., no LOB, normal gait pattern, deviates <6 in   (2) Mild impairment: 7.1-9 sec, mild gait deviations, deviates 6-10  in   (1) Moderate impairment: > 9 sec, abnormal pattern, LOB, deviates 10-15 in   (0) Severe impairment: cannot perform w/o assist, LOB, deviates >15in  9. Ambulating Backwards = 3   (3) Normal: no A.D., no LOB, normal gait pattern, deviates <6in   (2) Mild impairment: uses A.D., slower speed, mild gait deviations, deviates 6-10 in   (1) Moderate impairment: slow speed, abnormal gait pattern, LOB, deviates 10-15 in   (0) Severe impairment: severe gait deviations or LOB, deviates >15in  10. Steps = 3   (3) Normal: alternating feet, no rail   (2) Mild Impairment: alternating feet, uses rail   (1) Moderate impairment: step-to, uses rail   (0) Severe impairment: cannot perform safely    Score 30/30     Cutoffs:   <22/30 fall risk in older adults  <18/30 fall risk in Parkinsons    MDC/MCID:  Stroke = 4.2 points (MDC)  Vestibular = 6 points (MDC)  Geriatric = 4 points (MCID)  Parkinson's = 4 points (MDC)    Treatment     Martha received the treatments listed below:      therapeutic activities to improve functional performance for 23 minutes, including:  - Functional Gait Assessment, Six-Minute Walk Test, FOTO (see above)  - Patient education on ACSM/CDC recommendations on weekly exercise with examples of each domain provided    Patient Education and Home Exercises       Education provided:   - Continue HEP    Written Home Exercises Provided: Patient instructed to cont prior HEP. Exercises were reviewed and Martha was able to demonstrate them prior to the end of the session.  Martha demonstrated good  understanding of the education provided. See Electronic Medical Record under Patient Instructions for exercises provided during therapy sessions    Assessment     Martha arrived to session ready for discharge. Per reassessment, she has shown good improvement in terms of cardiovascular endurance, self-perception of functional mobility, and higher level balance skills. She is appropriate for discharge today secondary to  degree of goal achievement coupled with capacity for independent home/community exercise. She understands she can contact PT with any questions/concerns post discharge.    Martha Is progressing well towards her goals.   Patient prognosis is Good.     Patient's spiritual, cultural and educational needs considered and pt agreeable to plan of care and goals.     Anticipated barriers to physical therapy: Co-morbidities; recent medical history     Goals:     Short Term Goals: 2 weeks   Patient will be 100% compliant with original HEP in order to maximize PT benefits (progressing, not met)  Patient will score >/=70% on FOTO survey in order to improve self-perception of functional mobility  (met)     Long Term Goals: 5 weeks   Patient will begin some form of home/community fitness in order to sustain PT benefits post-discharge  (progressing, not met)  Patient will score >/=29/30 on FGA in order to improve postural control with community mobility  (met)  Patient will walk >/=1400 feet on Six-Minute Walk Test in order to improve cardiovascular endurance for community mobility  (met)  Patient will score >/=80% on FOTO survey in order to improve self-perception of functional mobility  (met)    Plan     Discharge PT    RAD OSEI, PT

## 2024-03-05 ENCOUNTER — OFFICE VISIT (OUTPATIENT)
Dept: PRIMARY CARE CLINIC | Facility: CLINIC | Age: 54
End: 2024-03-05
Payer: COMMERCIAL

## 2024-03-05 VITALS
HEART RATE: 55 BPM | TEMPERATURE: 98 F | WEIGHT: 290.13 LBS | OXYGEN SATURATION: 98 % | DIASTOLIC BLOOD PRESSURE: 86 MMHG | SYSTOLIC BLOOD PRESSURE: 164 MMHG | BODY MASS INDEX: 48.34 KG/M2 | RESPIRATION RATE: 18 BRPM | HEIGHT: 65 IN

## 2024-03-05 DIAGNOSIS — E66.01 MORBID OBESITY WITH BMI OF 45.0-49.9, ADULT: ICD-10-CM

## 2024-03-05 DIAGNOSIS — I10 PRIMARY HYPERTENSION: Primary | ICD-10-CM

## 2024-03-05 PROCEDURE — 3008F BODY MASS INDEX DOCD: CPT | Mod: CPTII,S$GLB,, | Performed by: FAMILY MEDICINE

## 2024-03-05 PROCEDURE — 3079F DIAST BP 80-89 MM HG: CPT | Mod: CPTII,S$GLB,, | Performed by: FAMILY MEDICINE

## 2024-03-05 PROCEDURE — 3044F HG A1C LEVEL LT 7.0%: CPT | Mod: CPTII,S$GLB,, | Performed by: FAMILY MEDICINE

## 2024-03-05 PROCEDURE — 99999 PR PBB SHADOW E&M-EST. PATIENT-LVL V: CPT | Mod: PBBFAC,,, | Performed by: FAMILY MEDICINE

## 2024-03-05 PROCEDURE — 4010F ACE/ARB THERAPY RXD/TAKEN: CPT | Mod: CPTII,S$GLB,, | Performed by: FAMILY MEDICINE

## 2024-03-05 PROCEDURE — 1159F MED LIST DOCD IN RCRD: CPT | Mod: CPTII,S$GLB,, | Performed by: FAMILY MEDICINE

## 2024-03-05 PROCEDURE — 99214 OFFICE O/P EST MOD 30 MIN: CPT | Mod: S$GLB,,, | Performed by: FAMILY MEDICINE

## 2024-03-05 PROCEDURE — 3077F SYST BP >= 140 MM HG: CPT | Mod: CPTII,S$GLB,, | Performed by: FAMILY MEDICINE

## 2024-03-05 PROCEDURE — 1160F RVW MEDS BY RX/DR IN RCRD: CPT | Mod: CPTII,S$GLB,, | Performed by: FAMILY MEDICINE

## 2024-03-05 RX ORDER — SEMAGLUTIDE 1.7 MG/.75ML
1.7 INJECTION, SOLUTION SUBCUTANEOUS
Qty: 3 ML | Refills: 0 | Status: SHIPPED | OUTPATIENT
Start: 2024-05-28 | End: 2024-05-22

## 2024-03-05 RX ORDER — SEMAGLUTIDE 2.4 MG/.75ML
2.4 INJECTION, SOLUTION SUBCUTANEOUS
Qty: 3 ML | Refills: 5 | Status: SHIPPED | OUTPATIENT
Start: 2024-06-25 | End: 2024-05-22

## 2024-03-05 RX ORDER — SEMAGLUTIDE 1 MG/.5ML
1 INJECTION, SOLUTION SUBCUTANEOUS
Qty: 2 ML | Refills: 0 | Status: SHIPPED | OUTPATIENT
Start: 2024-04-30 | End: 2024-05-22

## 2024-03-05 RX ORDER — SEMAGLUTIDE 0.25 MG/.5ML
0.25 INJECTION, SOLUTION SUBCUTANEOUS
Qty: 2 ML | Refills: 0 | Status: SHIPPED | OUTPATIENT
Start: 2024-03-05 | End: 2024-03-27

## 2024-03-05 RX ORDER — SEMAGLUTIDE 0.5 MG/.5ML
0.5 INJECTION, SOLUTION SUBCUTANEOUS
Qty: 2 ML | Refills: 0 | Status: SHIPPED | OUTPATIENT
Start: 2024-04-02 | End: 2024-04-24

## 2024-03-05 RX ORDER — OLMESARTAN MEDOXOMIL 20 MG/1
20 TABLET ORAL DAILY
Qty: 90 TABLET | Refills: 0 | Status: SHIPPED | OUTPATIENT
Start: 2024-03-05 | End: 2024-05-20

## 2024-03-05 NOTE — PROGRESS NOTES
"Subjective:       Patient ID: Martha Shah is a 53 y.o. adult.    Chief Complaint: Blood Pressure Check and Weight Loss    Blood pressure has been consistently elevated on home monitoring.  Compliant with medications.  No exertional chest pain or shortness of breath.  Also has struggled with weight for quite a while.  Has tried multiple diets without significant success.  Interested in trying Wegovy.  No personal or family history of pancreatitis or thyroid cancer    Hypertension  This is a recurrent problem. The current episode started more than 1 year ago. The problem has been rapidly worsening since onset. The problem is resistant. Pertinent negatives include no anxiety, blurred vision, chest pain, headaches, malaise/fatigue, neck pain, orthopnea, palpitations, peripheral edema, PND, shortness of breath or sweats. There are no associated agents to hypertension. Risk factors for coronary artery disease include family history, obesity and sedentary lifestyle. Past treatments include beta blockers, diuretics and lifestyle changes. The current treatment provides mild improvement. Compliance problems include exercise.      Review of Systems   Constitutional:  Negative for malaise/fatigue.   Eyes:  Negative for blurred vision.   Respiratory:  Negative for shortness of breath.    Cardiovascular:  Negative for chest pain, palpitations, orthopnea and PND.   Musculoskeletal:  Negative for neck pain.   Allergic/Immunologic: Negative for immunocompromised state.   Neurological:  Negative for headaches.       Objective:      Vitals:    03/05/24 0829   BP: (!) 164/86   BP Location: Right arm   Patient Position: Sitting   BP Method: Large (Manual)   Pulse: (!) 55   Resp: 18   Temp: 97.6 °F (36.4 °C)   TempSrc: Temporal   SpO2: 98%   Weight: 131.6 kg (290 lb 2 oz)   Height: 5' 5" (1.651 m)     BP Readings from Last 5 Encounters:   03/05/24 (!) 164/86   02/29/24 (!) 184/87   02/06/24 134/82   01/31/24 (!) 153/86   01/30/24 128/80 "     Wt Readings from Last 5 Encounters:   03/05/24 131.6 kg (290 lb 2 oz)   02/29/24 134.7 kg (296 lb 15.4 oz)   02/06/24 132.9 kg (292 lb 15.9 oz)   01/31/24 134.6 kg (296 lb 11.8 oz)   01/30/24 134.6 kg (296 lb 13.6 oz)     Physical Exam  Vitals and nursing note reviewed.   Constitutional:       General: She is not in acute distress.     Appearance: Normal appearance. She is well-developed.   HENT:      Head: Normocephalic and atraumatic.   Cardiovascular:      Rate and Rhythm: Normal rate and regular rhythm.      Heart sounds: Normal heart sounds.   Pulmonary:      Effort: Pulmonary effort is normal.      Breath sounds: Normal breath sounds.   Musculoskeletal:      Right lower leg: No edema.      Left lower leg: No edema.   Skin:     General: Skin is warm and dry.   Neurological:      Mental Status: She is alert and oriented to person, place, and time.   Psychiatric:         Mood and Affect: Mood normal.         Behavior: Behavior normal.         Lab Results   Component Value Date    WBC 9.01 01/12/2024    HGB 13.1 (L) 01/12/2024    HCT 39.5 (L) 01/12/2024     01/12/2024    CHOL 144 01/12/2024    TRIG 122 01/12/2024    HDL 28 (L) 01/12/2024    ALT 36 01/12/2024    AST 29 01/12/2024     02/06/2024    K 4.3 02/06/2024    CL 99 02/06/2024    CREATININE 1.2 02/06/2024    BUN 17 02/06/2024    CO2 28 02/06/2024    TSH 3.642 01/12/2024    INR 1.1 01/12/2024    HGBA1C 5.4 01/11/2024      Assessment:       1. Primary hypertension    2. Morbid obesity with BMI of 45.0-49.9, adult        Plan:       Primary hypertension  -     olmesartan (BENICAR) 20 MG tablet; Take 1 tablet (20 mg total) by mouth once daily.  Dispense: 90 tablet; Refill: 0  -     Basic Metabolic Panel; Future; Expected date: 03/05/2024  Continue metoprolol and hydrochlorothiazide.  Add olmesartan 20 mg daily.  Continue to monitor blood pressure at home, and return to clinic in 2 weeks for BMP and blood pressure check.  Anticipate increase  olmesartan to 40 mg if blood pressure remains suboptimally controlled  Morbid obesity with BMI of 45.0-49.9, adult  -     semaglutide, weight loss, (WEGOVY) 0.25 mg/0.5 mL PnIj; Inject 0.25 mg into the skin every 7 days. for 4 doses  Dispense: 2 mL; Refill: 0  -     semaglutide, weight loss, (WEGOVY) 0.5 mg/0.5 mL PnIj; Inject 0.5 mg into the skin every 7 days. for 4 doses  Dispense: 2 mL; Refill: 0  -     semaglutide, weight loss, (WEGOVY) 1 mg/0.5 mL PnIj; Inject 1 mg into the skin every 7 days. for 4 doses  Dispense: 2 mL; Refill: 0  -     semaglutide, weight loss, (WEGOVY) 1.7 mg/0.75 mL PnIj; Inject 1.7 mg into the skin every 7 days. for 4 doses  Dispense: 3 mL; Refill: 0  -     semaglutide, weight loss, (WEGOVY) 2.4 mg/0.75 mL PnIj; Inject 2.4 mg into the skin every 7 days.  Dispense: 3 mL; Refill: 5  Counseled regarding potential side effects of Wegovy and stressed importance of reducing carbohydrate intake to facilitate weight loss    Medication List with Changes/Refills   New Medications    OLMESARTAN (BENICAR) 20 MG TABLET    Take 1 tablet (20 mg total) by mouth once daily.    SEMAGLUTIDE, WEIGHT LOSS, (WEGOVY) 0.25 MG/0.5 ML PNIJ    Inject 0.25 mg into the skin every 7 days. for 4 doses    SEMAGLUTIDE, WEIGHT LOSS, (WEGOVY) 0.5 MG/0.5 ML PNIJ    Inject 0.5 mg into the skin every 7 days. for 4 doses    SEMAGLUTIDE, WEIGHT LOSS, (WEGOVY) 1 MG/0.5 ML PNIJ    Inject 1 mg into the skin every 7 days. for 4 doses    SEMAGLUTIDE, WEIGHT LOSS, (WEGOVY) 1.7 MG/0.75 ML PNIJ    Inject 1.7 mg into the skin every 7 days. for 4 doses    SEMAGLUTIDE, WEIGHT LOSS, (WEGOVY) 2.4 MG/0.75 ML PNIJ    Inject 2.4 mg into the skin every 7 days.   Current Medications    ASPIRIN (ECOTRIN) 81 MG EC TABLET    Take 1 tablet (81 mg total) by mouth once daily.    CETIRIZINE (ZYRTEC) 10 MG TABLET    Take 10 mg by mouth once daily.    DICLOFENAC SODIUM (VOLTAREN) 1 % GEL    Apply 2 g topically 4 (four) times daily as needed.     HYDROCHLOROTHIAZIDE (HYDRODIURIL) 25 MG TABLET    Take 1 tablet (25 mg total) by mouth once daily.    METOPROLOL TARTRATE (LOPRESSOR) 25 MG TABLET    Take 1 tablet (25 mg total) by mouth 2 (two) times daily.    MULTIVITAMIN (ONE DAILY MULTIVITAMIN) PER TABLET    Take 1 tablet by mouth once daily.    NITROGLYCERIN (NITROSTAT) 0.4 MG SL TABLET    Place 1 tablet (0.4 mg total) under the tongue every 5 (five) minutes as needed for Chest pain. Call the 911 after the 3rd dose.    OMEGA 3-DHA-EPA-FISH OIL 1,000 MG (120 MG-180 MG) CAP        PANTOPRAZOLE (PROTONIX) 40 MG TABLET    Take 1 tablet (40 mg total) by mouth 2 (two) times daily.    ROSUVASTATIN (CRESTOR) 40 MG TAB    Take 1 tablet (40 mg total) by mouth every evening.   Discontinued Medications    INDOMETHACIN (INDOCIN) 25 MG CAPSULE    Take 1 capsule (25 mg total) by mouth 3 (three) times daily with meals.    TIZANIDINE (ZANAFLEX) 4 MG TABLET    Take 1 tablet (4 mg total) by mouth 3 (three) times daily as needed (muscle spasm).

## 2024-03-06 ENCOUNTER — TELEPHONE (OUTPATIENT)
Dept: PRIMARY CARE CLINIC | Facility: CLINIC | Age: 54
End: 2024-03-06
Payer: COMMERCIAL

## 2024-03-06 ENCOUNTER — PATIENT MESSAGE (OUTPATIENT)
Dept: PRIMARY CARE CLINIC | Facility: CLINIC | Age: 54
End: 2024-03-06
Payer: COMMERCIAL

## 2024-03-15 ENCOUNTER — TELEPHONE (OUTPATIENT)
Dept: PRIMARY CARE CLINIC | Facility: CLINIC | Age: 54
End: 2024-03-15
Payer: COMMERCIAL

## 2024-03-15 NOTE — TELEPHONE ENCOUNTER
----- Message from Madyson Ashley sent at 3/15/2024  2:53 PM CDT -----  Contact: Patient, [phone]  Calling to inquire about the prior authorization for Rx semaglutide, weight loss, (WEGOVY). Please call her. Thanks.

## 2024-03-15 NOTE — TELEPHONE ENCOUNTER
----- Message from Madyson Ashley sent at 3/15/2024  2:53 PM CDT -----  Contact: Patient, [phone]  Calling to inquire about the prior authorization for Rx semaglutide, weight loss, (WEGOVY). Please call her. Thanks.

## 2024-03-18 PROBLEM — I21.4 NSTEMI (NON-ST ELEVATED MYOCARDIAL INFARCTION): Status: ACTIVE | Noted: 2024-03-18

## 2024-03-18 PROBLEM — R79.89 ELEVATED TROPONIN: Status: ACTIVE | Noted: 2024-03-18

## 2024-03-19 PROBLEM — I21.4 NSTEMI (NON-ST ELEVATED MYOCARDIAL INFARCTION): Status: RESOLVED | Noted: 2024-03-18 | Resolved: 2024-03-19

## 2024-03-20 ENCOUNTER — TELEPHONE (OUTPATIENT)
Dept: PRIMARY CARE CLINIC | Facility: CLINIC | Age: 54
End: 2024-03-20
Payer: COMMERCIAL

## 2024-03-20 NOTE — TELEPHONE ENCOUNTER
----- Message from Madyson Ashley sent at 3/20/2024 10:25 AM CDT -----  Contact: [place] with [facility] phone [phone] Case# [id number]  Calling because they have received no information regarding the prior authorization for Ex semaglutide, weight loss, (WEGOVY) 0.25 mg/0.5 mL PnIj. Please advise. Thanks.

## 2024-03-22 ENCOUNTER — PATIENT MESSAGE (OUTPATIENT)
Dept: PRIMARY CARE CLINIC | Facility: CLINIC | Age: 54
End: 2024-03-22
Payer: COMMERCIAL

## 2024-03-22 NOTE — TELEPHONE ENCOUNTER
Called pt regarding message. Informed pt that I spoke with [employer]. Informed pt [employer] was faxing over form. Informed pt did not receive PA form.

## 2024-03-27 ENCOUNTER — TELEPHONE (OUTPATIENT)
Dept: PRIMARY CARE CLINIC | Facility: CLINIC | Age: 54
End: 2024-03-27
Payer: COMMERCIAL

## 2024-03-27 NOTE — TELEPHONE ENCOUNTER
----- Message from Julian Hearn sent at 3/27/2024 11:22 AM CDT -----  1MEDICALADVICE     Patient is calling for Medical Advice regarding: pt needs a prior auth  call back goes to [employer] [phone]   Fax [phone]         How long has patient had these symptoms:    Pharmacy name and phone#:    Would like response via [facility]  call back     Comments:      
Faxed PA form to [phone]  
Statement Selected

## 2024-04-03 ENCOUNTER — TELEPHONE (OUTPATIENT)
Dept: CARDIOLOGY | Facility: HOSPITAL | Age: 54
End: 2024-04-03
Payer: COMMERCIAL

## 2024-04-03 NOTE — TELEPHONE ENCOUNTER
Received call from patient inquiring about her event monitor appt.  She wanted to know if she had to come to clinic or will be mailed to her home.  Confirmed it is a mail to patient.  She verbalized understanding and thanked me for the call.

## 2024-04-04 ENCOUNTER — TELEPHONE (OUTPATIENT)
Dept: ELECTROPHYSIOLOGY | Facility: CLINIC | Age: 54
End: 2024-04-04
Payer: COMMERCIAL

## 2024-04-04 DIAGNOSIS — I49.9 CARDIAC ARRHYTHMIA, UNSPECIFIED CARDIAC ARRHYTHMIA TYPE: Primary | ICD-10-CM

## 2024-04-08 ENCOUNTER — CLINICAL SUPPORT (OUTPATIENT)
Dept: CARDIOLOGY | Facility: HOSPITAL | Age: 54
End: 2024-04-08
Attending: INTERNAL MEDICINE
Payer: COMMERCIAL

## 2024-04-08 ENCOUNTER — PATIENT MESSAGE (OUTPATIENT)
Dept: PODIATRY | Facility: CLINIC | Age: 54
End: 2024-04-08
Payer: COMMERCIAL

## 2024-04-08 DIAGNOSIS — I47.10 PSVT (PAROXYSMAL SUPRAVENTRICULAR TACHYCARDIA): ICD-10-CM

## 2024-04-08 PROCEDURE — 93272 ECG/REVIEW INTERPRET ONLY: CPT | Mod: ,,, | Performed by: INTERNAL MEDICINE

## 2024-04-08 PROCEDURE — 93271 ECG/MONITORING AND ANALYSIS: CPT

## 2024-04-09 ENCOUNTER — PATIENT MESSAGE (OUTPATIENT)
Dept: PRIMARY CARE CLINIC | Facility: CLINIC | Age: 54
End: 2024-04-09
Payer: COMMERCIAL

## 2024-04-09 ENCOUNTER — HOSPITAL ENCOUNTER (OUTPATIENT)
Dept: RADIOLOGY | Facility: HOSPITAL | Age: 54
Discharge: HOME | End: 2024-04-09
Attending: INTERNAL MEDICINE
Payer: COMMERCIAL

## 2024-04-09 DIAGNOSIS — I21.4 NSTEMI (NON-ST ELEVATED MYOCARDIAL INFARCTION): ICD-10-CM

## 2024-04-09 PROCEDURE — 25500020 PHARM REV CODE 255: Performed by: INTERNAL MEDICINE

## 2024-04-09 PROCEDURE — 75561 CARDIAC MRI FOR MORPH W/DYE: CPT | Mod: 26,,, | Performed by: INTERNAL MEDICINE

## 2024-04-09 PROCEDURE — 75565 CARD MRI VELOC FLOW MAPPING: CPT | Mod: 26,,, | Performed by: RADIOLOGY

## 2024-04-09 PROCEDURE — A9577 INJ MULTIHANCE: HCPCS | Performed by: INTERNAL MEDICINE

## 2024-04-09 PROCEDURE — 75565 CARD MRI VELOC FLOW MAPPING: CPT | Mod: TC

## 2024-04-09 PROCEDURE — 75561 CARDIAC MRI FOR MORPH W/DYE: CPT | Mod: 26,,, | Performed by: RADIOLOGY

## 2024-04-09 PROCEDURE — 75561 CARDIAC MRI FOR MORPH W/DYE: CPT | Mod: TC

## 2024-04-09 RX ADMIN — GADOBENATE DIMEGLUMINE 20 ML: 529 INJECTION, SOLUTION INTRAVENOUS at 09:04

## 2024-04-10 ENCOUNTER — TELEPHONE (OUTPATIENT)
Dept: PRIMARY CARE CLINIC | Facility: CLINIC | Age: 54
End: 2024-04-10

## 2024-04-10 ENCOUNTER — OFFICE VISIT (OUTPATIENT)
Dept: PRIMARY CARE CLINIC | Facility: CLINIC | Age: 54
End: 2024-04-10
Payer: COMMERCIAL

## 2024-04-10 ENCOUNTER — PATIENT MESSAGE (OUTPATIENT)
Dept: PRIMARY CARE CLINIC | Facility: CLINIC | Age: 54
End: 2024-04-10

## 2024-04-10 VITALS
HEART RATE: 61 BPM | BODY MASS INDEX: 48.65 KG/M2 | TEMPERATURE: 97 F | WEIGHT: 292 LBS | OXYGEN SATURATION: 100 % | HEIGHT: 65 IN | RESPIRATION RATE: 18 BRPM | SYSTOLIC BLOOD PRESSURE: 97 MMHG | DIASTOLIC BLOOD PRESSURE: 60 MMHG

## 2024-04-10 DIAGNOSIS — M1A.0710 IDIOPATHIC CHRONIC GOUT OF RIGHT FOOT WITHOUT TOPHUS: ICD-10-CM

## 2024-04-10 DIAGNOSIS — R07.9 CHEST PAIN, UNSPECIFIED TYPE: Primary | ICD-10-CM

## 2024-04-10 DIAGNOSIS — I21.4 NSTEMI (NON-ST ELEVATED MYOCARDIAL INFARCTION): ICD-10-CM

## 2024-04-10 DIAGNOSIS — E66.01 MORBID OBESITY WITH BMI OF 45.0-49.9, ADULT: ICD-10-CM

## 2024-04-10 DIAGNOSIS — I49.9 CARDIAC ARRHYTHMIA, UNSPECIFIED CARDIAC ARRHYTHMIA TYPE: ICD-10-CM

## 2024-04-10 DIAGNOSIS — I25.10 CORONARY ARTERY DISEASE INVOLVING NATIVE CORONARY ARTERY OF NATIVE HEART WITHOUT ANGINA PECTORIS: ICD-10-CM

## 2024-04-10 PROBLEM — R79.89 ELEVATED TROPONIN: Status: RESOLVED | Noted: 2024-03-18 | Resolved: 2024-04-10

## 2024-04-10 PROCEDURE — 3044F HG A1C LEVEL LT 7.0%: CPT | Mod: CPTII,S$GLB,, | Performed by: FAMILY MEDICINE

## 2024-04-10 PROCEDURE — 99214 OFFICE O/P EST MOD 30 MIN: CPT | Mod: S$GLB,,, | Performed by: FAMILY MEDICINE

## 2024-04-10 PROCEDURE — 3074F SYST BP LT 130 MM HG: CPT | Mod: CPTII,S$GLB,, | Performed by: FAMILY MEDICINE

## 2024-04-10 PROCEDURE — 3078F DIAST BP <80 MM HG: CPT | Mod: CPTII,S$GLB,, | Performed by: FAMILY MEDICINE

## 2024-04-10 PROCEDURE — 1160F RVW MEDS BY RX/DR IN RCRD: CPT | Mod: CPTII,S$GLB,, | Performed by: FAMILY MEDICINE

## 2024-04-10 PROCEDURE — 99999 PR PBB SHADOW E&M-EST. PATIENT-LVL V: CPT | Mod: PBBFAC,,, | Performed by: FAMILY MEDICINE

## 2024-04-10 PROCEDURE — 1159F MED LIST DOCD IN RCRD: CPT | Mod: CPTII,S$GLB,, | Performed by: FAMILY MEDICINE

## 2024-04-10 PROCEDURE — 1111F DSCHRG MED/CURRENT MED MERGE: CPT | Mod: CPTII,S$GLB,, | Performed by: FAMILY MEDICINE

## 2024-04-10 PROCEDURE — 3008F BODY MASS INDEX DOCD: CPT | Mod: CPTII,S$GLB,, | Performed by: FAMILY MEDICINE

## 2024-04-10 PROCEDURE — 4010F ACE/ARB THERAPY RXD/TAKEN: CPT | Mod: CPTII,S$GLB,, | Performed by: FAMILY MEDICINE

## 2024-04-10 NOTE — TELEPHONE ENCOUNTER
----- Message from Kamini White sent at 4/10/2024 10:07 AM CDT -----  Contact: [phone] Patient  Pt is calling in regards to her appointment today 04/10/2024 at 11:30 AM. Pt is asking if it can be a virtual visit because of the weather please. Please call and advise. Thank you

## 2024-04-10 NOTE — PROGRESS NOTES
"Subjective:       Patient ID: Martha Shah is a 53 y.o. adult.    Chief Complaint: Hospital Follow Up    Hospital Course: Patient admitted for NSTEMI. Initially with elevated troponin up to 0.128 and has since trended down. Cardiology consulted. Patient had angiogram 01/2024 with mild to moderate non obstructive CAD. Concern for arrhythmia, SVT, causing pain with demand ischemia. Repeat echo with no new acute findings. Patient will have outpatient event monitor to be set up by cardiology with referral for electrophysiology. She will continue current medication regimen. Patient's pain has resolved and she reports feeling much better. Patient deemed medically stable for discharge on 03/19/2024. Return precautions advised. Patient in agreement with discharge plan. She will follow up with PCP and cardiology.     Since discharge, she has had continued episodes of nonexertional chest pain relieved by nitroglycerin.  All of these have typically been associated with episodes of palpitations and arrhythmias catch it on her fit bit.  These a peer to be episodes of possible atrial fibrillation and SVT.  She is in the process of being scheduled for a 30 day event monitor, and has follow-up scheduled with Interventional Cardiology and electrophysiology.  Blood pressure has been running low on home monitoring, and she has been getting dizzy when she bends over.  No syncope or near-syncope, however.      Review of Systems   Constitutional:  Positive for fatigue.   Cardiovascular:  Positive for chest pain and palpitations.   Neurological:  Positive for dizziness. Negative for syncope.       Objective:      Vitals:    04/10/24 1137 04/10/24 1159   BP: 118/72 97/60   BP Location: Right arm    Patient Position: Sitting    BP Method: Medium (Manual)    Pulse: 61    Resp: 18    Temp: 97.3 °F (36.3 °C)    TempSrc: Temporal    SpO2: 100%    Weight: 132.5 kg (292 lb)    Height: 5' 5" (1.651 m)      BP Readings from Last 5 Encounters: "   04/10/24 97/60   03/19/24 (!) 105/54   03/05/24 (!) 164/86   02/29/24 (!) 184/87   02/06/24 134/82     Wt Readings from Last 5 Encounters:   04/10/24 132.5 kg (292 lb)   03/19/24 130.2 kg (287 lb 0.6 oz)   03/05/24 131.6 kg (290 lb 2 oz)   02/29/24 134.7 kg (296 lb 15.4 oz)   02/06/24 132.9 kg (292 lb 15.9 oz)     Physical Exam  Vitals and nursing note reviewed.   Constitutional:       General: She is not in acute distress.     Appearance: Normal appearance. She is well-developed.   HENT:      Head: Normocephalic and atraumatic.   Cardiovascular:      Rate and Rhythm: Normal rate and regular rhythm.      Heart sounds: Normal heart sounds.   Pulmonary:      Effort: Pulmonary effort is normal.      Breath sounds: Normal breath sounds.   Musculoskeletal:      Right lower leg: No edema.      Left lower leg: No edema.   Skin:     General: Skin is warm and dry.   Neurological:      Mental Status: She is alert and oriented to person, place, and time.   Psychiatric:         Mood and Affect: Mood normal.         Behavior: Behavior normal.         Lab Results   Component Value Date    WBC 9.13 03/19/2024    HGB 13.9 (L) 03/19/2024    HCT 43.1 03/19/2024     03/19/2024    CHOL 107 (L) 03/05/2024    TRIG 140 03/05/2024    HDL 28 (L) 03/05/2024    ALT 30 03/19/2024    AST 26 03/19/2024     03/19/2024    K 4.1 03/19/2024     03/19/2024    CREATININE 1.1 03/19/2024    BUN 14 03/19/2024    CO2 23 03/19/2024    TSH 3.642 01/12/2024    INR 1.1 01/12/2024    HGBA1C 5.4 01/11/2024    Cardiac MRI:  Conclusion:    LV volumes are normal. LV mass is normal.LVEF = 55%.   RV volumes are normal. RVEF = 47%.  Normal  T1 and T2 maps  No LGE appreciated in the left ventricular myocardium  2mm thick pericardium with normal slippage, no pericardial effusion, and no LGE  Assessment:       1. Chest pain, unspecified type    2. NSTEMI (non-ST elevated myocardial infarction)    3. Cardiac arrhythmia, unspecified cardiac arrhythmia  type    4. Coronary artery disease involving native coronary artery of native heart without angina pectoris    5. Morbid obesity with BMI of 45.0-49.9, adult    6. Idiopathic chronic gout of right foot without tophus        Plan:       Chest pain, unspecified type  Advised to go to the emergency room for any chest pain not relieved by nitroglycerin  NSTEMI (non-ST elevated myocardial infarction)  Medical management  Cardiac arrhythmia, unspecified cardiac arrhythmia type  Workup ongoing, follow-up with EP as scheduled  Coronary artery disease involving native coronary artery of native heart without angina pectoris  Hold HCTZ for now in light of relative hypotension and dizziness.  Will message for update in 2 weeks  Morbid obesity with BMI of 45.0-49.9, adult  Lifestyle modification.  Would benefit greatly from weight loss.  Would benefit from Wegovy, if this can get approved  Idiopathic chronic gout of right foot without tophus  Should benefit from stopping thiazide diuretic    Medication List with Changes/Refills   Current Medications    ASPIRIN (ECOTRIN) 81 MG EC TABLET    Take 1 tablet (81 mg total) by mouth once daily.    CETIRIZINE (ZYRTEC) 10 MG TABLET    Take 10 mg by mouth once daily.    DICLOFENAC SODIUM (VOLTAREN) 1 % GEL    Apply 2 g topically 4 (four) times daily as needed.    METOPROLOL TARTRATE (LOPRESSOR) 25 MG TABLET    Take 1 tablet (25 mg total) by mouth 2 (two) times daily.    MULTIVITAMIN (ONE DAILY MULTIVITAMIN) PER TABLET    Take 1 tablet by mouth once daily.    NITROGLYCERIN (NITROSTAT) 0.4 MG SL TABLET    Place 1 tablet (0.4 mg total) under the tongue every 5 (five) minutes as needed for Chest pain. Call the 911 after the 3rd dose.    OLMESARTAN (BENICAR) 20 MG TABLET    Take 1 tablet (20 mg total) by mouth once daily.    OMEGA 3-DHA-EPA-FISH OIL 1,000 MG (120 MG-180 MG) CAP        PANTOPRAZOLE (PROTONIX) 40 MG TABLET    Take 1 tablet (40 mg total) by mouth 2 (two) times daily.     ROSUVASTATIN (CRESTOR) 40 MG TAB    Take 1 tablet (40 mg total) by mouth every evening.    SEMAGLUTIDE, WEIGHT LOSS, (WEGOVY) 0.5 MG/0.5 ML PNIJ    Inject 0.5 mg into the skin every 7 days. for 4 doses    SEMAGLUTIDE, WEIGHT LOSS, (WEGOVY) 1 MG/0.5 ML PNIJ    Inject 1 mg into the skin every 7 days. for 4 doses    SEMAGLUTIDE, WEIGHT LOSS, (WEGOVY) 1.7 MG/0.75 ML PNIJ    Inject 1.7 mg into the skin every 7 days. for 4 doses    SEMAGLUTIDE, WEIGHT LOSS, (WEGOVY) 2.4 MG/0.75 ML PNIJ    Inject 2.4 mg into the skin every 7 days.   Discontinued Medications    HYDROCHLOROTHIAZIDE (HYDRODIURIL) 25 MG TABLET    Take 1 tablet (25 mg total) by mouth once daily.            128

## 2024-04-15 ENCOUNTER — PATIENT MESSAGE (OUTPATIENT)
Dept: PRIMARY CARE CLINIC | Facility: CLINIC | Age: 54
End: 2024-04-15
Payer: COMMERCIAL

## 2024-04-15 DIAGNOSIS — I63.9 CEREBROVASCULAR ACCIDENT (CVA), UNSPECIFIED MECHANISM: ICD-10-CM

## 2024-04-15 RX ORDER — ASPIRIN 81 MG/1
81 TABLET ORAL DAILY
Qty: 90 TABLET | Refills: 4 | Status: SHIPPED | OUTPATIENT
Start: 2024-04-15

## 2024-04-16 DIAGNOSIS — M10.00 ACUTE IDIOPATHIC GOUT, UNSPECIFIED SITE: Primary | ICD-10-CM

## 2024-04-16 RX ORDER — INDOMETHACIN 50 MG/1
50 CAPSULE ORAL 3 TIMES DAILY
Qty: 15 CAPSULE | Refills: 3 | Status: SHIPPED | OUTPATIENT
Start: 2024-04-16

## 2024-04-17 ENCOUNTER — TELEPHONE (OUTPATIENT)
Dept: PRIMARY CARE CLINIC | Facility: CLINIC | Age: 54
End: 2024-04-17
Payer: COMMERCIAL

## 2024-04-17 NOTE — TELEPHONE ENCOUNTER
----- Message from Sona García sent at 4/17/2024  9:44 AM CDT -----  Contact: [employer]/  [employer] rep called to inform that she will be faxing prior authorization (WEGOVY), please review and return it back. Thank you.

## 2024-04-22 ENCOUNTER — PATIENT MESSAGE (OUTPATIENT)
Dept: PRIMARY CARE CLINIC | Facility: CLINIC | Age: 54
End: 2024-04-22
Payer: COMMERCIAL

## 2024-04-23 ENCOUNTER — TELEPHONE (OUTPATIENT)
Dept: PRIMARY CARE CLINIC | Facility: CLINIC | Age: 54
End: 2024-04-23
Payer: COMMERCIAL

## 2024-04-23 NOTE — TELEPHONE ENCOUNTER
----- Message from Madyosn Ashley sent at 4/23/2024  3:24 PM CDT -----  Contact: Ruth with [employer] phone [phone] Ref# [id number]  Calling to speak with the nurse regarding prior authorization for Rx semaglutide, weight loss, (WEGOVY) 0.25 mg/0.5 mL PnIj. Please fax prior authorization form to [phone]. Thanks.

## 2024-04-24 VITALS — DIASTOLIC BLOOD PRESSURE: 59 MMHG | SYSTOLIC BLOOD PRESSURE: 113 MMHG

## 2024-05-18 DIAGNOSIS — I10 PRIMARY HYPERTENSION: ICD-10-CM

## 2024-05-18 NOTE — TELEPHONE ENCOUNTER
Care Due:                  Date            Visit Type   Department     Provider  --------------------------------------------------------------------------------                                EP -                              PRIMARY      [facility]  Last Visit: 04-      CARE ([facility])   PRIMARY CARE   Estrada Partida                              ESTABLISHED                              PATIENT -    [facility]  Next Visit: 05-      [facility]      PRIMARY CARE   Estrada Partida                                                            Last  Test          Frequency    Reason                     Performed    Due Date  --------------------------------------------------------------------------------    HBA1C.......  6 months...  semaglutide,.............  01-   07-    [facility] Embedded Care Due Messages. Reference number: [id number].   5/18/2024 10:29:22 AM CDT

## 2024-05-20 RX ORDER — OLMESARTAN MEDOXOMIL 20 MG/1
20 TABLET ORAL
Qty: 90 TABLET | Refills: 1 | Status: SHIPPED | OUTPATIENT
Start: 2024-05-20

## 2024-05-20 NOTE — TELEPHONE ENCOUNTER
Refill Routing Note   Refill Routing Note   Medication(s) are not appropriate for processing by [facility] for the following reason(s):        New or recently adjusted medication    [facility] action(s):  Defer   Requires labs : Yes      Medication Therapy Plan: A1C      Appointments  past 12m or future 3m with PCP    Date Provider   Last Visit   4/10/2024 Estrada Partida MD   Next Visit   5/22/2024 Estrada Partida MD   ED visits in past 90 days: 0        Note composed:10:29 PM 05/19/2024

## 2024-05-22 ENCOUNTER — OFFICE VISIT (OUTPATIENT)
Dept: PRIMARY CARE CLINIC | Facility: CLINIC | Age: 54
End: 2024-05-22
Payer: COMMERCIAL

## 2024-05-22 ENCOUNTER — PATIENT MESSAGE (OUTPATIENT)
Dept: PRIMARY CARE CLINIC | Facility: CLINIC | Age: 54
End: 2024-05-22

## 2024-05-22 VITALS — WEIGHT: 287 LBS | BODY MASS INDEX: 47.76 KG/M2

## 2024-05-22 DIAGNOSIS — R73.09 ELEVATED GLUCOSE: Primary | ICD-10-CM

## 2024-05-22 DIAGNOSIS — E66.01 MORBID OBESITY WITH BMI OF 45.0-49.9, ADULT: ICD-10-CM

## 2024-05-22 PROCEDURE — 1160F RVW MEDS BY RX/DR IN RCRD: CPT | Mod: CPTII,95,, | Performed by: FAMILY MEDICINE

## 2024-05-22 PROCEDURE — 99214 OFFICE O/P EST MOD 30 MIN: CPT | Mod: 95,,, | Performed by: FAMILY MEDICINE

## 2024-05-22 PROCEDURE — 3044F HG A1C LEVEL LT 7.0%: CPT | Mod: CPTII,95,, | Performed by: FAMILY MEDICINE

## 2024-05-22 PROCEDURE — 4010F ACE/ARB THERAPY RXD/TAKEN: CPT | Mod: CPTII,95,, | Performed by: FAMILY MEDICINE

## 2024-05-22 PROCEDURE — 1159F MED LIST DOCD IN RCRD: CPT | Mod: CPTII,95,, | Performed by: FAMILY MEDICINE

## 2024-05-22 PROCEDURE — 3008F BODY MASS INDEX DOCD: CPT | Mod: CPTII,95,, | Performed by: FAMILY MEDICINE

## 2024-05-22 RX ORDER — METFORMIN HYDROCHLORIDE 500 MG/1
500 TABLET ORAL 2 TIMES DAILY WITH MEALS
Qty: 180 TABLET | Refills: 1 | Status: SHIPPED | OUTPATIENT
Start: 2024-05-22

## 2024-05-22 NOTE — PROGRESS NOTES
Subjective:       Patient ID: Martha Shah is a 53 y.o. adult.    Chief Complaint: No chief complaint on file.       Has struggled with weight for many years.  Weighed 343 at her heaviest, got down to 255 a few years ago on [employer], currently weighs 287 lb.  Recently completed 30 day event monitor.  Had some episodes of nonexertional chest pain, but no worrisome findings on event monitor.  Wegovy was denied by her insurance.  Has been trying to avoid eating wheat and dairy.      The patient location is: [place]  The chief complaint leading to consultation is: weight management    Visit type: audiovisual    Face to Face time with patient: 18 minutes  25 minutes of total time spent on the encounter, which includes face to face time and non-face to face time preparing to see the patient (eg, review of tests), Obtaining and/or reviewing separately obtained history, Documenting clinical information in the electronic or other health record, Independently interpreting results (not separately reported) and communicating results to the patient/family/caregiver, or Care coordination (not separately reported).         Each patient to whom he or she provides medical services by telemedicine is:  (1) informed of the relationship between the physician and patient and the respective role of any other health care provider with respect to management of the patient; and (2) notified that he or she may decline to receive medical services by telemedicine and may withdraw from such care at any time.    Notes:    Review of Systems   Constitutional:  Negative for unexpected weight change.   Respiratory:  Negative for shortness of breath.    Cardiovascular:  Positive for chest pain.   Psychiatric/Behavioral:  Negative for agitation and confusion.        Objective:      Vitals:    05/22/24 1612   Weight: 130.2 kg (287 lb)     Physical Exam  Constitutional:       General: She is not in acute distress.     Appearance: Normal appearance. She is  well-developed.   Pulmonary:      Effort: No respiratory distress.   Neurological:      Mental Status: She is alert and oriented to person, place, and time.   Psychiatric:         Behavior: Behavior normal.         Lab Results   Component Value Date    WBC 9.13 03/19/2024    HGB 13.9 (L) 03/19/2024    HCT 43.1 03/19/2024     03/19/2024    CHOL 107 (L) 03/05/2024    TRIG 140 03/05/2024    HDL 28 (L) 03/05/2024    ALT 30 03/19/2024    AST 26 03/19/2024     03/19/2024    K 4.1 03/19/2024     03/19/2024    CREATININE 1.1 03/19/2024    BUN 14 03/19/2024    CO2 23 03/19/2024    TSH 3.642 01/12/2024    INR 1.1 01/12/2024    HGBA1C 5.4 01/11/2024      Assessment:       1. Elevated glucose    2. Morbid obesity with BMI of 45.0-49.9, adult        Plan:       Elevated glucose  -     metFORMIN (GLUCOPHAGE) 500 MG tablet; Take 1 tablet (500 mg total) by mouth 2 (two) times daily with meals.  Dispense: 180 tablet; Refill: 1    Morbid obesity with BMI of 45.0-49.9, adult  -     metFORMIN (GLUCOPHAGE) 500 MG tablet; Take 1 tablet (500 mg total) by mouth 2 (two) times daily with meals.  Dispense: 180 tablet; Refill: 1    Trial of low-dose metformin.  Stressed importance of reducing carbohydrate intake to facilitate further weight loss.  If metformin is ineffective or not tolerated, could consider prescription for compounded semaglutide    Medication List with Changes/Refills   New Medications    METFORMIN (GLUCOPHAGE) 500 MG TABLET    Take 1 tablet (500 mg total) by mouth 2 (two) times daily with meals.   Current Medications    ASPIRIN (ECOTRIN) 81 MG EC TABLET    Take 1 tablet (81 mg total) by mouth once daily.    CETIRIZINE (ZYRTEC) 10 MG TABLET    Take 10 mg by mouth once daily.    DICLOFENAC SODIUM (VOLTAREN) 1 % GEL    Apply 2 g topically 4 (four) times daily as needed.    INDOMETHACIN (INDOCIN) 50 MG CAPSULE    Take 1 capsule (50 mg total) by mouth 3 (three) times daily.    METOPROLOL TARTRATE (LOPRESSOR) 25  MG TABLET    Take 1 tablet (25 mg total) by mouth 2 (two) times daily.    MULTIVITAMIN (ONE DAILY MULTIVITAMIN) PER TABLET    Take 1 tablet by mouth once daily.    NITROGLYCERIN (NITROSTAT) 0.4 MG SL TABLET    Place 1 tablet (0.4 mg total) under the tongue every 5 (five) minutes as needed for Chest pain. Call the 911 after the 3rd dose.    OLMESARTAN (BENICAR) 20 MG TABLET    TAKE 1 TABLET BY MOUTH EVERY DAY    OMEGA 3-DHA-EPA-FISH OIL 1,000 MG (120 MG-180 MG) CAP        PANTOPRAZOLE (PROTONIX) 40 MG TABLET    Take 1 tablet (40 mg total) by mouth 2 (two) times daily.    ROSUVASTATIN (CRESTOR) 40 MG TAB    Take 1 tablet (40 mg total) by mouth every evening.   Discontinued Medications    SEMAGLUTIDE, WEIGHT LOSS, (WEGOVY) 1 MG/0.5 ML PNIJ    Inject 1 mg into the skin every 7 days. for 4 doses    SEMAGLUTIDE, WEIGHT LOSS, (WEGOVY) 1.7 MG/0.75 ML PNIJ    Inject 1.7 mg into the skin every 7 days. for 4 doses    SEMAGLUTIDE, WEIGHT LOSS, (WEGOVY) 2.4 MG/0.75 ML PNIJ    Inject 2.4 mg into the skin every 7 days.

## 2024-05-24 ENCOUNTER — PATIENT MESSAGE (OUTPATIENT)
Dept: CARDIOLOGY | Facility: CLINIC | Age: 54
End: 2024-05-24
Payer: COMMERCIAL

## 2024-05-27 NOTE — PROGRESS NOTES
Subjective   Patient ID:  Martha Shah is a 53 y.o. adult who presents for evaluation of SVT      53 yoF here for arrhythmia management. She has had 2 episodes in 2024 (January and March) leading to ER visits and hospitalizations for NSTEMI. Before the March event, there was an elevated HR recorded on her FitBit. Normal EF. No LGE on cardiac MRI. Event monitor did not reveal any arrhythmias. She was on propranolol leading to the January event. She was switched to metoprolol 25 mg bid. Her symptoms have since subsided.     Review of Fitbit shows tachycardia that lasts 10-15s with change in QRS. Morphology on the single lead shows rapid intrinsicoid deflection. Later strips show the same morphology as single ectopic beats. Event monitor has PVCs with rapid intrinsicoid deflection.      LHC 3/24:  ·  Mild-to-moderate nonobstructive coronary artery disease.  ·  Ectasia noted involving the right coronary artery and distal left main.    Echo 3/24:  ·  Left Ventricle: The left ventricle is normal in size. Mildly to moderately increased wall thickness. Unable to assess wall motion. There is normal systolic function with a visually estimated ejection fraction of 55 - 60%. There is normal diastolic function.  ·  Right Ventricle: Normal right ventricular cavity size. Systolic function is normal.  ·  IVC/SVC: Intermediate venous pressure at 8 mmHg.     MRI 4/24:  Conclusion:    1. LV volumes are normal. LV mass is normal.LVEF = 55%.   2. RV volumes are normal. RVEF = 47%.  3. Normal  T1 and T2 maps  4. No LGE appreciated in the left ventricular myocardium  5. 2mm thick pericardium with normal slippage, no pericardial effusion, and no LGE    Past Medical History:  No date: Allergy  No date: Hypertension  01/07/2024: NSTEMI (non-ST elevated myocardial infarction)  No date: Sleep apnea, unspecified  No date: Urinary tract infection    Past Surgical History:  No date: ADENOIDECTOMY  No date: ADENOIDECTOMY  No date: BIOPSY OF  ADENOIDS  09/14/2022: COLONOSCOPY; N/A      Comment:  Procedure: COLONOSCOPY;  Surgeon: Jordy Simons MD;                 Location: [facility] [place] ENDO;  Service: Endoscopy;  Laterality:                N/A;  1/8/2024: CORONARY ANGIOGRAPHY; Right      Comment:  Procedure: ANGIOGRAM, CORONARY ARTERY;  Surgeon: Estrada Ojeda MD;  Location: [facility] [place] CATH LAB;  Service:                Cardiology;  Laterality: Right;  11/30/2023: EGD, WITH CLOSED BIOPSY  09/14/2022: ESOPHAGOGASTRODUODENOSCOPY; N/A      Comment:  Procedure: EGD (ESOPHAGOGASTRODUODENOSCOPY);  Surgeon:                Jordy Simons MD;  Location: [facility] [place] ENDO;  Service:                Endoscopy;  Laterality: N/A;  12/19/2022: ESOPHAGOGASTRODUODENOSCOPY      Comment:  with biopsy  12/19/2022: ESOPHAGOGASTRODUODENOSCOPY; N/A      Comment:  Procedure: EGD (ESOPHAGOGASTRODUODENOSCOPY);  Surgeon:                Jordy Simons MD;  Location: [facility];  Service:                Endoscopy;  Laterality: N/A;  11/30/2023: ESOPHAGOGASTRODUODENOSCOPY; N/A      Comment:  Procedure: EGD (ESOPHAGOGASTRODUODENOSCOPY);  Surgeon:                Vinay Bourgeois MD;  Location: [facility];                 Service: Endoscopy;  Laterality: N/A;  No date: TONSILLECTOMY    Social History    Socioeconomic History      Marital status:     Tobacco Use      Smoking status: Never        Passive exposure: Never      Smokeless tobacco: Never    Substance and Sexual Activity      Alcohol use: Not Currently        Comment: social      Drug use: No      Sexual activity: Yes        Partners: Female    Social Determinants of Health  Financial Resource Strain: Low Risk  (3/19/2024)      Overall Financial Resource Strain (CARDIA)          Difficulty of Paying Living Expenses: Not hard at all  Food Insecurity: No Food Insecurity (1/22/2024)      Hunger Vital Sign          Worried About Running Out of Food in the Last Year: Never true          Ran Out of Food in the Last  Year: Never true  Transportation Needs: No Transportation Needs (3/19/2024)      PRAPARE - Transportation          Lack of Transportation (Medical): No          Lack of Transportation (Non-Medical): No  Physical Activity: Inactive (1/22/2024)      Exercise Vital Sign          Days of Exercise per Week: 0 days          Minutes of Exercise per Session: 0 min  Stress: Stress Concern Present (3/19/2024)      [employer] - Occupational Stress Questionnaire          Feeling of Stress : To some extent  Housing Stability: Low Risk  (3/19/2024)      Housing Stability Vital Sign          Unable to Pay for Housing in the Last Year: No          Number of Places Lived in the Last Year: 1          Unstable Housing in the Last Year: No    Review of patient's family history indicates:  Problem: Lupus      Relation: Mother          Name:               Age of Onset: (Not Specified)  Problem: Heart disease      Relation: Father          Name:               Age of Onset: (Not Specified)  Problem: Prostate cancer      Relation: Neg Hx          Name:               Age of Onset: (Not Specified)  Problem: Kidney disease      Relation: Neg Hx          Name:               Age of Onset: (Not Specified)      Current Outpatient Medications:  aspirin (ECOTRIN) 81 MG EC tablet, Take 1 tablet (81 mg total) by mouth once daily., Disp: 90 tablet, Rfl: 4  cetirizine (ZYRTEC) 10 MG tablet, Take 10 mg by mouth once daily., Disp: , Rfl:   diclofenac sodium (VOLTAREN) 1 % Gel, Apply 2 g topically 4 (four) times daily as needed., Disp: 350 g, Rfl: 2  indomethacin (INDOCIN) 50 MG capsule, Take 1 capsule (50 mg total) by mouth 3 (three) times daily., Disp: 15 capsule, Rfl: 3  metFORMIN (GLUCOPHAGE) 500 MG tablet, Take 1 tablet (500 mg total) by mouth 2 (two) times daily with meals., Disp: 180 tablet, Rfl: 1  metoprolol tartrate (LOPRESSOR) 25 MG tablet, Take 1 tablet (25 mg total) by mouth 2 (two) times daily., Disp: 60 tablet,  Rfl: 11  multivitamin (ONE DAILY MULTIVITAMIN) per tablet, Take 1 tablet by mouth once daily., Disp: , Rfl:   nitroGLYCERIN (NITROSTAT) 0.4 MG SL tablet, Place 1 tablet (0.4 mg total) under the tongue every 5 (five) minutes as needed for Chest pain. Call the 911 after the 3rd dose., Disp: 25 tablet, Rfl: 3  olmesartan (BENICAR) 20 MG tablet, TAKE 1 TABLET BY MOUTH EVERY DAY, Disp: 90 tablet, Rfl: 1  omega 3-dha-epa-fish oil 1,000 mg (120 mg-180 mg) Cap, , Disp: , Rfl:   pantoprazole (PROTONIX) 40 MG tablet, Take 1 tablet (40 mg total) by mouth 2 (two) times daily., Disp: 180 tablet, Rfl: 1  rosuvastatin (CRESTOR) 40 MG Tab, Take 1 tablet (40 mg total) by mouth every evening., Disp: 90 tablet, Rfl: 3    No current facility-administered medications for this visit.        Review of Systems   Constitutional: Negative.   HENT: Negative.     Eyes: Negative.    Cardiovascular:  Negative for chest pain, dyspnea on exertion, leg swelling, near-syncope, palpitations and syncope.   Respiratory: Negative.  Negative for shortness of breath.    Endocrine: Negative.    Hematologic/Lymphatic: Negative.    Skin: Negative.    Musculoskeletal: Negative.    Gastrointestinal: Negative.    Genitourinary: Negative.    Neurological: Negative.  Negative for dizziness and light-headedness.   Psychiatric/Behavioral: Negative.     Allergic/Immunologic: Negative.           Objective     Physical Exam  Vitals reviewed.   Constitutional:       General: She is not in acute distress.     Appearance: She is well-developed.   HENT:      Head: Normocephalic and atraumatic.   Eyes:      Pupils: Pupils are equal, round, and reactive to light.   Neck:      Thyroid: No thyromegaly.      Vascular: No JVD.   Cardiovascular:      Rate and Rhythm: Normal rate and regular rhythm.      Chest Wall: PMI is not displaced.      Heart sounds: Normal heart sounds, S1 normal and S2 normal. No murmur heard.     No friction rub. No gallop.   Pulmonary:      Effort:  Pulmonary effort is normal. No respiratory distress.      Breath sounds: Normal breath sounds. No wheezing or rales.   Abdominal:      General: Bowel sounds are normal. There is no distension.      Palpations: Abdomen is soft.      Tenderness: There is no abdominal tenderness. There is no guarding or rebound.   Musculoskeletal:         General: No tenderness. Normal range of motion.      Cervical back: Normal range of motion.   Skin:     General: Skin is warm and dry.      Findings: No erythema or rash.   Neurological:      Mental Status: She is alert and oriented to person, place, and time.      Cranial Nerves: No cranial nerve deficit.   Psychiatric:         Behavior: Behavior normal.         Thought Content: Thought content normal.         Judgment: Judgment normal.       ECG: NSR nl NV, QRS, QTC       Assessment and Plan     1. Tachycardia    2. PSVT (paroxysmal supraventricular tachycardia)        Plan:  53 yoF here for arrhythmia management. I am not convinced that the arrhythmias recorded on her watch are consistent with SVT. I would favor NSVT. I am not convinced that the duration of the events that I saw would lead to troponin elevation. She has since had normal LHC and MRI. Event monitor with rare PVCs and PACs, no sustained arrhythmia. Her symptoms have subsided. At this time, I would keep her on metoprolol 25 mg bid and reassess in 6m. If further arrhythmia events, I would offer escalation of metoprolol, addition of AAD versus EPS/RFA. RTC 6m

## 2024-05-28 ENCOUNTER — HOSPITAL ENCOUNTER (OUTPATIENT)
Dept: CARDIOLOGY | Facility: CLINIC | Age: 54
Discharge: HOME | End: 2024-05-28
Payer: COMMERCIAL

## 2024-05-28 ENCOUNTER — OFFICE VISIT (OUTPATIENT)
Dept: ELECTROPHYSIOLOGY | Facility: CLINIC | Age: 54
End: 2024-05-28
Payer: COMMERCIAL

## 2024-05-28 VITALS
BODY MASS INDEX: 48.38 KG/M2 | DIASTOLIC BLOOD PRESSURE: 82 MMHG | WEIGHT: 290.38 LBS | SYSTOLIC BLOOD PRESSURE: 118 MMHG | HEIGHT: 65 IN | HEART RATE: 69 BPM

## 2024-05-28 DIAGNOSIS — I47.10 PSVT (PAROXYSMAL SUPRAVENTRICULAR TACHYCARDIA): ICD-10-CM

## 2024-05-28 DIAGNOSIS — R00.0 TACHYCARDIA: Primary | ICD-10-CM

## 2024-05-28 DIAGNOSIS — I49.9 CARDIAC ARRHYTHMIA, UNSPECIFIED CARDIAC ARRHYTHMIA TYPE: ICD-10-CM

## 2024-05-28 LAB
OHS QRS DURATION: 90 MS
OHS QTC CALCULATION: 407 MS

## 2024-05-28 PROCEDURE — 3008F BODY MASS INDEX DOCD: CPT | Mod: CPTII,S$GLB,, | Performed by: INTERNAL MEDICINE

## 2024-05-28 PROCEDURE — 99999 PR PBB SHADOW E&M-EST. PATIENT-LVL IV: CPT | Mod: PBBFAC,,, | Performed by: INTERNAL MEDICINE

## 2024-05-28 PROCEDURE — 93005 ELECTROCARDIOGRAM TRACING: CPT | Mod: S$GLB,,, | Performed by: INTERNAL MEDICINE

## 2024-05-28 PROCEDURE — 99205 OFFICE O/P NEW HI 60 MIN: CPT | Mod: S$GLB,,, | Performed by: INTERNAL MEDICINE

## 2024-05-28 PROCEDURE — 3079F DIAST BP 80-89 MM HG: CPT | Mod: CPTII,S$GLB,, | Performed by: INTERNAL MEDICINE

## 2024-05-28 PROCEDURE — 3044F HG A1C LEVEL LT 7.0%: CPT | Mod: CPTII,S$GLB,, | Performed by: INTERNAL MEDICINE

## 2024-05-28 PROCEDURE — 93010 ELECTROCARDIOGRAM REPORT: CPT | Mod: S$GLB,,, | Performed by: INTERNAL MEDICINE

## 2024-05-28 PROCEDURE — 1160F RVW MEDS BY RX/DR IN RCRD: CPT | Mod: CPTII,S$GLB,, | Performed by: INTERNAL MEDICINE

## 2024-05-28 PROCEDURE — 3074F SYST BP LT 130 MM HG: CPT | Mod: CPTII,S$GLB,, | Performed by: INTERNAL MEDICINE

## 2024-05-28 PROCEDURE — 4010F ACE/ARB THERAPY RXD/TAKEN: CPT | Mod: CPTII,S$GLB,, | Performed by: INTERNAL MEDICINE

## 2024-05-28 PROCEDURE — 1159F MED LIST DOCD IN RCRD: CPT | Mod: CPTII,S$GLB,, | Performed by: INTERNAL MEDICINE

## 2024-05-28 NOTE — Clinical Note
I cannot say that her events on her watch were SVT. I would keep her on metoprolol for now. Will see if her symptoms return, thanks

## 2024-06-03 PROBLEM — I63.9 CEREBROVASCULAR ACCIDENT (CVA): Status: RESOLVED | Noted: 2024-01-12 | Resolved: 2024-06-03

## 2024-06-07 ENCOUNTER — PATIENT MESSAGE (OUTPATIENT)
Dept: ELECTROPHYSIOLOGY | Facility: CLINIC | Age: 54
End: 2024-06-07
Payer: COMMERCIAL

## 2024-06-13 ENCOUNTER — TELEPHONE (OUTPATIENT)
Dept: ELECTROPHYSIOLOGY | Facility: CLINIC | Age: 54
End: 2024-06-13
Payer: COMMERCIAL

## 2024-06-13 ENCOUNTER — TELEPHONE (OUTPATIENT)
Dept: CARDIOLOGY | Facility: CLINIC | Age: 54
End: 2024-06-13
Payer: COMMERCIAL

## 2024-06-13 NOTE — TELEPHONE ENCOUNTER
Called patient back. Dr. Griggs reviewed chart and Appl[facility] isaak tracings. Possible ST elevation in leads II, III, and AVF. Patient has had no further chest pain since 6/7/24. Patient instructed to get an appointment with her cardiologist Dr. Lynette JUNG and if she has any further chest pain to go to the ER. Patient verbalized understanding. Strips scanned into the media tab in her chart.

## 2024-06-13 NOTE — TELEPHONE ENCOUNTER
Spoke with patient, informed that I did review message and recommendation from Dr. Griggs.  I scheduled an appointment with provider for next available date and placed on wait list.   Patient verbalized understanding.

## 2024-06-13 NOTE — TELEPHONE ENCOUNTER
----- Message from Rosalinda Amaro sent at 6/13/2024  2:49 PM CDT -----  Regarding: Urgent Appt Request  Patient called in regards to being told by Electrophysiology to schedule an appt as soon as possible.     Please call back to further assist- [phone]

## 2024-06-28 ENCOUNTER — OFFICE VISIT (OUTPATIENT)
Dept: SLEEP MEDICINE | Facility: CLINIC | Age: 54
End: 2024-06-28
Payer: COMMERCIAL

## 2024-06-28 DIAGNOSIS — Z86.73 HISTORY OF CVA (CEREBROVASCULAR ACCIDENT): ICD-10-CM

## 2024-06-28 DIAGNOSIS — G47.33 OBSTRUCTIVE SLEEP APNEA ON CPAP: ICD-10-CM

## 2024-06-28 DIAGNOSIS — I10 HYPERTENSION, UNSPECIFIED TYPE: ICD-10-CM

## 2024-06-28 DIAGNOSIS — G47.33 OSA (OBSTRUCTIVE SLEEP APNEA): Primary | ICD-10-CM

## 2024-06-28 NOTE — PROGRESS NOTES
The patient location is: [place]  The chief complaint leading to consultation is: ALFREDO    Visit type: audiovisual    Face to Face time with patient: 20 minutes of total time spent on the encounter, which includes face to face time and non-face to face time preparing to see the patient (eg, review of tests), Obtaining and/or reviewing separately obtained history, Documenting clinical information in the electronic or other health record, Independently interpreting results (not separately reported) and communicating results to the patient/family/caregiver, or Care coordination (not separately reported).Each patient to whom he or she provides medical services by telemedicine is:  (1) informed of the relationship between the physician and patient and the respective role of any other health care provider with respect to management of the patient; and (2) notified that he or she may decline to receive medical services by telemedicine and may withdraw from such care at any time.      Been adherent with cpap since dx'd 2000. Using qhs DS2 machine 6-18cm but since stroke and MI she is not sleeping as well. Now avoiding L side sleep which she preferred and on her back most of the time or R side(harder to breath nasally) feels worse. Using FFM. Metformin impacting sleep also, may begin wgt loss med.     Remote 60davg 6:44h/n AHI 8.8, 90% tile 12.9cm  ESS=8    HST 2019 AHI 16 (severe on back, nil left side)  Dx previously 2000, had also repeat study [place] 2008        Assessment:  ALFREDO, adherent, having residual mild AHI despite using 6-18cm.   NSTEMI and CVA 1/2024      Plan:  Continue cpap 6-18cm,. CPAP titration study  [facility] DME supplies  See neuro and cards as advised/continue meds

## 2024-07-10 ENCOUNTER — TELEPHONE (OUTPATIENT)
Dept: SLEEP MEDICINE | Facility: OTHER | Age: 54
End: 2024-07-10
Payer: COMMERCIAL

## 2024-07-13 ENCOUNTER — HOSPITAL ENCOUNTER (OUTPATIENT)
Dept: SLEEP MEDICINE | Facility: OTHER | Age: 54
Discharge: HOME | End: 2024-07-13
Payer: COMMERCIAL

## 2024-07-13 DIAGNOSIS — G47.33 OSA (OBSTRUCTIVE SLEEP APNEA): ICD-10-CM

## 2024-07-13 PROCEDURE — 95811 POLYSOM 6/>YRS CPAP 4/> PARM: CPT

## 2024-07-14 NOTE — PROGRESS NOTES
Martha Shah to [facility] for an overnight sleep study on 07/13/2024.  Pt. education,setup and cpap explanation given prior to testing. Disposable leads and sensors used where applicable.  Titration study w/ pt's own Medium Quatro FFM.  Post test followup information given in AM.

## 2024-07-16 NOTE — PROCEDURES
"[facility]    Titration Interpretation Report    Patient Name:  Martha Shah  MRN#:  [id number]  :  1970  Study Date:  2024  Referring Provider:  TAMIKO Hyatt NP    The patient is a 53 year old Female who is 5' 5" and weighs 290.0 lbs.  Her BMI equals 48.3.  A full night PAP titration was performed.    Polysomnogram Data  A full night polysomnogram recorded the standard physiologic parameters including EEG, EOG, EMG, EKG, nasal and oral airflow.  Respiratory parameters of chest and abdominal movements were recorded with (RIP) Respiratory Inductance Plethsmography.  Oxygen saturation was recorded by pulse oximetry.    Titration Summary  The patient was titrated at pressures ranging from 6* cm/H20 with supplemental oxygen at - up to 15/11/0** cm/H20 with supplemental oxygen at -.  The last pressure used in the study was 12* cm/H20 with supplemental oxygen at -.    Sleep Architecture  The total recording time of the polysomnogram was 429.9 minutes.  The total sleep time was 237.0 minutes.  The patient spent 12.2% of total sleep time in Stage N1, 45.4% in Stage N2, 24.7% in Stages N3, and 17.7% in REM.  Sleep latency was 22.3 minutes.  REM latency was 125.5 minutes.  Sleep Efficiency was 55.1%.  Total wake time was 193.5 minutes for a total wake percentage of 41.4%.  Wake after Sleep Onset was 170.5 minutes.    Respiratory Summary  The polysomnogram revealed a presence of 33 obstructive, 34 central, and 1 mixed apneas resulting in Total Apnea index of 17.2 events per hour.  There were 41 hypopneas resulting in Total Hypopnea index of 10.4 events per hour.  The combined Apnea/Hypopnea index was 27.6 events per hour.  There were a total of - RERA events resulting in a Respiratory Disturbance Index (RDI) of 27.6 events per hour.     Mean oxygen saturation was 95.5%.  The lowest oxygen saturation during sleep was 81.0%.  Time spent ?88% oxygen saturation was 6.3 minutes (1.5%).    End Tidal " CO2 during sleep ranged from - to - mmHg. End Tidal CO2 was greater than 50 mmHg for - minutes and greater than 55 mmHg for - minutes.  Transcutaneous CO2 during sleep ranged from - to - mmHg. Transcutaneous CO2 was greater than 50 mmHg for - minutes and greater than 55 mmHg for - minutes.    Limb Movement Activity  There were - limb movements recorded.  Of this total, - were classified as PLMs.  Of the PLMs, - were associated with arousals.  The Limb Movement index was - per hour while the PLM index was - per hour and PLM with arousals index was - per hour.    Cardiac: single lead EKG revealed normal sinus rhythm     PAP titration:    Mask: pt's own Medium Quatro FFM     CPAP = 11 cwp was largely effective in lateral position in stage N2 and lateral REM sleep  CPAP = 14 cwp was largely effective in supine position in stage N2 sleep  The patient was then titrated on BiPAP due to residual AHI >5  at CPAP =15cwp.   However, the patient requested to be placed back on CPAP    The titration was somewhat limited by mask leak.    No effective pressure was seen in supine REM.    Oxygenation:  At therapeutic levels of PAP therapy, there was no baseline hypoxemia.    Impression:  -obstructive sleep apnea       Recommendations:  -initial auto-CPAP settings CPAP min = 11 cwp  and CPAP max =  20 cwp  -if the patient complains of sleep disruption, CPAP min should be adjusted to 15cwp or higher depending on pressure tolerance due to high pressure requirement in supine REM sleep  -consider mask-fitting due to the leaks seen in this study   -attempts at side sleeping would likely be beneficial  -the patient has follow up with Sleep Medicine        Jose Valverde MD    (This Sleep Study was interpreted by a Board Certified Sleep Specialist who conducted an epoch-by-epoch review of the entire raw data recording.)  (The indication for this sleep study was reviewed and deemed appropriate by AASM Practice Parameters or other reasons by a  "Board Certified Sleep Specialist.)    [facility]    Titration Report    Patient Name: Martha Shah Study Date: 7/13/2024   YOB: 1970 MRN #: [id number]   Age: 53 year RADHA #: [id number]   Sex: Female Referring Provider: TAMIKO Hyatt NP   Height: 5' 5" Recording Tech: Constance Fournet RRT RPSGT   Weight: 290.0 lbs Scoring Tech: Enoc Oneil RRT RPSGT   BMI: 48.3 Interpreting Physician: -   ESS: - Neck Circumference: -     Study Overview    Lights Off: 10:00:54 PM  Count Index   Lights On: 05:10:51 AM Awakenings: 28 7.1   Time in Bed: 429.9 min. Arousals: 112 28.4   Total Sleep Time: 237.0 min. Apneas & Hypopneas: 109 27.6    Sleep Efficiency: 55.1% Limb Movements: - -   Sleep Latency: 22.3 min. Snores: - -   Wake After Sleep Onset: 170.5 min. Desaturations: 93 23.5    REM Latency from Sleep Onset: 125.5 min. Minimum SpO2 TST: 81.0%      Sleep Architecture   % of Time in Bed  Stages Time (mins) % Sleep Time   Wake 193.5    Stage N1 29.0 12.2%   Stage N2 107.5 45.4%   Stage N3 58.5 24.7%   REM 42.0 17.7%         Arousal Summary     NREM REM Sleep Index   Respiratory Arousals 45 18 63 15.9   PLM Arousals - - - -   Isolated Limb Movement Arousals - - - -   Spontaneous Arousals 44 5 49 12.4   Total 89 23 112 28.4       Limb Movement Summary     Count Index   Isolated Limb Movements - -   Periodic Limb Movements (PLMs) - -   Total Limb Movements - -   Respiratory Summary     By Sleep Stage By Body Position Total    NREM REM Supine Non-Supine    Time (min) 195.0 42.0 195.5 41.5 237.0           Obstructive Apnea 4 29 33 - 33   Mixed Apnea 1 - - 1 1   Central Apnea 34 - 30 4 34   Central Apnea Index 10.5 - 11.1 7.3 10.5   Total Apneas 39 29 63 5 68   Total Apnea Index 12.0 41.4 19.3 7.2 17.2           Total Hypopnea 38 3 38 3 41   Total Hypopnea Index 11.7 4.3 11.7 4.3 10.4           Apnea & Hypopnea 77 32 101 8 109   Apnea & Hypopnea Index 23.7 45.7 31.0 11.6 27.6           RERAs - - - - - "   RERA Index - - - - -           RDI 23.7 45.7 31.0 11.6 27.6     Scoring Criteria: Hypopneas scored at 3% desaturation criteria.    Respiratory Event Durations     Apnea Hypopnea    NREM REM NREM REM   Average (seconds) 13.7 22.5 16.3 21.2   Maximum (seconds) 22.5 38.0 40.7 26.8       Oxygen Saturation Summary     Wake NREM REM TST Total   Average SpO2 96.5% 94.9% 94.2% 94.8% 95.5%   Minimum SpO2 85.0% 85.0% 81.0% 81.0% 81.0%   Maximum SpO2 100.0% 98.0% 98.0% 98.0% 100.0%     Oxygen Saturation Distribution    Range (%) Time in range (min) Time in range (%)    90.0 - 100.0 393.9 95.8%   80.0 - 90.0 12.6 3.1%   70.0 - 80.0 - -   60.0 - 70.0 - -   50.0 - 60.0 - -   0.0 - 50.0 - -   Time Spent ?88% SpO2    Range (%) Time in range (min) Time in range (%)   0.0 - 88.0 6.3 1.5%          Count Index   Desaturations 93 23.5      Cardiac Summary     Wake NREM REM Sleep Total   Average Pulse Rate (BPM) 55.6 57.5 59.3 57.8 56.9   Minimum Pulse Rate (BPM) 49.0 49.0 49.0 49.0 49.0   Maximum Pulse Rate (BPM) 88.0 72.0 77.0 77.0 88.0     Pulse Rate Distribution    Range (bpm) Time in range (min) Time in range (%)   0.0 - 40.0 - -   40.0 - 60.0 329.1 79.9%   60.0 - 80.0 82.1 19.9%   80.0 - 100.0 0.6 0.1%   100.0 - 120.0 - -   120.0 - 140.0 - -   140.0 - 200.0 - -     EtCO2 Summary    Stage Min (mmHg) Average (mmHg) Max (mmHg)   Wake - - -   NREM(1+2+3) - - -   REM - - -     Range (mmHg) Time in range (min) Time in range (%)   - - -   - - -   - - -   - - -   - - -     TcCO2 Summary    Stage Min (mmHg) Average (mmHg) Max (mmHg)   Wake - - -   NREM(1+2+3) - - -   REM - - -     Range (mmHg) Time in range (min) Time in range (%)   20.0 - 40.0 - -   40.0 - 50.0 - -   50.0 - 55.0 - -   55.0 - 100.0 - -   Excluded data <20.0 & >65.0 430.5 100.0%     Comments    -    Titration Summary    PAP Device PAP Level O2 Level Time (min) TST (min) NREM (min) REM (min) Wake (min) Sleep Eff% OA# CA# MA# Hyp# AHI RERA RDI Min SpO2 SpO2 ?88% (min) Ar.  Index   CPAP 6 - 26.5 2.5 2.5 0.0 24.0 9.4% - 2 - 2 96.0 - 96.0 93.0  0.0 72.0   CPAP 8 - 86.0 6.5 6.5 0.0 79.5 7.6% - 11 - 4 138.5 - 138.5 87.0  0.0 138.5   CPAP 9 - 25.5 15.0 15.0 0.0 10.5 58.8% 2 14 - 7 92.0 - 92.0 85.0  0.3 80.0   CPAP 10 - 20.0 19.0 19.0 0.0 1.0 95.0% - 3 1 4 25.3 - 25.3 86.0  0.7 34.7   BiLevel 10/6/0 - 32.0 0.0 0.0 0.0 32.0 0.0%             CPAP 11 - 62.5 59.5 54.0 5.5 3.0 95.2% - 3 - 6 9.1 - 9.1 88.0  0.1 11.1   CPAP 12 - 33.5 30.0 23.5 6.5 3.5 89.6% 3 - - 5 16.0 - 16.0 81.0  1.2 20.0   CPAP 13 - 11.5 11.5 0.0 11.5 0.0 100.0% 9 - - 1 52.2 - 52.2 84.0  1.5 41.7   BiLevel 13/9/0 - 17.5 0.0 0.0 0.0 17.5 0.0%             CPAP 14 - 28.5 17.0 14.0 3.0 11.5 59.6% 2 1 - 5 28.2 - 28.2 92.0  0.0 31.8   BiLevel 14/10/0 - 5.0 5.0 0.0 5.0 0.0 100.0% 6 - - - 72.0 - 72.0 84.0  0.3 36.0   CPAP 15 - 66.0 64.5 60.5 4.0 1.5 97.7% 7 - - 6 12.1 - 12.1 90.0  0.0 16.7   BiLevel 15/11/0 - 16.0 6.5 0.0 6.5 9.5 40.6% 4 - - 1 46.2 - 46.2 85.0  0.7 36.9

## 2024-07-31 ENCOUNTER — OFFICE VISIT (OUTPATIENT)
Dept: CARDIOLOGY | Facility: CLINIC | Age: 54
End: 2024-07-31
Payer: COMMERCIAL

## 2024-07-31 VITALS
HEART RATE: 63 BPM | OXYGEN SATURATION: 99 % | WEIGHT: 278.56 LBS | DIASTOLIC BLOOD PRESSURE: 70 MMHG | HEIGHT: 65 IN | BODY MASS INDEX: 46.41 KG/M2 | SYSTOLIC BLOOD PRESSURE: 110 MMHG

## 2024-07-31 DIAGNOSIS — I25.10 CORONARY ARTERY DISEASE INVOLVING NATIVE CORONARY ARTERY OF NATIVE HEART WITHOUT ANGINA PECTORIS: ICD-10-CM

## 2024-07-31 DIAGNOSIS — E78.5 HYPERLIPIDEMIA, UNSPECIFIED HYPERLIPIDEMIA TYPE: ICD-10-CM

## 2024-07-31 DIAGNOSIS — Z86.73 HISTORY OF CVA (CEREBROVASCULAR ACCIDENT): Primary | ICD-10-CM

## 2024-07-31 DIAGNOSIS — I47.29 NSVT (NONSUSTAINED VENTRICULAR TACHYCARDIA): ICD-10-CM

## 2024-07-31 DIAGNOSIS — I77.819 AORTIC DILATATION: ICD-10-CM

## 2024-07-31 DIAGNOSIS — I10 HYPERTENSION, UNSPECIFIED TYPE: ICD-10-CM

## 2024-07-31 PROCEDURE — 1159F MED LIST DOCD IN RCRD: CPT | Mod: CPTII,S$GLB,, | Performed by: INTERNAL MEDICINE

## 2024-07-31 PROCEDURE — 3078F DIAST BP <80 MM HG: CPT | Mod: CPTII,S$GLB,, | Performed by: INTERNAL MEDICINE

## 2024-07-31 PROCEDURE — 1160F RVW MEDS BY RX/DR IN RCRD: CPT | Mod: CPTII,S$GLB,, | Performed by: INTERNAL MEDICINE

## 2024-07-31 PROCEDURE — 4010F ACE/ARB THERAPY RXD/TAKEN: CPT | Mod: CPTII,S$GLB,, | Performed by: INTERNAL MEDICINE

## 2024-07-31 PROCEDURE — 3074F SYST BP LT 130 MM HG: CPT | Mod: CPTII,S$GLB,, | Performed by: INTERNAL MEDICINE

## 2024-07-31 PROCEDURE — 3044F HG A1C LEVEL LT 7.0%: CPT | Mod: CPTII,S$GLB,, | Performed by: INTERNAL MEDICINE

## 2024-07-31 PROCEDURE — 93005 ELECTROCARDIOGRAM TRACING: CPT | Mod: S$GLB,,, | Performed by: INTERNAL MEDICINE

## 2024-07-31 PROCEDURE — 93010 ELECTROCARDIOGRAM REPORT: CPT | Mod: S$GLB,,, | Performed by: INTERNAL MEDICINE

## 2024-07-31 PROCEDURE — 99999 PR PBB SHADOW E&M-EST. PATIENT-LVL IV: CPT | Mod: PBBFAC,,, | Performed by: INTERNAL MEDICINE

## 2024-07-31 PROCEDURE — 3008F BODY MASS INDEX DOCD: CPT | Mod: CPTII,S$GLB,, | Performed by: INTERNAL MEDICINE

## 2024-07-31 PROCEDURE — 99215 OFFICE O/P EST HI 40 MIN: CPT | Mod: S$GLB,,, | Performed by: INTERNAL MEDICINE

## 2024-07-31 NOTE — PROGRESS NOTES
[facility]  Cardiology Clinic Note      Chief Complaint  Chief Complaint   Patient presents with    Follow-up       HPI:      53-year-old female with past medical history morbid obesity, hepatic steatosis, GERD, eosinophilic esophagitis, ALFREDO, hypertension, CVA left corona radiata 1/2024, Holter 01/2024 short nonsustained atrial run possible nonconducted PAC then cardiac event monitor 05/2024 no arrhythmia occasional ectopy, coronary angiogram 01/2024 following elevated troponin mild-to-moderate nonobstructive coronary artery disease some ectasia noted, cardiac MRI 04/2024 normal LVEF and RV EF no LGE normal pericardium echo 03/2024 normal EF normal diastolic function normal RV normal CVP prior echo 01/2024 normal EF mild-to-moderate LVH normal diastolic function left atrium mildly dilated likely normal RV CVP 8 ascending aorta 3.6 subsequent bubble study performed no evidence for intracardiac shunt     Admission 01/2024 for NSTEMI no culprit could be found on angiogram  Subsequent admission for CVA placed on dual antiplatelet therapy for 21 days  Ordered cardiac MRI following above event  Again presented with elevated troponin found to have arrhythmia on fit bit presumed arrhythmia may have caused demand ischemia of the time  Could not tolerate Lipitor changed to Pravachol  Seen by Dr. [facility] electrophysiology thought that the event on her fit bit was NSVT and was not likely due have led to troponin elevation  There were further abnormal fitbit tracings 06/06/2024 uploaded to the chart sent to Dr. [facility]    Medications  Current Outpatient Medications   Medication Sig Dispense Refill    aspirin (ECOTRIN) 81 MG EC tablet Take 1 tablet (81 mg total) by mouth once daily. 90 tablet 4    cetirizine (ZYRTEC) 10 MG tablet Take 10 mg by mouth once daily.      indomethacin (INDOCIN) 50 MG capsule Take 1 capsule (50 mg total) by mouth 3 (three) times daily. 15 capsule 3    metFORMIN (GLUCOPHAGE) 500 MG  tablet Take 1 tablet (500 mg total) by mouth 2 (two) times daily with meals. 180 tablet 1    metoprolol tartrate (LOPRESSOR) 25 MG tablet Take 1 tablet (25 mg total) by mouth 2 (two) times daily. 60 tablet 11    multivitamin (ONE DAILY MULTIVITAMIN) per tablet Take 1 tablet by mouth once daily.      nitroGLYCERIN (NITROSTAT) 0.4 MG SL tablet Place 1 tablet (0.4 mg total) under the tongue every 5 (five) minutes as needed for Chest pain. Call the 911 after the 3rd dose. 25 tablet 3    olmesartan (BENICAR) 20 MG tablet TAKE 1 TABLET BY MOUTH EVERY DAY 90 tablet 1    omega 3-dha-epa-fish oil 1,000 mg (120 mg-180 mg) Cap       pantoprazole (PROTONIX) 40 MG tablet Take 1 tablet (40 mg total) by mouth 2 (two) times daily. 180 tablet 1    pravastatin (PRAVACHOL) 20 MG tablet Take 1 tablet (20 mg total) by mouth once daily. 90 tablet 3    diclofenac sodium (VOLTAREN) 1 % Gel Apply 2 g topically 4 (four) times daily as needed. 350 g 2     No current facility-administered medications for this visit.        History  Past Medical History:   Diagnosis Date    Allergy     Hypertension     NSTEMI (non-ST elevated myocardial infarction) 01/07/2024    Sleep apnea, unspecified     Urinary tract infection      Past Surgical History:   Procedure Laterality Date    ADENOIDECTOMY      ADENOIDECTOMY      BIOPSY OF ADENOIDS      COLONOSCOPY N/A 09/14/2022    Procedure: COLONOSCOPY;  Surgeon: Jordy Simons MD;  Location: [facility] ENDO;  Service: Endoscopy;  Laterality: N/A;    CORONARY ANGIOGRAPHY Right 1/8/2024    Procedure: ANGIOGRAM, CORONARY ARTERY;  Surgeon: Estrada Ojeda MD;  Location: [facility] CATH LAB;  Service: Cardiology;  Laterality: Right;    EGD, WITH CLOSED BIOPSY  11/30/2023    ESOPHAGOGASTRODUODENOSCOPY N/A 09/14/2022    Procedure: EGD (ESOPHAGOGASTRODUODENOSCOPY);  Surgeon: Jordy Simons MD;  Location: [facility] ENDO;  Service: Endoscopy;  Laterality: N/A;    ESOPHAGOGASTRODUODENOSCOPY  12/19/2022    with biopsy     ESOPHAGOGASTRODUODENOSCOPY N/A 12/19/2022    Procedure: EGD (ESOPHAGOGASTRODUODENOSCOPY);  Surgeon: Jordy Simons MD;  Location: [facility];  Service: Endoscopy;  Laterality: N/A;    ESOPHAGOGASTRODUODENOSCOPY N/A 11/30/2023    Procedure: EGD (ESOPHAGOGASTRODUODENOSCOPY);  Surgeon: Vinay Bourgeois MD;  Location: [facility];  Service: Endoscopy;  Laterality: N/A;    TONSILLECTOMY       Social History     Socioeconomic History    Marital status:    Tobacco Use    Smoking status: Never     Passive exposure: Never    Smokeless tobacco: Never   Substance and Sexual Activity    Alcohol use: Not Currently     Comment: social    Drug use: No    Sexual activity: Yes     Partners: Female     Social Determinants of Health     Financial Resource Strain: Low Risk  (3/19/2024)    Overall Financial Resource Strain (CARDIA)     Difficulty of Paying Living Expenses: Not hard at all   Food Insecurity: No Food Insecurity (1/22/2024)    Hunger Vital Sign     Worried About Running Out of Food in the Last Year: Never true     Ran Out of Food in the Last Year: Never true   Transportation Needs: No Transportation Needs (3/19/2024)    PRAPARE - Transportation     Lack of Transportation (Medical): No     Lack of Transportation (Non-Medical): No   Physical Activity: Inactive (1/22/2024)    Exercise Vital Sign     Days of Exercise per Week: 0 days     Minutes of Exercise per Session: 0 min   Stress: Stress Concern Present (3/19/2024)    [employer] [place] - Occupational Stress Questionnaire     Feeling of Stress : To some extent   Housing Stability: Low Risk  (3/19/2024)    Housing Stability Vital Sign     Unable to Pay for Housing in the Last Year: No     Number of Places Lived in the Last Year: 1     Unstable Housing in the Last Year: No     Family History   Problem Relation Name Age of Onset    Lupus Mother      Heart disease Father      Prostate cancer Neg Hx      Kidney disease Neg Hx           Allergies  Review of patient's allergies indicates:   Allergen Reactions    Sulfa (sulfonamide antibiotics) Shortness Of Breath    Coconut     Guaifenesin     Milk containing products (dairy)     Prochlorperazine Hallucinations     Other reaction(s): Compazine  Note:  Adverse Reaction: Other    Terbinafine hcl      Other reaction(s): terbinafine 250 mg tablet  Note:  Adverse Reaction: Upset Stomach    Wheat containing prod     Penicillins Rash     Other reaction(s): Penicillins  Note:  Adverse Reaction: Rash       Review of Systems   Review of Systems   Constitutional: Negative for fever.   HENT:  Negative for nosebleeds.    Eyes:  Negative for visual disturbance.   Cardiovascular:  Positive for chest pain. Negative for claudication, dyspnea on exertion, palpitations and syncope.   Respiratory:  Negative for cough, hemoptysis and wheezing.    Endocrine: Negative for cold intolerance, heat intolerance, polyphagia and polyuria.   Hematologic/Lymphatic: Negative for bleeding problem.   Skin:  Negative for rash.   Musculoskeletal:  Negative for myalgias.   Gastrointestinal:  Negative for hematemesis, hematochezia, nausea and vomiting.   Genitourinary:  Negative for dysuria.   Neurological:  Positive for focal weakness. Negative for sensory change.   Psychiatric/Behavioral:  Negative for altered mental status.        Physical Exam  Vitals:    07/31/24 1519   BP: 110/70   Pulse: 63     Wt Readings from Last 1 Encounters:   07/31/24 126.4 kg (278 lb 8.8 oz)     Physical Exam  Constitutional:       General: She is not in acute distress.  HENT:      Head: Normocephalic and atraumatic.      Mouth/Throat:      Mouth: Mucous membranes are moist.   Eyes:      Extraocular Movements: Extraocular movements intact.      Pupils: Pupils are equal, round, and reactive to light.   Neck:      Vascular: No carotid bruit or JVD.   Cardiovascular:      Rate and Rhythm: Normal rate and regular rhythm.      Heart sounds: No murmur  heard.     No friction rub. No gallop.   Pulmonary:      Effort: Pulmonary effort is normal.      Breath sounds: Normal breath sounds.   Abdominal:      Tenderness: There is no abdominal tenderness. There is no guarding or rebound.   Musculoskeletal:      Right lower leg: No edema.      Left lower leg: No edema.   Skin:     General: Skin is warm and dry.      Capillary Refill: Capillary refill takes less than 2 seconds.   Neurological:      Mental Status: She is alert. Mental status is at baseline.   Psychiatric:         Mood and Affect: Mood normal.         Labs  Hospital Outpatient Visit on 05/28/2024   Component Date Value Ref Range Status    QRS Duration 05/28/2024 90  ms Final    [facility] QTC Calculation 05/28/2024 407  ms Final   Admission on 03/18/2024, Discharged on 03/19/2024   Component Date Value Ref Range Status    QRS Duration 03/18/2024 90  ms Final    [facility] QTC Calculation 03/18/2024 388  ms Final    WBC 03/18/2024 10.76  3.90 - 12.70 K/uL Final    RBC 03/18/2024 4.69  4.60 - 6.20 M/uL Final    Hemoglobin 03/18/2024 13.6 (L)  14.0 - 18.0 g/dL Final    Hematocrit 03/18/2024 41.9  40.0 - 54.0 % Final    MCV 03/18/2024 89  82 - 98 fL Final    MCH 03/18/2024 29.0  27.0 - 31.0 pg Final    MCHC 03/18/2024 32.5  32.0 - 36.0 g/dL Final    RDW 03/18/2024 13.2  11.5 - 14.5 % Final    Platelets 03/18/2024 250  150 - 450 K/uL Final    MPV 03/18/2024 11.1  9.2 - 12.9 fL Final    Immature Granulocytes 03/18/2024 0.3  0.0 - 0.5 % Final    Gran # (ANC) 03/18/2024 7.0  1.8 - 7.7 K/uL Final    Immature Grans (Abs) 03/18/2024 0.03  0.00 - 0.04 K/uL Final    Comment: Mild elevation in immature granulocytes is non specific and   can be seen in a variety of conditions including stress response,   acute inflammation, trauma and pregnancy. Correlation with other   laboratory and clinical findings is essential.      Lymph # 03/18/2024 2.7  1.0 - 4.8 K/uL Final    Mono # 03/18/2024 0.8  0.3 - 1.0 K/uL Final    Eos # 03/18/2024 0.2   0.0 - 0.5 K/uL Final    Baso # 03/18/2024 0.07  0.00 - 0.20 K/uL Final    nRBC 03/18/2024 0  0 /100 WBC Final    Gran % 03/18/2024 64.6  38.0 - 73.0 % Final    Lymph % 03/18/2024 24.9  18.0 - 48.0 % Final    Mono % 03/18/2024 7.5  4.0 - 15.0 % Final    Eosinophil % 03/18/2024 2.0  0.0 - 8.0 % Final    Basophil % 03/18/2024 0.7  0.0 - 1.9 % Final    Differential Method 03/18/2024 Automated   Final    Sodium 03/18/2024 139  136 - 145 mmol/L Final    Potassium 03/18/2024 3.6  3.5 - 5.1 mmol/L Final    Chloride 03/18/2024 102  95 - 110 mmol/L Final    CO2 03/18/2024 24  23 - 29 mmol/L Final    Glucose 03/18/2024 106  70 - 110 mg/dL Final    BUN 03/18/2024 17  6 - 20 mg/dL Final    Creatinine 03/18/2024 1.1  0.5 - 1.4 mg/dL Final    Calcium 03/18/2024 9.5  8.7 - 10.5 mg/dL Final    Total Protein 03/18/2024 7.7  6.0 - 8.4 g/dL Final    Albumin 03/18/2024 3.7  3.5 - 5.2 g/dL Final    Total Bilirubin 03/18/2024 0.5  0.1 - 1.0 mg/dL Final    Comment: For infants and newborns, interpretation of results should be based  on gestational age, weight and in agreement with clinical  observations.    Premature Infant recommended reference ranges:  Up to 24 hours.............<8.0 mg/dL  Up to 48 hours............<12.0 mg/dL  3-5 days..................<15.0 mg/dL  6-29 days.................<15.0 mg/dL      Alkaline Phosphatase 03/18/2024 108  55 - 135 U/L Final    AST 03/18/2024 25  10 - 40 U/L Final    ALT 03/18/2024 32  10 - 44 U/L Final    eGFR 03/18/2024 >60.0  >60 mL/min/1.73 m^2 Final    Anion Gap 03/18/2024 13  8 - 16 mmol/L Final    Troponin I 03/18/2024 <0.006  0.000 - 0.026 ng/mL Final    Comment: The reference interval for Troponin I represents the 99th percentile   cutoff   for our facility and is consistent with 3rd generation assay   performance.      BNP 03/18/2024 17  0 - 99 pg/mL Final    Values of less than 100 pg/ml are consistent with non-CHF populations.    Troponin I 03/18/2024 0.064 (H)  0.000 - 0.026 ng/mL  Final    Comment: The reference interval for Troponin I represents the 99th percentile   cutoff   for our facility and is consistent with 3rd generation assay   performance.      Troponin I 03/18/2024 0.128 (H)  0.000 - 0.026 ng/mL Final    Comment: The reference interval for Troponin I represents the 99th percentile   cutoff   for our facility and is consistent with 3rd generation assay   performance.      Lipase 03/18/2024 24  4 - 60 U/L Final    Troponin I 03/18/2024 0.064 (H)  0.000 - 0.026 ng/mL Final    Comment: The reference interval for Troponin I represents the 99th percentile   cutoff   for our facility and is consistent with 3rd generation assay   performance.      BSA 03/19/2024 2.44  m2 Final    LVOT stroke volume 03/19/2024 66.64  cm3 Final    LVIDd 03/19/2024 5.09  3.5 - 6.0 cm Final    LV Systolic Volume 03/19/2024 64.95  mL Final    LV Systolic Volume Index 03/19/2024 28.1  mL/m2 Final    LVIDs 03/19/2024 3.88  2.1 - 4.0 cm Final    LV Diastolic Volume 03/19/2024 123.34  mL Final    LV Diastolic Volume Index 03/19/2024 53.39  mL/m2 Final    IVS 03/19/2024 1.11 (A)  0.6 - 1.1 cm Final    LVOT diameter 03/19/2024 2.04  cm Final    LVOT area 03/19/2024 3.3  cm2 Final    FS 03/19/2024 24 (A)  28 - 44 % Final    Left Ventricle Relative Wall Thick* 03/19/2024 0.33  cm Final    Posterior Wall 03/19/2024 0.84  0.6 - 1.1 cm Final    LV mass 03/19/2024 181.18  g Final    LV Mass Index 03/19/2024 78  g/m2 Final    MV Peak E Philip 03/19/2024 0.76  m/s Final    TDI LATERAL 03/19/2024 0.09  m/s Final    TDI SEPTAL 03/19/2024 0.06  m/s Final    E/E' ratio 03/19/2024 10.13  m/s Final    MV Peak A Philip 03/19/2024 0.73  m/s Final    E/A ratio 03/19/2024 1.04   Final    IVRT 03/19/2024 79.92  msec Final    E wave deceleration time 03/19/2024 259.96  msec Final    LV SEPTAL E/E' RATIO 03/19/2024 12.67  m/s Final    LV LATERAL E/E' RATIO 03/19/2024 8.44  m/s Final    LVOT peak philip 03/19/2024 0.86  m/s Final    Left  Ventricular Outflow Tract Aleyda* 03/19/2024 0.59  cm/s Final    Left Ventricular Outflow Tract Aleyda* 03/19/2024 1.64  mmHg Final    RVDD 03/19/2024 2.21  cm Final    LA size 03/19/2024 3.37  cm Final    Left Atrium Minor Axis 03/19/2024 4.73  cm Final    Left Atrium Major Axis 03/19/2024 4.79  cm Final    RA Major Axis 03/19/2024 3.97  cm Final    AV mean gradient 03/19/2024 2  mmHg Final    AV peak gradient 03/19/2024 3  mmHg Final    Ao peak shagufta 03/19/2024 0.90  m/s Final    Ao VTI 03/19/2024 18.90  cm Final    LVOT peak VTI 03/19/2024 20.40  cm Final    AV valve area 03/19/2024 3.53  cm² Final    AV Velocity Ratio 03/19/2024 0.96   Final    AV index (prosthetic) 03/19/2024 1.08   Final    MICKEY by Velocity Ratio 03/19/2024 3.12  cm² Final    MV mean gradient 03/19/2024 1  mmHg Final    MV peak gradient 03/19/2024 2  mmHg Final    MV stenosis pressure 1/2 time 03/19/2024 88.72  ms Final    MV valve area p 1/2 method 03/19/2024 2.48  cm2 Final    MV valve area by continuity eq 03/19/2024 2.36  cm2 Final    MV VTI 03/19/2024 28.2  cm Final    PV PEAK VELOCITY 03/19/2024 1.28  m/s Final    PV peak gradient 03/19/2024 7  mmHg Final    Pulmonary Valve Mean Velocity 03/19/2024 0.79  m/s Final    Ao root annulus 03/19/2024 3.44  cm Final    STJ 03/19/2024 2.47  cm Final    Ascending aorta 03/19/2024 3.5  cm Final    IVC diameter 03/19/2024 1.17  cm Final    Mean e' 03/19/2024 0.08  m/s Final    ZLVIDS 03/19/2024 -2.72   Final    ZLVIDD 03/19/2024 -5.79   Final    AORTIC VALVE CUSP SEPERATION 03/19/2024 1.82  cm Final    LA Volume Index 03/19/2024 15.9  mL/m2 Final    LA volume 03/19/2024 36.81  cm3 Final    LA volume (mod) 03/19/2024 57.00  cm3 Final    LA WIDTH 03/19/2024 2.7  cm Final    LA Volume Index (Mod) 03/19/2024 24.7  mL/m2 Final    RA Width 03/19/2024 2.3  cm Final    Est. RA pres 03/19/2024 8  mmHg Final    Troponin I 03/19/2024 0.035 (H)  0.000 - 0.026 ng/mL Final    Comment: The reference interval for Troponin I  represents the 99th percentile   cutoff   for our facility and is consistent with 3rd generation assay   performance.      WBC 03/19/2024 9.13  3.90 - 12.70 K/uL Final    RBC 03/19/2024 4.86  4.60 - 6.20 M/uL Final    Hemoglobin 03/19/2024 13.9 (L)  14.0 - 18.0 g/dL Final    Hematocrit 03/19/2024 43.1  40.0 - 54.0 % Final    MCV 03/19/2024 89  82 - 98 fL Final    MCH 03/19/2024 28.6  27.0 - 31.0 pg Final    MCHC 03/19/2024 32.3  32.0 - 36.0 g/dL Final    RDW 03/19/2024 13.2  11.5 - 14.5 % Final    Platelets 03/19/2024 246  150 - 450 K/uL Final    MPV 03/19/2024 11.3  9.2 - 12.9 fL Final    Immature Granulocytes 03/19/2024 0.2  0.0 - 0.5 % Final    Gran # (ANC) 03/19/2024 6.1  1.8 - 7.7 K/uL Final    Immature Grans (Abs) 03/19/2024 0.02  0.00 - 0.04 K/uL Final    Comment: Mild elevation in immature granulocytes is non specific and   can be seen in a variety of conditions including stress response,   acute inflammation, trauma and pregnancy. Correlation with other   laboratory and clinical findings is essential.      Lymph # 03/19/2024 2.3  1.0 - 4.8 K/uL Final    Mono # 03/19/2024 0.5  0.3 - 1.0 K/uL Final    Eos # 03/19/2024 0.2  0.0 - 0.5 K/uL Final    Baso # 03/19/2024 0.05  0.00 - 0.20 K/uL Final    nRBC 03/19/2024 0  0 /100 WBC Final    Gran % 03/19/2024 67.1  38.0 - 73.0 % Final    Lymph % 03/19/2024 25.1  18.0 - 48.0 % Final    Mono % 03/19/2024 5.1  4.0 - 15.0 % Final    Eosinophil % 03/19/2024 2.0  0.0 - 8.0 % Final    Basophil % 03/19/2024 0.5  0.0 - 1.9 % Final    Differential Method 03/19/2024 Automated   Final    Sodium 03/19/2024 139  136 - 145 mmol/L Final    Potassium 03/19/2024 4.1  3.5 - 5.1 mmol/L Final    Chloride 03/19/2024 102  95 - 110 mmol/L Final    CO2 03/19/2024 23  23 - 29 mmol/L Final    Glucose 03/19/2024 146 (H)  70 - 110 mg/dL Final    BUN 03/19/2024 14  6 - 20 mg/dL Final    Creatinine 03/19/2024 1.1  0.5 - 1.4 mg/dL Final    Calcium 03/19/2024 9.5  8.7 - 10.5 mg/dL Final    Total  Protein 03/19/2024 7.4  6.0 - 8.4 g/dL Final    Albumin 03/19/2024 3.7  3.5 - 5.2 g/dL Final    Total Bilirubin 03/19/2024 0.6  0.1 - 1.0 mg/dL Final    Comment: For infants and newborns, interpretation of results should be based  on gestational age, weight and in agreement with clinical  observations.    Premature Infant recommended reference ranges:  Up to 24 hours.............<8.0 mg/dL  Up to 48 hours............<12.0 mg/dL  3-5 days..................<15.0 mg/dL  6-29 days.................<15.0 mg/dL      Alkaline Phosphatase 03/19/2024 103  55 - 135 U/L Final    AST 03/19/2024 26  10 - 40 U/L Final    ALT 03/19/2024 30  10 - 44 U/L Final    eGFR 03/19/2024 >60.0  >60 mL/min/1.73 m^2 Final    Anion Gap 03/19/2024 14  8 - 16 mmol/L Final   Lab Visit on 03/05/2024   Component Date Value Ref Range Status    Cholesterol 03/05/2024 107 (L)  120 - 199 mg/dL Final    Comment: The National Cholesterol Education Program (NCEP) has set the  following guidelines (reference ranges) for Cholesterol:  Optimal.....................<200 mg/dL  Borderline High.............200-239 mg/dL  High........................> or = 240 mg/dL      Triglycerides 03/05/2024 140  30 - 150 mg/dL Final    Comment: The National Cholesterol Education Program (NCEP) has set the  following guidelines (reference values) for triglycerides:  Normal......................<150 mg/dL  Borderline High.............150-199 mg/dL  High........................200-499 mg/dL      HDL 03/05/2024 28 (L)  40 - 75 mg/dL Final    Comment: The National Cholesterol Education Program (NCEP) has set the  following guidelines (reference values) for HDL Cholesterol:  Low...............<40 mg/dL  Optimal...........>60 mg/dL      LDL Cholesterol 03/05/2024 51.0 (L)  63.0 - 159.0 mg/dL Final    Comment: The National Cholesterol Education Program (NCEP) has set the  following guidelines (reference values) for LDL Cholesterol:  Optimal.......................<130 mg/dL  Borderline  High...............130-159 mg/dL  High..........................160-189 mg/dL  Very High.....................>190 mg/dL      HDL/Cholesterol Ratio 03/05/2024 26.2  20.0 - 50.0 % Final    Total Cholesterol/HDL Ratio 03/05/2024 3.8  2.0 - 5.0 Final    Non-HDL Cholesterol 03/05/2024 79  mg/dL Final    Comment: Risk category and Non-HDL cholesterol goals:  Coronary heart disease (CHD)or equivalent (10-year risk of CHD >20%):  Non-HDL cholesterol goal     <130 mg/dL  Two or more CHD risk factors and 10-year risk of CHD <= 20%:  Non-HDL cholesterol goal     <160 mg/dL  0 to 1 CHD risk factor:  Non-HDL cholesterol goal     <190 mg/dL     Lab Visit on 02/06/2024   Component Date Value Ref Range Status    Glucose 02/06/2024 90  70 - 110 mg/dL Final    Sodium 02/06/2024 140  136 - 145 mmol/L Final    Potassium 02/06/2024 4.3  3.5 - 5.1 mmol/L Final    Chloride 02/06/2024 99  95 - 110 mmol/L Final    CO2 02/06/2024 28  23 - 29 mmol/L Final    BUN 02/06/2024 17  6 - 20 mg/dL Final    Calcium 02/06/2024 9.5  8.7 - 10.5 mg/dL Final    Creatinine 02/06/2024 1.2  0.5 - 1.4 mg/dL Final    Albumin 02/06/2024 3.6  3.5 - 5.2 g/dL Final    Phosphorus 02/06/2024 3.3  2.7 - 4.5 mg/dL Final    eGFR 02/06/2024 >60.0  >60 mL/min/1.73 m^2 Final    Anion Gap 02/06/2024 13  8 - 16 mmol/L Final       EKG  Reviewed prior    Echo   Results for orders placed or performed during the hospital encounter of 03/18/24   Echo   Result Value Ref Range    BSA 2.44 m2    LVOT stroke volume 66.64 cm3    LVIDd 5.09 3.5 - 6.0 cm    LV Systolic Volume 64.95 mL    LV Systolic Volume Index 28.1 mL/m2    LVIDs 3.88 2.1 - 4.0 cm    LV Diastolic Volume 123.34 mL    LV Diastolic Volume Index 53.39 mL/m2    IVS 1.11 (A) 0.6 - 1.1 cm    LVOT diameter 2.04 cm    LVOT area 3.3 cm2    FS 24 (A) 28 - 44 %    Left Ventricle Relative Wall Thickness 0.33 cm    Posterior Wall 0.84 0.6 - 1.1 cm    LV mass 181.18 g    LV Mass Index 78 g/m2    MV Peak E Philip 0.76 m/s    TDI LATERAL  0.09 m/s    TDI SEPTAL 0.06 m/s    E/E' ratio 10.13 m/s    MV Peak A Philip 0.73 m/s    E/A ratio 1.04     IVRT 79.92 msec    E wave deceleration time 259.96 msec    LV SEPTAL E/E' RATIO 12.67 m/s    LV LATERAL E/E' RATIO 8.44 m/s    LVOT peak philip 0.86 m/s    Left Ventricular Outflow Tract Mean Velocity 0.59 cm/s    Left Ventricular Outflow Tract Mean Gradient 1.64 mmHg    RVDD 2.21 cm    LA size 3.37 cm    Left Atrium Minor Axis 4.73 cm    Left Atrium Major Axis 4.79 cm    RA Major Axis 3.97 cm    AV mean gradient 2 mmHg    AV peak gradient 3 mmHg    Ao peak philip 0.90 m/s    Ao VTI 18.90 cm    LVOT peak VTI 20.40 cm    AV valve area 3.53 cm²    AV Velocity Ratio 0.96     AV index (prosthetic) 1.08     MICKEY by Velocity Ratio 3.12 cm²    MV mean gradient 1 mmHg    MV peak gradient 2 mmHg    MV stenosis pressure 1/2 time 88.72 ms    MV valve area p 1/2 method 2.48 cm2    MV valve area by continuity eq 2.36 cm2    MV VTI 28.2 cm    PV PEAK VELOCITY 1.28 m/s    PV peak gradient 7 mmHg    Pulmonary Valve Mean Velocity 0.79 m/s    Ao root annulus 3.44 cm    STJ 2.47 cm    Ascending aorta 3.5 cm    IVC diameter 1.17 cm    Mean e' 0.08 m/s    ZLVIDS -2.72     ZLVIDD -5.79     AORTIC VALVE CUSP SEPERATION 1.82 cm    LA Volume Index 15.9 mL/m2    LA volume 36.81 cm3    LA volume (mod) 57.00 cm3    LA WIDTH 2.7 cm    LA Volume Index (Mod) 24.7 mL/m2    RA Width 2.3 cm    Est. RA pres 8 mmHg    Narrative      Left Ventricle: The left ventricle is normal in size. Mildly to   moderately increased wall thickness. Unable to assess wall motion. There   is normal systolic function with a visually estimated ejection fraction of   55 - 60%. There is normal diastolic function.    Right Ventricle: Normal right ventricular cavity size. Systolic   function is normal.    IVC/SVC: Intermediate venous pressure at 8 mmHg.         Imaging  Holter monitor - 24 hour    Result Date: 1/19/2024    The predominant rhythm is sinus.   The average heart rate  was 69 BPM. The minimum heart rate was 41 BPM, occurring at 8:13:09 AM (in the context of sinus arrhythmia). The maximum heart rate was 145 BPM, occurring at 2:43:05 PM.   Longest R-R interval 1.7 seconds at 8:13:08 AM.   No ventricular ectopy.   Rare supraventricular ectopic activity.  Short nonsustained atrial run.   There appear to be some aberrantly conducted beats on a few of the rhythm strips.   Possible nonconducted PAC.     US Lower Extremity Veins Bilateral    Result Date: 1/12/2024  EXAMINATION: US LOWER EXTREMITY VEINS BILATERAL CLINICAL HISTORY: elevated d dimer, concern for DVT; FINDINGS: All veins demonstrate flow, compressibility, and waveform.  No popliteal fossa mass, cyst, or aneurysm seen.     No evidence of DVT bilaterally. Electronically signed by: Jamarcus Marin MD Date:    01/12/2024 Time:    12:55    Echo Saline Bubble? Yes    Result Date: 1/11/2024    Limited exam for bubble study.   No evidence of intracardiac shunt.   Technically difficult limited study.     MRI Brain Without Contrast    Result Date: 1/11/2024  EXAMINATION: MRI BRAIN WITHOUT CONTRAST CLINICAL HISTORY: Stroke, follow up; TECHNIQUE: Multiplanar multisequence MR imaging of the brain was performed without contrast. COMPARISON: CT head and CTA head neck performed 01/11/2024. FINDINGS: Parenchyma: Small focus of restricted diffusion and T2 weighted signal abnormality in the left corona radiata in keeping with acute to early subacute infarct.  No associated hemorrhage or mass effect at this region of presumed cytotoxic edema. Elsewhere, no definite additional areas of acute ischemia or recent infarction are appreciated.  Few additional areas of nonspecific T2/FLAIR hyperintense signal the frontoparietal white matter are identified. Additional comments: There is no midline shift, abnormal extra-axial fluid collection, or acute intracranial hemorrhage. The basal cisterns are patent. Ventricles: Normal. Flow voids: The normal major  intracranial arterial flow voids are visualized. Sinuses and mastoid air cells: Essentially clear Orbits: Normal Midline structures: The pituitary and craniocervical junction are normal. Marrow: Normal     Small focus of acute to early subacute ischemia in the left corona radiata.  No associated hemorrhage or mass effect. This report was flagged in Epic as abnormal. Electronically signed by: Harish Benitez Date:    01/11/2024 Time:    13:58    CTA Head and Neck (xpd)    Result Date: 1/11/2024  EXAMINATION: CTA HEAD AND NECK (XPD) CLINICAL HISTORY: Stroke/TIA, determine embolic source; TECHNIQUE: Axial CT images obtained throughout the region of the head before and after the administration of intravenous contrast.  CT angiogram was performed through the cervical and intracranial vasculature during the IV bolus administration of 80mL of Omnipaque 350.  Multiplanar MPR and MIP reformats were performed. COMPARISON: None FINDINGS: Head CT without and with contrast: The ventricles are normal in size without evidence of hydrocephalus. The brain appears within normal limits. No parenchymal mass, hemorrhage, edema or major vascular distribution infarct. No extra-axial blood or fluid collections. The cranium appears intact. Mastoid air cells and paranasal sinuses are essentially clear. Right external ear cerumen. Moderate left foraminal narrowing at C3-4. CTA NECK: ARCH: Normal arch anatomy. COMMON CAROTIDS: Normal. EXTERNAL CAROTIDS: Normal. INTERNAL CAROTIDS: Normal. VERTEBRALS: Patent, there is mild calcified atherosclerotic plaque at the distal left vertebral artery. CTA HEAD: INTERNAL CAROTIDS: Normal. ANTERIOR CEREBRALS: Normal. ANTERIOR COMMUNICATING: Normal. MIDDLE CEREBRALS: Normal. POSTERIOR COMMUNICATING ARTERIES: Normal bilaterally. POSTERIOR CEREBRALS: Normal. VERTEBROBASILAR AND BRANCH VESSELS: Patent     No CT evidence of large territorial infarction. No large vessel occlusion in the intracranial circulation.  No hemodynamically significant stenosis of the neck vessels. Mild calcified atherosclerotic plaque distal left vertebral artery. Note that MRI has greater sensitivity to detect small acute infarction and be done if clinically warranted. Electronically signed by: Chantale Tavarez MD Date:    01/11/2024 Time:    12:24    X-Ray Chest PA And Lateral    Result Date: 1/11/2024  EXAMINATION: XR CHEST PA AND LATERAL CLINICAL HISTORY: Shortness of breath TECHNIQUE: PA and lateral views of the chest were performed. COMPARISON: Chest radiograph from 01/07/2024 FINDINGS: The lungs are clear, with normal appearance of pulmonary vasculature and no pleural effusion or pneumothorax. The cardiac silhouette is normal in size. The hilar and mediastinal contours are unremarkable. Bones are intact.     No acute abnormality. Electronically signed by: Harish Mckeon MD Date:    01/11/2024 Time:    11:51    CT Head Without Contrast    Result Date: 1/11/2024  EXAMINATION: CT HEAD WITHOUT CONTRAST CLINICAL HISTORY: Neuro deficit, acute, stroke suspected; TECHNIQUE: Low dose axial images were obtained through the head.  Coronal and sagittal reformations were also performed. Contrast was not administered. COMPARISON: None. FINDINGS: Blood: No acute intracranial hemorrhage. Parenchyma: No definite loss of gray-white differentiation to suggest acute or subacute transcortical infarct. Ventricles/Extra-axial spaces: No abnormal extra-axial fluid collection. Basal cisterns are patent. Vessels: Mild atherosclerotic calcifications. Orbits: Unremarkable. Scalp: Unremarkable. Skull: There are no depressed skull fractures or destructive bone lesions. Sinuses and mastoids: Essentially clear. Other findings: Soft tissue attenuation within the right external auditory canal may relate to cerumen.     No acute intracranial findings by noncontrast CT. Electronically signed by: Harish Benitez Date:    01/11/2024 Time:    11:02    Cardiac  catheterization    Result Date: 1/8/2024    Mild-to-moderate nonobstructive coronary artery disease.   Ectasia noted involving the right coronary artery and distal left main. The procedure log was documented by Documenter: Marianne Olsen RT and verified by Estrada Ojeda MD. Date: 1/8/2024  Time: 12:08 PM     Echo    Result Date: 1/8/2024    Left Ventricle: The left ventricle is normal in size.  Mildly to moderately increased wall thickness. Unable to assess wall motion. There is normal systolic function with a visually estimated ejection fraction of 55 - 60%. There is normal diastolic function.   Left Atrium: Left atrium is mildly dilated.   Right Ventricle: Right ventricle was not well visualized due to poor acoustic window. Normal right ventricular cavity size. Systolic function is normal.   IVC/SVC: Intermediate venous pressure at 8 mmHg.   Aorta: Ascending aorta is mildly dilated measuring 3.6 cm.     CTA Chest Non-Coronary (PE Studies)    Result Date: 1/7/2024  EXAMINATION: CTA CHEST NON CORONARY (PE STUDIES) CLINICAL HISTORY: Pulmonary embolism (PE) suspected, positive D-dimer; TECHNIQUE: Following the intravenous administration of 75 cc of Omnipaque 350 contrast material, 1.25 mm contiguous axial images were acquired through the chest utilizing the CT pulmonary angiogram protocol.  2-D coronal and sagittal reconstructed images of the chest and sagittal oblique MIP images of the pulmonary arterial system were generated from the source data. COMPARISON: None. FINDINGS: Pulmonary arterial system: This is a good quality examination for the evaluation of pulmonary thromboembolism with good opacification of the pulmonary arteries to the level of the segmental branches of the pulmonary arterial system. There is no evidence of acute pulmonary thromboembolism. No large saddle pulmonary embolism, enlargement of the main pulmonary artery, or secondary findings of right ventricular strain. Aorta and heart: The heart  is normal in size.  No pericardial fluid.  No thoracic aortic aneurysm.  No thoracic aortic dissection. Airways: Unremarkable. Lymph nodes: No pathologically enlarged lymph nodes in the chest or axilla. Lungs: The lungs are clear with no evidence of airspace consolidation, mass, or bronchiectasis.  No emphysematous lung architecture. Pleura: No significant volume of pleural fluid or pneumothorax.  No pleural based masses. Visualized upper abdominal soft tissues: No significant abnormalities appreciated. Bones: There is degenerative change of the thoracic spine.     1. No evidence of acute pulmonary thromboembolism. 2. No acute findings evident elsewhere in the chest. Electronically signed by: Elie Quan Date:    01/07/2024 Time:    21:33    X-Ray Chest 1 View    Result Date: 1/7/2024  EXAMINATION: XR CHEST 1 VIEW CLINICAL HISTORY: chest pain; TECHNIQUE: Single frontal view of the chest was performed. COMPARISON: 01/26/2021 FINDINGS: Heart is not enlarged the trachea is midline.  The lungs and pleural spaces are clear.  The bony structures are intact.     Stable and negative chest Electronically signed by: Elie Quan Date:    01/07/2024 Time:    18:53      Prior coronary angiogram / intervention:  See above    Assessment and Plan  1. NSTEMI (non-ST elevated myocardial infarction)  No culprit on angiogram   Cardiac MRI within normal limits  Arrhythmia could be culprit which seems more likely as more data comes from her fitbit  However, vasospasm is also high in the differential since the chest discomfort that occurs every 3 months does respond to nitroglycerin  Continue antithrombotic with statin and beta-blocker  Could not tolerate high-dose statin    2. Primary hypertension  Benicar beta-blocker controlled    3. Hyperlipidemia, unspecified hyperlipidemia type  Continue statin      4. History of CVA (cerebrovascular accident)  Completed dual antiplatelet therapy for 21 days then single antiplatelet therapy  with high-dose statin neurology follow up     5. Aortic dilatation  Continue beta-blocker blood pressure control surveillance    6. Coronary artery disease involving native coronary artery of native heart without angina pectoris  As above antithrombotic statin beta-blocker    7. SVT with aberrancy versus NSVT  Caught on fitbit  Followed by electrophysiology  Has had an event monitor  Will contact Dr. Griggs about abnormal fitbit tracings from 06/06/2024 and see what for further workup as an order    Follow Up  3 months      Estrada Ojeda MD, FAC, [facility]  Interventional Cardiology     Total professional time spent for the encounter: 40 minutes  Time was spent preparing to see the patient, reviewing results of prior testing, obtaining and/or reviewing separately obtained history, performing a medically appropriate examination and interview, counseling and educating the patient/family, ordering medications/tests/procedures, referring and communicating with other health care professionals, documenting clinical information in the electronic health record, and independently interpreting results.

## 2024-08-01 ENCOUNTER — PATIENT MESSAGE (OUTPATIENT)
Dept: SLEEP MEDICINE | Facility: CLINIC | Age: 54
End: 2024-08-01
Payer: COMMERCIAL

## 2024-08-01 LAB
OHS QRS DURATION: 90 MS
OHS QTC CALCULATION: 384 MS

## 2024-08-01 PROCEDURE — 95811 POLYSOM 6/>YRS CPAP 4/> PARM: CPT | Mod: 26,,, | Performed by: INTERNAL MEDICINE

## 2024-08-13 ENCOUNTER — E-VISIT (OUTPATIENT)
Dept: PRIMARY CARE CLINIC | Facility: CLINIC | Age: 54
End: 2024-08-13
Payer: COMMERCIAL

## 2024-08-13 DIAGNOSIS — E66.01 MORBID OBESITY WITH BMI OF 45.0-49.9, ADULT: Primary | ICD-10-CM

## 2024-08-13 RX ORDER — TIRZEPATIDE 2.5 MG/.5ML
2.5 INJECTION, SOLUTION SUBCUTANEOUS
Qty: 2 ML | Refills: 0 | Status: SHIPPED | OUTPATIENT
Start: 2024-08-13 | End: 2024-09-04

## 2024-08-13 RX ORDER — TIRZEPATIDE 5 MG/.5ML
5 INJECTION, SOLUTION SUBCUTANEOUS
Qty: 2 ML | Refills: 0 | Status: SHIPPED | OUTPATIENT
Start: 2024-09-10 | End: 2024-10-02

## 2024-08-13 NOTE — PROGRESS NOTES
Patient ID: Martha Shah is a 53 y.o. adult.    Chief Complaint: General Illness (Entered automatically based on patient selection in [facility].)    The patient initiated a request through [facility] on 8/13/2024 for evaluation and management with a chief complaint of General Illness (Entered automatically based on patient selection in [facility].)     I evaluated the questionnaire submission on 8/13/24.    [facility] Peq Evisit Supergroup-Medication    8/13/2024  1:59 PM CDT - Filed by Patient   What do you need help with? Weight Management   Do you agree to participate in an E-Visit? Yes   If you have any of the following symptoms, please present to your local emergency room or call 911: I acknowledge   Medication requests for narcotics will not be addressed via an E-Visit.  Please schedule an appointment. I acknowledge   Are you pregnant, could you be pregnant, or are you breast feeding? None of the above   What best describes your appetite? Average   Do you have specific food cravings? No   When you eat, how quickly do you feel full Quickly   Give an example of what you have eaten in a day in the past 2 weeks:    Breakfast: keto cereal, almond milk, nonfat greek yogurt   Lunch: soylent shake   Dinner: salad, chicken breast, side veggies   Snacks: hummus & matzo, rice cakes, quest protein cookie   Drinks: water   Have you exercised in the past week? Yes   What type of exercise? treadmill walking   How many days in a week do you exercise? 2   How many minutes do you exericse? 25   Do you have a personal or family history of thyroid cancer? No   Do you have a personal history of kidney stones? No   Do you have a personal history of seizures? No   Do you have a personal history of pancreatitis? No   I would like to address: Medication for weight loss   Do you want to address a new or existing medication? I would like to start a new medication that I do not already take   What is the name of the medication that you would like to  start? compounded semaglutide or tirzepatide   Have you taken a similar medication in the past? No   Why are you requesting this particular medication? we discussed previously, tried metformin instead, but benefits of that have levelled off    What medical condition is the  medication intended to treat? obesity, cardiovascular disease, sleep apnea   Provide any additional information you feel is important.    Please attach any relevant images or files    Are you able to take your vital signs? Yes   Systolic Blood Pressure: 122   Diastolic Blood Pressure: 74   Weight: 277   Height: 65   Pulse: 67   Temperature: 97.5   Respiration rate: 11   Pulse Oxygen: 97         Encounter Diagnosis   Name Primary?    Morbid obesity with BMI of 45.0-49.9, adult Yes        No orders of the defined types were placed in this encounter.     Medications Ordered This Encounter   Medications    tirzepatide (MOUNJARO) 2.5 mg/0.5 mL PnIj     Sig: Inject 2.5 mg into the skin every 7 days. for 4 doses     Dispense:  2 mL     Refill:  0     OK to compound    tirzepatide (MOUNJARO) 5 mg/0.5 mL PnIj     Sig: Inject 5 mg into the skin every 7 days. for 4 doses     Dispense:  2 mL     Refill:  0     OK to compound    tirzepatide 10 mg/0.5 mL PnIj     Sig: Inject 10 mg into the skin every 7 days.     Dispense:  2 mL     Refill:  2     OK to compound    tirzepatide 7.5 mg/0.5 mL PnIj     Sig: Inject 7.5 mg into the skin every 7 days. for 4 doses     Dispense:  2 mL     Refill:  0     OK to compound        No follow-ups on file.      E-Visit Time Tracking:    Day 1 Time (in minutes): 12    Total Time (in minutes): 12

## 2024-09-03 ENCOUNTER — PATIENT MESSAGE (OUTPATIENT)
Dept: NEUROLOGY | Facility: CLINIC | Age: 54
End: 2024-09-03
Payer: COMMERCIAL

## 2024-09-19 ENCOUNTER — PATIENT MESSAGE (OUTPATIENT)
Dept: PRIMARY CARE CLINIC | Facility: CLINIC | Age: 54
End: 2024-09-19
Payer: COMMERCIAL

## 2024-09-20 ENCOUNTER — E-VISIT (OUTPATIENT)
Dept: FAMILY MEDICINE | Facility: CLINIC | Age: 54
End: 2024-09-20
Payer: COMMERCIAL

## 2024-09-20 DIAGNOSIS — R05.2 SUBACUTE COUGH: ICD-10-CM

## 2024-09-20 DIAGNOSIS — U07.1 COVID: Primary | ICD-10-CM

## 2024-09-20 RX ORDER — BENZONATATE 200 MG/1
200 CAPSULE ORAL 3 TIMES DAILY PRN
Qty: 60 CAPSULE | Refills: 1 | Status: SHIPPED | OUTPATIENT
Start: 2024-09-20 | End: 2024-09-30

## 2024-09-20 NOTE — PROGRESS NOTES
Patient ID: Martha Shah is a 53 y.o. adult.    Chief Complaint: General Illness (Entered automatically based on patient selection in [facility].)          274}  The patient initiated a request through [facility] on 9/20/2024 for evaluation and management with a chief complaint of General Illness (Entered automatically based on patient selection in [facility].)     I evaluated the questionnaire submission on 09/20/2024 .    Total Time (in minutes): 12     [facility] Peq Evisit Supergroup-Cough And Cold    9/20/2024  9:45 AM CDT - Filed by Patient   What do you need help with? Covid 19   Do you agree to participate in an E-Visit? Yes   If you have any of the following symptoms, go to your local emergency room or call 911: I acknowledge   Are you pregnant, could you be pregnant, or are you breast feeding? None of the above   What is the main issue you would like addressed today? positive home COVID test. sore throat, stuffy nose, body aches   Do you think you might have COVID or the Flu? Yes COVID   Have you tested positive for COVID or Flu? Yes COVID   What symptoms do you have? Cough;  Fatigue;  Headache;  Nasal congestion;  Muscle or body aches;  Sore throat   When did your symptoms first appear? 9/19/2024   List what you have done or taken to help your symptoms. cough drops and ibuprofen   Provide any additional information you feel is important. I have previously taken paxlovid and found it helpful   Please attach any relevant images or files    Are you able to take your vital signs? Yes   Systolic Blood Pressure: 117   Diastolic Blood Pressure: 64   Weight: 265   Height: 65   Pulse: 76   Temperature: 98.9   Respiration rate: 10   Pulse Oxygen:           Active Problem List with Overview Notes    Diagnosis Date Noted    NSVT (nonsustained ventricular tachycardia) 07/31/2024    Tachycardia 05/28/2024    Idiopathic chronic gout of right foot without tophus 04/10/2024    History of CVA (cerebrovascular accident) 01/24/2024     Aortic dilatation 01/24/2024    Coronary artery disease involving native coronary artery of native heart without angina pectoris 01/24/2024    Hyperlipidemia 01/12/2024    Acute focal neurological deficit 01/11/2024    Hypertension 01/13/2023    Migraine with aura and without status migrainosus, not intractable 01/13/2023    Eosinophilic esophagitis 01/13/2023    Gastroesophageal reflux disease with esophagitis without hemorrhage 01/13/2023    Morbid obesity with BMI of 45.0-49.9, adult 08/10/2022    ALFREDO (obstructive sleep apnea)       Recent Labs Obtained:  Lab Results   Component Value Date    WBC 9.13 03/19/2024    HGB 13.9 (L) 03/19/2024    HCT 43.1 03/19/2024    MCV 89 03/19/2024     03/19/2024     03/19/2024    K 4.1 03/19/2024     (H) 03/19/2024    CREATININE 1.1 03/19/2024    EGFRNORACEVR >60.0 03/19/2024    HGBA1C 5.4 01/11/2024    TSH 3.642 01/12/2024      Review of patient's allergies indicates:   Allergen Reactions    Sulfa (sulfonamide antibiotics) Shortness Of Breath    Coconut     Guaifenesin     Milk containing products (dairy)     Prochlorperazine Hallucinations     Other reaction(s): Compazine  Note:  Adverse Reaction: Other    Terbinafine hcl      Other reaction(s): terbinafine 250 mg tablet  Note:  Adverse Reaction: Upset Stomach    Wheat containing prod     Penicillins Rash     Other reaction(s): Penicillins  Note:  Adverse Reaction: Rash       Encounter Diagnoses   Name Primary?    COVID Yes    Subacute cough         No orders of the defined types were placed in this encounter.     Medications Ordered This Encounter   Medications    benzonatate (TESSALON) 200 MG capsule     Sig: Take 1 capsule (200 mg total) by mouth 3 (three) times daily as needed for Cough.     Dispense:  60 capsule     Refill:  1    nirmatrelvir-ritonavir 300 mg (150 mg x 2)-100 mg copackaged tablets (EUA)     Sig: Take 3 tablets by mouth 2 (two) times daily for 5 days. Each dose contains 2 nirmatrelvir  (pink tablets) and 1 ritonavir (white tablet). Take all 3 tablets together     Dispense:  30 tablet     Refill:  0        E-Visit Time Tracking:    Day 1 Time (in minutes): 12    Total Time (in minutes): 12      274}

## 2024-10-01 ENCOUNTER — PATIENT MESSAGE (OUTPATIENT)
Dept: CARDIOLOGY | Facility: CLINIC | Age: 54
End: 2024-10-01
Payer: COMMERCIAL

## 2024-10-02 ENCOUNTER — OFFICE VISIT (OUTPATIENT)
Dept: PRIMARY CARE CLINIC | Facility: CLINIC | Age: 54
End: 2024-10-02
Payer: COMMERCIAL

## 2024-10-02 DIAGNOSIS — L65.9 ALOPECIA: Primary | ICD-10-CM

## 2024-10-02 DIAGNOSIS — E78.5 HYPERLIPIDEMIA, UNSPECIFIED HYPERLIPIDEMIA TYPE: Primary | ICD-10-CM

## 2024-10-02 PROCEDURE — 1159F MED LIST DOCD IN RCRD: CPT | Mod: CPTII,95,, | Performed by: FAMILY MEDICINE

## 2024-10-02 PROCEDURE — 1160F RVW MEDS BY RX/DR IN RCRD: CPT | Mod: CPTII,95,, | Performed by: FAMILY MEDICINE

## 2024-10-02 PROCEDURE — 99214 OFFICE O/P EST MOD 30 MIN: CPT | Mod: 95,,, | Performed by: FAMILY MEDICINE

## 2024-10-02 PROCEDURE — 3044F HG A1C LEVEL LT 7.0%: CPT | Mod: CPTII,95,, | Performed by: FAMILY MEDICINE

## 2024-10-02 PROCEDURE — 4010F ACE/ARB THERAPY RXD/TAKEN: CPT | Mod: CPTII,95,, | Performed by: FAMILY MEDICINE

## 2024-10-02 RX ORDER — EZETIMIBE 10 MG/1
10 TABLET ORAL DAILY
Qty: 90 TABLET | Refills: 1 | Status: SHIPPED | OUTPATIENT
Start: 2024-10-02 | End: 2025-10-02

## 2024-10-02 NOTE — PROGRESS NOTES
Subjective:       Patient ID: Martha Shah is a 53 y.o. adult.    Chief Complaint: Hair/Scalp Problem (Pt would like to discuss hair loss treatments. )      Has been struggling with hair loss past several years, but seems to be accelerating more recently.  Has gotten worse since she started losing weight and again recently after bout of COVID.  Would like to try to avoid oral therapies to reduce risk of systemic side effects.  Has never taken anything for this other than biotin OTC supplement.  Not on any testosterone blocking medications    Hair/Scalp Problem  Pertinent negatives include no chest pain or rash.     The patient location is: [place]  The chief complaint leading to consultation is: hair loss    Visit type: audiovisual    Face to Face time with patient: 6 min  10 minutes of total time spent on the encounter, which includes face to face time and non-face to face time preparing to see the patient (eg, review of tests), Obtaining and/or reviewing separately obtained history, Documenting clinical information in the electronic or other health record, Independently interpreting results (not separately reported) and communicating results to the patient/family/caregiver, or Care coordination (not separately reported).         Each patient to whom he or she provides medical services by telemedicine is:  (1) informed of the relationship between the physician and patient and the respective role of any other health care provider with respect to management of the patient; and (2) notified that he or she may decline to receive medical services by telemedicine and may withdraw from such care at any time.    Notes:    Review of Systems   Constitutional:  Negative for appetite change and unexpected weight change.   Respiratory:  Negative for shortness of breath.    Cardiovascular:  Negative for chest pain and palpitations.   Skin:  Negative for rash.   Psychiatric/Behavioral:  Negative for agitation and sleep disturbance.         Objective:      There were no vitals filed for this visit.  Physical Exam  Constitutional:       General: She is not in acute distress.     Appearance: Normal appearance. She is well-developed.   Pulmonary:      Effort: No respiratory distress.   Neurological:      Mental Status: She is alert and oriented to person, place, and time.   Psychiatric:         Behavior: Behavior normal.         Lab Results   Component Value Date    WBC 9.13 03/19/2024    HGB 13.9 (L) 03/19/2024    HCT 43.1 03/19/2024     03/19/2024    CHOL 107 (L) 03/05/2024    TRIG 140 03/05/2024    HDL 28 (L) 03/05/2024    ALT 30 03/19/2024    AST 26 03/19/2024     03/19/2024    K 4.1 03/19/2024     03/19/2024    CREATININE 1.1 03/19/2024    BUN 14 03/19/2024    CO2 23 03/19/2024    TSH 3.642 01/12/2024    INR 1.1 01/12/2024    HGBA1C 5.4 01/11/2024      Assessment:       1. Alopecia        Plan:       Alopecia  -     minoxidiL-finasteride 5-0.1 % Soln; Apply 1 application  topically 2 (two) times a day.  Dispense: 60 mL; Refill: 5  Trial of topical formulation for a few months.  If no improvement, switch to low-dose oral finasteride    Medication List with Changes/Refills   New Medications    MINOXIDIL-FINASTERIDE 5-0.1 % SOLN    Apply 1 application  topically 2 (two) times a day.   Current Medications    ASPIRIN (ECOTRIN) 81 MG EC TABLET    Take 1 tablet (81 mg total) by mouth once daily.    CETIRIZINE (ZYRTEC) 10 MG TABLET    Take 10 mg by mouth once daily.    DICLOFENAC SODIUM (VOLTAREN) 1 % GEL    Apply 2 g topically 4 (four) times daily as needed.    EZETIMIBE (ZETIA) 10 MG TABLET    Take 1 tablet (10 mg total) by mouth once daily.    INDOMETHACIN (INDOCIN) 50 MG CAPSULE    Take 1 capsule (50 mg total) by mouth 3 (three) times daily.    METOPROLOL TARTRATE (LOPRESSOR) 25 MG TABLET    Take 1 tablet (25 mg total) by mouth 2 (two) times daily.    MULTIVITAMIN (ONE DAILY MULTIVITAMIN) PER TABLET    Take 1 tablet by mouth once  daily.    NITROGLYCERIN (NITROSTAT) 0.4 MG SL TABLET    Place 1 tablet (0.4 mg total) under the tongue every 5 (five) minutes as needed for Chest pain. Call the 911 after the 3rd dose.    OLMESARTAN (BENICAR) 20 MG TABLET    TAKE 1 TABLET BY MOUTH EVERY DAY    PANTOPRAZOLE (PROTONIX) 40 MG TABLET    Take 1 tablet (40 mg total) by mouth 2 (two) times daily.    TIRZEPATIDE (MOUNJARO) 5 MG/0.5 ML PNIJ    Inject 5 mg into the skin every 7 days. for 4 doses    TIRZEPATIDE 10 MG/0.5 ML PNIJ    Inject 10 mg into the skin every 7 days.    TIRZEPATIDE 7.5 MG/0.5 ML PNIJ    Inject 7.5 mg into the skin every 7 days. for 4 doses   Discontinued Medications    BENZONATATE (TESSALON) 200 MG CAPSULE    Take 1 capsule (200 mg total) by mouth 3 (three) times daily as needed for Cough.    METFORMIN (GLUCOPHAGE) 500 MG TABLET    Take 1 tablet (500 mg total) by mouth 2 (two) times daily with meals.    NIRMATRELVIR-RITONAVIR 300 MG (150 MG X 2)-100 MG COPACKAGED TABLETS (EUA)    Take 3 tablets by mouth 2 (two) times daily for 5 days. Each dose contains 2 nirmatrelvir (pink tablets) and 1 ritonavir (white tablet). Take all 3 tablets together    OMEGA 3-DHA-EPA-FISH OIL 1,000 MG (120 MG-180 MG) CAP

## 2024-10-03 ENCOUNTER — PATIENT OUTREACH (OUTPATIENT)
Dept: ADMINISTRATIVE | Facility: HOSPITAL | Age: 54
End: 2024-10-03
Payer: COMMERCIAL

## 2024-10-03 RX ORDER — PRAVASTATIN SODIUM 20 MG/1
20 TABLET ORAL DAILY
COMMUNITY

## 2024-10-04 ENCOUNTER — PATIENT MESSAGE (OUTPATIENT)
Dept: PRIMARY CARE CLINIC | Facility: CLINIC | Age: 54
End: 2024-10-04
Payer: COMMERCIAL

## 2024-10-16 ENCOUNTER — PATIENT MESSAGE (OUTPATIENT)
Dept: PRIMARY CARE CLINIC | Facility: CLINIC | Age: 54
End: 2024-10-16
Payer: COMMERCIAL

## 2024-10-25 ENCOUNTER — E-VISIT (OUTPATIENT)
Dept: PRIMARY CARE CLINIC | Facility: CLINIC | Age: 54
End: 2024-10-25
Payer: COMMERCIAL

## 2024-10-25 DIAGNOSIS — K20.0 EOSINOPHILIC ESOPHAGITIS: Primary | ICD-10-CM

## 2024-10-28 RX ORDER — PANTOPRAZOLE SODIUM 40 MG/1
40 TABLET, DELAYED RELEASE ORAL 2 TIMES DAILY
Qty: 180 TABLET | Refills: 3 | Status: SHIPPED | OUTPATIENT
Start: 2024-10-28

## 2024-11-01 ENCOUNTER — OFFICE VISIT (OUTPATIENT)
Dept: CARDIOLOGY | Facility: CLINIC | Age: 54
End: 2024-11-01
Payer: COMMERCIAL

## 2024-11-01 VITALS
OXYGEN SATURATION: 96 % | HEIGHT: 65 IN | DIASTOLIC BLOOD PRESSURE: 72 MMHG | SYSTOLIC BLOOD PRESSURE: 100 MMHG | HEART RATE: 62 BPM | BODY MASS INDEX: 43.1 KG/M2 | WEIGHT: 258.69 LBS

## 2024-11-01 DIAGNOSIS — E78.5 HYPERLIPIDEMIA, UNSPECIFIED HYPERLIPIDEMIA TYPE: ICD-10-CM

## 2024-11-01 DIAGNOSIS — I77.819 AORTIC DILATATION: ICD-10-CM

## 2024-11-01 DIAGNOSIS — I10 HYPERTENSION, UNSPECIFIED TYPE: ICD-10-CM

## 2024-11-01 DIAGNOSIS — I25.10 CORONARY ARTERY DISEASE INVOLVING NATIVE CORONARY ARTERY OF NATIVE HEART WITHOUT ANGINA PECTORIS: ICD-10-CM

## 2024-11-01 DIAGNOSIS — I47.29 NSVT (NONSUSTAINED VENTRICULAR TACHYCARDIA): Primary | ICD-10-CM

## 2024-11-01 DIAGNOSIS — Z86.73 HISTORY OF CVA (CEREBROVASCULAR ACCIDENT): ICD-10-CM

## 2024-11-01 PROCEDURE — 99999 PR PBB SHADOW E&M-EST. PATIENT-LVL IV: CPT | Mod: PBBFAC,,, | Performed by: INTERNAL MEDICINE

## 2024-11-04 NOTE — PROGRESS NOTES
Subjective   Patient ID:  Martha Shah is a 53 y.o. adult who presents for follow-up of Tachycardia      53 yoF here for arrhythmia management.     5/24: She has had 2 episodes in 2024 (January and March) leading to ER visits and hospitalizations for NSTEMI. Before the March event, there was an elevated HR recorded on her FitBit. Normal EF. No LGE on cardiac MRI. Event monitor did not reveal any arrhythmias. She was on propranolol leading to the January event. She was switched to metoprolol 25 mg bid. Her symptoms have since subsided.     Review of Fitbit shows tachycardia that lasts 10-15s with change in QRS. Morphology on the single lead shows rapid intrinsicoid deflection. Later strips show the same morphology as single ectopic beats. Event monitor has PVCs with rapid intrinsicoid deflection.      Interval history: No recurrence of palpitations.     LHC 3/24:  ·  Mild-to-moderate nonobstructive coronary artery disease.  ·  Ectasia noted involving the right coronary artery and distal left main.    Echo 3/24:  ·  Left Ventricle: The left ventricle is normal in size. Mildly to moderately increased wall thickness. Unable to assess wall motion. There is normal systolic function with a visually estimated ejection fraction of 55 - 60%. There is normal diastolic function.  ·  Right Ventricle: Normal right ventricular cavity size. Systolic function is normal.  ·  IVC/SVC: Intermediate venous pressure at 8 mmHg.     MRI 4/24:  Conclusion:    1. LV volumes are normal. LV mass is normal.LVEF = 55%.   2. RV volumes are normal. RVEF = 47%.  3. Normal  T1 and T2 maps  4. No LGE appreciated in the left ventricular myocardium  5. 2mm thick pericardium with normal slippage, no pericardial effusion, and no LGE    My interpretation of the ECG is:    NSR nl UT, QRS, QTc    Past Medical History:  No date: Allergy  No date: Hypertension  01/07/2024: NSTEMI (non-ST elevated myocardial infarction)  No date: Sleep apnea, unspecified  No  date: Urinary tract infection    Past Surgical History:  No date: ADENOIDECTOMY  No date: ADENOIDECTOMY  No date: BIOPSY OF ADENOIDS  09/14/2022: COLONOSCOPY; N/A      Comment:  Procedure: COLONOSCOPY;  Surgeon: Jordy Simons MD;                 Location: [facility];  Service: Endoscopy;  Laterality:                N/A;  1/8/2024: CORONARY ANGIOGRAPHY; Right      Comment:  Procedure: ANGIOGRAM, CORONARY ARTERY;  Surgeon: Estrada Ojeda MD;  Location: [facility] [place] CATH LAB;  Service:                Cardiology;  Laterality: Right;  11/30/2023: EGD, WITH CLOSED BIOPSY  09/14/2022: ESOPHAGOGASTRODUODENOSCOPY; N/A      Comment:  Procedure: EGD (ESOPHAGOGASTRODUODENOSCOPY);  Surgeon:                Jordy Simons MD;  Location: [facility];  Service:                Endoscopy;  Laterality: N/A;  12/19/2022: ESOPHAGOGASTRODUODENOSCOPY      Comment:  with biopsy  12/19/2022: ESOPHAGOGASTRODUODENOSCOPY; N/A      Comment:  Procedure: EGD (ESOPHAGOGASTRODUODENOSCOPY);  Surgeon:                Jordy Simons MD;  Location: [facility];  Service:                Endoscopy;  Laterality: N/A;  11/30/2023: ESOPHAGOGASTRODUODENOSCOPY; N/A      Comment:  Procedure: EGD (ESOPHAGOGASTRODUODENOSCOPY);  Surgeon:                Vinay Bourgeois MD;  Location: [facility];                 Service: Endoscopy;  Laterality: N/A;  No date: TONSILLECTOMY    Social History    Socioeconomic History      Marital status:     Tobacco Use      Smoking status: Never        Passive exposure: Never      Smokeless tobacco: Never    Substance and Sexual Activity      Alcohol use: Not Currently        Comment: social      Drug use: No      Sexual activity: Yes        Partners: Female    Social Drivers of Health  Financial Resource Strain: Low Risk  (3/19/2024)      Overall Financial Resource Strain (CARDIA)          Difficulty of Paying Living Expenses: Not hard at all  Food Insecurity: No Food Insecurity (1/22/2024)      Hunger Vital  Sign          Worried About Running Out of Food in the Last Year: Never true          Ran Out of Food in the Last Year: Never true  Transportation Needs: No Transportation Needs (3/19/2024)      PRAPARE - Transportation          Lack of Transportation (Medical): No          Lack of Transportation (Non-Medical): No  Physical Activity: Inactive (1/22/2024)      Exercise Vital Sign          Days of Exercise per Week: 0 days          Minutes of Exercise per Session: 0 min  Stress: Stress Concern Present (3/19/2024)      Citizen of [place] [employer] - Occupational Stress Questionnaire          Feeling of Stress : To some extent  Housing Stability: Low Risk  (3/19/2024)      Housing Stability Vital Sign          Unable to Pay for Housing in the Last Year: No          Number of Places Lived in the Last Year: 1          Unstable Housing in the Last Year: No    Review of patient's family history indicates:  Problem: Lupus      Relation: Mother          Name:               Age of Onset: (Not Specified)  Problem: Heart disease      Relation: Father          Name:               Age of Onset: (Not Specified)  Problem: Prostate cancer      Relation: Neg Hx          Name:               Age of Onset: (Not Specified)  Problem: Kidney disease      Relation: Neg Hx          Name:               Age of Onset: (Not Specified)      Current Outpatient Medications:  aspirin (ECOTRIN) 81 MG EC tablet, Take 1 tablet (81 mg total) by mouth once daily., Disp: 90 tablet, Rfl: 4  cetirizine (ZYRTEC) 10 MG tablet, Take 10 mg by mouth once daily., Disp: , Rfl:   diclofenac sodium (VOLTAREN) 1 % Gel, Apply 2 g topically 4 (four) times daily as needed., Disp: 350 g, Rfl: 2  indomethacin (INDOCIN) 50 MG capsule, Take 1 capsule (50 mg total) by mouth 3 (three) times daily., Disp: 15 capsule, Rfl: 3  metoprolol tartrate (LOPRESSOR) 25 MG tablet, Take 1 tablet (25 mg total) by mouth 2 (two) times daily., Disp: 60 tablet, Rfl:  11  minoxidiL-finasteride 5-0.1 % Soln, Apply 1 application  topically 2 (two) times a day., Disp: 60 mL, Rfl: 5  multivitamin (ONE DAILY MULTIVITAMIN) per tablet, Take 1 tablet by mouth once daily., Disp: , Rfl:   nitroGLYCERIN (NITROSTAT) 0.4 MG SL tablet, Place 1 tablet (0.4 mg total) under the tongue every 5 (five) minutes as needed for Chest pain. Call the 911 after the 3rd dose., Disp: 25 tablet, Rfl: 3  pantoprazole (PROTONIX) 40 MG tablet, Take 1 tablet (40 mg total) by mouth 2 (two) times daily., Disp: 180 tablet, Rfl: 3  tirzepatide 10 mg/0.5 mL PnIj, Inject 10 mg into the skin every 7 days., Disp: 2 mL, Rfl: 2    No current facility-administered medications for this visit.        Review of Systems   Constitutional: Negative.   HENT: Negative.     Eyes: Negative.    Cardiovascular:  Negative for chest pain, dyspnea on exertion, leg swelling, near-syncope, palpitations and syncope.   Respiratory: Negative.  Negative for shortness of breath.    Endocrine: Negative.    Hematologic/Lymphatic: Negative.    Skin: Negative.    Musculoskeletal: Negative.    Gastrointestinal: Negative.    Genitourinary: Negative.    Neurological: Negative.  Negative for dizziness and light-headedness.   Psychiatric/Behavioral: Negative.     Allergic/Immunologic: Negative.           Objective     Physical Exam  Vitals reviewed.   Constitutional:       General: She is not in acute distress.     Appearance: She is well-developed.   HENT:      Head: Normocephalic and atraumatic.   Eyes:      Pupils: Pupils are equal, round, and reactive to light.   Neck:      Thyroid: No thyromegaly.      Vascular: No JVD.   Cardiovascular:      Rate and Rhythm: Normal rate and regular rhythm.      Chest Wall: PMI is not displaced.      Heart sounds: Normal heart sounds, S1 normal and S2 normal. No murmur heard.     No friction rub. No gallop.   Pulmonary:      Effort: Pulmonary effort is normal. No respiratory distress.      Breath sounds: Normal  breath sounds. No wheezing or rales.   Abdominal:      General: Bowel sounds are normal. There is no distension.      Palpations: Abdomen is soft.      Tenderness: There is no abdominal tenderness. There is no guarding or rebound.   Musculoskeletal:         General: No tenderness. Normal range of motion.      Cervical back: Normal range of motion.   Skin:     General: Skin is warm and dry.      Findings: No erythema or rash.   Neurological:      Mental Status: She is alert and oriented to person, place, and time.      Cranial Nerves: No cranial nerve deficit.   Psychiatric:         Behavior: Behavior normal.         Thought Content: Thought content normal.         Judgment: Judgment normal.            Assessment and Plan     1. NSVT (nonsustained ventricular tachycardia)    2. Tachycardia        Plan:  53 yoF here for management of palpitations. No recurrence of palpitations. RTC as needed

## 2024-11-05 ENCOUNTER — HOSPITAL ENCOUNTER (OUTPATIENT)
Dept: CARDIOLOGY | Facility: CLINIC | Age: 54
Discharge: HOME | End: 2024-11-05
Payer: COMMERCIAL

## 2024-11-05 ENCOUNTER — OFFICE VISIT (OUTPATIENT)
Dept: ELECTROPHYSIOLOGY | Facility: CLINIC | Age: 54
End: 2024-11-05
Payer: COMMERCIAL

## 2024-11-05 VITALS
WEIGHT: 261.94 LBS | DIASTOLIC BLOOD PRESSURE: 82 MMHG | BODY MASS INDEX: 43.64 KG/M2 | SYSTOLIC BLOOD PRESSURE: 110 MMHG | HEIGHT: 65 IN | HEART RATE: 73 BPM

## 2024-11-05 DIAGNOSIS — I49.9 CARDIAC ARRHYTHMIA, UNSPECIFIED CARDIAC ARRHYTHMIA TYPE: ICD-10-CM

## 2024-11-05 DIAGNOSIS — R00.0 TACHYCARDIA: ICD-10-CM

## 2024-11-05 DIAGNOSIS — I47.29 NSVT (NONSUSTAINED VENTRICULAR TACHYCARDIA): Primary | ICD-10-CM

## 2024-11-05 LAB
OHS QRS DURATION: 90 MS
OHS QTC CALCULATION: 407 MS

## 2024-11-05 PROCEDURE — 93010 ELECTROCARDIOGRAM REPORT: CPT | Mod: S$GLB,,, | Performed by: STUDENT IN AN ORGANIZED HEALTH CARE EDUCATION/TRAINING PROGRAM

## 2024-11-05 PROCEDURE — 99999 PR PBB SHADOW E&M-EST. PATIENT-LVL III: CPT | Mod: PBBFAC,,, | Performed by: INTERNAL MEDICINE

## 2024-11-05 PROCEDURE — 93005 ELECTROCARDIOGRAM TRACING: CPT | Mod: S$GLB,,, | Performed by: INTERNAL MEDICINE

## 2024-11-19 DIAGNOSIS — I10 PRIMARY HYPERTENSION: ICD-10-CM

## 2024-11-19 RX ORDER — OLMESARTAN MEDOXOMIL 20 MG/1
20 TABLET ORAL
Qty: 90 TABLET | Refills: 1 | OUTPATIENT
Start: 2024-11-19

## 2024-11-19 NOTE — TELEPHONE ENCOUNTER
Care Due:                  Date            Visit Type   Department     Provider  --------------------------------------------------------------------------------                                ESTABLISHED                              [place]IENT -    [facility] [place] [facility]  Last Visit: 10-      VIRTUAL      PRIMARY CARE   Estrada Partida                              ESTABLISHED                              PATIENT -    [facility] [place] [facility]  Next Visit: 11-      VIRTUAL      PRIMARY CARE   Estrada Partida                                                            Last  Test          Frequency    Reason                     Performed    Due Date  --------------------------------------------------------------------------------    HBA1C.......  6 months...  tirzepatide..............  01-   07-    [facility] Embedded Care Due Messages. Reference number: [id number].   11/19/2024 12:25:01 AM CST

## 2024-11-19 NOTE — TELEPHONE ENCOUNTER
Provider Staff:  Action required for this patient    Requires labs      Please see care gap opportunities below in Care Due Message.    Thanks!  [facility]     Appointments      Date Provider   Last Visit   10/2/2024 Estrada Partida MD   Next Visit   11/20/2024 Estrada Partida MD     Refill Decision Note   Martha Shah  is requesting a refill authorization.  Brief Assessment and Rationale for Refill:  Quick Discontinue     Medication Therapy Plan:  discontinued on 11/1/2024 by Estrada Ojeda MD      Comments:     Note composed:9:36 AM 11/19/2024             Appointments     Last Visit   10/2/2024 Estrada Partida MD   Next Visit   11/20/2024 Estrada Partida MD

## 2024-11-20 ENCOUNTER — OFFICE VISIT (OUTPATIENT)
Dept: PRIMARY CARE CLINIC | Facility: CLINIC | Age: 54
End: 2024-11-20
Payer: COMMERCIAL

## 2024-11-20 VITALS — BODY MASS INDEX: 42.27 KG/M2 | WEIGHT: 254 LBS

## 2024-11-20 DIAGNOSIS — E66.01 MORBID OBESITY WITH BMI OF 40.0-44.9, ADULT: Primary | ICD-10-CM

## 2024-11-20 DIAGNOSIS — M79.10 MYALGIA DUE TO STATIN: ICD-10-CM

## 2024-11-20 DIAGNOSIS — I10 PRIMARY HYPERTENSION: ICD-10-CM

## 2024-11-20 DIAGNOSIS — T46.6X5A MYALGIA DUE TO STATIN: ICD-10-CM

## 2024-11-20 DIAGNOSIS — L65.9 ALOPECIA: ICD-10-CM

## 2024-11-20 PROBLEM — R29.818 ACUTE FOCAL NEUROLOGICAL DEFICIT: Status: RESOLVED | Noted: 2024-01-11 | Resolved: 2024-11-20

## 2024-11-20 PROCEDURE — 4010F ACE/ARB THERAPY RXD/TAKEN: CPT | Mod: CPTII,95,, | Performed by: FAMILY MEDICINE

## 2024-11-20 PROCEDURE — 1159F MED LIST DOCD IN RCRD: CPT | Mod: CPTII,95,, | Performed by: FAMILY MEDICINE

## 2024-11-20 PROCEDURE — 3008F BODY MASS INDEX DOCD: CPT | Mod: CPTII,95,, | Performed by: FAMILY MEDICINE

## 2024-11-20 PROCEDURE — 1160F RVW MEDS BY RX/DR IN RCRD: CPT | Mod: CPTII,95,, | Performed by: FAMILY MEDICINE

## 2024-11-20 PROCEDURE — 99214 OFFICE O/P EST MOD 30 MIN: CPT | Mod: 95,,, | Performed by: FAMILY MEDICINE

## 2024-11-20 PROCEDURE — 3044F HG A1C LEVEL LT 7.0%: CPT | Mod: CPTII,95,, | Performed by: FAMILY MEDICINE

## 2024-11-20 RX ORDER — METOPROLOL TARTRATE 25 MG/1
25 TABLET, FILM COATED ORAL 2 TIMES DAILY
Qty: 180 TABLET | Refills: 3 | Status: SHIPPED | OUTPATIENT
Start: 2024-11-20

## 2024-11-20 NOTE — PROGRESS NOTES
Assessment:       1. Morbid obesity with BMI of 40.0-44.9, adult    2. Alopecia    3. Primary hypertension    4. Myalgia due to statin        Plan:       Morbid obesity with BMI of 40.0-44.9, adult  -     tirzepatide 10 mg/0.5 mL PnIj; Inject 10 mg into the skin every 7 days.  Dispense: 2 mL; Refill: 5    Alopecia    Primary hypertension  -     metoprolol tartrate (LOPRESSOR) 25 MG tablet; Take 1 tablet (25 mg total) by mouth 2 (two) times daily.  Dispense: 180 tablet; Refill: 3    Myalgia due to statin      Assessment & Plan    - Continued Terzepatide for weight loss support, increasing to 10mg dose  - Maintained metoprolol twice daily for blood pressure management  - Discontinued Benicar due to low blood pressure, as per Dr. Thakkar  - Considering potential future reduction in metoprolol dosage due to significant weight loss  - Opted to forego cholesterol medication due to patient's history of statin intolerance, as discussed with Dr. Ojeda  - Added statin intolerance to patient's allergy list    HYPERTENSION MANAGEMENT:   Patient to monitor blood pressure at home.   Continued metoprolol twice daily.   Ensured prescription is for 90-day supply.   Continued aspirin.    DIABETES MANAGEMENT:   Terzepatide increased to 10mg dose.   Patient to start 10mg dose on Saturday.   Provided at least 6 months of refills.    MEDICATION MANAGEMENT:   Contact office if experiencing intolerable side effects from Terzepatide or if wanting to adjust dosage.    FOLLOW-UP:   Follow up in 5-6 months for annual checkup, including lipid panel.        Medication List with Changes/Refills   Current Medications    ASPIRIN (ECOTRIN) 81 MG EC TABLET    Take 1 tablet (81 mg total) by mouth once daily.    CETIRIZINE (ZYRTEC) 10 MG TABLET    Take 10 mg by mouth once daily.    DICLOFENAC SODIUM (VOLTAREN) 1 % GEL    Apply 2 g topically 4 (four) times daily as needed.    INDOMETHACIN (INDOCIN) 50 MG CAPSULE    Take 1 capsule (50 mg total) by mouth 3  (three) times daily.    MINOXIDIL-FINASTERIDE 5-0.1 % SOLN    Apply 1 application  topically 2 (two) times a day.    MULTIVITAMIN (ONE DAILY MULTIVITAMIN) PER TABLET    Take 1 tablet by mouth once daily.    NITROGLYCERIN (NITROSTAT) 0.4 MG SL TABLET    Place 1 tablet (0.4 mg total) under the tongue every 5 (five) minutes as needed for Chest pain. Call the 911 after the 3rd dose.    PANTOPRAZOLE (PROTONIX) 40 MG TABLET    Take 1 tablet (40 mg total) by mouth 2 (two) times daily.   Changed and/or Refilled Medications    Modified Medication Previous Medication    METOPROLOL TARTRATE (LOPRESSOR) 25 MG TABLET metoprolol tartrate (LOPRESSOR) 25 MG tablet       Take 1 tablet (25 mg total) by mouth 2 (two) times daily.    Take 1 tablet (25 mg total) by mouth 2 (two) times daily.    TIRZEPATIDE 10 MG/0.5 ML PNIJ tirzepatide 10 mg/0.5 mL PnIj       Inject 10 mg into the skin every 7 days.    Inject 10 mg into the skin every 7 days.   Discontinued Medications    TIRZEPATIDE 7.5 MG/0.5 ML PNIJ    Inject 7.5 mg into the skin every 7 days. for 4 doses         Subjective:       Patient ID: Martha Shah is a 53 y.o. adult.    Chief Complaint: No chief complaint on file.      HPI  History of Present Illness    CHIEF COMPLAINT:  Patient presents today for follow-up on Terzepatide prescription.    WEIGHT MANAGEMENT:  She started Terzepatide 10 mg one month ago for weight loss support. She experiences mild nausea as a side effect. It's too early to fully assess its effectiveness, but she expresses willingness to continue with the current dose and monitor its effects.    CARDIOVASCULAR:  She reports a recent low blood pressure reading of 100/60 during a visit with Dr. Thakkar earlier this month, resulting in the discontinuation of Benicar. She denies experiencing chest pain and notes fewer chest pain problems since the medication change. She tolerates her current regimen well without feeling weaker when performing activities. However, she  experiences dizziness and nausea quickly upon exertion, a symptom present throughout the year with any type of physical activity. She denies muscle aches associated with exercise.    MEDICATIONS:  She is currently taking metoprolol twice daily and aspirin. She may need a refill on metoprolol but reports sufficient supply of aspirin until next summer. She reports intolerance to statins, experiencing severe muscle pain on her left side with lingering twinges. She has tried multiple cholesterol medications, including atorvastatin and rosuvastatin, but all have caused side effects. She is currently not taking any cholesterol medication as per discussion with Dr. Ojeda.    DERMATOLOGY:  She has been using topical minoxidil for one month. She states it's too early to determine its effectiveness and denies any noticeable changes or side effects from the treatment at this time.    PODIATRY:  She reports ongoing issues with her feet and plans to schedule an appointment with Dr. Urbina, a podiatrist, for further evaluation and management of these concerns.      ROS:  Constitutional: +dizziness  Cardiovascular: -chest pain  Gastrointestinal: +nausea  Musculoskeletal: +muscle pain       The patient location is: [place]  The chief complaint leading to consultation is: weight management    Visit type: audiovisual    Face to Face time with patient: 9 minutes  12 minutes of total time spent on the encounter, which includes face to face time and non-face to face time preparing to see the patient (eg, review of tests), Obtaining and/or reviewing separately obtained history, Documenting clinical information in the electronic or other health record, Independently interpreting results (not separately reported) and communicating results to the patient/family/caregiver, or Care coordination (not separately reported).         Each patient to whom he or she provides medical services by telemedicine is:  (1) informed of the relationship between the  physician and patient and the respective role of any other health care provider with respect to management of the patient; and (2) notified that he or she may decline to receive medical services by telemedicine and may withdraw from such care at any time.    Notes:    Review of Systems    Objective:      Vitals:    11/20/24 1337   Weight: 115.2 kg (254 lb)     BP Readings from Last 5 Encounters:   11/05/24 110/82   11/01/24 100/72   07/31/24 110/70   05/28/24 118/82   04/24/24 (!) 113/59     Wt Readings from Last 5 Encounters:   11/20/24 115.2 kg (254 lb)   11/05/24 118.8 kg (261 lb 14.5 oz)   11/01/24 117.4 kg (258 lb 11.4 oz)   07/31/24 126.4 kg (278 lb 8.8 oz)   05/28/24 131.7 kg (290 lb 5.5 oz)     Physical Exam  Constitutional:       General: She is not in acute distress.     Appearance: Normal appearance. She is well-developed.   Pulmonary:      Effort: No respiratory distress.   Neurological:      Mental Status: She is alert and oriented to person, place, and time.   Psychiatric:         Behavior: Behavior normal.         Lab Results   Component Value Date    WBC 9.13 03/19/2024    HGB 13.9 (L) 03/19/2024    HCT 43.1 03/19/2024     03/19/2024    CHOL 107 (L) 03/05/2024    TRIG 140 03/05/2024    HDL 28 (L) 03/05/2024    ALT 30 03/19/2024    AST 26 03/19/2024     03/19/2024    K 4.1 03/19/2024     03/19/2024    CREATININE 1.1 03/19/2024    BUN 14 03/19/2024    CO2 23 03/19/2024    TSH 3.642 01/12/2024    INR 1.1 01/12/2024    HGBA1C 5.4 01/11/2024       This note was generated with the assistance of ambient listening technology. Verbal consent was obtained by the patient and accompanying visitor(s) for the recording of patient appointment to facilitate this note. I attest to having reviewed and edited the generated note for accuracy, though some syntax or spelling errors may persist. Please contact the author of this note for any clarification.

## 2024-12-09 ENCOUNTER — E-VISIT (OUTPATIENT)
Dept: PRIMARY CARE CLINIC | Facility: CLINIC | Age: 54
End: 2024-12-09
Payer: COMMERCIAL

## 2024-12-09 DIAGNOSIS — I10 PRIMARY HYPERTENSION: Primary | ICD-10-CM

## 2024-12-09 RX ORDER — LOSARTAN POTASSIUM 25 MG/1
25 TABLET ORAL DAILY
Qty: 90 TABLET | Refills: 0 | Status: SHIPPED | OUTPATIENT
Start: 2024-12-09

## 2024-12-09 NOTE — PROGRESS NOTES
@Patient ID: Martha Shah is a 53 y.o. adult.    Chief Complaint: General Illness (Entered automatically based on patient selection in [employer].)    The patient initiated a request through [employer] on 12/9/2024 for evaluation and management with a chief complaint of General Illness (Entered automatically based on patient selection in [employer].)     I evaluated the questionnaire submission on 12/9/24.    [facility] Peq Evisit Supergroup-Chronic Conditions    12/9/2024  2:05 PM CST - Filed by Patient   What do you need help with? High Blood Pressure   Do you agree to participate in an E-Visit? Yes   If you have any of the following symptoms, please do not complete an E-Visit. Instead, schedule an appointment with your provider I acknowledge   Do you have any of the following pregnancy-related conditions? None   What would you like addressed about your blood pressure? Recent blood pressure medication change   What is the main issue you would like addressed today? blood pressure is elevated again   How would you classify your blood pressure? Out of control   Are you having any of the following symptoms from your high blood pressure? None of the above   Are you taking any of the following medications? Decongestants or cold medicines   The following factors can make high blood pressure worse or harder to control. Which of them might be contributing to your high blood pressure?  Excess weight;  Lack of exercise   Have you taken blood pressure medications in the past that caused you problems or side effects? Yes   What was/were the medication(s) and what type of side effects? Benicar - my blood pressure was actually too low on this medicine   Are you currently taking medication(s) for your blood pressure? Yes   Have you recently started a new medication or changed your dose? No   How often are you taking your medication per week?  Every day   Have you had any side effects from your current blood pressure medication? No   Are you able  to take your blood pressure? Yes   Please give your most recent blood pressure readings    Reading 1 155/83 - 12/9/24 - 2:00 PM   Reading 2 146/93 - 12/8/24 - 9:00 PM   Reading 3 115/67 - 11/2/24   Reading 4    Reading 5    If you are able to take your pulse, please provide it below. 67   Provide any additional information you feel is important. Dr. Ojeda stopped the benicar because my BP was too low, but advised me to monitor. It has since gone back up to current bad levels.   Please attach any relevant images or files    Are you able to take any other vitals? Yes   Weight: 252   Height: 65   Temperature:    Respiration rate: 11   Pulse Oxygen: 96         Encounter Diagnosis   Name Primary?    Primary hypertension Yes        No orders of the defined types were placed in this encounter.     Medications Ordered This Encounter   Medications    losartan (COZAAR) 25 MG tablet     Sig: Take 1 tablet (25 mg total) by mouth once daily.     Dispense:  90 tablet     Refill:  0     .        No follow-ups on file.      E-Visit Time Tracking:    Day 1 Time (in minutes): 7    Total Time (in minutes): 7

## 2024-12-16 PROBLEM — N39.0 ACUTE UTI: Status: ACTIVE | Noted: 2024-12-16

## 2024-12-16 PROBLEM — A41.9 SEPSIS: Status: ACTIVE | Noted: 2024-12-16

## 2024-12-16 PROBLEM — K85.90 ACUTE PANCREATITIS: Status: ACTIVE | Noted: 2024-12-16

## 2024-12-19 ENCOUNTER — NURSE TRIAGE (OUTPATIENT)
Dept: ADMINISTRATIVE | Facility: CLINIC | Age: 54
End: 2024-12-19
Payer: COMMERCIAL

## 2024-12-19 ENCOUNTER — TELEPHONE (OUTPATIENT)
Dept: PRIMARY CARE CLINIC | Facility: CLINIC | Age: 54
End: 2024-12-19

## 2024-12-19 RX ORDER — SUCRALFATE 1 G/1
1 TABLET ORAL
Qty: 20 TABLET | Refills: 0 | Status: SHIPPED | OUTPATIENT
Start: 2024-12-19 | End: 2024-12-24

## 2024-12-19 NOTE — TELEPHONE ENCOUNTER
----- Message from Diego sent at 12/19/2024 11:12 AM CST -----  Regarding: Alternative Help with Medication  Contact: Pt [phone]  [email or web address]     Patient is calling for Medical Advice regarding: Patient went to the ER recently and they prescribed a medication that is too expensive out-of-pocket. She wanted to know if she could see about an alternative OR if she needed to just go ahead and pay out-of-pocket. Please call patient back at number provided.    How long has patient had these symptoms:    Pharmacy name and phone#:      Patient wants a call back or thru [facility]:    Comments:    Please advise patient replies from provider may take up to 48 hours.

## 2024-12-19 NOTE — TELEPHONE ENCOUNTER
Reason for Disposition   Taking prescription medication that could cause nausea (e.g., narcotics/opiates, antibiotics, OCPs, many others)    Additional Information   Negative: Shock suspected (e.g., cold/pale/clammy skin, too weak to stand, low BP, rapid pulse)   Negative: Sounds like a life-threatening emergency to the triager   Negative: Unable to walk, or can only walk with assistance (e.g., requires support)   Negative: Difficulty breathing   Negative: [1] Insulin-dependent diabetes (Type I) AND [2] glucose > 400 mg/dL (22 mmol/L)   Negative: [1] Drinking very little AND [2] dehydration suspected (e.g., no urine > 12 hours, very dry mouth, very lightheaded)   Negative: Patient sounds very sick or weak to the triager   Negative: Fever > 104 F (40 C)   Negative: [1] Fever > 101 F (38.3 C) AND [2] age > 60 years   Negative: [1] Fever > 100 F (37.8 C) AND [2] bedridden (e.g., CVA, chronic illness, recovering from surgery)   Negative: [1] Fever > 100 F (37.8 C) AND [2] diabetes mellitus or weak immune system (e.g., HIV positive, cancer chemo, splenectomy, organ transplant, chronic steroids)   Negative: Taking any of the following medications: digoxin (Lanoxin), lithium, theophylline, phenytoin (Dilantin)   Negative: Yellowish color of the skin or white of the eye (i.e., jaundice)   Negative: Fever present > 3 days (72 hours)   Negative: Receiving cancer chemotherapy medication    Protocols used: Nausea-A-AH  Pt states she has nausea. States she is currently on antibiotics (Cipro) for sepsis. Advised per the Triage protocol to contact her PCP within 24 hrs. Advised a message will be sent to Dr. Partida's office to contact her. Also advised of On Demand VV. Instructed to call OOC back if symptoms worsen. Pt verbalized understanding.

## 2024-12-19 NOTE — TELEPHONE ENCOUNTER
Called pt regarding message. Pt stated she was admitted to hospital for sepsis. Pt stated she is feeling better. Appt has been scheduled.

## 2025-01-03 ENCOUNTER — OFFICE VISIT (OUTPATIENT)
Dept: PRIMARY CARE CLINIC | Facility: CLINIC | Age: 55
End: 2025-01-03
Payer: COMMERCIAL

## 2025-01-03 VITALS
RESPIRATION RATE: 12 BRPM | WEIGHT: 256.63 LBS | OXYGEN SATURATION: 99 % | SYSTOLIC BLOOD PRESSURE: 120 MMHG | TEMPERATURE: 98 F | HEIGHT: 65 IN | BODY MASS INDEX: 42.76 KG/M2 | HEART RATE: 56 BPM | DIASTOLIC BLOOD PRESSURE: 84 MMHG

## 2025-01-03 DIAGNOSIS — N30.90 CYSTITIS: ICD-10-CM

## 2025-01-03 DIAGNOSIS — M10.00 ACUTE IDIOPATHIC GOUT, UNSPECIFIED SITE: ICD-10-CM

## 2025-01-03 DIAGNOSIS — E66.01 MORBID OBESITY WITH BMI OF 40.0-44.9, ADULT: ICD-10-CM

## 2025-01-03 DIAGNOSIS — Z09 HOSPITAL DISCHARGE FOLLOW-UP: Primary | ICD-10-CM

## 2025-01-03 LAB
BILIRUB UR QL STRIP: NEGATIVE
CLARITY UR: CLEAR
COLOR UR: YELLOW
GLUCOSE UR QL STRIP: NEGATIVE
HGB UR QL STRIP: NEGATIVE
KETONES UR QL STRIP: NEGATIVE
LEUKOCYTE ESTERASE UR QL STRIP: ABNORMAL
MICROSCOPIC COMMENT: ABNORMAL
NITRITE UR QL STRIP: NEGATIVE
PH UR STRIP: 7 [PH] (ref 5–8)
PROT UR QL STRIP: ABNORMAL
RBC #/AREA URNS HPF: 4 /HPF (ref 0–4)
SP GR UR STRIP: 1.02 (ref 1–1.03)
SQUAMOUS #/AREA URNS HPF: 1 /HPF
URN SPEC COLLECT METH UR: ABNORMAL
UROBILINOGEN UR STRIP-ACNC: NEGATIVE EU/DL
WBC #/AREA URNS HPF: 46 /HPF (ref 0–5)

## 2025-01-03 PROCEDURE — 81001 URINALYSIS AUTO W/SCOPE: CPT | Performed by: NURSE PRACTITIONER

## 2025-01-03 PROCEDURE — 87086 URINE CULTURE/COLONY COUNT: CPT | Performed by: NURSE PRACTITIONER

## 2025-01-03 PROCEDURE — 99999 PR PBB SHADOW E&M-EST. PATIENT-LVL V: CPT | Mod: PBBFAC,,, | Performed by: NURSE PRACTITIONER

## 2025-01-03 RX ORDER — METFORMIN HYDROCHLORIDE 500 MG/1
500 TABLET ORAL 2 TIMES DAILY WITH MEALS
Qty: 180 TABLET | Refills: 3 | Status: SHIPPED | OUTPATIENT
Start: 2025-01-03 | End: 2025-04-03

## 2025-01-03 RX ORDER — INDOMETHACIN 50 MG/1
50 CAPSULE ORAL 3 TIMES DAILY
Qty: 15 CAPSULE | Refills: 3 | Status: SHIPPED | OUTPATIENT
Start: 2025-01-03

## 2025-01-03 NOTE — PROGRESS NOTES
Assessment:       1. Hospital discharge follow-up    2. Acute idiopathic gout, unspecified site    3. Morbid obesity with BMI of 40.0-44.9, adult    4. Cystitis    5. BMI 40.0-44.9, adult         Plan:       Hospital discharge follow-up    Acute idiopathic gout, unspecified site  -     indomethacin (INDOCIN) 50 MG capsule; Take 1 capsule (50 mg total) by mouth 3 (three) times daily.  Dispense: 15 capsule; Refill: 3  Upcoming appointment with Rheumatology can discuss    Morbid obesity with BMI of 40.0-44.9, adult  -     Ambulatory referral/consult to Bariatric/Obesity Medicine; Future; Expected date: 01/10/2025    Cystitis  -     Urinalysis, Reflex to Urine Culture Urine, Clean Catch    BMI 40.0-44.9, adult  -     metFORMIN (GLUCOPHAGE) 500 MG tablet; Take 1 tablet (500 mg total) by mouth 2 (two) times daily with meals.  Dispense: 180 tablet; Refill: 3  -     Ambulatory referral/consult to Bariatric/Obesity Medicine; Future; Expected date: 01/10/2025      Assessment & Plan    IMPRESSION:  - Reviewed patient's recent ER visit for sepsis secondary to UTI, noting hemodynamic stability at discharge  - Assessed recent episode of pancreatitis, potentially related to tirzepatide use  - Evaluated current weight management strategy, considering alternatives to tirzepatide given limited efficacy and side effects  - Considered Metformin as a potential alternative for weight management  - Noted recurrent gout flares, successfully managed with colchicine and indomethacin    PANCREATITIS:  - Discontinued tirzepatide (Mounjaro) due to potential link with pancreatitis and limited efficacy.  - Martha presented with epigastric pain, vomiting, and non-bloody emesis.  - CT revealed pancreatic stranding, particularly in the pancreatic tail.  - Noted that lipase levels remained normal throughout the hospital stay. - questioning if pancreatitis was actually inappropriate diagnosis.  - Observed that the patient did not experience severe  abdominal pain typical of pancreatitis.  - Treated the patient with clear liquids for 1.5 days in the hospital. - with full resolution of symptoms.  Of note, patient has not taken tears appetite in 3 weeks    URINARY TRACT INFECTION (UTI):  - Ordered urine sample collection in clinic to recheck for white blood cells.  - Noted that the patient had white blood cells in urine with a negative culture.  - Initiated empiric antibiotics in the hospital for suspected UTI.  - Instructed the patient to provide a follow-up urine sample in the clinic.    GASTRITIS:  - Noted that the patient experienced nausea and vomiting prior to hospital admission.  - Diagnosed the patient with diffuse gastritis.    GOUT:  - Continued indomethacin for gout management.  - Observed that the patient experienced a severe gout flare-up in both feet while in the hospital.  - Noted that gout has been recurring for the last year.  - Recognized that gout typically presents with swelling and warmth in the foot, usually in the toes, but this time it was in a different area of the foot.  - Treated the patient with colchicine for gout flare-up in the hospital.  - Refilled indomethacin prescription for gout management.  - Confirmed that the patient has an appointment scheduled with a rheumatologist to discuss recurring gout.    WEIGHT MANAGEMENT:  - Initiated Metformin 500 mg twice daily with meals for weight management.  - Referred the patient to Dr. Marianne Patino for bariatric medicine consultation and alternative weight loss strategies.  - Advised the patient to contact the office if interested in trying Contrave for weight management.  - Noted that the patient has lost 45 lbs over the past year, with 20 lbs attributed to Tirzepatide (Mounjaro) use.  - Observed that the patient has gained 3 lbs since discontinuing Tirzepatide.  - Discussed various weight loss options including medication and bariatric medicine referral.  - Initiated Metformin 500 mg  twice daily with meals for weight management.  - Noted that the patient reports being considered pre-diabetic at points in the past.       Medication List with Changes/Refills   New Medications    METFORMIN (GLUCOPHAGE) 500 MG TABLET    Take 1 tablet (500 mg total) by mouth 2 (two) times daily with meals.   Current Medications    ASPIRIN (ECOTRIN) 81 MG EC TABLET    Take 1 tablet (81 mg total) by mouth once daily.    CETIRIZINE (ZYRTEC) 10 MG TABLET    Take 10 mg by mouth once daily.    COLCHICINE (COLCRYS) 0.6 MG TABLET    Take 1 tablet (0.6 mg total) by mouth daily as needed.    DICLOFENAC SODIUM (VOLTAREN) 1 % GEL    Apply 2 g topically 4 (four) times daily as needed.    LOSARTAN (COZAAR) 25 MG TABLET    Take 1 tablet (25 mg total) by mouth once daily.    METOPROLOL TARTRATE (LOPRESSOR) 25 MG TABLET    Take 1 tablet (25 mg total) by mouth 2 (two) times daily.    MINOXIDIL-FINASTERIDE 5-0.1 % SOLN    Apply 1 application  topically 2 (two) times a day.    MULTIVITAMIN (ONE DAILY MULTIVITAMIN) PER TABLET    Take 1 tablet by mouth once daily.    NITROGLYCERIN (NITROSTAT) 0.4 MG SL TABLET    Place 1 tablet (0.4 mg total) under the tongue every 5 (five) minutes as needed for Chest pain. Call the 911 after the 3rd dose.    PANTOPRAZOLE (PROTONIX) 40 MG TABLET    Take 1 tablet (40 mg total) by mouth once daily.   Changed and/or Refilled Medications    Modified Medication Previous Medication    INDOMETHACIN (INDOCIN) 50 MG CAPSULE indomethacin (INDOCIN) 50 MG capsule       Take 1 capsule (50 mg total) by mouth 3 (three) times daily.    Take 1 capsule (50 mg total) by mouth 3 (three) times daily.   Discontinued Medications    PANTOPRAZOLE (PROTONIX) 40 MG TABLET    Take 1 tablet (40 mg total) by mouth 2 (two) times daily.    TIRZEPATIDE 10 MG/0.5 ML PNIJ    Inject 10 mg into the skin every 7 days.         Subjective:    Patient ID: Martha Shah is a 54 y.o. adult.  Chief Complaint: Hospital Follow Up    History of Present  Illness    CHIEF COMPLAINT:  Martha presents today for follow-up after hospitalization for epigastric pain and vomiting.    RECENT HOSPITALIZATION:  She experienced vomiting every two hours for eight hours with only liquid emesis. After a brief period of sleep, she developed tachycardia with heart rate of 150 bpm, prompting her to call 911. She was diagnosed with sepsis and suspected pancreatitis. During hospitalization, she was placed on clear liquids for a day and a half before discharge on December 18th. She currently reports some abdominal pain with nausea but denies severe pain.    WEIGHT MANAGEMENT:  She initially lost 20 lbs on tirzepatide before weight loss plateaued. She discontinued tirzepatide due to increasing nausea and reports feeling better since stopping the medication. She was taking the medication solely for weight management as she is not diabetic. She previously used Metformin with positive results and is interested in resuming this medication to maintain current weight. She expresses interest in possibly consulting with a bariatric medicine specialist for medication management.    GOUT:  She experienced a severe gout flare-up while in the hospital, which has been recurrent over the last year. She reports swelling and a warm feeling in her foot, usually affecting the toes.       Review of Systems   Constitutional:  Positive for unexpected weight change (Difficulty with weight loss). Negative for chills and fever.   HENT:  Negative for ear pain, nosebleeds, sinus pressure and sore throat.    Respiratory:  Negative for cough and shortness of breath.    Cardiovascular:  Negative for chest pain and palpitations.   Gastrointestinal:  Negative for diarrhea, nausea and vomiting.   Genitourinary: Negative.    Psychiatric/Behavioral:  Negative for dysphoric mood and sleep disturbance. The patient is not nervous/anxious.        Objective:      Vitals:    01/03/25 1048   BP: 120/84   BP Location: Left arm  "  Patient Position: Sitting   Pulse: (!) 56   Resp: 12   Temp: 97.5 °F (36.4 °C)   TempSrc: Temporal   SpO2: 99%   Weight: 116.4 kg (256 lb 9.9 oz)   Height: 5' 5" (1.651 m)     BP Readings from Last 5 Encounters:   01/03/25 120/84   12/18/24 116/75   11/05/24 110/82   11/01/24 100/72   07/31/24 110/70     Wt Readings from Last 5 Encounters:   01/03/25 116.4 kg (256 lb 9.9 oz)   12/16/24 116.4 kg (256 lb 9.9 oz)   11/20/24 115.2 kg (254 lb)   11/05/24 118.8 kg (261 lb 14.5 oz)   11/01/24 117.4 kg (258 lb 11.4 oz)     Physical Exam  Constitutional:       General: She is not in acute distress.     Appearance: Normal appearance. She is not ill-appearing.   HENT:      Head: Normocephalic.      Mouth/Throat:      Mouth: Mucous membranes are moist.      Pharynx: No pharyngeal swelling or oropharyngeal exudate.      Tonsils: No tonsillar exudate.   Eyes:      Conjunctiva/sclera:      Right eye: Right conjunctiva is not injected.      Left eye: Left conjunctiva is not injected.   Cardiovascular:      Rate and Rhythm: Regular rhythm. Bradycardia present.      Pulses:           Radial pulses are 1+ on the right side and 1+ on the left side.      Heart sounds: No murmur heard.     No systolic murmur is present.      No diastolic murmur is present.   Pulmonary:      Effort: No tachypnea or bradypnea.      Breath sounds: Normal breath sounds. No decreased breath sounds, wheezing, rhonchi or rales.   Abdominal:      General: There is no distension.   Musculoskeletal:      Right lower leg: No edema.      Left lower leg: No edema.   Skin:     General: Skin is warm and dry.   Neurological:      Mental Status: She is alert.   Psychiatric:         Attention and Perception: Attention normal.         Speech: Speech normal.         Behavior: Behavior normal.           Lab Results   Component Value Date    WBC 7.64 12/18/2024    HGB 13.2 (L) 12/18/2024    HCT 41.2 12/18/2024     12/18/2024    CHOL 107 (L) 03/05/2024    TRIG 140 " 03/05/2024    HDL 28 (L) 03/05/2024    ALT 23 12/18/2024    AST 24 12/18/2024     12/18/2024    K 4.6 12/18/2024     12/18/2024    CREATININE 1.1 12/18/2024    BUN 10 12/18/2024    CO2 25 12/18/2024    TSH 3.642 01/12/2024    INR 1.1 01/12/2024    HGBA1C 5.4 01/11/2024      This note was generated with the assistance of ambient listening technology. Verbal consent was obtained by the patient and accompanying visitor(s) for the recording of patient appointment to facilitate this note. I attest to having reviewed and edited the generated note for accuracy, though some syntax or spelling errors may persist. Please contact the author of this note for any clarification.

## 2025-01-15 ENCOUNTER — PATIENT MESSAGE (OUTPATIENT)
Dept: PRIMARY CARE CLINIC | Facility: CLINIC | Age: 55
End: 2025-01-15
Payer: COMMERCIAL

## 2025-01-15 DIAGNOSIS — N30.90 CYSTITIS: Primary | ICD-10-CM

## 2025-01-28 NOTE — PROGRESS NOTES
"Subjective:      Martha Shah is a 54 y.o. adult who presents for evaluation of UTI.      Recently admitted for pancreatitis/sepsis (12/16/2024).  Urine culture revealed multiple organisms. Blood cultures: no growth.  Repeat UC: multiple organisms.   CTAP with contrast revealed atrophic right kidney, 3 x 5 mm bladder stone.  Negative for hydronephrosis, nephrolithiasis  Patient denies previous renal stone history.  Denies recent flank pain/gross hematuria.  Denies dysuria  Reports history of hypospadias and expresses concern over ability to pass bladder stone, but open to trial of passage.     The following portions of the patient's history were reviewed and updated as appropriate: allergies, current medications, past family history, past medical history, past social history, past surgical history and problem list.    Review of Systems  Constitutional: no fever or chills  ENT: no nasal congestion or sore throat  Respiratory: no cough or shortness of breath  Cardiovascular: no chest pain or palpitations  Gastrointestinal: no nausea or vomiting, tolerating diet  Genitourinary: as per HPI  Hematologic/Lymphatic: no easy bruising or lymphadenopathy  Musculoskeletal: no arthralgias or myalgias  Neurological: no seizures or tremors  Behavioral/Psych: no auditory or visual hallucinations     Objective:   Vitals: /85 (BP Location: Left arm, Patient Position: Sitting)   Pulse 74   Ht 5' 5" (1.651 m)   Wt 116.9 kg (257 lb 11.5 oz)   BMI 42.89 kg/m²     Physical Exam   General: alert and oriented, no acute distress  Head: normocephalic, atraumatic  Neck: supple, no lymphadenopathy, normal ROM, no masses  Respiratory: Symmetric expansion, non-labored breathing  Cardiovascular: regular rate and rhythm, nomal pulses, no peripheral edema  Abdomen: soft, non tender, non distended  Skin: normal coloration and turgor, no rashes, no suspicious skin lesions noted  Neuro: alert and oriented x3, no gross deficits  Psych: normal " judgment and insight, normal mood/affect, and non-anxious    Physical Exam    Lab Review   Urinalysis demonstrates positive for leukocytes  Lab Results   Component Value Date    WBC 7.64 12/18/2024    HGB 13.2 (L) 12/18/2024    HCT 41.2 12/18/2024    MCV 91 12/18/2024     12/18/2024     Lab Results   Component Value Date    CREATININE 1.1 12/18/2024    BUN 10 12/18/2024     Component      Latest Ref Rng 5/28/2021 12/16/2024 1/3/2025   RBC, UA      0 - 4 /hpf 4  3  4    WBC, UA      0 - 5 /hpf 8 (H)  30 (H)  46 (H)    Bacteria, UA      None-Occ /hpf Rare  Rare     Microscopic Comment SEE COMMENT  SEE COMMENT  SEE COMMENT    Squam Epithel, UA      /hpf  1  1         Imaging  CT ABDOMEN PELVIS WITH IV CONTRAST     CLINICAL HISTORY:  Nausea/vomiting;     TECHNIQUE:  Low dose axial images, sagittal and coronal reformations were obtained from the lung bases to the pubic symphysis following the IV administration of 100 mL of Omnipaque 350 .  Oral contrast was not administered.     COMPARISON:  None.     FINDINGS:  Abdomen:     - Lower thorax:Normal.     - Lung bases: No infiltrates and no nodules.     - Liver: The liver is enlarged measuring 18.3 cm in craniocaudad dimension but otherwise appears normal.     - Gallbladder: No calcified gallstones.     - Bile Ducts: No evidence of intra or extra hepatic biliary ductal dilation.     - Spleen: Negative.     - Kidneys: There is relative atrophy of the right kidney.  There is lobulation of both kidneys likely indicative of chronic renal parenchymal scarring.  There is no hydroureteronephrosis bilaterally.  No suspicious renal mass.     - Adrenals: Unremarkable.     - Pancreas: The pancreas is hypodense.  There is stranding of the fat posterior to the pancreatic tail which could be indicative of acute pancreatitis under appropriate clinical conditions.  Correlate clinically.     - Retroperitoneum:  No significant adenopathy.     - Vascular: Mild atherosclerotic  calcification of the right common iliac artery and right internal iliac artery.     - Abdominal wall:  Small fat containing periumbilical hernia.     Pelvis:     There is a layering calculus within the bladder measuring 6 mm.  No bladder wall thickening.     Prostate noted.     Bowel/Mesentery:     The stomach is decompressed with a suggestion of diffuse wall thickening and submucosal edema.  The findings are suggestive of diffuse gastritis.  There is stranding of the fat surrounding the stomach.     Bones:  No acute osseous abnormality and no suspicious lytic or blastic lesion.     Impression:     Findings suggestive of diffuse gastritis.     Diffusely hypodense pancreas should be correlated for clinical evidence of acute pancreatitis.     Hepatomegaly.     Bladder calculus.     This report was flagged in Epic as abnormal.        Electronically signed by:Nish Ignacio  Date:                                            12/16/2024  Time:                                           16:03    Assessment and Plan:   1. Cystitis  --UC negative; 46 WBC count, asymptomatic     2. Bladder stone  - tamsulosin (FLOMAX) 0.4 mg Cap; Take 1 capsule (0.4 mg total) by mouth once daily.  Dispense: 30 capsule; Refill: 1  - ketorolac (TORADOL) 10 mg tablet; Take 1 tablet (10 mg total) by mouth every 6 (six) hours as needed for Pain.  Dispense: 20 tablet; Refill: 1      --will proceed with trial of passage  --Drink 2-3 liters of water daily  --Flomax daily   --Toradol PRN for pain   --Strain every urine.  Strainer provided.    --Patient instructed to bring in stone for stone analysis.  Sterile cup provided.    --Will repeat CT RSS if patient has not passed stone in 2 weeks. If stone remains then will proceed with cystolithiplexy and possible outlet procedure       Recommend general preventive measures, to include increased hydration, low Na diet, and increased citrus intake  Discussed indications to present to ED, including fever,  intractable pain, and intractable nausea/vomitting    --PSA after stone episode    This note is dictated on M*Modal word recognition program.  There are word recognition mistakes that are occasionally missed on review.

## 2025-01-29 ENCOUNTER — OFFICE VISIT (OUTPATIENT)
Dept: UROLOGY | Facility: CLINIC | Age: 55
End: 2025-01-29
Payer: COMMERCIAL

## 2025-01-29 VITALS
DIASTOLIC BLOOD PRESSURE: 85 MMHG | WEIGHT: 257.69 LBS | HEART RATE: 74 BPM | HEIGHT: 65 IN | BODY MASS INDEX: 42.93 KG/M2 | SYSTOLIC BLOOD PRESSURE: 136 MMHG

## 2025-01-29 DIAGNOSIS — N30.90 CYSTITIS: ICD-10-CM

## 2025-01-29 DIAGNOSIS — N21.0 BLADDER STONE: Primary | ICD-10-CM

## 2025-01-29 LAB
BILIRUB SERPL-MCNC: NEGATIVE MG/DL
BLOOD URINE, POC: NORMAL
CLARITY, POC UA: NORMAL
COLOR, POC UA: YELLOW
GLUCOSE UR QL STRIP: NEGATIVE
KETONES UR QL STRIP: NEGATIVE
LEUKOCYTE ESTERASE URINE, POC: 1
NITRITE, POC UA: NEGATIVE
PH, POC UA: 8
PROTEIN, POC: NEGATIVE
SPECIFIC GRAVITY, POC UA: 1
UROBILINOGEN, POC UA: NORMAL

## 2025-01-29 PROCEDURE — 81002 URINALYSIS NONAUTO W/O SCOPE: CPT | Mod: S$GLB,,, | Performed by: NURSE PRACTITIONER

## 2025-01-29 PROCEDURE — 87086 URINE CULTURE/COLONY COUNT: CPT | Performed by: NURSE PRACTITIONER

## 2025-01-29 PROCEDURE — 99999 PR PBB SHADOW E&M-EST. PATIENT-LVL IV: CPT | Mod: PBBFAC,,, | Performed by: NURSE PRACTITIONER

## 2025-01-29 PROCEDURE — 3075F SYST BP GE 130 - 139MM HG: CPT | Mod: CPTII,S$GLB,, | Performed by: NURSE PRACTITIONER

## 2025-01-29 PROCEDURE — 1159F MED LIST DOCD IN RCRD: CPT | Mod: CPTII,S$GLB,, | Performed by: NURSE PRACTITIONER

## 2025-01-29 PROCEDURE — 3079F DIAST BP 80-89 MM HG: CPT | Mod: CPTII,S$GLB,, | Performed by: NURSE PRACTITIONER

## 2025-01-29 PROCEDURE — 3008F BODY MASS INDEX DOCD: CPT | Mod: CPTII,S$GLB,, | Performed by: NURSE PRACTITIONER

## 2025-01-29 PROCEDURE — 1160F RVW MEDS BY RX/DR IN RCRD: CPT | Mod: CPTII,S$GLB,, | Performed by: NURSE PRACTITIONER

## 2025-01-29 PROCEDURE — 99204 OFFICE O/P NEW MOD 45 MIN: CPT | Mod: S$GLB,,, | Performed by: NURSE PRACTITIONER

## 2025-01-29 RX ORDER — TAMSULOSIN HYDROCHLORIDE 0.4 MG/1
0.4 CAPSULE ORAL DAILY
Qty: 30 CAPSULE | Refills: 1 | Status: SHIPPED | OUTPATIENT
Start: 2025-01-29 | End: 2025-02-28

## 2025-01-29 RX ORDER — KETOROLAC TROMETHAMINE 10 MG/1
10 TABLET, FILM COATED ORAL EVERY 6 HOURS PRN
Qty: 20 TABLET | Refills: 1 | Status: SHIPPED | OUTPATIENT
Start: 2025-01-29

## 2025-01-30 ENCOUNTER — OFFICE VISIT (OUTPATIENT)
Dept: GASTROENTEROLOGY | Facility: CLINIC | Age: 55
End: 2025-01-30
Payer: COMMERCIAL

## 2025-01-30 ENCOUNTER — PATIENT MESSAGE (OUTPATIENT)
Dept: GASTROENTEROLOGY | Facility: CLINIC | Age: 55
End: 2025-01-30

## 2025-01-30 ENCOUNTER — LAB VISIT (OUTPATIENT)
Dept: LAB | Facility: HOSPITAL | Age: 55
End: 2025-01-30
Payer: COMMERCIAL

## 2025-01-30 VITALS
HEART RATE: 85 BPM | DIASTOLIC BLOOD PRESSURE: 77 MMHG | SYSTOLIC BLOOD PRESSURE: 130 MMHG | BODY MASS INDEX: 42.71 KG/M2 | HEIGHT: 65 IN | WEIGHT: 256.38 LBS

## 2025-01-30 DIAGNOSIS — K21.00 GASTROESOPHAGEAL REFLUX DISEASE WITH ESOPHAGITIS WITHOUT HEMORRHAGE: ICD-10-CM

## 2025-01-30 DIAGNOSIS — K20.0 EOSINOPHILIC ESOPHAGITIS: ICD-10-CM

## 2025-01-30 DIAGNOSIS — K29.00 ACUTE GASTRITIS WITHOUT HEMORRHAGE, UNSPECIFIED GASTRITIS TYPE: ICD-10-CM

## 2025-01-30 DIAGNOSIS — K29.00 ACUTE GASTRITIS WITHOUT HEMORRHAGE, UNSPECIFIED GASTRITIS TYPE: Primary | ICD-10-CM

## 2025-01-30 DIAGNOSIS — Z09 HOSPITAL DISCHARGE FOLLOW-UP: ICD-10-CM

## 2025-01-30 LAB
BACTERIA UR CULT: NORMAL
BACTERIA UR CULT: NORMAL
BASOPHILS # BLD AUTO: 0.06 K/UL (ref 0–0.2)
BASOPHILS NFR BLD: 0.7 % (ref 0–1.9)
DIFFERENTIAL METHOD BLD: ABNORMAL
EOSINOPHIL # BLD AUTO: 0.3 K/UL (ref 0–0.5)
EOSINOPHIL NFR BLD: 3 % (ref 0–8)
ERYTHROCYTE [DISTWIDTH] IN BLOOD BY AUTOMATED COUNT: 14.1 % (ref 11.5–14.5)
FERRITIN SERPL-MCNC: 59 NG/ML (ref 20–300)
HCT VFR BLD AUTO: 41 % (ref 40–54)
HGB BLD-MCNC: 13.2 G/DL (ref 14–18)
IMM GRANULOCYTES # BLD AUTO: 0.03 K/UL (ref 0–0.04)
IMM GRANULOCYTES NFR BLD AUTO: 0.3 % (ref 0–0.5)
IRON SERPL-MCNC: 33 UG/DL (ref 45–160)
LYMPHOCYTES # BLD AUTO: 2.5 K/UL (ref 1–4.8)
LYMPHOCYTES NFR BLD: 27.6 % (ref 18–48)
MCH RBC QN AUTO: 29 PG (ref 27–31)
MCHC RBC AUTO-ENTMCNC: 32.2 G/DL (ref 32–36)
MCV RBC AUTO: 90 FL (ref 82–98)
MONOCYTES # BLD AUTO: 0.5 K/UL (ref 0.3–1)
MONOCYTES NFR BLD: 5.6 % (ref 4–15)
NEUTROPHILS # BLD AUTO: 5.6 K/UL (ref 1.8–7.7)
NEUTROPHILS NFR BLD: 62.8 % (ref 38–73)
NRBC BLD-RTO: 0 /100 WBC
PLATELET # BLD AUTO: 236 K/UL (ref 150–450)
PMV BLD AUTO: 11.7 FL (ref 9.2–12.9)
RBC # BLD AUTO: 4.55 M/UL (ref 4.6–6.2)
SATURATED IRON: 11 % (ref 20–50)
TOTAL IRON BINDING CAPACITY: 303 UG/DL (ref 250–450)
TRANSFERRIN SERPL-MCNC: 205 MG/DL (ref 200–375)
WBC # BLD AUTO: 8.87 K/UL (ref 3.9–12.7)

## 2025-01-30 PROCEDURE — 82728 ASSAY OF FERRITIN: CPT

## 2025-01-30 PROCEDURE — 3078F DIAST BP <80 MM HG: CPT | Mod: CPTII,S$GLB,,

## 2025-01-30 PROCEDURE — 1160F RVW MEDS BY RX/DR IN RCRD: CPT | Mod: CPTII,S$GLB,,

## 2025-01-30 PROCEDURE — 3075F SYST BP GE 130 - 139MM HG: CPT | Mod: CPTII,S$GLB,,

## 2025-01-30 PROCEDURE — 1159F MED LIST DOCD IN RCRD: CPT | Mod: CPTII,S$GLB,,

## 2025-01-30 PROCEDURE — 85025 COMPLETE CBC W/AUTO DIFF WBC: CPT

## 2025-01-30 PROCEDURE — 4010F ACE/ARB THERAPY RXD/TAKEN: CPT | Mod: CPTII,S$GLB,,

## 2025-01-30 PROCEDURE — 3008F BODY MASS INDEX DOCD: CPT | Mod: CPTII,S$GLB,,

## 2025-01-30 PROCEDURE — 83540 ASSAY OF IRON: CPT

## 2025-01-30 PROCEDURE — 36415 COLL VENOUS BLD VENIPUNCTURE: CPT

## 2025-01-30 PROCEDURE — 99999 PR PBB SHADOW E&M-EST. PATIENT-LVL V: CPT | Mod: PBBFAC,,,

## 2025-01-30 PROCEDURE — 99214 OFFICE O/P EST MOD 30 MIN: CPT | Mod: S$GLB,,,

## 2025-01-30 RX ORDER — PANTOPRAZOLE SODIUM 40 MG/1
40 TABLET, DELAYED RELEASE ORAL 2 TIMES DAILY
Qty: 60 TABLET | Refills: 11 | Status: SHIPPED | OUTPATIENT
Start: 2025-01-30 | End: 2026-01-30

## 2025-01-30 NOTE — PROGRESS NOTES
Gastroenterology Clinic Consultation Note    Reason for Visit:  The primary encounter diagnosis was Acute gastritis without hemorrhage, unspecified gastritis type. Diagnoses of Hospital discharge follow-up, Gastroesophageal reflux disease with esophagitis without hemorrhage, and Eosinophilic esophagitis were also pertinent to this visit.    PCP:   Estrada Partida.   [address] [facility] [address] / [place] [id number]      Initial HPI   This is a 54 y.o. adult presenting for hospital f/u for gastritis, anemia  Patient in clinic with hospital f/u for gastritis 12/16.  She is also concerned with chronic anemia. Denies any current bleeding. EGD done in 2023 showed hiatal hernia. Colonoscopy in 2022 was unremarkable. Denies unintentional weight loss, fever, chills, nausea, vomiting, constipation, diarrhea, regurgitation, hematemesis, difficulties swallowing, changes in bowel habits, changes in stool caliber, blood in stool, and abdominal pain.  Currently taking PPI twice per day for history of EOE.     ROS:  Review of Systems   Constitutional:  Negative for chills, fever, malaise/fatigue and weight loss.   Respiratory:  Negative for shortness of breath.    Cardiovascular:  Negative for chest pain.   Gastrointestinal:  Positive for heartburn. Negative for abdominal pain, blood in stool, constipation, diarrhea, melena, nausea and vomiting.   Genitourinary:  Negative for hematuria.   Neurological:  Negative for dizziness and weakness.        Medical History:  has a past medical history of Allergy, Hypertension, NSTEMI (non-ST elevated myocardial infarction) (01/07/2024), Sleep apnea, unspecified, and Urinary tract infection.    Surgical History:  has a past surgical history that includes Tonsillectomy; Biopsy of adenoids; Adenoidectomy; Colonoscopy (N/A, 09/14/2022); Esophagogastroduodenoscopy (N/A, 09/14/2022); Adenoidectomy; Esophagogastroduodenoscopy (12/19/2022); Esophagogastroduodenoscopy (N/A, 12/19/2022); egd, with  "closed biopsy (11/30/2023); Esophagogastroduodenoscopy (N/A, 11/30/2023); and Coronary angiography (Right, 1/8/2024).    Family History: family history includes Heart disease in her father; Lupus in her mother..       Review of patient's allergies indicates:   Allergen Reactions    Sulfa (sulfonamide antibiotics) Shortness Of Breath    Coconut     Guaifenesin     Milk containing products (dairy)     Prochlorperazine Hallucinations     Other reaction(s): Compazine  Note:  Adverse Reaction: Other    Statins-hmg-coa reductase inhibitors     Terbinafine hcl      Other reaction(s): terbinafine 250 mg tablet  Note:  Adverse Reaction: Upset Stomach    Wheat containing prod     Penicillins Rash     Other reaction(s): Penicillins  Note:  Adverse Reaction: Rash       Current Outpatient Medications on File Prior to Visit   Medication Sig Dispense Refill    aspirin (ECOTRIN) 81 MG EC tablet Take 1 tablet (81 mg total) by mouth once daily. 90 tablet 4    cetirizine (ZYRTEC) 10 MG tablet Take 10 mg by mouth once daily.      colchicine (COLCRYS) 0.6 mg tablet Take 1 tablet (0.6 mg total) by mouth daily as needed. 21 tablet 0    diclofenac sodium (VOLTAREN) 1 % Gel Apply 2 g topically 4 (four) times daily as needed. 350 g 2    indomethacin (INDOCIN) 50 MG capsule Take 1 capsule (50 mg total) by mouth 3 (three) times daily. 15 capsule 3    ketorolac (TORADOL) 10 mg tablet Take 1 tablet (10 mg total) by mouth every 6 (six) hours as needed for Pain. 20 tablet 1    metFORMIN (GLUCOPHAGE) 500 MG tablet Take 1 tablet (500 mg total) by mouth 2 (two) times daily with meals. 180 tablet 3    minoxidiL-finasteride 5-0.1 % Soln Apply 1 application  topically 2 (two) times a day. 60 mL 5    multivitamin (ONE DAILY MULTIVITAMIN) per tablet Take 1 tablet by mouth once daily.       No current facility-administered medications on file prior to visit.         Objective Findings:    Vital Signs:  /77   Pulse 85   Ht 5' 5" (1.651 m)   Wt " 116.3 kg (256 lb 6.3 oz)   BMI 42.67 kg/m²   Body mass index is 42.67 kg/m².    Physical Exam:  Physical Exam  Vitals and nursing note reviewed.   Constitutional:       General: She is not in acute distress.     Appearance: Normal appearance. She is not ill-appearing.   HENT:      Head: Normocephalic and atraumatic.      Right Ear: External ear normal.      Left Ear: External ear normal.      Nose: Nose normal.   Eyes:      General: No scleral icterus.     Extraocular Movements: Extraocular movements intact.   Cardiovascular:      Rate and Rhythm: Normal rate.   Pulmonary:      Effort: Pulmonary effort is normal. No respiratory distress.   Abdominal:      General: Bowel sounds are normal. There is no distension.      Palpations: Abdomen is soft.      Tenderness: There is no guarding.   Musculoskeletal:         General: Normal range of motion.      Cervical back: Normal range of motion.   Skin:     General: Skin is warm.   Neurological:      Mental Status: She is alert and oriented to person, place, and time.   Psychiatric:         Mood and Affect: Mood normal.         Behavior: Behavior is cooperative.         Thought Content: Thought content normal.             Labs:  Lab Results   Component Value Date    WBC 8.87 01/30/2025    HGB 13.2 (L) 01/30/2025    HCT 41.0 01/30/2025     01/30/2025    CHOL 107 (L) 03/05/2024    TRIG 140 03/05/2024    HDL 28 (L) 03/05/2024    ALKPHOS 95 12/18/2024    LIPASE 19 12/17/2024    ALT 23 12/18/2024    AST 24 12/18/2024     12/18/2024    K 4.6 12/18/2024     12/18/2024    CREATININE 1.1 12/18/2024    BUN 10 12/18/2024    CO2 25 12/18/2024    TSH 3.642 01/12/2024    INR 1.1 01/12/2024    HGBA1C 5.4 01/11/2024       Imaging reviewed:   CT ABDOMEN PELVIS WITH IV CONTRAST     CLINICAL HISTORY:  Nausea/vomiting;     TECHNIQUE:  Low dose axial images, sagittal and coronal reformations were obtained from the lung bases to the pubic symphysis following the IV  administration of 100 mL of Omnipaque 350 .  Oral contrast was not administered.     COMPARISON:  None.     FINDINGS:  Abdomen:     - Lower thorax:Normal.     - Lung bases: No infiltrates and no nodules.     - Liver: The liver is enlarged measuring 18.3 cm in craniocaudad dimension but otherwise appears normal.     - Gallbladder: No calcified gallstones.     - Bile Ducts: No evidence of intra or extra hepatic biliary ductal dilation.     - Spleen: Negative.     - Kidneys: There is relative atrophy of the right kidney.  There is lobulation of both kidneys likely indicative of chronic renal parenchymal scarring.  There is no hydroureteronephrosis bilaterally.  No suspicious renal mass.     - Adrenals: Unremarkable.     - Pancreas: The pancreas is hypodense.  There is stranding of the fat posterior to the pancreatic tail which could be indicative of acute pancreatitis under appropriate clinical conditions.  Correlate clinically.     - Retroperitoneum:  No significant adenopathy.     - Vascular: Mild atherosclerotic calcification of the right common iliac artery and right internal iliac artery.     - Abdominal wall:  Small fat containing periumbilical hernia.     Pelvis:     There is a layering calculus within the bladder measuring 6 mm.  No bladder wall thickening.     Prostate noted.     Bowel/Mesentery:     The stomach is decompressed with a suggestion of diffuse wall thickening and submucosal edema.  The findings are suggestive of diffuse gastritis.  There is stranding of the fat surrounding the stomach.     Bones:  No acute osseous abnormality and no suspicious lytic or blastic lesion.     Impression:     Findings suggestive of diffuse gastritis.     Diffusely hypodense pancreas should be correlated for clinical evidence of acute pancreatitis.     Hepatomegaly.     Bladder calculus.     This report was flagged in Epic as abnormal.        Electronically signed by:Nish Ignacio  Date:                                             12/16/2024      Endoscopy reviewed:   Findings:       Mucosal changes including ringed esophagus were found in the lower        third of the esophagus. Esophageal findings were graded using the        Eosinophilic Esophagitis Endoscopic Reference Score (EoE-EREFS) as:        Edema Grade 0 Normal (distinct vascular markings), Rings Grade 1        Mild (subtle circumferential ridges seen on esophageal distension),        Exudates Grade 0 None (no white lesions seen), Furrows Grade 1 Mild        (vertical lines without visible depth) and Stricture none (no        stricture found). Biopsies were obtained from the proximal and        distal esophagus with cold forceps for histology of suspected        eosinophilic esophagitis. Verification of patient identification for        the specimen was done. Estimated blood loss was minimal.        A small hiatal hernia was present.        The exam of the stomach was otherwise normal.        The examined duodenum was normal.   Impression:            - Esophageal mucosal changes consistent with                          eosinophilic esophagitis.                          - Small hiatal hernia.                          - Normal examined duodenum.                          - Biopsies were taken with a cold forceps for                          evaluation of eosinophilic esophagitis.   Recommendation:        - Discharge patient to home.                          - Resume previous diet.                          - Continue present medications.                          - Await pathology results.                          - Return to my office after studies are complete.   Vinay Bourgeois MD   11/30/2023 9:25:37 AM     Pathology:   1. Distal esophagus (biopsy):  No intestinal metaplasia, no dysplasia  Squamous mucosa with minimal reactive changes, nonspecific  No support for eosinophilic esophagitis    2. Proximal esophagus (biopsy):  Squamous mucosa with no significant  histopathologic changes  No support for eosinophilic esophagitis     Findings:       The perianal and digital rectal examinations were normal.        The terminal ileum appeared normal.        The colon (entire examined portion) appeared normal. Biopsies for        histology were taken with a cold forceps from the right colon and        left colon for evaluation of microscopic colitis.        The exam was otherwise without abnormality on direct and        retroflexion views.   Impression:            - The examined portion of the ileum was normal.                          - The entire examined colon is normal. Biopsied.                          - The examination was otherwise normal on direct                          and retroflexion views.   Recommendation:        - Discharge patient to home.                          - Patient has a contact number available for                          emergencies. The signs and symptoms of potential                          delayed complications were discussed with the                          patient. Return to normal activities tomorrow.                          Written discharge instructions were provided to                          the patient.                          - Resume previous diet.                          - Continue present medications.                          - Await pathology results.                          - Repeat colonoscopy in 10 years for screening                          purposes.                          - Return to nurse practitioner as previously                          scheduled.   Jordy Simons MD   9/14/2022 8:01:34 AM   Assessment:  1. Acute gastritis without hemorrhage, unspecified gastritis type    2. Hospital discharge follow-up    3. Gastroesophageal reflux disease with esophagitis without hemorrhage    4. Eosinophilic esophagitis      Orders Placed This Encounter    CBC W/ AUTO DIFFERENTIAL    IRON AND TIBC    Ferritin    pantoprazole  (PROTONIX) 40 MG tablet       Plan:  Blood work as above to assess hemoglobin level, iron panel  2.   If any acute drops since last labs, will perform a colonoscopy and possible VCE  3.   Continue PPI BID       For GERD/Reflux:  Take your PPI 30-45 minutes before your first and last protein containing meal every day.   Take Pepcid 20 mg every evening before bedtime to help with nocturnal symptoms, as needed.  Remain upright for at least 3 hours after eating.   Elevate the head of the bed for nighttime.   Avoid foods that you have noticed make your symptoms worse (possible triggers include: peppermint, alcohol, chocolate, caffeine, spicy foods, greasy/fried foods, acidic foods-citrus).   Lose weight if you are overweight -- of all of the lifestyle changes you can make, this one is the most effective.      Thank you for allowing me to participate in this patient's care.    Sincerely,     ISACC DALAL-C  Gastroenterology Department  [facility] Office [phone]

## 2025-01-30 NOTE — PROGRESS NOTES
"GENERAL GI PATIENT INTAKE:    COVID symptoms in the last 7 days (runny nose, sore throat, congestion, cough, fever): No  PCP: Estrada Partida  If not PCP-  number given to establish [phone]: N/A    ALLERGIES REVIEWED:  Yes    CHIEF COMPLAINT:    Chief Complaint   Patient presents with    Anemia     Possible GI bleed       VITAL SIGNS:  /77   Pulse 85   Ht 5' 5" (1.651 m)   Wt 116.3 kg (256 lb 6.3 oz)   BMI 42.67 kg/m²      Change in medical, surgical, family or social history: No      REVIEWED MEDICATION LIST RECONCILED INCLUDING ABOVE MEDS:  Yes      "

## 2025-02-03 ENCOUNTER — OFFICE VISIT (OUTPATIENT)
Dept: CARDIOLOGY | Facility: CLINIC | Age: 55
End: 2025-02-03
Payer: COMMERCIAL

## 2025-02-03 VITALS
HEART RATE: 58 BPM | WEIGHT: 257.5 LBS | HEIGHT: 65 IN | SYSTOLIC BLOOD PRESSURE: 126 MMHG | OXYGEN SATURATION: 98 % | DIASTOLIC BLOOD PRESSURE: 70 MMHG | BODY MASS INDEX: 42.9 KG/M2

## 2025-02-03 DIAGNOSIS — I47.29 NSVT (NONSUSTAINED VENTRICULAR TACHYCARDIA): ICD-10-CM

## 2025-02-03 DIAGNOSIS — E78.5 HYPERLIPIDEMIA, UNSPECIFIED HYPERLIPIDEMIA TYPE: ICD-10-CM

## 2025-02-03 DIAGNOSIS — I77.819 AORTIC DILATATION: Primary | ICD-10-CM

## 2025-02-03 DIAGNOSIS — I10 PRIMARY HYPERTENSION: ICD-10-CM

## 2025-02-03 DIAGNOSIS — G47.33 OSA (OBSTRUCTIVE SLEEP APNEA): ICD-10-CM

## 2025-02-03 DIAGNOSIS — I25.10 CORONARY ARTERY DISEASE INVOLVING NATIVE CORONARY ARTERY OF NATIVE HEART WITHOUT ANGINA PECTORIS: ICD-10-CM

## 2025-02-03 DIAGNOSIS — E66.01 MORBID OBESITY WITH BMI OF 40.0-44.9, ADULT: ICD-10-CM

## 2025-02-03 DIAGNOSIS — R00.0 TACHYCARDIA: ICD-10-CM

## 2025-02-03 DIAGNOSIS — Z86.73 HISTORY OF CVA (CEREBROVASCULAR ACCIDENT): ICD-10-CM

## 2025-02-03 PROCEDURE — 3078F DIAST BP <80 MM HG: CPT | Mod: CPTII,S$GLB,,

## 2025-02-03 PROCEDURE — G2211 COMPLEX E/M VISIT ADD ON: HCPCS | Mod: S$GLB,,,

## 2025-02-03 PROCEDURE — 3044F HG A1C LEVEL LT 7.0%: CPT | Mod: CPTII,S$GLB,,

## 2025-02-03 PROCEDURE — 3074F SYST BP LT 130 MM HG: CPT | Mod: CPTII,S$GLB,,

## 2025-02-03 PROCEDURE — 99999 PR PBB SHADOW E&M-EST. PATIENT-LVL III: CPT | Mod: PBBFAC,,,

## 2025-02-03 PROCEDURE — 3008F BODY MASS INDEX DOCD: CPT | Mod: CPTII,S$GLB,,

## 2025-02-03 PROCEDURE — 99204 OFFICE O/P NEW MOD 45 MIN: CPT | Mod: S$GLB,,,

## 2025-02-03 PROCEDURE — 4010F ACE/ARB THERAPY RXD/TAKEN: CPT | Mod: CPTII,S$GLB,,

## 2025-02-03 RX ORDER — LOSARTAN POTASSIUM 25 MG/1
25 TABLET ORAL DAILY
Qty: 90 TABLET | Refills: 3 | Status: SHIPPED | OUTPATIENT
Start: 2025-02-03 | End: 2025-02-26 | Stop reason: SDUPTHER

## 2025-02-03 RX ORDER — METOPROLOL TARTRATE 25 MG/1
25 TABLET, FILM COATED ORAL 2 TIMES DAILY
Qty: 180 TABLET | Refills: 3 | Status: SHIPPED | OUTPATIENT
Start: 2025-02-03

## 2025-02-03 RX ORDER — NITROGLYCERIN 0.4 MG/1
0.4 TABLET SUBLINGUAL EVERY 5 MIN PRN
Qty: 25 TABLET | Refills: 3 | Status: SHIPPED | OUTPATIENT
Start: 2025-02-03 | End: 2026-02-03

## 2025-02-03 NOTE — PROGRESS NOTES
[facility]  Cardiology Clinic Note      Chief Complaint  Chief Complaint   Patient presents with    Follow-up       HPI:  Ms. Shah is a 54-year-old female with a past medical history morbid obesity, hepatic steatosis, GERD, eosinophilic esophagitis, ALFREDO, hypertension, CVA left corona radiata 1/2024, Holter 01/2024 short nonsustained atrial run possible nonconducted PAC then cardiac event monitor 05/2024 no arrhythmia occasional ectopy, coronary angiogram 01/2024 following elevated troponin mild-to-moderate nonobstructive coronary artery disease some ectasia noted, cardiac MRI 04/2024 normal LVEF and RV EF no LGE normal pericardium echo 03/2024 normal EF normal diastolic function normal RV normal CVP prior echo 01/2024 normal EF mild-to-moderate LVH normal diastolic function left atrium mildly dilated likely normal RV CVP 8 ascending aorta 3.6 subsequent bubble study performed no evidence for intracardiac shunt      Patient is new to me and here to establish care  Previously seen by Dr. Ojeda   Last visit with cardiologist patient unable to tolerate statin or Zetia  No complaints at this time and is doing well with medications  Denies chest pain, SOB, or difficulty breathing  Denies palpitations, lightheadedness, dizziness, or syncope/presyncope  Denies cough, LE edema, orthopnea, or PND  Denies falls or head injures  Denies easy bruising, hematochezia, or hemoptysis  Denies fever, chills, or NVD  Denies any recent travels or close contact with sick individuals  Denies tobacco or alcohol use    Cardiology course:  Admission 01/2024 for NSTEMI no culprit could be found on angiogram  Subsequent admission for CVA placed on dual antiplatelet therapy for 21 days  Ordered cardiac MRI following above event  Again presented with elevated troponin found to have arrhythmia on fit bit presumed arrhythmia may have caused demand ischemia of the time  Could not tolerate Lipitor changed to Pravachol  Seen by  Dr. Griggs electrophysiology thought that the event on her fit bit was NSVT and was not likely due have led to troponin elevation  There were further abnormal fitbit tracings 06/06/2024 uploaded to the chart sent to Dr. Griggs  Could not tolerate statin placed on Zetia but could not tolerate Zetia either  Blood pressure soft today will hold Benicar    Medications  Current Medications[1]     History  Past Medical History:   Diagnosis Date    Allergy     Hypertension     NSTEMI (non-ST elevated myocardial infarction) 01/07/2024    Sleep apnea, unspecified     Stroke     Urinary tract infection      Past Surgical History:   Procedure Laterality Date    ADENOIDECTOMY      ADENOIDECTOMY      BIOPSY OF ADENOIDS      COLONOSCOPY N/A 09/14/2022    Procedure: COLONOSCOPY;  Surgeon: Jordy Simons MD;  Location: [facility];  Service: Endoscopy;  Laterality: N/A;    CORONARY ANGIOGRAPHY Right 01/08/2024    Procedure: ANGIOGRAM, CORONARY ARTERY;  Surgeon: Estrada Ojeda MD;  Location: [facility] CATH LAB;  Service: Cardiology;  Laterality: Right;    CYSTOURETEROSCOPY, WITH HOLMIUM LASER LITHOTRIPSY OF URETERAL CALCULUS AND STENT INSERTION  CYSTOSCOPY, WITH URETERAL DILATION  03/18/2025    EGD, WITH CLOSED BIOPSY  11/30/2023    ESOPHAGOGASTRODUODENOSCOPY N/A 09/14/2022    Procedure: EGD (ESOPHAGOGASTRODUODENOSCOPY);  Surgeon: Jordy Simons MD;  Location: [facility];  Service: Endoscopy;  Laterality: N/A;    ESOPHAGOGASTRODUODENOSCOPY  12/19/2022    with biopsy    ESOPHAGOGASTRODUODENOSCOPY N/A 12/19/2022    Procedure: EGD (ESOPHAGOGASTRODUODENOSCOPY);  Surgeon: Jordy Simons MD;  Location: [facility];  Service: Endoscopy;  Laterality: N/A;    ESOPHAGOGASTRODUODENOSCOPY N/A 11/30/2023    Procedure: EGD (ESOPHAGOGASTRODUODENOSCOPY);  Surgeon: Vinay Bourgeois MD;  Location: [facility];  Service: Endoscopy;  Laterality: N/A;    ESOPHAGOGASTRODUODENOSCOPY N/A 03/14/2025    Procedure: EGD (ESOPHAGOGASTRODUODENOSCOPY);   Surgeon: Vinay Bourgeois MD;  Location: [facility];  Service: Endoscopy;  Laterality: N/A;  ref A Bhandari NP- pt states not taking GLP-1 at this time- portal tmb    TONSILLECTOMY       Social History[2]  Family History   Problem Relation Name Age of Onset    Lupus Mother      Heart disease Father      Prostate cancer Neg Hx      Kidney disease Neg Hx          Allergies  Review of patient's allergies indicates:   Allergen Reactions    Sulfa (sulfonamide antibiotics) Shortness Of Breath    Coconut     Guaifenesin     Milk containing products (dairy)     Prochlorperazine Hallucinations     Other reaction(s): Compazine  Note:  Adverse Reaction: Other    Statins-hmg-coa reductase inhibitors     Terbinafine hcl      Other reaction(s): terbinafine 250 mg tablet  Note:  Adverse Reaction: Upset Stomach    Wheat containing prod     Penicillins Rash     Other reaction(s): Penicillins  Note:  Adverse Reaction: Rash       Review of Systems   Review of Systems   Constitutional: Negative for chills, decreased appetite, diaphoresis, fever, malaise/fatigue, weight gain and weight loss.   Eyes:  Negative for blurred vision.   Cardiovascular:  Negative for chest pain, claudication, dyspnea on exertion, irregular heartbeat, leg swelling, near-syncope, orthopnea, palpitations, paroxysmal nocturnal dyspnea and syncope.   Respiratory:  Negative for cough, shortness of breath, snoring, sputum production and wheezing.    Endocrine: Negative for cold intolerance, heat intolerance, polydipsia, polyphagia and polyuria.   Skin:  Negative for color change, dry skin, itching, nail changes and poor wound healing.   Musculoskeletal:  Negative for back pain, gout, joint pain and joint swelling.   Gastrointestinal:  Negative for bloating, abdominal pain, constipation, diarrhea, hematemesis, hematochezia, melena, nausea and vomiting.   Genitourinary:  Negative for dysuria and hematuria.   Neurological:  Negative for dizziness, headaches,  light-headedness, numbness, paresthesias and weakness.   Psychiatric/Behavioral:  Negative for altered mental status, depression and memory loss.        Physical Exam  Vitals:    02/03/25 0925   BP: 126/70   Pulse: (!) 58     Wt Readings from Last 1 Encounters:   03/18/25 117.5 kg (259 lb 2.4 oz)     Physical Exam  Constitutional:       Appearance: She is obese.   HENT:      Head: Normocephalic and atraumatic.      Mouth/Throat:      Mouth: Mucous membranes are moist.   Eyes:      Extraocular Movements: Extraocular movements intact.      Pupils: Pupils are equal, round, and reactive to light.   Neck:      Vascular: No carotid bruit.   Cardiovascular:      Rate and Rhythm: Regular rhythm. Bradycardia present.      Heart sounds: No murmur heard.     No friction rub. No gallop.   Pulmonary:      Effort: Pulmonary effort is normal. No respiratory distress.   Abdominal:      General: Abdomen is flat.      Palpations: Abdomen is soft.   Musculoskeletal:      Right lower leg: No edema.      Left lower leg: No edema.   Skin:     General: Skin is warm.      Capillary Refill: Capillary refill takes less than 2 seconds.   Neurological:      General: No focal deficit present.      Mental Status: She is alert.   Psychiatric:         Mood and Affect: Mood normal.         Labs  Lab Visit on 01/30/2025   Component Date Value Ref Range Status    WBC 01/30/2025 8.87  3.90 - 12.70 K/uL Final    RBC 01/30/2025 4.55 (L)  4.60 - 6.20 M/uL Final    Hemoglobin 01/30/2025 13.2 (L)  14.0 - 18.0 g/dL Final    Hematocrit 01/30/2025 41.0  40.0 - 54.0 % Final    MCV 01/30/2025 90  82 - 98 fL Final    MCH 01/30/2025 29.0  27.0 - 31.0 pg Final    MCHC 01/30/2025 32.2  32.0 - 36.0 g/dL Final    RDW 01/30/2025 14.1  11.5 - 14.5 % Final    Platelets 01/30/2025 236  150 - 450 K/uL Final    MPV 01/30/2025 11.7  9.2 - 12.9 fL Final    Immature Granulocytes 01/30/2025 0.3  0.0 - 0.5 % Final    Gran # (ANC) 01/30/2025 5.6  1.8 - 7.7 K/uL Final    Immature  Grans (Abs) 01/30/2025 0.03  0.00 - 0.04 K/uL Final    Comment: Mild elevation in immature granulocytes is non specific and   can be seen in a variety of conditions including stress response,   acute inflammation, trauma and pregnancy. Correlation with other   laboratory and clinical findings is essential.      Lymph # 01/30/2025 2.5  1.0 - 4.8 K/uL Final    Mono # 01/30/2025 0.5  0.3 - 1.0 K/uL Final    Eos # 01/30/2025 0.3  0.0 - 0.5 K/uL Final    Baso # 01/30/2025 0.06  0.00 - 0.20 K/uL Final    nRBC 01/30/2025 0  0 /100 WBC Final    Gran % 01/30/2025 62.8  38.0 - 73.0 % Final    Lymph % 01/30/2025 27.6  18.0 - 48.0 % Final    Mono % 01/30/2025 5.6  4.0 - 15.0 % Final    Eosinophil % 01/30/2025 3.0  0.0 - 8.0 % Final    Basophil % 01/30/2025 0.7  0.0 - 1.9 % Final    Differential Method 01/30/2025 Automated   Final    Iron 01/30/2025 33 (L)  45 - 160 ug/dL Final    Transferrin 01/30/2025 205  200 - 375 mg/dL Final    TIBC 01/30/2025 303  250 - 450 ug/dL Final    Saturated Iron 01/30/2025 11 (L)  20 - 50 % Final    Ferritin 01/30/2025 59  20.0 - 300.0 ng/mL Final   Office Visit on 01/29/2025   Component Date Value Ref Range Status    Glucose, UA 01/29/2025 Negative   Final    Bilirubin, POC 01/29/2025 Negative   Final    Ketones, UA 01/29/2025 Negative   Final    Spec Grav UA 01/29/2025 1.005   Final    Blood, UA 01/29/2025 Trace   Final    pH, UA 01/29/2025 8.0   Final    Protein, POC 01/29/2025 Negative   Final    Urobilinogen, UA 01/29/2025 Normal   Final    Nitrite, UA 01/29/2025 Negative   Final    WBC, UA 01/29/2025 1   Final    Color, UA 01/29/2025 Yellow   Final    Clarity, UA 01/29/2025 Cloudy   Final    Urine Culture, Routine 01/29/2025 Multiple organisms isolated. None in predominance.  Repeat if   Final    Urine Culture, Routine 01/29/2025 clinically necessary.   Final   Office Visit on 01/03/2025   Component Date Value Ref Range Status    Specimen UA 01/03/2025 Urine, Clean Catch   Final    Color, UA  01/03/2025 Yellow  Yellow, Straw, Ramona Final    Appearance, UA 01/03/2025 Clear  Clear Final    pH, UA 01/03/2025 7.0  5.0 - 8.0 Final    Specific Gravity, UA 01/03/2025 1.020  1.005 - 1.030 Final    Protein, UA 01/03/2025 Trace (A)  Negative Final    Comment: Recommend a 24 hour urine protein or a urine   protein/creatinine ratio if globulin induced proteinuria is  clinically suspected.      Glucose, UA 01/03/2025 Negative  Negative Final    Ketones, UA 01/03/2025 Negative  Negative Final    Bilirubin (UA) 01/03/2025 Negative  Negative Final    Occult Blood UA 01/03/2025 Negative  Negative Final    Nitrite, UA 01/03/2025 Negative  Negative Final    Urobilinogen, UA 01/03/2025 Negative  Negative EU/dL Final    Leukocytes, UA 01/03/2025 2+ (A)  Negative Final    RBC, UA 01/03/2025 4  0 - 4 /hpf Final    WBC, UA 01/03/2025 46 (H)  0 - 5 /hpf Final    Squam Epithel, UA 01/03/2025 1  /hpf Final    Microscopic Comment 01/03/2025 SEE COMMENT   Final    Comment: Other formed elements not mentioned in the report are not   present in the microscopic examination.       Urine Culture, Routine 01/03/2025 Multiple organisms isolated. None in predominance.  Repeat if   Final    Urine Culture, Routine 01/03/2025 clinically necessary.   Final   Admission on 12/16/2024, Discharged on 12/18/2024   Component Date Value Ref Range Status    QRS Duration 12/16/2024 82  ms Final    [facility] QTC Calculation 12/16/2024 450  ms Final    WBC 12/16/2024 20.78 (H)  3.90 - 12.70 K/uL Final    RBC 12/16/2024 5.62  4.60 - 6.20 M/uL Final    Hemoglobin 12/16/2024 16.2  14.0 - 18.0 g/dL Final    Hematocrit 12/16/2024 49.8  40.0 - 54.0 % Final    MCV 12/16/2024 89  82 - 98 fL Final    MCH 12/16/2024 28.8  27.0 - 31.0 pg Final    MCHC 12/16/2024 32.5  32.0 - 36.0 g/dL Final    RDW 12/16/2024 13.9  11.5 - 14.5 % Final    Platelets 12/16/2024 308  150 - 450 K/uL Final    MPV 12/16/2024 11.3  9.2 - 12.9 fL Final    Immature Granulocytes 12/16/2024 0.4  0.0 -  0.5 % Final    Gran # (ANC) 12/16/2024 18.9 (H)  1.8 - 7.7 K/uL Final    Immature Grans (Abs) 12/16/2024 0.08 (H)  0.00 - 0.04 K/uL Final    Comment: Mild elevation in immature granulocytes is non specific and   can be seen in a variety of conditions including stress response,   acute inflammation, trauma and pregnancy. Correlation with other   laboratory and clinical findings is essential.      Lymph # 12/16/2024 0.8 (L)  1.0 - 4.8 K/uL Final    Mono # 12/16/2024 0.9  0.3 - 1.0 K/uL Final    Eos # 12/16/2024 0.0  0.0 - 0.5 K/uL Final    Baso # 12/16/2024 0.07  0.00 - 0.20 K/uL Final    nRBC 12/16/2024 0  0 /100 WBC Final    Gran % 12/16/2024 90.9 (H)  38.0 - 73.0 % Final    Lymph % 12/16/2024 3.9 (L)  18.0 - 48.0 % Final    Mono % 12/16/2024 4.5  4.0 - 15.0 % Final    Eosinophil % 12/16/2024 0.0  0.0 - 8.0 % Final    Basophil % 12/16/2024 0.3  0.0 - 1.9 % Final    Differential Method 12/16/2024 Automated   Final    Sodium 12/16/2024 138  136 - 145 mmol/L Final    Potassium 12/16/2024 4.6  3.5 - 5.1 mmol/L Final    Chloride 12/16/2024 102  95 - 110 mmol/L Final    CO2 12/16/2024 24  23 - 29 mmol/L Final    Glucose 12/16/2024 117 (H)  70 - 110 mg/dL Final    BUN 12/16/2024 13  6 - 20 mg/dL Final    Creatinine 12/16/2024 1.4  0.5 - 1.4 mg/dL Final    Calcium 12/16/2024 9.7  8.7 - 10.5 mg/dL Final    Total Protein 12/16/2024 7.9  6.0 - 8.4 g/dL Final    Albumin 12/16/2024 3.5  3.5 - 5.2 g/dL Final    Total Bilirubin 12/16/2024 0.6  0.1 - 1.0 mg/dL Final    Comment: For infants and newborns, interpretation of results should be based  on gestational age, weight and in agreement with clinical  observations.    Premature Infant recommended reference ranges:  Up to 24 hours.............<8.0 mg/dL  Up to 48 hours............<12.0 mg/dL  3-5 days..................<15.0 mg/dL  6-29 days.................<15.0 mg/dL      Alkaline Phosphatase 12/16/2024 122  40 - 150 U/L Final    AST 12/16/2024 63 (H)  10 - 40 U/L Final    ALT  12/16/2024 36  10 - 44 U/L Final    eGFR 12/16/2024 59.4 (A)  >60 mL/min/1.73 m^2 Final    Anion Gap 12/16/2024 12  8 - 16 mmol/L Final    Troponin I 12/16/2024 <0.006  0.000 - 0.026 ng/mL Final    Comment: The reference interval for Troponin I represents the 99th percentile   cutoff   for our facility and is consistent with 3rd generation assay   performance.      Lipase 12/16/2024 27  4 - 60 U/L Final    BNP 12/16/2024 39  0 - 99 pg/mL Final    Values of less than 100 pg/ml are consistent with non-CHF populations.    Blood Culture, Routine 12/16/2024 No growth after 5 days.   Final    Blood Culture, Routine 12/16/2024 No growth after 5 days.   Final    Specimen UA 12/16/2024 Urine, Clean Catch   Final    Color, UA 12/16/2024 Yellow  Yellow, Straw, Ramona Final    Appearance, UA 12/16/2024 Clear  Clear Final    pH, UA 12/16/2024 >8.0 (A)  5.0 - 8.0 Final    Specific Gravity, UA 12/16/2024 >1.030 (A)  1.005 - 1.030 Final    Protein, UA 12/16/2024 Trace (A)  Negative Final    Comment: Recommend a 24 hour urine protein or a urine   protein/creatinine ratio if globulin induced proteinuria is  clinically suspected.      Glucose, UA 12/16/2024 Negative  Negative Final    Ketones, UA 12/16/2024 1+ (A)  Negative Final    Bilirubin (UA) 12/16/2024 Negative  Negative Final    Occult Blood UA 12/16/2024 Negative  Negative Final    Nitrite, UA 12/16/2024 Negative  Negative Final    Urobilinogen, UA 12/16/2024 Negative  Negative EU/dL Final    Leukocytes, UA 12/16/2024 1+ (A)  Negative Final    POC Molecular Influenza A Ag 12/16/2024 Negative  Negative Final    POC Molecular Influenza B Ag 12/16/2024 Negative  Negative Final     Acceptable 12/16/2024 Yes   Final    POC Rapid COVID 12/16/2024 Negative  Negative Final     Acceptable 12/16/2024 Yes   Final    POC Lactate 12/16/2024 2.4 (H)  0.4 - 2.2 mmol/L Final    Value above reference range    POC Temp 12/16/2024 37.0  C Final    Specimen source  12/16/2024 Venous   Final    FiO2 12/16/2024 21.0  % Final    O2DEVICE 12/16/2024 Room Air   Final    Mode 12/16/2024 None   Final    RBC, UA 12/16/2024 3  0 - 4 /hpf Final    WBC, UA 12/16/2024 30 (H)  0 - 5 /hpf Final    Bacteria 12/16/2024 Rare  None-Occ /hpf Final    Squam Epithel, UA 12/16/2024 1  /hpf Final    Microscopic Comment 12/16/2024 SEE COMMENT   Final    Comment: Other formed elements not mentioned in the report are not   present in the microscopic examination.       Urine Culture, Routine 12/16/2024 Multiple organisms isolated. None in predominance.  Repeat if   Final    Urine Culture, Routine 12/16/2024 clinically necessary.   Final    POC Lactate 12/16/2024 1.8  0.4 - 2.2 mmol/L Final    POC Temp 12/16/2024 37.0  C Final    Specimen source 12/16/2024 Venous   Final    FiO2 12/16/2024 21.0  % Final    O2DEVICE 12/16/2024 Room Air   Final    Mode 12/16/2024 None   Final    WBC 12/17/2024 15.11 (H)  3.90 - 12.70 K/uL Final    RBC 12/17/2024 4.31 (L)  4.60 - 6.20 M/uL Final    Hemoglobin 12/17/2024 12.7 (L)  14.0 - 18.0 g/dL Final    Hematocrit 12/17/2024 38.8 (L)  40.0 - 54.0 % Final    MCV 12/17/2024 90  82 - 98 fL Final    MCH 12/17/2024 29.5  27.0 - 31.0 pg Final    MCHC 12/17/2024 32.7  32.0 - 36.0 g/dL Final    RDW 12/17/2024 14.2  11.5 - 14.5 % Final    Platelets 12/17/2024 231  150 - 450 K/uL Final    MPV 12/17/2024 11.4  9.2 - 12.9 fL Final    Immature Granulocytes 12/17/2024 0.5  0.0 - 0.5 % Final    Gran # (ANC) 12/17/2024 12.2 (H)  1.8 - 7.7 K/uL Final    Immature Grans (Abs) 12/17/2024 0.07 (H)  0.00 - 0.04 K/uL Final    Comment: Mild elevation in immature granulocytes is non specific and   can be seen in a variety of conditions including stress response,   acute inflammation, trauma and pregnancy. Correlation with other   laboratory and clinical findings is essential.      Lymph # 12/17/2024 1.9  1.0 - 4.8 K/uL Final    Mono # 12/17/2024 0.8  0.3 - 1.0 K/uL Final    Eos # 12/17/2024 0.1   0.0 - 0.5 K/uL Final    Baso # 12/17/2024 0.05  0.00 - 0.20 K/uL Final    nRBC 12/17/2024 0  0 /100 WBC Final    Gran % 12/17/2024 81.0 (H)  38.0 - 73.0 % Final    Lymph % 12/17/2024 12.4 (L)  18.0 - 48.0 % Final    Mono % 12/17/2024 5.2  4.0 - 15.0 % Final    Eosinophil % 12/17/2024 0.6  0.0 - 8.0 % Final    Basophil % 12/17/2024 0.3  0.0 - 1.9 % Final    Differential Method 12/17/2024 Automated   Final    Sodium 12/17/2024 139  136 - 145 mmol/L Final    Potassium 12/17/2024 4.5  3.5 - 5.1 mmol/L Final    Chloride 12/17/2024 107  95 - 110 mmol/L Final    CO2 12/17/2024 26  23 - 29 mmol/L Final    Glucose 12/17/2024 88  70 - 110 mg/dL Final    BUN 12/17/2024 11  6 - 20 mg/dL Final    Creatinine 12/17/2024 1.2  0.5 - 1.4 mg/dL Final    Calcium 12/17/2024 8.6 (L)  8.7 - 10.5 mg/dL Final    Total Protein 12/17/2024 5.9 (L)  6.0 - 8.4 g/dL Final    Albumin 12/17/2024 2.7 (L)  3.5 - 5.2 g/dL Final    Total Bilirubin 12/17/2024 0.6  0.1 - 1.0 mg/dL Final    Comment: For infants and newborns, interpretation of results should be based  on gestational age, weight and in agreement with clinical  observations.    Premature Infant recommended reference ranges:  Up to 24 hours.............<8.0 mg/dL  Up to 48 hours............<12.0 mg/dL  3-5 days..................<15.0 mg/dL  6-29 days.................<15.0 mg/dL      Alkaline Phosphatase 12/17/2024 88  40 - 150 U/L Final    AST 12/17/2024 29  10 - 40 U/L Final    ALT 12/17/2024 28  10 - 44 U/L Final    eGFR 12/17/2024 >60.0  >60 mL/min/1.73 m^2 Final    Anion Gap 12/17/2024 6 (L)  8 - 16 mmol/L Final    Magnesium 12/17/2024 1.8  1.6 - 2.6 mg/dL Final    Phosphorus 12/17/2024 2.4 (L)  2.7 - 4.5 mg/dL Final    Lipase 12/17/2024 19  4 - 60 U/L Final    Occult Blood 12/18/2024 Negative  Negative Final    Iron 12/17/2024 23 (L)  45 - 160 ug/dL Final    Transferrin 12/17/2024 131 (L)  200 - 375 mg/dL Final    TIBC 12/17/2024 194 (L)  250 - 450 ug/dL Final    Saturated Iron  12/17/2024 12 (L)  20 - 50 % Final    Uric Acid 12/17/2024 5.6  3.4 - 7.0 mg/dL Final    WBC 12/18/2024 7.64  3.90 - 12.70 K/uL Final    RBC 12/18/2024 4.54 (L)  4.60 - 6.20 M/uL Final    Hemoglobin 12/18/2024 13.2 (L)  14.0 - 18.0 g/dL Final    Hematocrit 12/18/2024 41.2  40.0 - 54.0 % Final    MCV 12/18/2024 91  82 - 98 fL Final    MCH 12/18/2024 29.1  27.0 - 31.0 pg Final    MCHC 12/18/2024 32.0  32.0 - 36.0 g/dL Final    RDW 12/18/2024 14.4  11.5 - 14.5 % Final    Platelets 12/18/2024 230  150 - 450 K/uL Final    MPV 12/18/2024 11.4  9.2 - 12.9 fL Final    Immature Granulocytes 12/18/2024 0.3  0.0 - 0.5 % Final    Gran # (ANC) 12/18/2024 4.2  1.8 - 7.7 K/uL Final    Immature Grans (Abs) 12/18/2024 0.02  0.00 - 0.04 K/uL Final    Comment: Mild elevation in immature granulocytes is non specific and   can be seen in a variety of conditions including stress response,   acute inflammation, trauma and pregnancy. Correlation with other   laboratory and clinical findings is essential.      Lymph # 12/18/2024 2.5  1.0 - 4.8 K/uL Final    Mono # 12/18/2024 0.7  0.3 - 1.0 K/uL Final    Eos # 12/18/2024 0.3  0.0 - 0.5 K/uL Final    Baso # 12/18/2024 0.04  0.00 - 0.20 K/uL Final    nRBC 12/18/2024 0  0 /100 WBC Final    Gran % 12/18/2024 55.2  38.0 - 73.0 % Final    Lymph % 12/18/2024 32.2  18.0 - 48.0 % Final    Mono % 12/18/2024 8.5  4.0 - 15.0 % Final    Eosinophil % 12/18/2024 3.3  0.0 - 8.0 % Final    Basophil % 12/18/2024 0.5  0.0 - 1.9 % Final    Differential Method 12/18/2024 Automated   Final    Sodium 12/18/2024 140  136 - 145 mmol/L Final    Potassium 12/18/2024 4.6  3.5 - 5.1 mmol/L Final    Chloride 12/18/2024 106  95 - 110 mmol/L Final    CO2 12/18/2024 25  23 - 29 mmol/L Final    Glucose 12/18/2024 76  70 - 110 mg/dL Final    BUN 12/18/2024 10  6 - 20 mg/dL Final    Creatinine 12/18/2024 1.1  0.5 - 1.4 mg/dL Final    Calcium 12/18/2024 9.0  8.7 - 10.5 mg/dL Final    Total Protein 12/18/2024 6.7  6.0 - 8.4 g/dL  Final    Albumin 12/18/2024 2.9 (L)  3.5 - 5.2 g/dL Final    Total Bilirubin 12/18/2024 0.3  0.1 - 1.0 mg/dL Final    Comment: For infants and newborns, interpretation of results should be based  on gestational age, weight and in agreement with clinical  observations.    Premature Infant recommended reference ranges:  Up to 24 hours.............<8.0 mg/dL  Up to 48 hours............<12.0 mg/dL  3-5 days..................<15.0 mg/dL  6-29 days.................<15.0 mg/dL      Alkaline Phosphatase 12/18/2024 95  40 - 150 U/L Final    AST 12/18/2024 24  10 - 40 U/L Final    ALT 12/18/2024 23  10 - 44 U/L Final    eGFR 12/18/2024 >60.0  >60 mL/min/1.73 m^2 Final    Anion Gap 12/18/2024 9  8 - 16 mmol/L Final    Magnesium 12/18/2024 2.2  1.6 - 2.6 mg/dL Final    Phosphorus 12/18/2024 2.9  2.7 - 4.5 mg/dL Final   Hospital Outpatient Visit on 11/05/2024   Component Date Value Ref Range Status    QRS Duration 11/05/2024 90  ms Final    [facility] QTC Calculation 11/05/2024 407  ms Final       EKG  Reviewed    Echo   Results for orders placed or performed during the hospital encounter of 03/18/24   Echo   Result Value Ref Range    BSA 2.44 m2    LVOT stroke volume 66.64 cm3    LVIDd 5.09 3.5 - 6.0 cm    LV Systolic Volume 64.95 mL    LV Systolic Volume Index 28.1 mL/m2    LVIDs 3.88 2.1 - 4.0 cm    LV Diastolic Volume 123.34 mL    LV Diastolic Volume Index 53.39 mL/m2    IVS 1.11 (A) 0.6 - 1.1 cm    LVOT diameter 2.04 cm    LVOT area 3.3 cm2    FS 24 (A) 28 - 44 %    Left Ventricle Relative Wall Thickness 0.33 cm    PW 0.84 0.6 - 1.1 cm    LV mass 181.18 g    LV Mass Index 78 g/m2    MV Peak E Philip 0.76 m/s    TDI LATERAL 0.09 m/s    TDI SEPTAL 0.06 m/s    E/E' ratio 10.13 m/s    MV Peak A Philip 0.73 m/s    E/A ratio 1.04     IVRT 79.92 msec    E wave deceleration time 259.96 msec    LV SEPTAL E/E' RATIO 12.67 m/s    LV LATERAL E/E' RATIO 8.44 m/s    LVOT peak philip 0.86 m/s    Left Ventricular Outflow Tract Mean Velocity 0.59 cm/s     Left Ventricular Outflow Tract Mean Gradient 1.64 mmHg    RVDD 2.21 cm    LA size 3.37 cm    Left Atrium Minor Axis 4.73 cm    Left Atrium Major Axis 4.79 cm    RA Major Axis 3.97 cm    AV mean gradient 2 mmHg    AV peak gradient 3 mmHg    Ao peak shagufta 0.90 m/s    Ao VTI 18.90 cm    LVOT peak VTI 20.40 cm    AV valve area 3.53 cm²    AV Velocity Ratio 0.96     AV index (prosthetic) 1.08     MICKEY by Velocity Ratio 3.12 cm²    MV mean gradient 1 mmHg    MV peak gradient 2 mmHg    MV stenosis pressure 1/2 time 88.72 ms    MV valve area p 1/2 method 2.48 cm2    MV valve area by continuity eq 2.36 cm2    MV VTI 28.2 cm    PV PEAK VELOCITY 1.28 m/s    PV peak gradient 7 mmHg    Pulmonary Valve Mean Velocity 0.79 m/s    Ao root annulus 3.44 cm    STJ 2.47 cm    Ascending aorta 3.5 cm    IVC diameter 1.17 cm    Mean e' 0.08 m/s    ZLVIDS -2.72     ZLVIDD -5.79     AORTIC VALVE CUSP SEPERATION 1.82 cm    DAILY 15.9 mL/m2    LA Vol 36.81 cm3    LA Vol (MOD) 57.00 cm3    LA WIDTH 2.7 cm    DAILY (MOD) 24.7 mL/m2    RA Width 2.3 cm    Est. RA pres 8 mmHg    Narrative      Left Ventricle: The left ventricle is normal in size. Mildly to   moderately increased wall thickness. Unable to assess wall motion. There   is normal systolic function with a visually estimated ejection fraction of   55 - 60%. There is normal diastolic function.    Right Ventricle: Normal right ventricular cavity size. Systolic   function is normal.    IVC/SVC: Intermediate venous pressure at 8 mmHg.         Imaging  SURG FL Surgery Fluoro Usage  Result Date: 3/18/2025  See OP Notes for results. IMPRESSION: See OP Notes for results. This procedure was auto-finalized by: Virtual Radiologist    Stress Echo Which stress agent will be used? Treadmill Exercise; Color Flow Doppler? No  Result Date: 3/11/2025    Technically challenging study with poor endocardial visualization.   Post-stress Conclusion: The study is negative with no echocardiographic evidence of stress  induced ischemia.   ECG Conclusion: The ECG portion of the study is negative for ischemia.   Stress Protocol: The patient was stressed using an exercise protocol. The patient exercised for 6 minutes 9 seconds on a high ramp protocol, achieving a peak heart rate of 162 bpm, which is 98% of the age predicted maximum heart rate. Their exercise capacity was average. The patient reported no symptoms during the stress test. The test was stopped because the patient experienced fatigue.   Left Ventricle: The left ventricle is normal in size. Normal wall thickness. There is normal systolic function with a visually estimated ejection fraction of 55 - 60%. There is normal diastolic function.   Right Ventricle: The right ventricle is normal in size. Wall thickness is normal. Systolic function is normal.   Left Atrium: Left atrium is dilated.   Pulmonary Artery: The estimated pulmonary artery systolic pressure is 28 mmHg.   IVC/SVC: Normal venous pressure at 3 mmHg.     FL Upper GI  Result Date: 2/26/2025  EXAMINATION: FL UPPER GI CLINICAL HISTORY: Morbid (severe) obesity due to excess calories TECHNIQUE: Fluoroscopic single contrast upper GI examination performed. Contrast material: Barium sulfate suspension 290 cc Fluoroscopy time: 3:05 minutes COMPARISON: CT abdomen pelvis 12/16/2024 CTA chest 02/17/2024 FINDINGS: Esophagus: No evidence for mass or stricture. Esophageal motility is normal. Gastroesophageal junction: No reflux observed.  Phrenic ampulla with no definite hiatal hernia. The gastroesophageal junction is in its expected location. Stomach: Normal fundal accommodation. Gastric emptying is normal without evidence for obstruction. Duodenum: Normal contrast transit.  The duodenal sweep is normal.  The duodenal-jejunal junction is in its expected location.     Upper GI study shows no significant abnormality. Electronically signed by resident: Anjelica Lozano Date:    02/26/2025 Time:    11:46 Electronically signed by: Brent  MD Arsenio Date:    02/26/2025 Time:    12:05    CT Renal Stone Study ABD Pelvis WO  Result Date: 2/25/2025  EXAMINATION: CT RENAL STONE STUDY ABD PELVIS WO CLINICAL HISTORY: please place pt prone to access UVJ vs intravesical calculus; TECHNIQUE: Routine axial CT images of the abdomen and pelvis were obtained without the use of IV contrast.   Sagittal and coronal reformatted images were also acquired. COMPARISON: CT abdomen pelvis with contrast 12/16/2024 FINDINGS: The heart is normal in size with no pericardial effusion. Lung bases are clear. Liver is enlarged in size measuring 18.1 cm, demonstrating normal attenuation with no focal hepatic lesions. Gallbladder is unremarkable.  No intrahepatic or extrahepatic ductal dilatation. The spleen is normal in size with few splenic parenchymal calcifications. Adrenal glands are unremarkable. Similar hypoattenuating pancreas with mild nonspecific surrounding stranding.  No pancreatic mass or ductal dilatation. Mild atrophy of the right kidney, unchanged.  No nephrolithiasis or hydronephrosis.  Mild perinephric stranding.  Ureters are normal in course and caliber.  Similar 6 mm calcific focus about the urinary bladder distal to the right ureter vesicular junction.  Urinary bladder is not well distended, limiting evaluation. Prostate is unremarkable. Stomach and duodenum are unremarkable.  Loops of small bowel demonstrate no evidence for obstructive or inflammatory process.  Appendix is unremarkable.  Visualized colon is unremarkable. No abdominopelvic ascites.  No free intra-abdominal air.  No mesenteric or retroperitoneal lymphadenopathy. Aorta is normal in course and caliber without significant atherosclerosis. Small fat containing umbilical hernia. Osseous structures demonstrate no evidence for acute fracture or osseous destructive lesion.     Similar appearance of a 6 mm calcific focus about the urinary bladder just distal to the right ureter vesicular junction, likely  representing a bladder stone.  No evidence for nephrolithiasis or obstructive uropathy. Hepatomegaly.  No focal hepatic lesions. Similar appearance of the pancreas demonstrating relative diffuse hypoattenuation and mild surrounding inflammatory change.  Evolving pancreatitis cannot be excluded correlation with laboratory values and clinical history is recommended. Additional stable findings as above. Electronically signed by: Tj Vera Date:    02/25/2025 Time:    08:41      Prior coronary angiogram / intervention:  See HPI    Assessment and Plan  Ms. Shah is a 54-year-old female with a past medical history morbid obesity, hepatic steatosis, GERD, eosinophilic esophagitis, ALFREDO, hypertension, CVA left corona radiata 1/2024, Holter 01/2024 short nonsustained atrial run possible nonconducted PAC then cardiac event monitor 05/2024 no arrhythmia occasional ectopy, coronary angiogram 01/2024 following elevated troponin mild-to-moderate nonobstructive coronary artery disease some ectasia noted, cardiac MRI 04/2024 normal LVEF and RV EF no LGE normal pericardium echo 03/2024 normal EF normal diastolic function normal RV normal CVP prior echo 01/2024 normal EF mild-to-moderate LVH normal diastolic function left atrium mildly dilated likely normal RV CVP 8 ascending aorta 3.6 subsequent bubble study performed no evidence for intracardiac shunt     NSTEMI (non-ST elevated myocardial infarction)  Stable and asymptomatic  Continue aspirin 81 beta-blocker  Unable to tolerate statin or Zetia     Primary hypertension  Blood pressure good today  Continue beta-blocker and losartan    Hyperlipidemia, unspecified hyperlipidemia type  Unable to tolerate statins or Zetia    History of CVA (cerebrovascular accident)  Completed dual antiplatelet therapy for 21 days then single antiplatelet therapy  Unable to tolerate statin or Zetia  Can consider Repatha injections at next visit  Follow up by Neurology     Aortic dilatation  Continue  beta- blood pressure control  Echo ordered     Coronary artery disease involving native coronary artery of native heart without angina pectoris  Nonobstructive  Continue beta-blocker  Could not tolerate statin or Zetia    SVT with aberrancy versus NSVT  Tachycardia  Followed by EP  Continue beta-blocker    Morbid obesity with BMI of 40.0-44.9, adult  Follow up by bariatric services  Encouraged a heart healthy diet that is low in saturated fats and sodium   Also encouraged an increase in activities as tolerated for healthy weight management   Discussed Mediterranean Diet recommendations (Adopted from Isai et al, [employer], 2018.)  - Eat primarily plant-based foods, such as fruits/vegetables, whole grains, legumes & nuts  - Limit refined carbohydrates (white pasta, bread, rice).  - Replace butter with healthy fats such as olive oil.  - Use herbs and spices instead of salt to flavor foods.  - Limit red meat and processed meats to no more than a few times a month.  - Avoid sugary sodas, bakery goods, and sweets.  - Eat fish and poultry at least twice a week.              - Get plenty of exercise (150 minutes per week).      Follow Up  Follow up in about 3 months (around 5/3/2025), or if symptoms worsen or fail to improve.       Jesenia Mason, FNP-C [facility] [place] - Cardiology    Total professional time spent for the encounter: 45 minutes  Time was spent preparing to see the patient, reviewing results of prior testing, obtaining and/or reviewing separately obtained history, performing a medically appropriate examination and interview, counseling and educating the patient/family, ordering medications/tests/procedures, referring and communicating with other health care professionals, documenting clinical information in the electronic health record, and independently interpreting results.    Disclaimer: This document was created using voice recognition software (M*Modal Fluency Direct). Although it may be  edited, this document may contain errors related to incorrect recognition of the spoken word. Please call the physician if clarification is needed.          [1]   Current Outpatient Medications   Medication Sig Dispense Refill    aspirin (ECOTRIN) 81 MG EC tablet Take 1 tablet (81 mg total) by mouth once daily. 90 tablet 4    colchicine (COLCRYS) 0.6 mg tablet Take 1 tablet (0.6 mg total) by mouth daily as needed. 21 tablet 0    indomethacin (INDOCIN) 50 MG capsule Take 1 capsule (50 mg total) by mouth 3 (three) times daily. 15 capsule 3    ketorolac (TORADOL) 10 mg tablet Take 1 tablet (10 mg total) by mouth every 6 (six) hours as needed for Pain. 20 tablet 1    metFORMIN (GLUCOPHAGE) 500 MG tablet Take 1 tablet (500 mg total) by mouth 2 (two) times daily with meals. 180 tablet 3    pantoprazole (PROTONIX) 40 MG tablet Take 1 tablet (40 mg total) by mouth 2 (two) times daily. 60 tablet 11    cetirizine (ZYRTEC) 10 MG tablet Take 10 mg by mouth once daily.      diclofenac sodium (VOLTAREN) 1 % Gel Apply 2 g topically 4 (four) times daily as needed. 350 g 2    ibuprofen (ADVIL,MOTRIN) 600 MG tablet Take 1 tablet (600 mg total) by mouth every 8 (eight) hours as needed. 30 tablet 0    losartan (COZAAR) 25 MG tablet Take 1 tablet (25 mg total) by mouth once daily. 90 tablet 3    metoprolol tartrate (LOPRESSOR) 25 MG tablet Take 1 tablet (25 mg total) by mouth 2 (two) times daily. 180 tablet 3    minoxidiL-finasteride 5-0.1 % Soln Apply 1 application  topically 2 (two) times a day. 60 mL 5    multivitamin (ONE DAILY MULTIVITAMIN) per tablet Take 1 tablet by mouth once daily.      nitroGLYCERIN (NITROSTAT) 0.4 MG SL tablet Place 1 tablet (0.4 mg total) under the tongue every 5 (five) minutes as needed for Chest pain. Call the 911 after the 3rd dose. 25 tablet 3    oxybutynin (DITROPAN) 5 MG Tab Take 1 tablet (5 mg total) by mouth 3 (three) times daily as needed (bladder spasms (urge to urinate, pain in bladder)). 10  tablet 0    phenazopyridine (PYRIDIUM) 200 MG tablet Take 1 tablet (200 mg total) by mouth 3 (three) times daily as needed for Pain. 9 tablet 0    semaglutide, weight loss, (WEGOVY) 0.25 mg/0.5 mL PnIj Inject 0.25 mg into the skin every 7 days. 2 mL 0    semaglutide, weight loss, (WEGOVY) 0.5 mg/0.5 mL PnIj Inject 0.5 mg into the skin every 7 days. (Patient not taking: Reported on 2/27/2025) 2 mL 0    [START ON 4/13/2025] semaglutide, weight loss, (WEGOVY) 1 mg/0.5 mL PnIj Inject 1 mg into the skin every 7 days. (Patient not taking: Reported on 2/27/2025) 2 mL 0    tamsulosin (FLOMAX) 0.4 mg Cap TAKE 1 CAPSULE BY MOUTH EVERY DAY 90 capsule 1     No current facility-administered medications for this visit.   [2]   Social History  Socioeconomic History    Marital status:    Tobacco Use    Smoking status: Never     Passive exposure: Never    Smokeless tobacco: Never   Substance and Sexual Activity    Alcohol use: Not Currently     Comment: social    Drug use: No    Sexual activity: Yes     Partners: Female     Social Drivers of Health     Financial Resource Strain: Low Risk  (1/31/2025)    Overall Financial Resource Strain (CARDIA)     Difficulty of Paying Living Expenses: Not hard at all   Food Insecurity: No Food Insecurity (1/31/2025)    Hunger Vital Sign     Worried About Running Out of Food in the Last Year: Never true     Ran Out of Food in the Last Year: Never true   Transportation Needs: No Transportation Needs (12/17/2024)    TRANSPORTATION NEEDS     Transportation : No   Physical Activity: Inactive (1/31/2025)    Exercise Vital Sign     Days of Exercise per Week: 0 days     Minutes of Exercise per Session: 20 min   Stress: Stress Concern Present (1/31/2025)    [employer] [place] [employer] - Occupational Stress Questionnaire     Feeling of Stress : To some extent   Housing Stability: Unknown (1/31/2025)    Housing Stability Vital Sign     Unable to Pay for Housing in the Last Year: No      Homeless in the Last Year: No

## 2025-02-06 ENCOUNTER — TELEPHONE (OUTPATIENT)
Dept: BARIATRICS | Facility: CLINIC | Age: 55
End: 2025-02-06
Payer: COMMERCIAL

## 2025-02-06 ENCOUNTER — PATIENT MESSAGE (OUTPATIENT)
Dept: UROLOGY | Facility: CLINIC | Age: 55
End: 2025-02-06
Payer: COMMERCIAL

## 2025-02-12 NOTE — PROGRESS NOTES
Subjective     Patient ID: Martha Shah is a 54 y.o. adult.    Chief Complaint: Consult    CC:weight    New pt to me, referred by Self, Aaareferral  No address on file , with Patient Active Problem List:     ALFREDO (obstructive sleep apnea)- HST 2019 AHI 16 (severe on back, nil left side)  Dx previously 2000, had also repeat study Mass 2008       Morbid obesity with BMI of 40.0-44.9, adult     Primary hypertension     Migraine with aura and without status migrainosus, not intractable     Eosinophilic esophagitis     Gastroesophageal reflux disease with esophagitis without hemorrhage- States improving with PPI     Hyperlipidemia     History of CVA (cerebrovascular accident)- CVA left corona radiata 1/2024,      Aortic dilatation     Coronary artery disease involving native coronary artery of native heart without angina pectoris     MI 1/2024-  01/2024 for NSTEMI no culprit could be found on angiogram     Idiopathic chronic gout 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 Not c/w pancreatitis. Had gastritis and eosinophilic esophagitis. GI note reviewed     Sepsis     Acute UTI       Lab Results       Component                Value               Date                       HGBA1C                   5.4                 01/11/2024                 HGBA1C                   5.2                 01/13/2023            Lab Results       Component                Value               Date                       LDLCALC                  51.0 (L)            03/05/2024                 CREATININE               1.1                 12/18/2024               Lab Results       Component                Value               Date                       LIPASE                   19                  12/17/2024                Current attempts at weight loss:     Previous diet attempts: Sugar busters.  Nutrisystem.  His highest adult weight was 340 lbs at age 39, and her lowest adult weight was 200 lbs at age 17.  The patient has tried calorie counting, GLP, sugar busters and nutrisystem.  The patient was most successful with nutrisystem with a weight loss of 60 lbs.  His current exercise include rowing and ellipteal  3 times a week. He denies any history of eating disorder such as anorexia, bulimia, or taking laxatives for weight loss, and denies any addiction including illicit substances, alcohol, or gambling.  Patient states she has a good  support system.  He lives with family.  He is currently employed   .  He  endorses a 10 year history of GERD, PPI BID on controls  The patient's goal is 150 lbs.    History of medication for loss:   checked today. Was taking compounded terzepatide. Was hospitalized with gastritis Metformin- lost a few lbs, but it stopped.     Heaviest weight:    Lightest weight:    Goal weight:      Last eye exam:   Dec 2024.NO glaucoma per pt, but is being watched for IOP.       Provider:    Typical eating patterns:        Current diet recall:      B: Cheerios with unsweetened almond milk with berries OR Chobani greek yogurt  L: Quest cookie OR crackers and cheese OR protein shake  Sn: apple, slice of cheese  Sn: caramel or apple cinnamon rice cakes  D:  microwave dish: chickpea/potato in spicy sauce over rice OR chicken breast or fish, potatoes/rice/beans, steamed broccoli/green beans OR salads     Avoids fried foods and gluten and limits dairy.     Current Diet:    Protein supplements: occ Owyn or Soylent RTD shakes  Vegetables: Likes a few. Eats 2-3 times per week. Raw veggies upset her wife's stomach so that are not often stocked in the house.  Fruits: Likes a variety. Mostly berries. Eats 2-3 times per week.  Beverages: water and diet soda  Dining out/delivery: Rarely.   Cooking at home: Daily. Weekly.      Exercise:  Occ  Has under desk elliptical, treadmill and rowing  machine at home.     Restrictions to Exercise: foot pain     Vitamins / Minerals / Herbs:   MV  Biotin 5,000     Labs:   None available at time of visit        Social:  Works from home.  Lives with wife.  Grocery shopping and food prep done by both.  Patient believes the household will be supportive after surgery.  Alcohol: None.  Smoking: None.     ASSESSMENT:  · Patient reports attempts at weight loss, only to regain lost weight.  · Patient demonstrated knowledge of healthy eating behaviors and exercise patterns; admits to not eating healthy and not exercising at this point.  · Patient states willingness to change lifestyle and make behavior modifications.     Barriers to Education: none     Stage of change: determination/action     NUTRITION DIAGNOSIS:    Morbid Obesity related to Excessive carbohydrate intake, Excessive calorie intake, and Physical inactivity as evidence by BMI.     BARIATRIC DIET DISCUSSION/PLAN:  Discussed diet after surgery and related to patient's food record.  Reviewed nutrition guidelines for before and after surgery.  Answered all questions.  Continue to review Bariatric Nutrition Guidebook at home and call with any questions.  Work on Bariatric Nutrition Checklist.  Work on expanding variety of vegetables.  Work on gradually cutting back on starchy CHO in the diet.  Begin trying various protein supplements to determine preference  Start including protein supplements in the diet plan daily.  1200-calorie diet.  1500-calorie diet.  5-6 meals per day  Increase exercise     RECOMMENDATIONS:  Pt is a good candidate for bariatric surgery - Gastric Bypass.      Willingness to change:     Cardiac studies: Left Ventricle: The left ventricle is normal in size. Normal wall thickness. There is normal systolic function with a visually estimated ejection fraction of 55 - 60%. There is normal diastolic function.  ·  Right Ventricle: Normal right ventricular cavity size. Wall thickness is normal.  "Systolic function is normal.  ·  IVC/SVC: Normal venous pressure at 3 mmHg.      Sinus tachycardia   Otherwise normal ECG       BMR: 1616    PBF:  50.3%      Review of Systems       Objective   /74   Pulse 60   Ht 5' 5" (1.651 m)   Wt 116.1 kg (256 lb)   SpO2 98%   BMI 42.60 kg/m²    Physical Exam  Vitals reviewed.   Constitutional:       General: She is not in acute distress.     Appearance: She is well-developed.   HENT:      Head: Normocephalic and atraumatic.   Eyes:      General: No scleral icterus.     Pupils: Pupils are equal, round, and reactive to light.   Neck:      Thyroid: No thyromegaly.   Cardiovascular:      Rate and Rhythm: Normal rate.      Heart sounds: Normal heart sounds. No murmur heard.     No friction rub. No gallop.   Pulmonary:      Effort: Pulmonary effort is normal. No respiratory distress.      Breath sounds: Normal breath sounds. No wheezing.   Abdominal:      General: Bowel sounds are normal. There is no distension.      Palpations: Abdomen is soft.      Tenderness: There is no abdominal tenderness.   Musculoskeletal:         General: Normal range of motion.      Cervical back: Normal range of motion and neck supple.      Right lower leg: Edema present.      Left lower leg: Edema present.   Skin:     General: Skin is warm and dry.      Findings: No erythema.   Neurological:      Mental Status: She is alert and oriented to person, place, and time.      Cranial Nerves: No cranial nerve deficit.   Psychiatric:         Behavior: Behavior normal.         Judgment: Judgment normal.            Assessment and Plan     1. History of CVA (cerebrovascular accident)  -     semaglutide, weight loss, (WEGOVY) 0.25 mg/0.5 mL PnIj; Inject 0.25 mg into the skin every 7 days.  Dispense: 2 mL; Refill: 0  -     semaglutide, weight loss, (WEGOVY) 0.5 mg/0.5 mL PnIj; Inject 0.5 mg into the skin every 7 days.  Dispense: 2 mL; Refill: 0  -     semaglutide, weight loss, (WEGOVY) 1 mg/0.5 mL PnIj; " Inject 1 mg into the skin every 7 days.  Dispense: 2 mL; Refill: 0    2. History of MI (myocardial infarction)  -     semaglutide, weight loss, (WEGOVY) 0.25 mg/0.5 mL PnIj; Inject 0.25 mg into the skin every 7 days.  Dispense: 2 mL; Refill: 0  -     semaglutide, weight loss, (WEGOVY) 0.5 mg/0.5 mL PnIj; Inject 0.5 mg into the skin every 7 days.  Dispense: 2 mL; Refill: 0  -     semaglutide, weight loss, (WEGOVY) 1 mg/0.5 mL PnIj; Inject 1 mg into the skin every 7 days.  Dispense: 2 mL; Refill: 0    3. Class 3 severe obesity with serious comorbidity and body mass index (BMI) of 40.0 to 44.9 in adult, unspecified obesity type  -     semaglutide, weight loss, (WEGOVY) 0.25 mg/0.5 mL PnIj; Inject 0.25 mg into the skin every 7 days.  Dispense: 2 mL; Refill: 0  -     semaglutide, weight loss, (WEGOVY) 0.5 mg/0.5 mL PnIj; Inject 0.5 mg into the skin every 7 days.  Dispense: 2 mL; Refill: 0  -     semaglutide, weight loss, (WEGOVY) 1 mg/0.5 mL PnIj; Inject 1 mg into the skin every 7 days.  Dispense: 2 mL; Refill: 0    4. ALFREDO (obstructive sleep apnea)    5. Hyperlipidemia, unspecified hyperlipidemia type        1. History of CVA (cerebrovascular accident) (Primary)    - semaglutide, weight loss, (WEGOVY) 0.25 mg/0.5 mL PnIj; Inject 0.25 mg into the skin every 7 days.  Dispense: 2 mL; Refill: 0  - semaglutide, weight loss, (WEGOVY) 0.5 mg/0.5 mL PnIj; Inject 0.5 mg into the skin every 7 days.  Dispense: 2 mL; Refill: 0  - semaglutide, weight loss, (WEGOVY) 1 mg/0.5 mL PnIj; Inject 1 mg into the skin every 7 days.  Dispense: 2 mL; Refill: 0    Start Wegovy 0.25 mg once a week x 1 month. At the end of that month, the dose will continue to increase monthly.       Decrease portions as soon as you start wegovy. Avoid fried, rich or greasy foods.      Some nausea in the first 2 weeks is not unusual.     If you get pain across the upper abdomen and around to your back, please call the office.       [email or web address] to sign up for  coupon card.     2. History of MI (myocardial infarction)  See#1.   - semaglutide, weight loss, (WEGOVY) 0.25 mg/0.5 mL PnIj; Inject 0.25 mg into the skin every 7 days.  Dispense: 2 mL; Refill: 0  - semaglutide, weight loss, (WEGOVY) 0.5 mg/0.5 mL PnIj; Inject 0.5 mg into the skin every 7 days.  Dispense: 2 mL; Refill: 0  - semaglutide, weight loss, (WEGOVY) 1 mg/0.5 mL PnIj; Inject 1 mg into the skin every 7 days.  Dispense: 2 mL; Refill: 0    3. Class 3 severe obesity with serious comorbidity and body mass index (BMI) of 40.0 to 44.9 in adult, unspecified obesity type    - semaglutide, weight loss, (WEGOVY) 0.25 mg/0.5 mL PnIj; Inject 0.25 mg into the skin every 7 days.  Dispense: 2 mL; Refill: 0  - semaglutide, weight loss, (WEGOVY) 0.5 mg/0.5 mL PnIj; Inject 0.5 mg into the skin every 7 days.  Dispense: 2 mL; Refill: 0  - semaglutide, weight loss, (WEGOVY) 1 mg/0.5 mL PnIj; Inject 1 mg into the skin every 7 days.  Dispense: 2 mL; Refill: 0    Diet per RACHEL De Dios.       Exercise should be some cardiovascular and some resistance training    Exercise handouts given.   4. ALFREDO (obstructive sleep apnea)  Discussed importance of compliance with treatment, and that ALFREDO does require getting closer to normal BMI range to see improvement that some other co-morbidities. Continue with, or consider, CPAP if indicated.     5. Hyperlipidemia, unspecified hyperlipidemia type  Expect improvement with weight loss. Recheck after 10% TBW lost.             No follow-ups on file.

## 2025-02-13 ENCOUNTER — OFFICE VISIT (OUTPATIENT)
Dept: BARIATRICS | Facility: CLINIC | Age: 55
End: 2025-02-13
Payer: COMMERCIAL

## 2025-02-13 ENCOUNTER — CLINICAL SUPPORT (OUTPATIENT)
Dept: BARIATRICS | Facility: CLINIC | Age: 55
End: 2025-02-13
Payer: COMMERCIAL

## 2025-02-13 ENCOUNTER — PATIENT MESSAGE (OUTPATIENT)
Dept: PSYCHIATRY | Facility: CLINIC | Age: 55
End: 2025-02-13
Payer: COMMERCIAL

## 2025-02-13 VITALS
DIASTOLIC BLOOD PRESSURE: 74 MMHG | HEIGHT: 65 IN | BODY MASS INDEX: 42.93 KG/M2 | SYSTOLIC BLOOD PRESSURE: 139 MMHG | OXYGEN SATURATION: 98 % | HEART RATE: 60 BPM | WEIGHT: 257.69 LBS

## 2025-02-13 VITALS
HEART RATE: 60 BPM | OXYGEN SATURATION: 98 % | DIASTOLIC BLOOD PRESSURE: 74 MMHG | BODY MASS INDEX: 42.65 KG/M2 | HEIGHT: 65 IN | SYSTOLIC BLOOD PRESSURE: 139 MMHG | WEIGHT: 256 LBS

## 2025-02-13 DIAGNOSIS — I25.2 HISTORY OF MI (MYOCARDIAL INFARCTION): ICD-10-CM

## 2025-02-13 DIAGNOSIS — I25.10 CORONARY ARTERY DISEASE INVOLVING NATIVE CORONARY ARTERY OF NATIVE HEART WITHOUT ANGINA PECTORIS: ICD-10-CM

## 2025-02-13 DIAGNOSIS — I10 PRIMARY HYPERTENSION: ICD-10-CM

## 2025-02-13 DIAGNOSIS — E78.5 HYPERLIPIDEMIA, UNSPECIFIED HYPERLIPIDEMIA TYPE: ICD-10-CM

## 2025-02-13 DIAGNOSIS — E66.813 CLASS 3 SEVERE OBESITY WITH SERIOUS COMORBIDITY AND BODY MASS INDEX (BMI) OF 40.0 TO 44.9 IN ADULT, UNSPECIFIED OBESITY TYPE: ICD-10-CM

## 2025-02-13 DIAGNOSIS — Z71.3 DIETARY COUNSELING: Primary | ICD-10-CM

## 2025-02-13 DIAGNOSIS — K21.00 GASTROESOPHAGEAL REFLUX DISEASE WITH ESOPHAGITIS WITHOUT HEMORRHAGE: ICD-10-CM

## 2025-02-13 DIAGNOSIS — Z86.73 HISTORY OF CVA (CEREBROVASCULAR ACCIDENT): Primary | ICD-10-CM

## 2025-02-13 DIAGNOSIS — E66.01 MORBID OBESITY WITH BMI OF 40.0-44.9, ADULT: ICD-10-CM

## 2025-02-13 DIAGNOSIS — E78.2 MIXED HYPERLIPIDEMIA: ICD-10-CM

## 2025-02-13 DIAGNOSIS — K20.0 EOSINOPHILIC ESOPHAGITIS: ICD-10-CM

## 2025-02-13 DIAGNOSIS — E66.01 MORBID OBESITY WITH BMI OF 40.0-44.9, ADULT: Primary | ICD-10-CM

## 2025-02-13 DIAGNOSIS — G47.33 OSA (OBSTRUCTIVE SLEEP APNEA): ICD-10-CM

## 2025-02-13 DIAGNOSIS — E66.01 CLASS 3 SEVERE OBESITY WITH SERIOUS COMORBIDITY AND BODY MASS INDEX (BMI) OF 40.0 TO 44.9 IN ADULT, UNSPECIFIED OBESITY TYPE: ICD-10-CM

## 2025-02-13 PROCEDURE — 99999 PR PBB SHADOW E&M-EST. PATIENT-LVL V: CPT | Mod: PBBFAC,,, | Performed by: NURSE PRACTITIONER

## 2025-02-13 PROCEDURE — 99999 PR PBB SHADOW E&M-EST. PATIENT-LVL V: CPT | Mod: PBBFAC,,, | Performed by: INTERNAL MEDICINE

## 2025-02-13 PROCEDURE — 99999 PR PBB SHADOW E&M-EST. PATIENT-LVL I: CPT | Mod: PBBFAC,,, | Performed by: DIETITIAN, REGISTERED

## 2025-02-13 RX ORDER — SEMAGLUTIDE 1 MG/.5ML
1 INJECTION, SOLUTION SUBCUTANEOUS
Qty: 2 ML | Refills: 0 | Status: SHIPPED | OUTPATIENT
Start: 2025-04-13

## 2025-02-13 RX ORDER — SEMAGLUTIDE 0.25 MG/.5ML
0.25 INJECTION, SOLUTION SUBCUTANEOUS
Qty: 2 ML | Refills: 0 | Status: SHIPPED | OUTPATIENT
Start: 2025-02-13

## 2025-02-13 RX ORDER — SEMAGLUTIDE 0.5 MG/.5ML
0.5 INJECTION, SOLUTION SUBCUTANEOUS
Qty: 2 ML | Refills: 0 | Status: SHIPPED | OUTPATIENT
Start: 2025-03-13

## 2025-02-13 NOTE — PATIENT INSTRUCTIONS
Prior to surgery you will need to complete:  - Dietitian consult and follow up appointments as needed  - Cardiac clearance  - Labs  - EKG  - Psychological evaluation, Please call psychiatry [phone] to schedule  - EGD  - FL UGI   - Stress test     In preparation for bariatric surgery, please complete the following:   Discuss your current medications with your primary care provider, remember your medications will need to be crushed, chewable, or in liquid form for the first 2-4 weeks after a gastric bypass or sleeve.  For a gastric band, your medications will need to be crushed indefinitely.    If you take a blood thinner such as: Coumadin (warfarin), Pradaxa (dabigatran), or Plavix (clopidogrel), you will need to speak with your prescribing provider on how or if this medication can be stopped before surgery.   If you take a medication for depression or anxiety, remember to discuss this with the psychologist or psychiatrist that you see.   If you take medication for arthritis on a daily basis that is considered a non-steroidal anti-inflammatory (NSAID), please discuss with your prescribing physician an alternative medication.  After having gastric bypass or gastric sleeve, this group of medications is not appropriate to take due to increased risk of bleeding stomach ulcers.      DEFINITIONS  Appointments: Pre-scheduled meetings or consultations with any physician, advanced practice provider, dietitian, or psychologist, and labs, imaging studies, sleep studies, etc.   Late cancellation: Cancelling an appointment 24-48 hours prior to scheduled time.  No-Show appointment:  is when   You do NOT arrive to your appointment at the time its scheduled.  You call to cancel or cancel via [facility] less than 24 hours in advance of your scheduled appointment  You show up 15 minutes AFTER your scheduled appointment time without any notification of being late.     POLICY  You are allowed up to 3 cancellations for  appointments.   After 3 cancellations your case will be placed on hold for 2 months and after that time you can resume the program.   You are allowed only 1 no-show for an appointment.   You will be re-scheduled one time and if there is a 2nd no-show at any point, your case will be placed on hold for 3 months.  After 3 months you can resume the program.     Upon resuming the program after being placed on hold for either above mentioned reasons, if you have a late cancel or no show for any appointment, the bariatric team will review if youre an appropriate candidate for surgery at the monthly meeting.

## 2025-02-13 NOTE — PATIENT INSTRUCTIONS
NUTRITION    Before & After  BARIATRIC SURGERY         [facility]  Surgical Weight Loss Program            BARIATRIC CLINIC  [address] [facility], [address] [place] [address]  Telephone: [phone]; Fax: [phone]    Bariatric Dietitians    Va Trejo    Support Group Meetings  2nd Tuesday of the month 5:30 pm - 6:30 pm via Zoom. Scan QR code below to join.                I. Preparing for Bariatric Surgery    Bariatric surgery is a great tool in helping you lose weight. Your surgeon, advance practice providers, dietitians, nurses, and medical assistants will assist you in preparing for surgery. You are the most important person in making the weight loss successful - by following the nutrition plans before and after surgery, following up with post-operative visits, and establishing lifelong healthy habits.      The Bariatric Diet    What is a Bariatric Diet and Why Should I Follow It?  The bariatric diet is high protein, low-fat, low carbohydrate diet, and reduced calories.  In other words, it prioritizes lean protein, followed by non-starchy vegetables, fruit, and whole grains. Since the stomach is smaller after bariatric surgery - about the size of an egg - it is important to eat nutrient-dense foods. Adequate nutrition helps in the healing of your incisions, preventing gastric discomfort, and in maintaining your nutritional health. Otherwise, you could develop nutrient deficiencies, which may consequently affect your health AND you may not reach the maximal amount of weight loss, or the rate of weight loss may slow down.    Why Protein?  Protein is a very important macronutrient. (The other two macronutrients are carbohydrates and fats). It is in our muscle, organs, blood, bone, skin, hair, and many other parts of the body. Sources of protein include meat, poultry, fish, seafood, beans, nuts, seeds, and dairy. Protein is especially important after bariatric  surgery. Why? Studies have shown that higher protein diets along with calorie restriction can lead to greater weight loss, fat loss, and preservation of lean mass than other diets. Protein can help you feel full and more satisfied than carbohydrates, so you eat fewer calories.     What About Carbohydrates?  Carbohydrates provide fuel for our bodies. There are three types of carbohydrates: sugars, starches (long chains of sugar molecules), and fiber. Your body breaks down sugars and starches into glucose (blood sugar) to provide energy for your cells, tissues, and organs. Sources of carbohydrates include fruits, vegetables, and grains. While preparing for bariatric surgery, we emphasize limiting starchy carbohydrates such as bread, cereals, crackers, pasta, rice, corn, peas, and potatoes and eating non-starchy vegetables, fruit, and low-fat dairy.     What About Fat?  Our bodies need a small amount of fat for absorbing certain vitamins, insulating our bodies, and cushioning our organs. Before and after bariatric surgery, you will want to avoid adding extra calories from fat. That means eating lean proteins, baking, grilling, steaming, boiling, and air frying foods, and using cooking sprays instead of oil. It also means avoiding fatty meats, butter, margarine, lard, shortening, animal fat (e.g. parker grease), and foods made with hydrogenated oil. These foods include packaged/highly processed foods, baked goods, snack foods, creamers, and fried foods.    To prepare for bariatric surgery, you will need to start following a high protein, low-fat, and low carbohydrate diet NOW. Below are 10 recommendations to help you adapt to the bariatric lifestyle, be ready for life after surgery, and be successful in your health improvement journey. Aim to consume 80 g - 120 g protein and 1200 - 1500 calories per day. That breaks down to 20 g - 30 g protein per meal and 10 g - 15 g protein per snack. Note: This is not a keto  diet.    Include protein/lean meats and non-starchy vegetables in all meals. Fruit can be used as a snack or dessert. Canned fruit in its own juice and water are ok. Avoid fruit in syrup.  Cut out high fat foods like beef/pork parker and sausages, gravies, fried foods, butter, margarine, whole milk, cream, half & half, and ice cream.   Begin cutting back on starchy vegetables (all potatoes, corn and peas) and grains (all breads pasta, rice, cereal, grits, oatmeal, crackers). Cut out sugary drinks (sodas, energy drinks, sports drinks, sweet tea, lemonade, and fruit juice).   Switch to smaller/salad plates (less than 8 inches across) to help with portion control. Begin limiting carbonated beverages (sodas, seltzer) and caffeine.   Begin replacing one meal with a high protein shake with at least 20 grams of protein and under 4 grams of sugar. Do some type of physical activity at least 3 times a week for 30 minutes. It does not have to be 30 minutes all at once!   Cut out junk foods (baked goods, candies, chips) and eat high protein snacks instead like nuts, cheese, Greek yogurt, and protein bars. Practice taking small bites of food and sips; Practice not drinking while eating - drink liquids 30 mins before and/or after eating.     High Protein Sources  Poultry: skinless chicken or turkey (light/dark), ground (90% lean)  Fish/Shellfish: catfish, clams, crab, crawfish, lobster, salmon, shrimp, squid, tilapia, trout, tuna  Beef: tenderloin, roast (rib, janki, rump), steak (sirloin, round, cubed, T-bone, flank), ground (90% lean)  Pork: lean ham, Lebanese parker, tenderloin, center loin chop, ground (90% lean)  Game: venison, rabbit, duck (skin removed)  Deli meats: turkey, roast beef, ham, chicken  Dairy: low fat, part skim, and fat free cheese (sliced or shredded) mozzarella string cheese, cottage cheese, Greek or low-fat yogurt (less than 10g sugar per serving)  Eggs: Any way you like them!  Beans and Lentils: red,  white, black, lima, black-eyed-peas, chickpeas, hummus  Soy: tofu, edamame  Nuts and Seeds: unsalted, ¼ cup or 2 tbsp nut/seed butter per day  Protein supplements: Protein shakes, drinks, powders, bars, protein chips, and protein soup    How to Prepare Proteins  Remove all visible fat before and after cooking  Bake, broil, boil, roast, grill, air bar, slow cook, pressure cook (Instant pot)  Sauté in Marcie, I Can't Believe It's Not Butter, or other cooking spray  NO BREADING or DEEP FRYING      Non-Starchy Vegetables  This is a short list of the variety of vegetables you can eat on a bariatric diet.    Asparagus  Beets  Beet greens  Broccoli  Paoli Sprouts  Cabbage  Carrots  Cauliflower (florets, mashed, riced)  Celery Cucumber  Eggplant  Green Beans  Kale  Lettuce/lettuce mixes  Mushrooms  Mustard greens  Okra  Onions  Peppers Snow peas  Spaghetti Squash  Spinach  Squash  Swiss chard  Tomatoes  Zucchini  Zucchini noodles     How to Prepare Non-Starchy Vegetables  Bake, boil, broil, grill, air-bar, microwave, raw, roast, steam, stew  Sauté in Marcie, I Can't Believe It's Not Butter, or other cooking spray  NO BREADING or DEEP FRYING  Do not add cream or cheese sauce or butter  Use lemon, vinegar, herbs, and spices for flavor without added calories    Choosing Fluids  All fluids before and after surgery should be sugar-free, non-carbonated, and low calorie - no more than 15 calories per serving. Check the nutrition facts on the back of the bottle/package. Not all diet or low calorie drinks will meet the 15 calorie limit.    Plain water - infuse with lemon/lime/orange, berries, mint leaves, cucumber slices  Flavored thorpe - Propel Zero, Nestle Pure Life Splash, Aquafina Flavor Splash, Hint Water        Coffee or tea - limit to one 12 oz caffeinated drink per day  Diet iced teas - [place] Diet Green Tea, Diet Snapple Tea, Alpesh diet green tea  Sugar-free (SF) powders and drops - Crystal Light, Khushi Mena's  Light, SF Cole-Aid, SF Hawaiian Punch, Dasani Drops, Great Value, Market Pantry, etc.   Sugar-free sports drinks - Powerade Zero, Gatorade Zero, Gatorade G-Fit, Gatorade Zero with 10g protein  Diet juices - diet cranberry juices, Diet V-8 Splash, diet lemonade  Sugar-free Jell-O and sugar-free popsicles, under 15 calories per serving  Low sodium broth, low sodium bone broth    *Try to drink at least 64 oz of fluid per day. That's about 4 bottles*            Low Calorie/Sugar-Free Drinks      Plain water   Infuse with fruit as desired   Non-carbonated flavored water   Unsweetened tea  (no green tea)     Sugar-free drink powders   Sugar-free drink drops   Diet juices     Sugar-free Jell-o   Sugar-free popsicles   Low sodium broths*   * Try bone broth, which has 4 g - 9 g of protein per cup. Also, you can add dry seasonings to broths if you find them bland        High Protein Drinks, Powders, Snacks, and Soups    On the next few pages are examples of acceptable, commonly used protein drinks, powders, snacks, and soups to start including in your diet. There are many more available in stores and online. If you are not sure if a product is okay to use, ask your dietitian.    Ready-to-Drink (RTD) Protein Shakes  When choosing a protein shake, please read the label. Make sure the protein shake has at least 20 grams of protein, no more than 4 grams of sugar per serving, and 100% whey protein (not collagen) or pea or soy protein if plant-based.  Lactose free protein shakes are marked with an *      Boost Glucose Control Max  30 g protein   Ensure Max Protein  30 g protein    Equate High Performance  30 g protein     The FeedRoom Core Power*  26 g protein   The FeedRoom Nutrition Plan*  30 g protein   GNC Total Lean*  20 g protein     Muscle Milk*  30 g protein   Premier Protein  30 g protein   Quest  30 g protein       Plant-Based Protein Shakes (Lactose-free)      Evolve*  20 g protein   Owyn*  20 g protein   Purely Inspired*  20 g  protein     Smoothies      PJ's Coffee  Protein Velvet Ice  21 g protein    Smoothie King Naifdijacek (without fruit)  20 oz, 45 g protein      Ready-to-Drink Protein Rahman      Isopure Zero Carb  32 g protein    Premier Clear  20 g protein   Protein 2.0  20 g protein       Protein Powders  Protein powders can be mixed with water, fat-free/skim or 1% milk, or plain, unsweetened plant-based milks like almond and soy milk. They can also be mixed with sugar-free flavored drinks. Lactose-free protein powders are marked with an *. Remember:    Choose powders that have fewer than 4 grams of sugar per serving  If you are tracking, count the calories and protein from the liquids that you add to your protein powders        Body Fortress  30 g protein       25 g protein   ISOPURE+  20-25 g protein     Dylon Myron*+  24 g protein   Muscle Milk*  32 g protein Orgain*  21 g protein     Premier Protein+  30 g protein   Pure Protein  25 g protein   Unjury+  21 g protein   + Available flavored and unflavored    Protein Bars and Protein Chips  Protein bars and protein chips can be a great way to add a tasty snack to your day and meet your protein needs. Try to purchase protein bars with at least 15 g of protein and no more than 4g of sugar. Below are some protein bars that meet these guidelines. Always check the nutrition facts when buying bars. Calorie, protein, and sugar content can change with different flavors and sizes.    Atkins Protein Meal Bar    1.76 oz  190-250 calories  13-17 g protein  1-3 g sugar Haider Nu Fit Snack Protein Bar    170-190 calories  16 g protein   4 g sugar Barebells Protein Bar    1.9 oz  200 calories  20 g protein  1 g sugar Fit Crunch    1.62 oz  190-200 calories  16 g protein   3 g sugar         Fulfill  1.41 oz  160 calories  15 g protein  1 g sugar No Cow   2.12 oz  190-200 calories  20-22g protein  1 g sugar  One Bar    2.14 oz  220 calories  20 g protein  1 g sugar  Pure Protein Bar    1.76  oz  190-200 calories  20-21 g protein  2-3 g sugar          Quest Hero/Crispy    1.94 oz  15 g protein  1 g sugar Quest Protein Bar    2.12 oz  180-200 calories  15-21 g protein  1 g sugar Think!    2.1 oz  240 calories  20g protein  0 g sugar          Atkins Chips    1.1 oz  140 calories  13 g protein One Puffs    1.05 oz  150 calories  14 g protein Quest Chips    1.1 oz  150 calories  18 g protein        High Protein Soups  High protein soups are available online (see Resources section). If you have hypertension, look for soups with sodium under 20% per serving.      Bariatric Pal  15 g protein   Unjury  21 g protein   Celebrate  15 g protein       Other Tips in Preparing for Bariatric Surgery (see Appendix for detailed information)    Stock your pantry with bariatric-friendly foods   Purge your pantry of high fat, high sugar, and junk foods and drinks   Learn to measure your portion sizes   Read nutrition facts on food packaging   Track your food     Goals in Preparing for Bariatric Surgery     Eating between 80 g - 120 g protein and 6971-5899 calories every day. That breaks down to 20 g - 30 g of protein per meal and 10 g-15 g of protein per snack  Drinking 64 fluid ounces from water and/or sugar-free, non-carbonated drinks every day  Including protein at every meal and snack  Finding a high protein shake/drink/powder to use during the pre- and post-operative liquid diet  Getting into the habit of reading nutrition facts  Getting physical activity at least 90 minutes per week  Shifting how your plate looks with most of it as protein, followed by non-starchy vegetables and fruit     * YOU DO NOT NEED TO LOSE WEIGHT PRIOR TO SURGERY *  However, you must show progress towards these nutrition goals to be approved for surgery.        II. Bariatric Nutrition Core Points and Contract Agreement    Eat a protein at all meals and snacks   Limit starchy carbohydrates (bread, rice, pasta, potatoes, corn, grits, oatmeal,  etc.)  Eat 4-6 small meals per day. Protein drinks should be used for 1-2 of the small meals.  Measure portion sizes. Small plates, bowls and cups make smaller portions look bigger.  Limit eating out; make better choices when eating out (low fat/low carb)  Include fruits and vegetables in the diet DAILY  Avoid/Limit desserts/candy   Low-fat diet (Baked, broiled, grilled, and boiled instead of fried, sautéed, creamed)  Increase activity (walking, swimming, exercise videos)  Limit sugary, caffeinated, and carbonated beverages  Aim for 64 oz. water per day  Limit alcohol  Practice chewing foods thoroughly.  Practice sipping beverages--no chugging or gulping.  No Straws.  NO LIQUIDS 30 MIN. BEFORE, DURING, AND 30 MIN. AFTER MEALS.  Keep food logs and bring to each visit for review AVOID/LIMIT THESE FOODS   High in Fat/Sugar:   High fat milk (whole, 2%)  Butter, margarine, oil (instead use Marcie sprays or I Can't Believe It's Not Butter Spray)   Mayonnaise, sour cream, cream cheese, salad dressing (may use low fat versions of these items)  Ice Cream  Cakes, cookies, pies, desserts  Candy  Luncheon meats (bologna, salami, chopped ham)  Sausage, Bartlett  Gravy  Breaded and Fried Foods  Sugary drinks  Alcohol   Starchy Carbohydrates.    White and wheat Bread, muffins, bagels, English muffins, biscuits, buns, rolls, cornbread,  Rice, Pasta   Cereals (including grits, oatmeal)   Crackers, Pretzels, Chips, Granola  Corn, Popcorn, Peas, Quinoa  White Potatoes, Sweet potatoes  Flour and corn tortillas   Practice NOT DRINKING   Carbonated drinks  Using a straw          I have been educated on the above lifestyle and nutrition changes regarding weight loss surgery.  I understand and agree that following these guidelines will help me to lose weight and maintain my health long-term.    Patient Signature ______________________________________   Date ____________________    Dietitian Signature  _____________________________________    CONTACT INFORMATION  Va Vega, RD, LDN  Catarina Deras, MS, RD, LDN, [facility]  Anabella Neil, MS, RDN, LDN    [facility] 2nd [address] [place] [address] PHONE: [phone] FAX: [phone]    *Send us a message anytime using your [facility] account*        III. Pre-Operative Liquid Protein Diet    Your surgeon and dietitian will have you start a high protein, low calorie, full liquid diet.   For BMI of 40 or more: you will do 2 weeks of the pre-op liquid diet  For BMI of below 40: you will do 1 week of the pre-op liquid diet unless instructed otherwise by the bariatric team.    On the liquid diet, your caloric intake will be about 600 - 800 calories per day, with a protein intake of  grams per day. This will help you lose weight, decrease the size of your abdomen, and shrink your liver. These changes help to decrease your risk of complications during surgery.      While on the liquid diet, remember:     Clear Liquids            Besides protein drinks, all liquids should be sugar-free, decaffeinated **, non-carbonated fluids, and under 15 calories per 8 oz/1 cup serving  Unlimited sugar free beverages are allowed on pre-op liquid diet  You should drink at least 64 ounces of clear, sugar-free fluids during your pre-operative liquid diet (unless instructed otherwise by your medical providers and/or bariatric team)  Fruit, fruit purees, fruit juices, yogurt, and puddings ARE NOT part of the liquid diet, either alone or mixed into liquids or protein drinks.  Approved low calorie clear liquids include:  Water  Flavored water  Sugar-free, non-carbonated, non-caffeinated drinks  Sugar-free powders and drops  Unsweetened tea  Diet juices  Sugar-free Jell-o  Sugar-free popsicles  Broths/bone broths  Gatorade Zero  Powerade Zero     Soups and Broths        Unlimited amounts of broths are allowed during the pre-op liquid  diet.  If your provider has you on a low sodium diet, please choose low sodium broths.  The only high protein soups allowed on the liquid diet are powdered soups from companies like uShare, Swype, Bariatric Pal, Unjury. Include protein from high protein soups into your daily protein totals.  Bone broth can be a good source of protein - it can have up to 15 g of protein per cup.  Canned and fresh soups from the grocery stores and restaurants (egg drop, broccoli & cheddar, lizandro broth), gumbo or red bean juice ARE NOT Allowed on the bariatric liquid diet.       Tea/Coffee     **Limit caffeinated tea or coffee to one 12 oz serving per day.    Do not drink green tea of any kind.  To andrea, use a sugar substitute like Splenda, stevia, etc. To lighten, use fat free or 1% milk, unsweetened almond or soy milk, or a sugar-free creamer. Watch the serving size of the milks or creamer! Remember, no more than 15 calories per serving. Or, try using a little protein shake!     Protein     Aim to get  grams of protein per day.  Choose protein shakes with at least 20g of protein, with no more than 4 grams of sugar per 8 oz serving and is 100% whey protein (not collagen protein).  If you prefer or require a plant-based option, please choose soy or pea protein shakes.   If you use protein powder instead of premade protein shakes, mix protein powder with water, skim or 1% milk, unsweetened soy, cashew, or almond milk according to directions on protein product label.   Do not add protein powder to other high protein drinks. Your body can only absorb about 35-40 g protein at a time.  Add flavor to protein drinks using sugar-free drink powders and drops, sugar-free syrups, and extracts. Do not add yogurt, honey, peanut butter, fruit, or vegetables to protein drinks.     Also remember:  Stop taking any vitamins for 1 week prior to surgery.  Stop herbal supplements including green tea and fish oils for 2 weeks prior to  surgery.        Tips to Thriving on the Liquid Diet  Make popsicles out of protein thorpe and protein shakes by pouring the protein drink/shake into a popsicle mold  If you don't have popsicle molds, use a small paper or plastic cup or an empty yogurt container  Pour drink/shake into cup/container  Insert a popsicle stick, spoon, or fork in the cup  Keep the stick in place by punching a hole through plastic wrap or tin foil and covering the cup  Make slushies or frosties out of protein drinks/shakes  Semi freeze protein drinks  Pour into , add ice, blend to desired consistency  Vary your flavor of protein drinks  Include broths and bone broths in your diet; Season broths with herbs and spices if you find them bland  Mix high protein soup with low sodium bone broth for added flavor and protein.          IV. Bariatric Vitamin and Mineral Needs    Bariatric surgery can change the way your body absorbs certain vitamins and minerals. With a smaller stomach, it is also harder for you to get all the nutrients you need through food. After bariatric surgery, it is essential for you to take the following vitamins and minerals for the rest of your life to avoid nutrient deficiency. These can be purchased in stores or online (see below).     The dietitian will call you one week after your surgery and discuss starting your recommended vitamins and minerals.      DO NOT START VITAMINS AND MINERALS UNTIL INSTRUCTED    You can purchase your vitamins and minerals locally (ZIIBRA, pharmacy, grocery, etc.) or on the websites below:    https://www.bariatricMovieLine.com/ (use verification code [facility] for discount)  https://store.bariatricpal.com/collections/[email or web address]-vitamins (use discount code RSMLI650 for 20% off your first order)  [email or web address] [email or web address]/  https://www.bariatricfusion.[email or web address]/  https://AccuSilicon.com/    B-1* (also called Thiamine) or Super B Complex with Thiamine   The following dosing applies to sleeve,  bypass, or CATHERINE-S surgeries:    How Much Do I Need?  50 mg of B-1 (thiamine) per day  The larger the dose, the less often you take it:  Take 25 mg 2 x day; or take 50 mg 1 x day; or take 100 mg every 2 days; or take 250 mg 1 x week; or take 500 mg every 2 weeks    Options:   Dissolve on Tongue   Pill      * We recommend purchasing 250 mg of B-1 and 2500 mcg of B-12 and taking each once per week.      Multivitamin with Iron   DO NOT GET A MEN'S, 50+ OR SILVER MULTIVITAMIN. They do not iron.    How Many Multivitamins Do I Take?   The following dosing applies to sleeve, bypass, or CATHERINE-S surgeries:  If taking a non-bariatric vitamin, take 2 times per day (one in the am and one in the pm)  If you are taking a bariatric vitamin (Barimelt, Bariatric Advantage, Bariatric Pal, etc.), you may only need to take 1 tablet per day.    How Much Iron do I need? Ensure you get at least the iron requirements as listed below:  Men and post-menopausal women:  take at least 18 mg of iron per day  Pre-menopausal women and patients with anemia: take at least 36 mg per day    Options:   Chewable   Pills   Dissolve on Tongue       Calcium Citrate with Vitamin D   *Take Calcium supplement 2 hours apart from multivitamin with iron*    How Much Do I need?  Your body can only absorb 500 - 600 mg of calcium at a time; Space out your daily requirements listed below:  For sleeve and bypass patients: take 4269-0725 mg per day  For CATHERINE-S patients: take 0790-3499 mg per day    Options:   Soft Chew   Liquid   Dissolve on Tongue   Pill       B-12*    Look for sublingual or dissolve on the pill bottles    How Much Do I Need?  500 mcg per day; The larger the dose, the less often you take it.  500 mcg = 1 x day; 1000 mcg = every 2 days; 2500 mcg = 1 x week; 5000 mcg = every 2 weeks    Options:   Dissolve Under Tongue   Drops Under Tongue     * We recommend purchasing 250 mg of B-1 and 2500 mcg of B-12 and taking each once per week.    FOR CATHERINE-S  PATIENTS ONLY- INCLUDE VITAMIN A SUPPLEMENT  Vitamin A    FOR CATHERINE-S PATIENTS ONLY    How Much Do I Need?  10,000 IU/3,000 mcg per day    Options:   Dissolve on Tongue   Drops   Softgels             Post-Op Medication Instructions    Taking Vitamins, Minerals and Prescribed Medications  Continue all prescribed medications upon arrival home from hospital discharge as instructed per your prescribing providers or per discharge instructions from the hospital.  Specific, written instructions about your medicines will be given to you by your nurse upon hospital discharge.    Speak with your primary care doctor or the physician who prescribed your medications if you have any concerns regarding changes in your medications that are not prescribed by the bariatric team.      You may swallow pills smaller than 5 mm or the tip of the pencil eraser right after surgery.     DO NOT swallow pills larger than 5 mm (tip of pencil eraser) for 2 weeks after surgery.  If your medications are larger than 5 mm you will either need crushable, chewable, liquid, or capsule form for 2 weeks after surgery. Review your current medications with the prescribing provider and/or pharmacy to determine if medications are appropriate to crush, open or alter    Extended-release medications should never be altered and are meant to be taken whole.     If you are having a hard time with swallowing pills, split or crush them and try mixing them with unsweetened applesauce    If a change in these medications is needed, please consult with the provider that prescribes these medications so that they can prescribe an alternative form, dose, or medication    DO NOT take gummy multivitamins due to poor quality and sugar content    DO NOT take calcium citrate and iron within 2 hours of each other due to poor absorption    The dietitian will call you one week after your surgery and discuss starting your recommended vitamins and minerals at that time    Take one  vitamin/mineral/medication at a time, spaced 10-15 mins apart        V. Diet Progression after Bariatric Surgery    DIETARY  PHASE TIME FRAME  POST-SURGERY FOODS AND BEVERAGES   1 - LIQUID       Weeks 1 & 2    Day 1-7: 48 oz fluids. Add protein shake when able to drink 48 oz fluids    Day 8-14: 64 oz fluids, 60 g - 80 g protein Water, sugar-free, decaffeinated, non-carbonated beverages  Sugar free Jell-o and sugar-free popsicles, under 15 calories per serving  Skim, 1% milk, unsweetened almond or soy milk   Protein drinks and powders < 4 grams of sugar per serving, high protein soups   2 - PUREE       Weeks 3 & 4    Daily goals:   g protein  64 oz fluids   All Phase 1 liquids plus  Pureed lean meats, seafood, beans, and fruits and vegetables (without peel)  Soft scrambled egg  Nonfat Greek yogurt, low fat/fat free cheese   98% fat free cream soups  Sugar-free pudding   3 - SOFT     1 month after surgery    Daily goals:   g protein  64 oz fluids   All Phase 2 foods and liquids plus  Cooked, fork tender lean meats, fish and seafood  Lean deli meats  Eggs - scrambled, boiled and poached  Raw fruits and cooked vegetables - no peel   4 - SOLID Without Starchy Carbohydrates     2 months after surgery    Daily goals:   g protein  64 oz fluids  800-1000 calories All Phase 3 foods and liquids plus  Raw vegetables  Fruit with peel  Nuts and seeds  Dried meats/jerky and sticks  Protein bars < 4 grams of sugar   NO STARCHY CARBOHYDRATES (breads, pastas, rice, corn, peas, or any potatoes)   5 - REGULAR BARIATRIC   At goal weight    Daily goals   g protein  64 oz fluids   All Phase foods 4 plus up to ½ a serving of starchy carbohydrate (¼ cup of pasta, rice, corn, peas, potatoes or ½ slice of bread, bun,  tortilla per meal)       Phase 1: Bariatric High Protein Liquid Diet  Weeks 1 & 2 Post-Op    Dehydration is the #1 reason patients return to the hospital after being discharged. After surgery, your job is  to drink fluids and walk frequently throughout the day. Use the medicine cups and the fluid tracker you were given at your pre-op visit.    After bariatric surgery, patients need to take very small sips of fluid throughout the day to stay hydrated.  Remember to sip slowly.    Bariatric patients should NOT have alcohol, sugar or sugary drinks, carbonated beverages, caffeine or use a straw for drinking.     In the hospital and upon discharge, measure and document protein and fluid intake on the log sheet (Post Surgery Fluid and Protein Tracker) provided.  Set a reminder or timer to alert you to drink fluids.  The [employer] is a valuable tool and can assist with alerts.    After you leave the hospital, follow the nutrition discharge instructions below and reference the Bariatric Nutrition Guidebook for additional information.    Start sipping water and clear protein drinks (Premier clear, [id number], Isopure Clear, or protein powder with water).  Drink 1-2 ounces or 1-2 medicine cups (30 ml/medicine cup) of clear liquids every 15 minutes while awake and increase as tolerated.   Fluid/clear liquid intake should be a minimum of 48 ounces with a goal of 64 ounces. Increase the amount of fluids as tolerated.      Once you reach 48 ounces of water/clear protein drinks, you will advance to a full protein liquid diet. For the full protein liquid diet, either mix protein powder with skim (1%) milk, unsweetened soy, cashew, or almond milk, according to directions on protein product label, or you can drink premade protein shakes (you may dilute or add water to thin pre-made shakes if needed).     Continue full protein liquid diet for 2 weeks after surgery.  Further diet progression will be discussed at your 2-week post-op appointment- Do not progress unless instructed to do so by the Bariatric team.    A dietitian will call you 1 week post op to see how you are doing. At your 1-week dietitian phone call appointment, your  dietitian will discuss your vitamin regimen and start date. When you are cleared and instructed, follow the instructions on pages 21 regarding pill size, guidelines swallowing medications, and vitamin regimen.       Post-op Liquid Diet Goals       4-8 oz fluids every hour, minimum of 48 oz per day with goal of 64 oz  Add clear protein drinks and advance to the pre-made protein shakes as instructed once you reach 48 oz fluids daily.  Goal of 30 g - 40 g protein per day  Walk as tolerated        8 oz fluids every hour, goal of 64 oz fluids per day  Goal of 60 g - 80 g protein per day  Walk as tolerated       Physical Activity    If your doctor has cleared you for walking or bike riding before your surgery, you can continue this after surgery.  Do not to lift, push, or pull anything heavier than 10 lbs for the first 6 weeks after surgery.      A dietitian will call you at the end of Week 1 to see how you are doing and discuss vitamin regimen and start date. When you come to the clinic for your 2-week follow-up, a dietitian will discuss advancing your diet. Please bring your fluid tracker and your vitamins for review. If you have any questions about your diet or vitamins call the Bariatric Clinic [phone]          Taste and Tolerance Changes After Surgery    You may find that after having surgery, things don't taste the same to you. Drinks may taste too sweet, or water may taste metallic. You may find that flavors you liked before, you no longer enjoy. Here are some common post-op complaints and tips for handling them.    Water has a metallic taste  Add flavoring to water, such as a squeeze of lemon or lime, some fresh fruit (to infuse, not eat!) with crushed mint, or sugar-free powders and drops.     Muscle spasm in stomach after drinking fluids  Your stomach may be sensitive to the temperature of the fluid. If cold fluids cause muscle spasms, try drinking fluids at room temperature or warm. You may need to try  different temperatures until you find what works best. Remember to take small sips!    Drinks taste too sweet   Dilute drinks with water. Or, use less flavoring when mixing sugar-free drinks.    Protein shake causes diarrhea  Some patients may develop lactose sensitivity or intolerance after surgery. Try lactose-free protein shakes and powders such as Fairlife, Muscle Milk, or plant-based protein shakes and powders like Owyn and Evolve.    Protein shake tastes chalky, has an after-taste  Try mixing protein powder with your fluid of choice instead of drinking pre-made shakes. Or, mix unflavored protein powder or fruit-flavored protein powder into sugar-free drinks.     Hunger and Fullness Cues    After surgery, you may not experience feelings of hunger or fullness. Or, your body may use different signs to tell you it is hungry or full.    Hunger  You may not feel hunger or feel like eating. That does not mean you should skip meals. Your body still needs nutrients. Eat a high protein meal, snack, or drink a protein shake every 2-3 hours. You may experience other signs of hunger, like tiredness, or getting hangry. Learn what your new hunger cues are and pay attention to them.    Fullness  You may not feel fullness. That does not mean you should keep eating. Learn to visualize how much food is an appropriate amount, i.e. a palm-sized portion of protein. You may have other signs of fullness, such as nausea, burping, a runny nose (this has happened!), or pressure in your stomach, shoulders, or chest. Learn your new fullness cues and pay attention to them.      Phase 2: Bariatric High Protein Pureed Diet  Weeks 3 & 4    Two weeks after surgery, you may be ready to add pureed foods to your diet.  All food should be the consistency of baby food, or thinner.      Protein First   It is very important to pay attention to protein intake during this time. Meeting protein needs daily will help increase healing, decrease muscle  loss, and increase weight loss. Inadequate protein intake can cause delayed wound healing, hair loss, and muscle break down.    Always eat the foods with the highest protein first. Foods high in protein include lean meat, poultry, fish, seafood, milk, yogurt, cheese, eggs, beans  Eat 3-4 small meals per day (2-4 tbsp each), with protein supplements in between to meet protein needs  Lean meats and seafood can be pureed with a small amount of liquid, such as broth, in a  or  to baby food consistency. No lumps, bumps or stringy bits.    Follow the pureed diet for the next two weeks.      Daily Goals   grams of protein a day through protein supplements and food  64 oz of water or other sugar-free, non-carbonated drink    Tip:  Use a food journal or the [employer] isaak to track how much you are eating and drinking     Foods and Drinks Allowed Portion size Protein (g)   Sugar-free, non-carbonated, non-caffeinated clear liquids As desired 0   Skim or 1% milk 8 oz 8   Greek yogurt (under 10 g total sugar) 5 oz 12-15   Lean meats, poultry, fish, seafood, pureed 1 oz 6-9   Beans (red, white, black, lima, silva, fat-free refried, hummus) and lentils, soft-cooked ¼ cup 4   Low-fat/fat-free cheese (cottage cheese, mozzarella string cheese, ricotta cheese, Laughing Cow, Baby Bell, cheddar, etc.) ¼ cup 7-8   Scrambled eggs or Egg Beaters 1 or ¼ cup 6   Edamame or tofu, mashed ¼ cup 5   98% fat-free cream soups (add unflavored protein powder if desired) ½ cup 1-2   Sugar-free pudding (add protein or pb powder if desired) ½ cup 0-1   Peanut butter powder* 2 Tbsp 5   * Peanut butter powder contains about 60 calories per serving. Regular peanut butter is 190 calories per serving. Peanut butter powder is sold in the same section as regular peanut butter.       Protein drinks will make up most of your protein for the day  Protein drinks should contain 20-30 g protein and no more than 4 grams of sugar per  serving  Always have protein when you eat  Sip fluids in between meals  If food feels stuck, do not try to push it down with fluids; walk around  *NO MASHED POTATOES, GRITS OR OATMEAL*    Bariatric Pureed Sample Menu  Below is a sample schedule and menu. Note how every hour there is an eating or drinking occasion with a break in between. Adjust this schedule according to your day.     Timing Item Protein (g) Calories   8-8:30am 1 egg or ¼ cup Egg Beaters 6 78   9-9:30am 1 cup water, or decaf coffee or tea 0 0   10-10:30am Protein drink 30 160   11-11:30am 2 tbsp low-fat cottage cheese, and 1 tbsp pureed peaches 3.5 20   12-12:30pm 1 cup water, or sugar-free lemonade  0 0   1-1:30pm 2 tbsp pureed chicken, and 1 tbsp pureed carrots  9 46   2-2:30pm 1 cup water, or sugar-free lemonade 0 0   3-3:30pm Protein drink 30 160   5-5:30pm 1 cup water  0 0   6-6:30pm 1 cup hi-protein creamy chicken soup (see Recipe) 14 120   7-7:30pm 1 cup water, or sugar-free fruit punch  0 0   8-8:30pm 1 cup water 0 0     RECIPE IDEAS for Bariatric Pureed Diet    Hi-Protein Creamy Chicken Soup  (10g protein per 1 cup serving)  Empty 1 can of 98% fat free cream of chicken soup into saucepan. In a bowl, blend 1 scoop of unflavored protein powder with 1 can of skim milk until smooth.  Add protein milk to saucepan and heat to warm. (Note: Do NOT boil. Protein powder may clump if heated too hot).     Hi-Protein Pudding:  (14g protein per ½ cup serving)  Add 2 scoops protein powder to 2 cups cold skim milk and mix well.  Stir in dry Jell-O Sugar-Free Instant Pudding mix.  Chill and Enjoy!    Tuna Mousse   (12g protein per ¼ cup serving) From Eating Well After Weight Loss Surgery.  In a  or , combine all ingredients and pulse until smooth.  2 6-ounce cans tuna packed in water, drained  2 tbsp low-fat mayonnaise  2 tbsp fat-free sour cream  2 tbsp fat-free cream cheese, softened  ½ cup shallots, finely chopped  1 tbsp lemon  juice  ¼ tsp ground pepper  ½ tsp celery seed    Chocolate Peanut Butter Mousse  (28g protein)  Mix 6 oz plain Greek yogurt with 4 tbsp chocolate PB2      Phase 3: Bariatric High Protein Soft Diet   One Month After Surgery    One month after surgery, your stomach may be healed enough to add soft foods to your diet.  Soft foods are those which can be easily mashed with a fork. This diet usually lasts for one month.      Daily Goals   g of protein per day through food and protein supplements   64 ounces of fluids per day from water, sugar-free, non-carbonated drinks, and sugar-free popsicles, and sugar-free Jell-O      No liquids with meals. Do no drink 30 minutes before meals and wait 30 minutes to 1 hour after meals to start drinking  Sip on fluids throughout the day  Chew foods slowly, at least 30 times. A meal should take 20-30 mins to eat  Eat 3-5 small meals per day, without any additional snacking (no grazing)  Stop eating as soon as you feel full  Avoid using sugar of any kind and foods made with sugar, which can cause dumping syndrome and slow down weight loss  Marinating meats with a low sugar marinade, adding low-fat salad dressing, or adding low calorie gravy (made from powder and water) can help meats to digest easier    Adding Vegetables and Fruits  As long as you are consuming at least 80 g total protein daily from combination of foods and protein drinks, you may start adding small bites of fruits and non-starchy vegetables to your meals.     Cooked, tender vegetables and ripe fruits without the peel are tolerated best. Non-starchy vegetables include:    Asparagus  Beets  Beet greens  Broccoli  Nogal Sprouts  Cabbage  Carrots  Cauliflower (florets, mashed, riced)  Celery Cucumber  Eggplant  Green Beans  Kale  Lettuce/lettuce mixes  Mushrooms  Mustard greens  Okra  Onions  Peppers Snow peas  Spaghetti Squash  Spinach  Squash  Swiss chard  Tomatoes  Zucchini  Zucchini noodles           Foods to  Eat and Avoid One Month After Surgery    EAT THESE FOODS AVOID THESE FOODS   High in Protein: High in Fat/Sugar:   Canned tuna or chicken packed in water  Ground beef/turkey/pork/chicken (at least 90% lean)  Lean deli meats (turkey, chicken, ham, roast beef)  Skinless turkey or chicken, cooked tender and cut in small pieces  Lean pork or beef, cooked tender and cut in small pieces  Baked, broiled, grilled or boiled fish and seafood (not fried!)  Eggs - scrambled, poached, or boiled  Beans, hummus and lentils  Low-fat or fat-free cottage cheese, soft cheese, mozzarella string cheese, ricotta cheese, Laughing Cow, and Baby Bel cheese  Non-fat/low sugar yogurt (no more than 10 g of sugar per serving)  Sugar-free pudding  Silken tofu, edamame (soybeans) High fat milk (whole, 2%)  Butter, margarine, oil, mayonnaise  Sour cream, cream cheese, salad dressing  Ice Cream and frozen desserts  Baked goods (muffins, doughnuts, sweet rolls, cakes, cookies, pies, desserts)  Candy and chocolates  Luncheon meats and smoked meats (bologna, salami, parker, sausage)  Breaded and fried foods  Gravy made with a melony    Starchy Carbohydrates*:    All breads (white, wheat, rolls, flat, wraps)  Flour and corn tortillas   All rice, quinoa, and other grains  All pasta and noodles  All cereals (including grits, oatmeal, and granola)   All crackers, pretzels and pretzel crisps  All potatoes, sweet potatoes, and chips  Corn, popcorn, and corn chips, corn puffs  Green/sweet peas   As long as you are getting >80g PRO: Tough/Crunchy   Cooked tender vegetables without peel  Ripe fresh fruits without peel  Frozen fruits with no added sugar  Fruit canned in its own juice or in water Tough or dry meats  Nuts and seeds  Dried fruit and dried coconut  Raw vegetables  Protein bars   Fluids: Always Avoid:   Skim/1% milk, Lactaid, soy milk,   Water and sugar-free beverages (decaf and non-carbonated)  Decaf coffee & decaf tea  Sugary drinks  Carbonated  drinks  Alcohol  Drinking through straws   * At goal weight, you may include whole grains in small amounts.    Sample Menu for Bariatric High Protein Soft Diet    This sample menu provides about 80 g protein, including about 40 g protein from foods and at least 40 g protein from protein drinks.  Drinking protein drinks daily helps decrease muscle loss, increase weight loss, and prevent hair loss.    Time of Day Day 1 Day 2   7:00am-7:30pm    1 egg (or ¼ cup Egg Beaters) ¼ cup low-fat cottage cheese, 1 tbsp berries   8:00am-9:00am Water/SF beverage Water/SF beverage   9:30am-10:30am Protein drink  Protein drink   11:00am-12:00pm Water/SF beverage Water/SF beverage   12:30pm-1:00pm   1-2 oz grilled shrimp, ¼ cup green beans 1-2 oz canned chicken, shredded cheese, 1 tbsp salsa   1:30pm-2:30pm Water/SF beverage Water/SF beverage   3:00pm-4:00pm  Protein drink   Protein drink   4:30-6:30pm Water/SF beverage Water/SF beverage   7:00pm-7:30pm  ½ cup low fat chili, 1oz low-fat cheese, ¼ cup broccoli 2 oz grilled fish,  ¼ cup lima beans   8:00pm-9:00pm Water/SF beverage Water/SF beverage       Sip fluids continuously between meals  No drinking from 30 minutes before meals to 30 minutes after meals  For fluids: 1 cup = 8 oz; For food: ¼ cup = 4 tablespoons   3 oz meat is about the size of a deck of cards  A food scale will help you determine portion size    Tips for Fluids  Avoid sugary drinks  Limit caffeinated beverages to 12 oz per day for the first 2-3 months  Avoid carbonated beverages  Avoid drinking through straws to reduce gas and bloating for at least 3 months      Phase 4: Bariatric High Protein Solid Diet  Two Months After Surgery    Two months after surgery, you may add the following slowly, one by one, back into your diet:    Raw, crunchy non-starchy vegetables  Plain/unsalted nuts and seeds  Dried meats/jerky and meat sticks  Protein bars with no more than 4 g of sugar per serving (see section II for  examples)         g of protein per day, using foods and high protein shakes   800-1000 calories per day  64 ounces of fluids per day from water, sugar-free, non-carbonated drinks, and sugar-free popsicles, and sugar-free Jell-O      Chew foods well. If you have a hard time with certain foods at first, wait a few days and try again  Limit fruit to 2 servings per day. One serving of fruit is 1 small piece of fruit, ½ cup of canned fruit (in juice or water) or ½ cup cubed fruit  Limit nuts and seeds to one serving per day - ¼ cup nuts/seeds or 2 tbsp nut/seed butter    SAMPLE MENU STARTING TWO MONTHS AFTER SURGERY    Time of Day Day 1 Day 2   7am-7:30am:    Egg omelet made with 1 egg and 1 slice low-fat cheese Protein bar   8am-9am:  8 oz water/SF beverage water/SF beverage   9:30am:  1 oz turkey with 1 string cheese ¼ cup unsalted nuts + ½ banana   10:30-11:30am:  8 oz water/SF beverage water/SF beverage   12pm:    Grilled chicken (2 oz) salad with 1 tbsp of low-fat Italian dressing and 1 apple  Taco Lettuce wraps: 1-2 oz of lean ground meat, low fat cheese, salsa wrapped in lettuce leaves   12:30pm-2:30pm:  16 oz water/SF beverage water/SF beverage   3pm:  Protein drink   Light yogurt   3:30-5:30pm:  16 oz water/SF beverage water/SF beverage   6pm:  Grilled shrimp kebobs (2 oz shrimp, pineapple chunks, bell pepper and onions) with ¼ cup sautéed spinach  ½ cup Red Beans (no rice) served over ¼ cup cauliflower rice   6:30pm-8:30pm:  16 oz water/SF beverage water/SF beverage   9pm:  1 tbsp slivered almonds sprinkled over 6 oz Greek yogurt Protein shake     Food to Eat and Avoid Two Months After Surgery    EAT THESE FOODS AVOID THESE FOODS   High in Protein: High in Fat/Sugar:   Poultry: skinless chicken or turkey (light/dark), ground (90% lean)  Fish/Shellfish: catfish, salmon, tilapia, trout, tuna, crab, crawfish, shrimp, etc.  Beef: tenderloin, roast (rib, janki, rump), steak (sirloin, round, cubed, T-bone,  flank), ground (90% lean)  Pork: lean ham, [place] parker, tenderloin, center loin chop, ground (90% lean)  Game: venison, rabbit, duck (skin removed)  Deli meats: turkey, roast beef, ham, chicken  Dairy: low fat, part skim, and fat free cheese, Greek or low-fat yogurt (less than 10g sugar per serving)  Eggs: Boiled, omelet, poached, scrambled  Beans and Lentils: any kind, plus hummus and fat free refried beans  Soy: tofu, edamame  Nuts and Seeds: unsalted, ¼ cup or 2 tbsp nut/seed butter per day  Fruits - fresh, frozen, canned in its own juices or water  Vegetables - fresh, frozen, canned without added salt  Protein bars (no more than 4 g sugar)  Sugar-free pudding, popsicles, and Jell-o High fat milk (whole, 2%)  Butter, margarine, oil, mayonnaise  Sour cream, cream cheese, salad dressing  Ice Cream and frozen desserts  Baked goods (muffins, doughnuts, sweet rolls, cakes, cookies, pies, desserts)  Candy and chocolates  Luncheon meats and smoked meats (bologna, salami, parker, sausage)  Breaded and fried foods  Gravy made with a melony    Starchy Carbohydrates*:    All breads (white, wheat, rolls, flat, wraps)  Flour and corn tortillas   All rice, quinoa, and other grains  All pasta and noodles  All cereals (including grits, oatmeal, and granola)   All crackers, pretzels and pretzel crisps  All potatoes, sweet potatoes, and chips  Corn, popcorn, and corn chips, corn puffs  Green/sweet peas   Fluids: Always Avoid:   Skim/1% cow milk, unsweetened almond and soy milk  Water, sugar-free, and non-carbonated drinks, coffee, tea  Sugary drinks and juice  Carbonated drinks  Alcohol  Drinking through straws   * At goal weight, you may include starchy carbohydrates in small amounts      Phase 5. Bariatric Diet at Goal Weight and Beyond  Follow a high protein, low carb, low fat diet FOR LIFE    Congratulations on reaching your goal weight!  Reaching your goal weight is a great accomplishment. You worked hard to get the weight  off. Keep it off by staying on track with the bariatric lifestyle. Remember the following:    Daily Goals   g of protein per day, through food and high protein shakes.  64 ounces of fluids per day from water, sugar-free, non-carbonated drinks, and sugar-free popsicles and Jell-o.  Calories per day to maintain your weight are based on your age, height, weight, gender, and activity level. Speak with your dietitian for assistance in determining your daily caloric intake.    Adding Grains and Starchy Vegetables  At your goal weight, ask your dietitian about adding grains and starchy vegetables to your diet.  Choose whole grains such as brown rice, whole wheat breads, pastas, tortillas, crackers, old fashioned oatmeal (not instant), and whole grain cereals (Cheerios, Bran Flakes). They provide more fiber, protein, and vitamins than refined (white) grains.  When preparing potatoes (roasted, boiled, mashed, air-fried, etc.), keep the skin on for added fiber.  Keep serving size to ¼ cup.        Protein is the priority. Eat protein foods first before eating vegetables, fruit, or grains.  Aim to eat 3 - 4 oz of protein foods per meal and eat 3 meals per day.  Drink fluids 30 minutes before or 30 minutes after eating.  Snack on high protein foods like Greek yogurt, ¼ cup of unsalted nuts, protein bars, and low-fat cheese  Use protein shakes as a meal replacement or snack.  Limit fruit to 2 per day - small apple, orange, or banana, a handful of strawberries, blueberries, or grapes, or ½ cup of cut fruit.  Limit nuts/seeds to 1 serving per day - ¼ cup of nuts/seeds or 2 tbsp nut/seed butter    Keep Doing the Following  Taking your vitamins - multivitamin with iron, calcium citrate with Vit D, B-1 or Super B-Complex, and B-12  Exercising - cardio and weight resistance or lifting  Checking nutrition labels for protein, fat, sugar, and calories (see Appendix for more info on reading nutrition labels)  Tracking your food,  drink, and exercise with the [employer] isaak or on paper  Visiting the bariatric clinic - at least once a year       Sample Menus    Time of Day Food/Drink Protein (g) Calories   7am-7:30am  2 eggs scrambled  12 156   8:00am-9:00am  Water     9:30am-10:00am 3 oz plain nonfat Greek yogurt,   ¼ c blueberries 14  0 83  21   10:30-11:30am Water     12:00pm-12:30pm   3 oz tuna salad with 1 tbsp lite cazares and 6 whole wheat crackers 24  3 146  120   1:00pm-2:00pm Water     2:30pm-3:00pm Protein bar 21 190   3:30-5:30pm Water     6:00pm-6:30pm 3 oz grilled chicken  ¼ cup brown rice  ½ cup steamed broccoli 27  1  0 138  54  10   7:00pm-8:00pm Water     8:30pm-9:00pm Banana nice cream Frozen ½ banana pureed with 1 tbsp PB powder 0  3 55  30   Total  105 1003       Time of Day Food/Drink Protein (g) Calories   7:00am-8:00am  Protein shake 30 160   8:30am-9:30am Water     10:00am-10:30am 1 oz sliced turkey  1 oz mozzarella string cheese 7  7 35  80   11:00am-12:00pm  Water     12:30pm-1:30pm   1 oz whole wheat spaghetti   3 oz ground turkey (90% lean)  ¼ cup marinara sauce no added sugar 4  20  2 90  143  60   2:00pm-3:00pm Water     3:30pm-4:00pm ¼ c unsalted peanuts  ½ banana 7  0 160  55   4:30-5:30pm:  Water     6:00pm-6:30pm  3 oz grilled shrimp skewers  ¼ cup grilled pineapple  ½ cup cauliflower rice 18  0  0 84  20  10   7:00pm-8:00pm  Water     8:30pm-9:00pm 3 oz plain nonfat Greek yogurt,   ¼ c blueberries 14  0 83  21   Total  109 1001         VI. Physical Activity        Physical activity and exercise are essential to achieve and maintain weight loss and to build and maintain lean muscle. Activity and exercise beginning right after your surgery will help you feel better, recover faster and reduce the risk of complications. Staying active promotes mental well-being, relieves stress and reduces feelings of depression and anxiety. You feel good about your body when you exercise regularly, and therefore have a healthier  body image.     Choose a form of activity or exercise that you enjoy   Try mall walking, aerobics, swimming, aqua aerobics, or dancing    Ask a friend or family member to join in an activity or exercise with you. Use the [employer] system!   Lift some weights or use resistance machines and bands to tone muscle and help retain muscles    Join an exercise club or fitness class   When shopping, park farther from the entrance and walk    Stay hydrated by drinking water before and during exercise   Prioritize activity and exercise time into your schedule    Listen to your favorite music or podcase as you exercise   Keep a record or journal of your activity    Use stairs instead of the elevator or escalator   Aim for 30 minutes of physical activity, 3 times per week. It does not have to be 30 mins all at once         VII. Common Nutritional Problems After Surgery and Prevention Tips     Contact the Bariatric Clinic if you need help with any of these problems after surgery.      Gas and Bloating  Constipation    Cause: Digestive tract changes after surgery  Prevention tips: Eat slowly, avoid overeating, avoid carbonated beverages and drinking through straws.  Try switching to lactose-free protein drinks. Try using Gas-X chewables  Causes: Food and fiber intake are reduced following surgery  Prevention tips: Drink at least 48 ounces water daily in addition to protein drinks; Exercise daily; Try a laxative such as MiraLAX or a stool softener like Dulcolax. Do not use a fiber supplement like Metamucil          Nausea and Vomiting  Temporary Hair Loss    Causes: Overeating or eating too quickly  Prevention tips: Eat slowly, chew your food very well, and stop eating as soon as you feel full  Causes: Rapid weight loss and/or lack of protein in the diet  Prevention tips: Eat the amount of protein recommended by your dietitian          Dehydration  Dumping Syndrome    Causes: Not drinking enough fluids; or persistent vomiting and/or  diarrhea  Symptoms: Dark and strong-smelling urine, dry mouth, headache and fatigue  Prevention tips: Take frequent sips of liquid throughout the day, eat sugar free Jell-o and popsicles  Causes: Food emptying too quickly from the stomach, due to excess sugar or fat  Symptoms: Diarrhea, nausea, cold sweats and light-headedness  Prevention tips: Avoid consuming sugary foods or beverages, eating high fat foods, drinking fluids too soon after a meal          Lactose Intolerance  Chronic Malnutrition    Causes: Change in ability to digest lactose  Symptoms: Gas, bloating, cramping and diarrhea after drinking milk or whey-based protein shakes  Prevention tips: Switch to lactose-free milk, unsweetened almond or soy milk. Refer to lactose-free protein supplement suggestions in this booklet  Causes: Changes in nutrient absorption after surgery, insufficient supplementation   Symptoms: Fatigue, aching muscles, tingling feet, calves or hands  Prevention tips: Eat a healthy diet; always take your supplements, check with a Dietitian that you are taking the correct supplements   Page created by 2024 [facility] Dietetic Intern Lauren Wu VIII. Frequently Asked Questions    Q: When can I have caffeinated coffee or tea after surgery?  A: When you can comfortably drink 48 oz of clear fluids, you can have one cup of caffeinated coffee or tea    Q: When can I have alcohol after surgery?  A: You may try alcohol one year after surgery. Be aware that alcohol will affect you very quickly after bariatric surgery. If you do, we suggest sticking to single ingredient drinks like beer, wine, and not mixing spirits with juices and carbonated drinks.    Q: Why can't I use a straw after surgery?  A: Three months after surgery, you may attempt to use a straw. You may find that it fills your stomach with air and cause nausea. In that case, stop using the straw.    Q: When can I start drinking carbonated beverages after surgery?  A: 3 months  after surgery, you may attempt to drink a carbonated beverage. You may find that it fills your stomach with air and cause nausea, bloating, or other discomfort. In that case, stop.    Q: Why do I have to take vitamins for the rest of my life?  A: Bariatric surgery can change the way your body absorbs vitamins and minerals. Plus, with a smaller stomach, you will not be able to get enough nutrients through food.     Q: When can I go back to work?  A: We recommend not returning to work for at least 1 week after surgery, preferably 2 weeks. In the first few days after surgery, you may experience symptoms like nausea, vomiting, diarrhea, or constipation. You also want to give your body time to adjust to your new stomach.    Q: Can I have mashed potatoes, grits, or oatmeal during the pureed phase?  A: No. These are starchy carbohydrates. Starchy carbs expand in your stomach and may cause pain. They do not help with weight loss and they are easy to overeat.    Q: How many carbs can I have?  A: There is not a set amount of carbs in the bariatric lifestyle. Rather, it is more important to focus on the type of carbs than the number of grams of carbs per meal or per day.    Q: When can I have surgery to remove the extra skin?  A: 2 years after bariatric surgery. Note that insurance does not pay for skin removal for cosmetic reasons. If excess skin removal is medically necessary (for example, you develop a rash under the extra skin around your belly), insurance may cover it.            Log food and drink and feelings of hunger and fullness before and after consuming    Base meals around lean protein and vegetables    Eat two servings of fruits and 3 servings of vegetables every day    Eat consistently throughout the day, every 3-4 hours    Go out when you want a treat instead of having it in the house    Include protein in all meals and snacks    Consume low fat/fat free dairy products    Drink water and low calorie drinks     Turn off electronics while eating    Plan meals and snacks   Page created by 2024 [facility] Dietetic Intern Jessi De La Cruz IX. Lifelong Nutrition Guidelines After Bariatric Surgery     Safe and successful weight loss after bariatric surgery requires you to make a commitment to living the bariatric lifestyle. Remember to do the following:    Prioritize Protein   Eat Protein Foods First.  Protein is necessary to help you maintain lean body mass as you lose weight.    80 g - 120 g of protein per day, through food and protein supplements  Make half your plate protein, followed by non-starchy vegetables, then fruit or a small serving of starchy carbohydrate when you reach your goal weight     Avoid high fat, high sugar foods and drinks  These can cause diarrhea and stomach discomfort, or the dreaded Dumping Syndrome!  Keep protein shakes, powders, drinks, and bars to no more than 4 g of sugar per serving  Diets high in fat and sugar can lead to weight regain    Be Mindful About Eating    Eat slowly, chew foods thoroughly,   eat and drink small amounts at a time    Listen to Your Body for hunger and fullness cues. You may not feel hunger, but you may feel hangry or tiredness.  Stop eating when you feel 80% full. If you are unable to recognize fullness, learn how much you can eat before you start to feel nauseated or vomit.    Consume only the quantity of food recommended. Eating or drinking too much may eventually stretch your pouch and prevent you from achieving optimal weight loss.      Be Active  Engage in some type of physical activity at least 30 minutes, three times per week. The 30 minutes do not all need to be at the same time   Include weightlifting/resistance to help build and preserve lean muscle    Stay Hydrated  Drink 64 oz or more of fluids daily of sugar-free, non-carbonated beverages under 15 calories per 8 oz serving  Drink fluids 30 minutes before or after eating, not at the same time    Take Your  Vitamins and Minerals  Multivitamin with iron, calcium citrate with vitamin D, B-1, and B-12 (include Vit A if you had CATHERINE-S surgery)  Take multivitamin with iron and calcium at least two hours apart from each other        Go to Your Bariatric Follow-up Visits  2 weeks, 8 weeks, 6 months, and every year after surgery  See a dietitian at every time!   Attend monthly bariatric support group meetings. First Tuesday of every month, 5:30 pm - 6:30 pm. Scan this QR code to join!            Contact Your Dietitian Anytime   Questions about the bariatric lifestyle  When weight loss stalls  Questions about foods, drinks, vitamins & minerals, recipes  Things you see on social media that you have questions about  Message your dietitian through the patient portal or call the Bariatric Clinic at [phone] x. Resources for Bariatric Patients        Smart Phone Apps  [place]o. Available for iPhone and Android. After signing in, enter [facility] Code 12934  [employer]  Diabetes logbook by Ildefonso (track blood sugar, upload meal pics)      Shopping Websites  https://[email or web address] (use verification code ADRYANNER for discount)  [email or web address]://[email or web address]/collections/[email or web address]-vitamins (use discount code UDSZN787 for 20% off your first order)  https://celebratevitamins.[email or web address]  [email or web address]/  https://unjury.[email or web address]    Websites   www.oa.org Overeater's Anonymous is a community of people who support each other in order to recover from compulsive eating and food behaviors. Online, in-person, and telephone meetings available  www.calorieking.com Online food database to find out the calories, protein, sugar, fat, fiber, etc. of any certain food. Helpful when tracking protein and calories on paper.  www.bariatriceating.com A website started by a bariatric surgery patient. Includes recipes, advice, and an online store      Books  Before & After: Living & Eating Well After Weight  Loss Surgery by Meg Rodriguez, 2012  Eating Well After Weight Loss Surgery: Over 150 Delicious Low-Fat, High-Protein Recipes to Enjoy in the Weeks, Months, and Years after Surgery by Cari Velazco & Yazan Nunez, 2018  Weight Loss Surgery for Dummies by Magui Lynn MD, FACS, 2012  Exodus from Obesity: The Guide to Long-Term Success After Weight Loss Surgery by Edie Ayala, 2003.  Weight Loss Surgery: Finding the Thin Person Hiding Inside You by Pati Abdul, 2008.  The Don't Diet Live It Workbook by Andrea Wachter and Mirna Dillon, 1999  The Overeater's Journal: Exercised for the Heart, Mind, and Soul by Alma Platt, 2004  Moving Away from Diets: Healing Eating Problems and Exercise Resistance, 2nd Edition by Laura Reid and Aliza Nowak, 2003  50 Ways to Soothe Yourself Without Food by Meg Evangelista PSY.D., 2009        XI. Appendix    What to Stock    An empty kitchen can be an obstacle to your weight loss journey. When it's mealtime and you don't have healthy options on hand, it's tempting to skip meals or head to the drive-through. Keeping your kitchen stocked with easy-to-prepare meals and snacks will help you make good choices and stay on track with your weight loss goals.     Pantry Malena   Canned/Serge Goods Cooking Malena Snacks/Dry Goods   Non-starchy vegetables (with no salt added, if possible)  Beans and fat free refried beans  Chicken/tuna/salmon packed in water  Soups (98% fat free)  Fruits (in 100% juice or no sugar added)  Olives and pickles Herbs and spices  Seasoning mixes (low sodium taco, Italian, Ranch)  Cinnamon/Pumpkin pie spice  Olive oil and sesame oil  Vinegars and cooking tara   Low sodium broth and stock  Light soy sauce  Sugar substitutes and sugar free syrups Nuts, nut butters, PB powder  Pumpkin/sunflower seeds  Protein powders and bars with 0-4 g sugar  Sugar free pudding and Jell-o mixes  Dry pasta made from chickpeas or lentils  Dry beans and  lentils  Low sugar tomato sauce   Protein chips (such as Quest)     Freezer Staples   Proteins Vegetables Fruits   Fish fillets (not breaded or battered)  Shrimp  Chicken  Turkey or veggie patties  Turkey sausage or chicken sausage  Edamame (soybeans) Broccoli, cauliflower (including cauli-rice and cauli-mash), green beans, squash/zucchini (including zoodles)  Onion/bell pepper/celery seasoning  Spinach/greens  Stir bar blends Berries  Rosman  Banana chunks  Pineapple  Cherries       Refrigerator Malena   Proteins Fruit, Vegetables, Drinks Condiments   Protein shakes/thorpe  Chicken/turkey/lean meats  Fish/seafood  90% lean ground beef/turkey   Lean deli meats  Eggs/Egg Beaters  Cottage cheese (low fat/fat free)  Greek yogurt (under 10 g sugar)  Low fat cheeses Fresh fruits  Fresh vegetables  Raw veggies, lettuces - washed and chopped  Avocado  Hummus   Milk (skim or 1%)  Unsweetened almond/soy milk  Pitcher of sugar-free flavored beverage  Low fat cazares  Mustard  Salsa  Sugar free flavored coffee creamer   Sugar free BBQ sauce, ketchup, and [place] sauce   Lite/fat-free salad dressing         Pantry Purge    It is important to rid your surroundings of foods that can derail your weight loss journey. They can keep you from losing weight and worsen any medical conditions you may have. A pantry purge includes the cupboards, refrigerator, freezer, garage, deep freezer, shed, and car - anywhere you keep food. Remember: When in doubt, throw it out.    What to Purge    High Fat Foods  Anything that has more than 20% of the daily value for fat should be tossed. These may include:   Meats in a can like meat spreads, SPAM, Kaite sausages, and little smokies  Luncheon/cured/smoked meats like bologna, salami, hot dogs, and sausages  Frozen meals, including pizza  Ground meat less than 90% lean, ribs, parker, and fatty meats  Bakery items like cakes, brownies, muffins, sweet rolls and buns, doughnuts, pies, and cookies  Whole fat  dairy like milk, cheese, yogurt, pudding, and ice cream.    High Sugar Foods  Anything that has more than 10% of the daily value for added sugar should be tossed. These may include.  Breakfast pastries like muffins, doughnuts, and Danishes  Bakery and desserts items like cakes, brownies, sweet rolls and buns, pies, and cookies, and ice cream  Sandi and chocolates  Fruit juices, sweetened tea, lemonade, cold drinks, flavored coffee creamers, flavored yogurt, jams, jellies, preserves, and dried fruits    High sodium foods  Anything that has more than 20% of the daily value for sodium should be limited, especially if you have hypertension. These may include:   Meals in a box such as jambalaya mix, Rice-A-Du, macaroni & cheese  Meals in a can like Chef Napoles, canned soups (even the ones that claim to be healthy), canned stews, meat spreads, SPAM, Gordon sausages, little smokies, and canned chili  Ramen noodles and Cup O' Soup  Luncheon/cured/smoked meats like bologna, salami, hot dogs, sausages, and pickled pork  Frozen meals, including pizza      Your Handy Portion Size Guide    You can use measure cups, spoons, and a kitchen scale to determine the portion size of your foods. Or, you can use this handy guide to estimate your portion sizes.     Palm of hand ? 1 serving of protein        A closed fist ? 1 serving of soup, salad greens          A cupped hand ? 1 serving of fruit, vegetables, grains          Tip of Thumb ? tsp of oil or sweetener     A thumb ?1 tbsp of cheese, dressings, spreads             Reading Nutrition Facts    When choosing foods, it's always a good idea to look at the nutrition facts label on the back of the food and drink packages. The label will help you choose foods that contain more of the nutrients you want to get more of and less of the nutrients you may want to limit.      Nutrients to get less of: Saturated Fat, Sodium, and Added Sugars.  Eating too much saturated fat and sodium is  associated with an increased risk of developing and worsening some health conditions, like cardiovascular disease and high blood pressure. Consuming too much added sugars can make it hard to meet important nutrient needs while staying within calorie limits.    Total Sugars includes sugars naturally present in many nutritious foods and beverages, such as sugar in milk, fruit, and vegetables as well as any sugars that were added in making in the product.     Added Sugars include any caloric sweeteners that are added during the processing of foods. Added sugars include: sugar, honey, molasses, agave nectar, syrups, malts, and concentrated fruit and vegetable juices (see next page for a longer list).      Nutrients to get more of: Dietary Fiber, Vitamin D, Calcium, Iron, and Potassium.  Eating a diet high in dietary fiber can increase the frequency of bowel movements, lower blood glucose and cholesterol levels, and reduce calorie intake. Diets higher in vitamin D, calcium, iron, and potassium can reduce the risk of developing osteoporosis, anemia, and high blood pressure.      A Word About Sugar and Sweeteners    We know to look at the nutrition facts for sugar. We can also look at the ingredients list to see if there is sugar in a food or drink. Did you know there are dozens of names for sugar?         Anything that is a juice, syrup, or malt is sugar. Anything that ends in -ose is sugar. Anything that ends in -zachary is sugar alcohol. Sugar alcohols are fewer in calories than sugar itself. But can also cause diarrhea if too much is eaten at one time.      Added Sugar  In addition to looking at the nutrition facts for added sugar, you can look at the ingredients list to find added sugars. Compare regular Yoplait (red container) and Yoplait Light (blue container).          Sample Nutrition Facts of Protein Bars  Which protein bar is a good choice for a bariatric diet?     Be a food ! Not all protein bars are a  good choice on the bariatric diet. The [place] bar does have protein (10 g). It also has more fat and sugar than the Quest protein bar.         Eat This, Not That    Protein-rich, lower fat, and lower sugar substitutes for commonly eaten foods.            Pre-Operative Sample Meal Plan     Below is a sample 7-day meal plan to give you ideas on the kinds of meals and snacks to include in your diet before surgery. Each day's menu provides  g protein per day and 0427-2667 calories. Remember to drink sugar-free fluids throughout the day.    MENU # 1   Breakfast: 2 eggs, 1 turkey sausage filippo, 1 apple   Snack: P3 protein pack (Avoid P3 with dried fruits and chocolate)   Lunch: 2 meat & cheese roll-ups (sliced turkey, low-fat sliced cheese, mustard), 12 baby carrots dipped in 1 Tbsp natural peanut butter   Mid-Day Snack: ¼ cup unsalted almonds, ½ cup fruit   Dinner: 1 chicken thigh simmered in tomato sauce + 2 Tbsp mozzarella cheese, ½ cup black beans, ½ cup steamed carrots   Evening Snack: ½ cup grapes with 4 cubes low-fat cheddar cheese           MENU # 2   Breakfast: 200 calorie protein drink (20-30 g protein, no more than 4 g sugar)  Mid-morning snack: ¼ cup unsalted nuts, medium banana   Lunch: 3 oz tuna or chicken salad made with 2 tbsp light cazares, over salad with tomatoes and cucumbers.   Snack: Low-fat cheese stick   Dinner: 3 oz hamburger filippo, slice of low-fat cheese, 1 cup yellow squash and zucchini sauteed in nonstick cook spray  Snack: 6 oz light yogurt           Menu #3   Breakfast: 5-6 oz plain Greek yogurt + fruit (½ banana OR ½ cup fruit - pineapple, blueberries, strawberries, peach), may add Splenda to andrea   Lunch: Grilled chicken breast w/slice low-fat pepper parag cheese, ½ cup grilled/baked asparagus and small salad with Salad Spritzer.   Mid-Day snack: 200 calorie protein drink (20-30 g protein, no more than 4 g sugar)  Dinner: 4 oz grilled fish, ½ cup white beans, ½ cup cooked  spinach   Evening Snack: Fudgsicle no sugar added           Menu # 4   Breakfast: 1 scoop vanilla protein powder + 4 oz skim milk + 4 oz coffee   Snack: Protein bar under 4 g sugar and under 200 calories   Lunch: 2 lettuce tacos: 3 oz seasoned ground turkey wrapped in kalyn lettuce leaves with 1 tbsp shredded cheese and dollop low-fat sour cream   Snack: ½ cup cottage cheese, ½ cup pineapple chunks   Dinner: Shrimp omelet: 2 eggs, ½ cup shrimp, green onions, and shredded cheese           Menu #5   Breakfast: 1 cup low-fat cottage cheese, ½ cup peaches (no sugar added)   Snack: 1 apple, 4 cubes cheese   Lunch: 3 oz baked pork chop, 1 cup okra and tomato stew   Snack: ½ cup black beans + salsa + dollop light sour cream   Dinner: Caprese chicken salad: 3 oz chicken breast, 1 oz fresh mozzarella, sliced tomato, 1 tbsp olive oil, torn fresh basil leaves or dried basil   Snack: Sugar-free popsicle           Menu #6   Breakfast: ½ cup part-skim ricotta cheese, 2 tbsp sugar-free strawberry preserves, sprinkle of slivered almonds   Snack: 1 orange + string cheese  Lunch: 3 oz canned chicken, 1 oz shredded cheddar cheese, ½ cup black beans, 2 Tbsp salsa   Snack: 200 calorie protein drink   Dinner: 4 oz grilled salmon steak, over mixed green salad with 2 tbsp light dressing           Menu #7  Breakfast: Crust-less quiche (bake 1 egg mixed w/ low fat cheese, broccoli and low sodium ham in a muffin cup until cooked inside)  Snack: 1 light yogurt  Lunch: Protein shake   Snack: Small apple w/2 tbsp PB2 (powdered peanut butter)  Dinner: 2 ground turkey meatballs w/low sugar marinara sauce, ½ cup spiralized zucchini (sauteed on stove top w/nonstick cook spray)              Tracking Your Nutrition Intake    Studies show that people who track their food intake and physical activity are more successful in their weight loss than those who do not. Tracking takes just a few minutes a day and is very helpful in keeping you on  course.    There's an isaak for that  We use [employer], a free iasak available for both Apple and Android. After downloading the isaak and signing up, use program code 05455 to access the isaak tailored for [facility] bariatric patients. In addition to the tracking features, on the isaak you can set reminders, view the bariatric video, and access all of the nutrition information in this booklet.     Paper and Pen  You can also choose to track with paper and pen. You can buy a food journal online, print food log sheets off the internet, or make your own in a notebook. On the last page of this booklet is a sample food log. In filling in the food log, refer to the nutrition label on the food you eat for the calorie and protein info. You may need to look up food items online if a nutrition label is not available.          Protein Foods List  Use these lists as a guide to your protein and calorie intake. You can also look up foods online at websites such as  [email or web address].[employer]/us/en/    Abbreviations: SF=sugar free, RF = Reduced fat, LF=low fat, FF= fat free, g=grams    Meat and Fish*   Food Name Portion Calories Protein (g)   Beef, ground 90% lean 1 oz 50 6   Beef, roast 1 oz 46 8   Beef, steak, sirloin, fat trimmed 1 oz 55 9   Catfish, broiled or baked 1 oz 30 5   Chicken, white breast w/o skin 1 oz 46 9   Chicken, leg w/o skin 1 oz 54 7   Crab, steamed ¼ cup 40 9   Crawfish tails, boiled ¼ cup 35 8   Egg 1 each 78 6   Ham, lean 95% 1 oz 44 7   Pork, tenderloin 1 oz 46 7   [place], baked 1 oz 52 7   Shrimp, steamed 1 oz 28 6   [place]apia, white fish, cooked 1 oz 36 8   Trout 1 oz 48 7   Tuna, canned in water 1 oz 37 8   Turkey, white meat 1 oz 35 7   Turkey, ground 93% lean 1 oz 38 6   Turkey, breakfast sausage link 1 oz 55 5   Turkey, smoked rope 1 oz 50 4   Veal Loin 1 oz 50 7   *A serving of cooked meat/fish is 3 oz = size of deck of cards    Beans   Food Name Portion Calories Protein (g)   Black beans ¼ cup 60 4   Great  northern (white) beans  ¼ cup 50 3   Red beans ¼ cup 56 4   Refried beans, fat free ¼ cup 65 4   Tofu ¼ cup 47 5     Dairy   Food Name Portion Calories Protein (g)   Baby Bel cheese 1 piece 70 5   Cheese, American FF 1 oz 40 6   Cheese, cottage 1% fat ¼ cup 41 7   Cheese, Fiesta Blend/Mexican, RF ¼ cup 80 7   Cheese, mozzarella, string 1 stick 80 7   Cheese, parmesan, grated 2 tsp 20 2   Cheese, ricotta, part skim ¼ cup 90 8   Cheese, [place] Ultra Thin Cheddar 1 slice 45 3   Laughing Cow cheese 1 wedge 30 2   Yogurt, light 5 oz 80 5   Yogurt, Greek nonfat/light 5 oz  12-15     Snacks   Food Name Portion Calories Protein (g)   Almonds, unsalted ¼ cup 160 6   Cashews, unsalted ¼ cup 160 5   Chickpeas, roasted ½ cup 70 4   Beef jerky, original 1 oz 80 10   Edamame, shelled ¼ cup 50 4   Hummus ¼ cup 100 5   Peanuts, unsalted ¼ cup 160 7   Peanut butter, unsalted, creamy 2 tbsp 180 8   Pudding, sugar free 1 serving 60-70 1-2   Quest Chips 32 g 140 19-20   Sunflower seeds, unsalted ¼ cup 170 6   Turkey jerky, original 1 oz 70 12     Drinks   Food Name Portion Calories Protein (g)   [place] milk, unsweetened 1 cup 30 1   Cow's milk, skim or 1% 1 cup 90 8   Cow's milk, Fairlife non-fat 1 cup 80 13   Soymilk, unsweetened 1 cup 80 7         Plant Based Meats   Food Name Portion Calories Protein (g)   Beyond Burger ½ filippo 115 10   Beyond Beef (ground) 2 oz 115 10   Beyond Sausage 1 link 190 16   Beyond Meatballs 3 each 114 11   Beyond Breakfast Sausage 2 each 180 11   Beyond Beef Crumble ½ cup 90 14   Beyond Beef Jerky ½ cup 90 10   Impossible Beef (ground, burger) 2 oz 115 9.5   Impossible Sausage 2 oz 130 7   Gardein Be'f Burger 1 filippo 130 14   Gardein Chick'n Strips 3 each 65 7.5   Gardein Meatballs 3 each 160 15   Gardein Saus'ge Links ½ link 95 8   Gardein Be'f Tips 6 each 105 10.5   Gardein Ground Be'f 1/2 cup 80 12   Morning Star Grillers Original Burger 1 filippo 130 16   Morning Star Breakfast Sausage Link 2  each 70 9   Morning Star Breakfast Shira 1 shira 80 9   Morning Star Griller Crumbles ½ cup 70 10   Morning Star Chick'n Stips 8 each 100 15       Sample Food Log    DAY/DATE FOOD ITEM QTY/SIZE/WT PROTEIN CALORIES   Mon, 11/27 Scrambled eggs 2 12 g 160    [employer] 1 - 14 oz 26 g 170    Mozzarella string cheese 1 - 1 oz 8 g 80    Tuna  4 oz 32 g 160    Hummus ¼ cup 5 g 100    Sliced carrots ½ cup .5 g 25    Baked salmon 4 oz 28 g 200    TOTAL  111.5 g 895

## 2025-02-13 NOTE — PROGRESS NOTES
BARIATRIC NEW PATIENT EVALUATION    CHIEF COMPLAINT:   Morbid obesity, body mass index is 42.89 kg/m². and inability to lose weight.    HPI:  Martha Shah is a 54 y.o. morbidly obese adult. His current body mass index is 42.89 kg/m². He has multiple associated comorbidities including essential hypertension, ALFREDO on CPAP, GERD (EOE) on daily medications, history of MI, and history of CVA.  He has struggled with excess weight since childhood.  His highest adult weight was 340 lbs at age 39, and her lowest adult weight was 200 lbs at age 17.  The patient has tried calorie counting, GLP, sugar busters and nutrisystem.  The patient was most successful with nutrisystem with a weight loss of 60 lbs.  His current exercise include rowing and ellipteal  3 times a week. He denies any history of eating disorder such as anorexia, bulimia, or taking laxatives for weight loss, and denies any addiction including illicit substances, alcohol, or gambling.  Patient states she has a good  support system.  He lives with family.  He is currently employed   .  He  endorses a 10 year history of GERD, PPI BID on controls  The patient's goal is 150 lbs.    ESS: Score of 9, reviewed 02/13/2025.  Does not need Sleep Study.    Pre op weight-257  IBW-148    Zepbound was caused for pancreatitis per pt.     CT abd/pelvis 12/16/24 Impression: Findings suggestive of diffuse gastritis.  Diffusely hypodense pancreas should be correlated for clinical evidence of acute pancreatitis.  Hepatomegaly.  Bladder calculus.    CXR 3/18/24 Impression: No acute radiographic findings in the chest     EGD 11/30/23  Impression: - Esophageal mucosal changes consistent with eosinophilic esophagitis.                          - Small hiatal hernia.                          - Normal examined duodenum.                          - Biopsies were taken with a cold forceps for                          evaluation of eosinophilic esophagitis.     PAST MEDICAL HISTORY:  Past  Medical History:   Diagnosis Date    Allergy     Hypertension     NSTEMI (non-ST elevated myocardial infarction) 01/07/2024    Sleep apnea, unspecified     Urinary tract infection        PAST SURGICAL HISTORY:  Past Surgical History:   Procedure Laterality Date    ADENOIDECTOMY      ADENOIDECTOMY      BIOPSY OF ADENOIDS      COLONOSCOPY N/A 09/14/2022    Procedure: COLONOSCOPY;  Surgeon: Jordy Simons MD;  Location: [facility];  Service: Endoscopy;  Laterality: N/A;    CORONARY ANGIOGRAPHY Right 1/8/2024    Procedure: ANGIOGRAM, CORONARY ARTERY;  Surgeon: Estrada Ojeda MD;  Location: [facility] CATH LAB;  Service: Cardiology;  Laterality: Right;    EGD, WITH CLOSED BIOPSY  11/30/2023    ESOPHAGOGASTRODUODENOSCOPY N/A 09/14/2022    Procedure: EGD (ESOPHAGOGASTRODUODENOSCOPY);  Surgeon: Jordy Simons MD;  Location: [facility];  Service: Endoscopy;  Laterality: N/A;    ESOPHAGOGASTRODUODENOSCOPY  12/19/2022    with biopsy    ESOPHAGOGASTRODUODENOSCOPY N/A 12/19/2022    Procedure: EGD (ESOPHAGOGASTRODUODENOSCOPY);  Surgeon: Jordy Simons MD;  Location: [facility];  Service: Endoscopy;  Laterality: N/A;    ESOPHAGOGASTRODUODENOSCOPY N/A 11/30/2023    Procedure: EGD (ESOPHAGOGASTRODUODENOSCOPY);  Surgeon: Vinay Bourgeois MD;  Location: [facility];  Service: Endoscopy;  Laterality: N/A;    TONSILLECTOMY         FAMILY HISTORY:  Family History   Problem Relation Name Age of Onset    Lupus Mother      Heart disease Father      Prostate cancer Neg Hx      Kidney disease Neg Hx          SOCIAL HISTORY:  Social History     Socioeconomic History    Marital status:    Tobacco Use    Smoking status: Never     Passive exposure: Never    Smokeless tobacco: Never   Substance and Sexual Activity    Alcohol use: Not Currently     Comment: social    Drug use: No    Sexual activity: Yes     Partners: Female     Social Drivers of Health     Financial Resource Strain: Low Risk  (1/31/2025)    Overall Financial Resource  Strain (CARDIA)     Difficulty of Paying Living Expenses: Not hard at all   Food Insecurity: No Food Insecurity (1/31/2025)    Hunger Vital Sign     Worried About Running Out of Food in the Last Year: Never true     Ran Out of Food in the Last Year: Never true   Transportation Needs: No Transportation Needs (12/17/2024)    TRANSPORTATION NEEDS     Transportation : No   Physical Activity: Inactive (1/31/2025)    Exercise Vital Sign     Days of Exercise per Week: 0 days     Minutes of Exercise per Session: 20 min   Stress: Stress Concern Present (1/31/2025)    [facility] [place] [facility] - Occupational Stress Questionnaire     Feeling of Stress : To some extent   Housing Stability: Unknown (1/31/2025)    Housing Stability Vital Sign     Unable to Pay for Housing in the Last Year: No     Homeless in the Last Year: No       MEDICATIONS:    Current Outpatient Medications:     aspirin (ECOTRIN) 81 MG EC tablet, Take 1 tablet (81 mg total) by mouth once daily., Disp: 90 tablet, Rfl: 4    cetirizine (ZYRTEC) 10 MG tablet, Take 10 mg by mouth once daily., Disp: , Rfl:     colchicine (COLCRYS) 0.6 mg tablet, Take 1 tablet (0.6 mg total) by mouth daily as needed., Disp: 21 tablet, Rfl: 0    diclofenac sodium (VOLTAREN) 1 % Gel, Apply 2 g topically 4 (four) times daily as needed., Disp: 350 g, Rfl: 2    indomethacin (INDOCIN) 50 MG capsule, Take 1 capsule (50 mg total) by mouth 3 (three) times daily., Disp: 15 capsule, Rfl: 3    ketorolac (TORADOL) 10 mg tablet, Take 1 tablet (10 mg total) by mouth every 6 (six) hours as needed for Pain., Disp: 20 tablet, Rfl: 1    losartan (COZAAR) 25 MG tablet, Take 1 tablet (25 mg total) by mouth once daily., Disp: 90 tablet, Rfl: 3    metFORMIN (GLUCOPHAGE) 500 MG tablet, Take 1 tablet (500 mg total) by mouth 2 (two) times daily with meals., Disp: 180 tablet, Rfl: 3    metoprolol tartrate (LOPRESSOR) 25 MG tablet, Take 1 tablet (25 mg total) by mouth 2 (two) times daily., Disp:  180 tablet, Rfl: 3    multivitamin (ONE DAILY MULTIVITAMIN) per tablet, Take 1 tablet by mouth once daily., Disp: , Rfl:     nitroGLYCERIN (NITROSTAT) 0.4 MG SL tablet, Place 1 tablet (0.4 mg total) under the tongue every 5 (five) minutes as needed for Chest pain. Call the 911 after the 3rd dose., Disp: 25 tablet, Rfl: 3    pantoprazole (PROTONIX) 40 MG tablet, Take 1 tablet (40 mg total) by mouth 2 (two) times daily., Disp: 60 tablet, Rfl: 11    tamsulosin (FLOMAX) 0.4 mg Cap, Take 1 capsule (0.4 mg total) by mouth once daily., Disp: 30 capsule, Rfl: 1    minoxidiL-finasteride 5-0.1 % Soln, Apply 1 application  topically 2 (two) times a day. (Patient not taking: Reported on 2/13/2025), Disp: 60 mL, Rfl: 5    ALLERGIES:  Review of patient's allergies indicates:   Allergen Reactions    Sulfa (sulfonamide antibiotics) Shortness Of Breath    Coconut     Guaifenesin     Milk containing products (dairy)     Prochlorperazine Hallucinations     Other reaction(s): Compazine  Note:  Adverse Reaction: Other    Statins-hmg-coa reductase inhibitors     Terbinafine hcl      Other reaction(s): terbinafine 250 mg tablet  Note:  Adverse Reaction: Upset Stomach    Wheat containing prod     Penicillins Rash     Other reaction(s): Penicillins  Note:  Adverse Reaction: Rash       Review of Systems   Constitutional:  Negative for chills and fever.   HENT:  Negative for ear pain, nosebleeds and sore throat.    Eyes:  Negative for blurred vision and double vision.   Respiratory:  Negative for cough and shortness of breath.    Cardiovascular:  Negative for chest pain, palpitations, orthopnea, claudication and leg swelling.   Gastrointestinal:  Negative for abdominal pain, constipation, diarrhea, heartburn, nausea and vomiting.   Genitourinary:  Negative for dysuria and urgency.   Musculoskeletal:  Negative for back pain and joint pain.   Skin:  Negative for rash.   Neurological:  Negative for dizziness, tingling, focal weakness and  "headaches.   Endo/Heme/Allergies:  Does not bruise/bleed easily.   Psychiatric/Behavioral:  Negative for depression and suicidal ideas.        Vitals:    02/13/25 0837   BP: 139/74   Pulse: 60   SpO2: 98%   Weight: 116.9 kg (257 lb 11.5 oz)   Height: 5' 5" (1.651 m)   PainSc: 0-No pain       Physical Exam  Vitals and nursing note reviewed.   Constitutional:       Appearance: She is well-developed. She is morbidly obese.   HENT:      Head: Normocephalic.      Nose: Nose normal.      Mouth/Throat:      Mouth: Mucous membranes are moist.   Eyes:      Extraocular Movements: Extraocular movements intact.   Cardiovascular:      Rate and Rhythm: Normal rate and regular rhythm.      Heart sounds: Normal heart sounds.   Pulmonary:      Effort: Pulmonary effort is normal.      Breath sounds: Normal breath sounds.   Abdominal:      General: Bowel sounds are normal.      Palpations: Abdomen is soft.   Musculoskeletal:         General: Normal range of motion.      Cervical back: Normal range of motion.   Skin:     General: Skin is warm and dry.      Capillary Refill: Capillary refill takes less than 2 seconds.   Neurological:      Mental Status: She is alert and oriented to person, place, and time.   Psychiatric:         Mood and Affect: Mood normal.          DIAGNOSIS:  1. Morbid obesity, body mass index is 42.89 kg/m². and inability to lose weight.  2. Co-morbidities: essential hypertension, ALFREDO on CPAP, GERD (EOE) on daily medications, history of MI, and history of CVA.     PLAN:  The patient is a good candidate for Bariatric Surgery. The patient is interested in laparoscopic melony-en-y gastric bypass with Dr. Evans. The surgery and post-op care was discussed in detail with the patient. All questions were answered.    The patient understands that bariatric surgery is a tool to aid in weight loss and that they need to be committed to the diet and exercise post-operatively for successful weight loss. Discussed with patient that " bariatric surgery is not the easy way out and that it will take plenty of dedication on the patient's part to be successful. Also discussed the possibility of weight regain if the patient strays from the diet guidelines or exercise requirements. Patient verbalized understanding and wishes to proceed with the work-up.    Estimated Goal weight is 195-200 lbs.    Discussed no NSAIDS post op     ORDERS:  1. Stress Test, EKG, FL UGI and EGD  2. Psychological Consult, Bariatric Dietician Consult, and Cardiac Clearance  3. Bariatric Labs: Per orders.    PCP: Estrada Partida MD  RTC: As scheduled.    I spent a total of 60 minutes on the day of the visit.This includes face to face time and non-face to face time preparing to see the patient (eg, review of tests), obtaining and/or reviewing separately obtained history, documenting clinical information in the electronic or other health record, independently interpreting results and communicating results to the patient/family/caregiver, or care coordinator.

## 2025-02-13 NOTE — PATIENT INSTRUCTIONS
Start Wegovy 0.25 mg once a week x 1 month. At the end of that month, the dose will continue to increase monthly.       Decrease portions as soon as you start wegovy. Avoid fried, rich or greasy foods.      Some nausea in the first 2 weeks is not unusual.     If you get pain across the upper abdomen and around to your back, please call the office.       [email or web address] to sign up for coupon card.       Diet per RACHEL De Dois.       Exercise should be some cardiovascular and some resistance training(see below).      STRENGTHENING  An adequate resistance training program could include the following types of exercise, focusing on the major muscle groups. One can use 5 to 10 pound dumbbells for those exercises that require the use of weight. At home, one can use a household item that provides a comfortable weight, such as a milk carton or beverage container. These exercises can also be performed without weights. Add some type of resistance training 2-3 days a week. These can be body weight exercises, light weight or elastic bands. [employer] and [employer] are great sources for free work out plans and videos.       Chest (Bench Press): Hold the weights with your hands. Lying on a flat surface, with knees bent and feet flat, slowly bring the weights to the chest area with palms facing upward. Begin to exhale and press the weights up, fully extending the arms, and keeping them above your eyes. Inhale as you lower them to the starting position and repeat the movement.  Back (Bent-Over Row): Start by placing your feet shoulder-width apart.  a weight with each hand just outside the knees. Keeping the back straight and the knees flexed, slightly bend forward at the waist. Let the arms hang naturally while holding the weights. From this starting position, pull the weights to the lower abdomen, keeping the elbows close to the body. Exhale as you pull. Return to the starting position, inhaling as you go.  Arms (Bicep Curls): Hold a  "weight in each hand, with elbows at your sides, palms facing forward. The back should be straight, the chest flared outward. Begin to bend your right arm up first while exhaling, keeping the elbow totally stationary. Wait until the right arm goes completely down to the fully extended position, and then begin to curl the left arm. Each arm curled completes one full repetition.  Shoulders (Lateral Raises): Place the feet a few inches apart with the knees bent slightly. Keep the back erect as you lean forward slightly. With the weights in front of your thighs and palms facing together, begin to slowly raise them up to the side until parallel with the floor. Lower the weights to your thighs, and repeat.  Legs (Stiff Leg Dead Lift): Start by standing straight, holding the weights close to your sides, nearly touching your thighs. Keep the weights close to the body--this protects the back. The back must stay straight. Bend at the waist as far forward as you comfortably can while keeping your legs straight, and begin to feel the pull in your hamstrings as you lower the weights toward the ground. Slowly return to the starting position, keeping the back straight.  Legs (Dumbbell Lunge): Hold a weight in each hand, arms hanging at your sides. Step out with one leg, keeping the back straight. It is important to step out far enough so that the knee does not extend over the toe. This puts too much stress on the knee. Go down far enough so that the opposite knee nearly touches the ground. Keep this stance and repeat the lunging motion for several repetitions.  Abdominals: Do not perform standard sit-ups as these could hurt the lower back. Rather, focus on the following 2 exercises: (1) Obliques--Lie on your back with the knees flexed in the bent sit-up position. From this position, bring both knees down to the ground. With the back remaining flat, begin to flex your body toward your toes ("crunch"). Bring your shoulders up off the " ground, but go slowly, controlling your momentum. Repeat this for 10 to 15 times. (2) Seated bench kicks/parag knives--Sit on the end of a bench or chair with the hands placed behind the buttocks. Begin to kick the legs outward with the knees bent slightly--at the same time, lean back to extend the torso. Come back to the beginning position and repeat this motion 10 to 15 times.

## 2025-02-13 NOTE — PROGRESS NOTES
NUTRITIONAL CONSULT    Referring Physician: Leila Evans M.D.   Reason for MNT Referral: Initial assessment for Chandler-en-Y gastric bypass work-up    PAST MEDICAL HISTORY:   54 y.o. adult    Weight history includes the patient has struggled with excess weight since childhood.  The patient's highest adult weight was 340 lbs at age 39, and the pt's lowest adult weight was 200 lbs at age 17.  The patient has tried calorie counting, GLP (Zepbound lost 25 lbs), sugar busters and nutrisystem.  The patient was most successful with nutrisystem with a weight loss of 60 lbs.      Past Medical History:   Diagnosis Date    Allergy     Hypertension     NSTEMI (non-ST elevated myocardial infarction) 01/07/2024    Sleep apnea, unspecified     Urinary tract infection      CLINICAL DATA:  54 y.o.-year-old White adult.  Height: 5 ft. 5 in.  Weight: 257 lbs  IBW: 148 lbs  BMI: 42.8  The patient's goal weight (50% EBW): 202 lbs  Personal goal weight: under 200 lbs    Goal for Bariatric Surgery: to improve health, to improve quality of life, to lose weight, and to prevent future medical conditions    DAILY NUTRITIONAL NEEDS: pre-op nutritional bariatric guidelines to promote weight loss  6543-3018 Calories    Grams Protein    NUTRITION & HEALTH HISTORY:  Greatest challenge: starchy CHO and portion control    Current diet recall:     B: Cheerios with unsweetened almond milk with berries OR Chobani greek yogurt  L: Quest cookie OR crackers and cheese OR protein shake  Sn: apple, slice of cheese  Sn: caramel or apple cinnamon rice cakes  D: Icelandic microwave dish: chickpea/potato in spicy sauce over rice OR chicken breast or fish, potatoes/rice/beans, steamed broccoli/green beans OR salads    Avoids fried foods and gluten and limits dairy.    Current Diet:  Meal pattern: 3 meals + 2-3 snacks  Protein supplements: occ Owyn or Soylent RTD shakes  Snacking: fruits, cheese, rice cakes  Vegetables: Likes a few. Eats 2-3 times per  week. Raw veggies upset her wife's stomach so that are not often stocked in the house.  Fruits: Likes a variety. Mostly berries. Eats 2-3 times per week.  Beverages: water and diet soda  Dining out/delivery: Rarely.   Cooking at home: Daily. Weekly.     Exercise:  Occ  Has under desk elliptical, treadmill and rowing machine at home.    Restrictions to Exercise: foot pain    Vitamins / Minerals / Herbs:   MV  Biotin 5,000    Labs:   None available at time of visit    Social:  Works from home.  Lives with wife.  Grocery shopping and food prep done by both.  Patient believes the household will be supportive after surgery.  Alcohol: None.  Smoking: None.    ASSESSMENT:  Patient reports attempts at weight loss, only to regain lost weight.  Patient demonstrated knowledge of healthy eating behaviors and exercise patterns; admits to not eating healthy and not exercising at this point.  Patient states willingness to change lifestyle and make behavior modifications.    Barriers to Education: none    Stage of change: determination/action    NUTRITION DIAGNOSIS:    Morbid Obesity related to Excessive carbohydrate intake, Excessive calorie intake, and Physical inactivity as evidence by BMI.    BARIATRIC DIET DISCUSSION/PLAN:  Discussed diet after surgery and related to patient's food record.  Reviewed nutrition guidelines for before and after surgery.  Answered all questions.  Continue to review Bariatric Nutrition Guidebook at home and call with any questions.  Work on Bariatric Nutrition Checklist.  Work on expanding variety of vegetables.  Work on gradually cutting back on starchy CHO in the diet.  Begin trying various protein supplements to determine preference  Start including protein supplements in the diet plan daily.  1200-calorie diet.  1500-calorie diet.  5-6 meals per day  Increase exercise    RECOMMENDATIONS:  Pt is a good candidate for bariatric surgery - Gastric Bypass.    Patient verbalized understanding.    Will  f/u before/after surgery as needed.    Communicated nutrition plan with bariatric team.    SESSION TIME:  60 minutes

## 2025-02-18 ENCOUNTER — PATIENT MESSAGE (OUTPATIENT)
Dept: BARIATRICS | Facility: CLINIC | Age: 55
End: 2025-02-18
Payer: COMMERCIAL

## 2025-02-19 DIAGNOSIS — N21.0 BLADDER STONE: Primary | ICD-10-CM

## 2025-02-19 NOTE — TELEPHONE ENCOUNTER
Portal message sent to patient.    Please call and schedule CT scan and follow-up appointment with me with UA/PVR   Thanks, M

## 2025-02-20 DIAGNOSIS — N21.0 BLADDER STONE: ICD-10-CM

## 2025-02-21 ENCOUNTER — PATIENT MESSAGE (OUTPATIENT)
Dept: BARIATRICS | Facility: CLINIC | Age: 55
End: 2025-02-21
Payer: COMMERCIAL

## 2025-02-21 RX ORDER — TAMSULOSIN HYDROCHLORIDE 0.4 MG/1
1 CAPSULE ORAL
Qty: 90 CAPSULE | Refills: 1 | Status: SHIPPED | OUTPATIENT
Start: 2025-02-21

## 2025-02-26 ENCOUNTER — HOSPITAL ENCOUNTER (OUTPATIENT)
Dept: RADIOLOGY | Facility: HOSPITAL | Age: 55
Discharge: HOME | End: 2025-02-26
Attending: NURSE PRACTITIONER
Payer: COMMERCIAL

## 2025-02-26 DIAGNOSIS — K20.0 EOSINOPHILIC ESOPHAGITIS: ICD-10-CM

## 2025-02-26 DIAGNOSIS — E66.01 MORBID OBESITY WITH BMI OF 40.0-44.9, ADULT: ICD-10-CM

## 2025-02-26 DIAGNOSIS — K21.00 GASTROESOPHAGEAL REFLUX DISEASE WITH ESOPHAGITIS WITHOUT HEMORRHAGE: ICD-10-CM

## 2025-02-26 DIAGNOSIS — I25.10 CORONARY ARTERY DISEASE INVOLVING NATIVE CORONARY ARTERY OF NATIVE HEART WITHOUT ANGINA PECTORIS: ICD-10-CM

## 2025-02-26 DIAGNOSIS — I10 PRIMARY HYPERTENSION: ICD-10-CM

## 2025-02-26 PROCEDURE — 74240 X-RAY XM UPR GI TRC 1CNTRST: CPT | Mod: 26,,, | Performed by: RADIOLOGY

## 2025-02-26 PROCEDURE — 25500020 PHARM REV CODE 255: Performed by: NURSE PRACTITIONER

## 2025-02-26 PROCEDURE — 74240 X-RAY XM UPR GI TRC 1CNTRST: CPT | Mod: TC,FY

## 2025-02-26 PROCEDURE — A9698 NON-RAD CONTRAST MATERIALNOC: HCPCS | Performed by: NURSE PRACTITIONER

## 2025-02-26 RX ADMIN — BARIUM SULFATE 290 ML: 0.6 SUSPENSION ORAL at 09:02

## 2025-02-26 NOTE — PROGRESS NOTES
"Subjective:      Martha Shah is a 54 y.o. adult who returns today regarding right ureter stone.    Recently admitted for pancreatitis/sepsis (12/16/2024).  Urine culture revealed multiple organisms. Blood cultures: no growth.  Repeat UC: multiple organisms.   CTAP with contrast revealed atrophic right kidney, 3 x 5 mm bladder stone.  Negative for hydronephrosis, nephrolithiasis  Patient denies previous renal stone history.  Denies recent flank pain/gross hematuria.  Denies dysuria  Reported history of hypospadias and expressed concern over ability to pass stone, but was open to trial of passage.   She continues to have intermittent right flank pain and has been unable to pass stone with MET.  Repeat CT renal stone study  (2/25/2025) e redemonstrated right UVJ stone.  She presents today to schedule definitive stone treatment. Denies f/c/n/v. Denies GH    Hx STEMI; ASA 81 mg daily     The following portions of the patient's history were reviewed and updated as appropriate: allergies, current medications, past family history, past medical history, past social history, past surgical history and problem list.    Review of Systems  A comprehensive multipoint review of systems was negative except as otherwise stated in the HPI.     Objective:   Vitals: /79 (BP Location: Left arm, Patient Position: Sitting)   Pulse (!) 142   Ht 5' 5" (1.651 m)   Wt 118.7 kg (261 lb 11 oz)   BMI 43.55 kg/m²       Physical Exam   General: alert and oriented, no acute distress  Head: normocephalic, atraumatic  Neck: supple, no lymphadenopathy, normal ROM, no masses  Neuro: alert and oriented x3, no gross deficits  Psych: normal judgment and insight, normal mood/affect, and non-anxious    Lab Review   Urinalysis demonstrates no ua   Lab Results   Component Value Date    WBC 8.87 01/30/2025    HGB 13.2 (L) 01/30/2025    HCT 41.0 01/30/2025    MCV 90 01/30/2025     01/30/2025     Lab Results   Component Value Date    CREATININE " 1.1 12/18/2024    BUN 10 12/18/2024       Imaging   CT Renal Stone Study ABD Pelvis WO    Narrative  EXAMINATION:  CT RENAL STONE STUDY ABD PELVIS WO    CLINICAL HISTORY:  please place pt prone to access UVJ vs intravesical calculus;    TECHNIQUE:  Routine axial CT images of the abdomen and pelvis were obtained without the use of IV contrast.   Sagittal and coronal reformatted images were also acquired.    COMPARISON:  CT abdomen pelvis with contrast 12/16/2024    FINDINGS:  The heart is normal in size with no pericardial effusion.    Lung bases are clear.    Liver is enlarged in size measuring 18.1 cm, demonstrating normal attenuation with no focal hepatic lesions.    Gallbladder is unremarkable.  No intrahepatic or extrahepatic ductal dilatation.    The spleen is normal in size with few splenic parenchymal calcifications.    Adrenal glands are unremarkable.    Similar hypoattenuating pancreas with mild nonspecific surrounding stranding.  No pancreatic mass or ductal dilatation.    Mild atrophy of the right kidney, unchanged.  No nephrolithiasis or hydronephrosis.  Mild perinephric stranding.  Ureters are normal in course and caliber.  Similar 6 mm calcific focus about the urinary bladder distal to the right ureter vesicular junction.  Urinary bladder is not well distended, limiting evaluation.    Prostate is unremarkable.    Stomach and duodenum are unremarkable.  Loops of small bowel demonstrate no evidence for obstructive or inflammatory process.  Appendix is unremarkable.  Visualized colon is unremarkable.    No abdominopelvic ascites.  No free intra-abdominal air.  No mesenteric or retroperitoneal lymphadenopathy.    Aorta is normal in course and caliber without significant atherosclerosis.    Small fat containing umbilical hernia.    Osseous structures demonstrate no evidence for acute fracture or osseous destructive lesion.    Impression  Similar appearance of a 6 mm calcific focus about the urinary bladder  just distal to the right ureter vesicular junction, likely representing a bladder stone.  No evidence for nephrolithiasis or obstructive uropathy.    Hepatomegaly.  No focal hepatic lesions.    Similar appearance of the pancreas demonstrating relative diffuse hypoattenuation and mild surrounding inflammatory change.  Evolving pancreatitis cannot be excluded correlation with laboratory values and clinical history is recommended.    Additional stable findings as above.      Electronically signed by: Tj Vera  Date:    02/25/2025  Time:    08:41        Assessment and Plan:   1. Calculus of right ureter  The patient is scheduled for ureteroscopy. The risks and benefits of ureteroscopy were discussed with the patient in detail.  Consent was obtained.  The risks include but are not limited to burning with urination, bleeding, infection, pain, incomplete fragmentation of the stone, need for further procedures, injury to the kidney, ureter, bladder and need for open surgery.  The patient was informed that they may require a ureteral stent and that stents can cause irritative voiding symptoms.  They also understand that ureteral stents are temporary and must be removed or exchanged in a timely fashion as they can calcify and make more stones and become difficult to remove. Alternative treatments were also discussed with the patient in detail to include ESWL, percutaneous treatment of the stone, open surgery or observation. Patient understands these risks and has agreed to proceed with surgery.     - Urine Culture High Risk  - Place in Outpatient; Standing  - Case Request Operating Room: URETEROSCOPY  - Vital Signs ; Standing  - Diet NPO; Standing  - Place sequential compression device; Standing  - Diet NPO         This note is dictated on M*Modal word recognition program.  There are word recognition mistakes that are occasionally missed on review.

## 2025-02-27 ENCOUNTER — OFFICE VISIT (OUTPATIENT)
Dept: UROLOGY | Facility: CLINIC | Age: 55
End: 2025-02-27
Payer: COMMERCIAL

## 2025-02-27 VITALS
WEIGHT: 261.69 LBS | HEIGHT: 65 IN | HEART RATE: 142 BPM | SYSTOLIC BLOOD PRESSURE: 113 MMHG | DIASTOLIC BLOOD PRESSURE: 79 MMHG | BODY MASS INDEX: 43.6 KG/M2

## 2025-02-27 DIAGNOSIS — N20.1 CALCULUS OF RIGHT URETER: Primary | ICD-10-CM

## 2025-02-27 PROCEDURE — 99999 PR PBB SHADOW E&M-EST. PATIENT-LVL V: CPT | Mod: PBBFAC,,, | Performed by: NURSE PRACTITIONER

## 2025-02-27 PROCEDURE — 3074F SYST BP LT 130 MM HG: CPT | Mod: CPTII,S$GLB,, | Performed by: NURSE PRACTITIONER

## 2025-02-27 PROCEDURE — 87086 URINE CULTURE/COLONY COUNT: CPT | Performed by: NURSE PRACTITIONER

## 2025-02-27 PROCEDURE — 3008F BODY MASS INDEX DOCD: CPT | Mod: CPTII,S$GLB,, | Performed by: NURSE PRACTITIONER

## 2025-02-27 PROCEDURE — 99214 OFFICE O/P EST MOD 30 MIN: CPT | Mod: S$GLB,,, | Performed by: NURSE PRACTITIONER

## 2025-02-27 PROCEDURE — 1159F MED LIST DOCD IN RCRD: CPT | Mod: CPTII,S$GLB,, | Performed by: NURSE PRACTITIONER

## 2025-02-27 PROCEDURE — 3078F DIAST BP <80 MM HG: CPT | Mod: CPTII,S$GLB,, | Performed by: NURSE PRACTITIONER

## 2025-02-27 PROCEDURE — 4010F ACE/ARB THERAPY RXD/TAKEN: CPT | Mod: CPTII,S$GLB,, | Performed by: NURSE PRACTITIONER

## 2025-02-27 RX ORDER — CIPROFLOXACIN 2 MG/ML
400 INJECTION, SOLUTION INTRAVENOUS
OUTPATIENT
Start: 2025-02-27

## 2025-02-27 NOTE — H&P
"Subjective:      Martha Shah is a 54 y.o. adult who returns today regarding right ureter stone.     Recently admitted for pancreatitis/sepsis (12/16/2024).  Urine culture revealed multiple organisms. Blood cultures: no growth.  Repeat UC: multiple organisms.   CTAP with contrast revealed atrophic right kidney, 3 x 5 mm bladder stone.  Negative for hydronephrosis, nephrolithiasis  Patient denies previous renal stone history.  Denies recent flank pain/gross hematuria.  Denies dysuria  Reported history of hypospadias and expressed concern over ability to pass stone, but was open to trial of passage.   She continues to have intermittent right flank pain and has been unable to pass stone with MET.  Repeat CT renal stone study  (2/25/2025) e redemonstrated right UVJ stone.  She presents today to schedule definitive stone treatment. Denies f/c/n/v. Denies GH     Hx STEMI; ASA 81 mg daily      The following portions of the patient's history were reviewed and updated as appropriate: allergies, current medications, past family history, past medical history, past social history, past surgical history and problem list.     Review of Systems  A comprehensive multipoint review of systems was negative except as otherwise stated in the HPI.     Objective:   Vitals: /79 (BP Location: Left arm, Patient Position: Sitting)   Pulse (!) 142   Ht 5' 5" (1.651 m)   Wt 118.7 kg (261 lb 11 oz)   BMI 43.55 kg/m²         Physical Exam   General: alert and oriented, no acute distress  Head: normocephalic, atraumatic  Neck: supple, no lymphadenopathy, normal ROM, no masses  Neuro: alert and oriented x3, no gross deficits  Psych: normal judgment and insight, normal mood/affect, and non-anxious     Lab Review   Urinalysis demonstrates no ua         Lab Results   Component Value Date     WBC 8.87 01/30/2025     HGB 13.2 (L) 01/30/2025     HCT 41.0 01/30/2025     MCV 90 01/30/2025      01/30/2025            Lab Results   Component " Value Date     CREATININE 1.1 12/18/2024     BUN 10 12/18/2024         Imaging   CT Renal Stone Study ABD Pelvis WO     Narrative  EXAMINATION:  CT RENAL STONE STUDY ABD PELVIS WO     CLINICAL HISTORY:  please place pt prone to access UVJ vs intravesical calculus;     TECHNIQUE:  Routine axial CT images of the abdomen and pelvis were obtained without the use of IV contrast.   Sagittal and coronal reformatted images were also acquired.     COMPARISON:  CT abdomen pelvis with contrast 12/16/2024     FINDINGS:  The heart is normal in size with no pericardial effusion.     Lung bases are clear.     Liver is enlarged in size measuring 18.1 cm, demonstrating normal attenuation with no focal hepatic lesions.     Gallbladder is unremarkable.  No intrahepatic or extrahepatic ductal dilatation.     The spleen is normal in size with few splenic parenchymal calcifications.     Adrenal glands are unremarkable.     Similar hypoattenuating pancreas with mild nonspecific surrounding stranding.  No pancreatic mass or ductal dilatation.     Mild atrophy of the right kidney, unchanged.  No nephrolithiasis or hydronephrosis.  Mild perinephric stranding.  Ureters are normal in course and caliber.  Similar 6 mm calcific focus about the urinary bladder distal to the right ureter vesicular junction.  Urinary bladder is not well distended, limiting evaluation.     Prostate is unremarkable.     Stomach and duodenum are unremarkable.  Loops of small bowel demonstrate no evidence for obstructive or inflammatory process.  Appendix is unremarkable.  Visualized colon is unremarkable.     No abdominopelvic ascites.  No free intra-abdominal air.  No mesenteric or retroperitoneal lymphadenopathy.     Aorta is normal in course and caliber without significant atherosclerosis.     Small fat containing umbilical hernia.     Osseous structures demonstrate no evidence for acute fracture or osseous destructive lesion.     Impression  Similar appearance of  a 6 mm calcific focus about the urinary bladder just distal to the right ureter vesicular junction, likely representing a bladder stone.  No evidence for nephrolithiasis or obstructive uropathy.     Hepatomegaly.  No focal hepatic lesions.     Similar appearance of the pancreas demonstrating relative diffuse hypoattenuation and mild surrounding inflammatory change.  Evolving pancreatitis cannot be excluded correlation with laboratory values and clinical history is recommended.     Additional stable findings as above.        Electronically signed by:Tj Vera  Date:                                        02/25/2025  Time:                                                    08:41           Assessment and Plan:   1. Calculus of right ureter  The patient is scheduled for ureteroscopy. The risks and benefits of ureteroscopy were discussed with the patient in detail.  Consent was obtained.  The risks include but are not limited to burning with urination, bleeding, infection, pain, incomplete fragmentation of the stone, need for further procedures, injury to the kidney, ureter, bladder and need for open surgery.  The patient was informed that they may require a ureteral stent and that stents can cause irritative voiding symptoms.  They also understand that ureteral stents are temporary and must be removed or exchanged in a timely fashion as they can calcify and make more stones and become difficult to remove. Alternative treatments were also discussed with the patient in detail to include ESWL, percutaneous treatment of the stone, open surgery or observation. Patient understands these risks and has agreed to proceed with surgery.      - Urine Culture High Risk  - Place in Outpatient; Standing  - Case Request Operating Room: URETEROSCOPY  - Vital Signs ; Standing  - Diet NPO; Standing  - Place sequential compression device; Standing  - Diet NPO      --pt does not need to hold blood thinner for URS     This note is dictated  on M*Modal word recognition program.  There are word recognition mistakes that are occasionally missed on review.

## 2025-02-28 LAB
BACTERIA UR CULT: NORMAL
BACTERIA UR CULT: NORMAL

## 2025-03-05 ENCOUNTER — RESULTS FOLLOW-UP (OUTPATIENT)
Dept: UROLOGY | Facility: CLINIC | Age: 55
End: 2025-03-05

## 2025-03-07 ENCOUNTER — CLINICAL SUPPORT (OUTPATIENT)
Dept: PSYCHIATRY | Facility: CLINIC | Age: 55
End: 2025-03-07
Payer: COMMERCIAL

## 2025-03-07 DIAGNOSIS — Z00.8 ENCOUNTER FOR PRE-SURGICAL PSYCHOLOGICAL ASSESSMENT: Primary | ICD-10-CM

## 2025-03-07 NOTE — PROGRESS NOTES
Audio Only Telehealth Visit     The patient location is: home  The chief complaint leading to consultation is: psychological testing  Visit type: Virtual visit with audio only (telephone)  Total time spent in medical discussion with patient: 60 minutes  Total time spent on date of the encounter:60 minutes       The reason for the audio only service rather than synchronous audio and video virtual visit was related to technical difficulties or patient preference/necessity.       The patient arrived on time for their scheduled testing appointment. Testing codes and duration will be included in the psychologist's progress note.                        This service was not originating from a related E/M service provided within the previous 7 days nor will  to an E/M service or procedure within the next 24 hours or my soonest available appointment.  Prevailing standard of care was able to be met in this audio-only visit.

## 2025-03-10 ENCOUNTER — TELEPHONE (OUTPATIENT)
Dept: BARIATRICS | Facility: CLINIC | Age: 55
End: 2025-03-10
Payer: COMMERCIAL

## 2025-03-10 ENCOUNTER — PATIENT MESSAGE (OUTPATIENT)
Dept: BARIATRICS | Facility: CLINIC | Age: 55
End: 2025-03-10
Payer: COMMERCIAL

## 2025-03-10 ENCOUNTER — OFFICE VISIT (OUTPATIENT)
Dept: PSYCHIATRY | Facility: CLINIC | Age: 55
End: 2025-03-10
Payer: COMMERCIAL

## 2025-03-10 DIAGNOSIS — I10 PRIMARY HYPERTENSION: ICD-10-CM

## 2025-03-10 DIAGNOSIS — E66.01 MORBID OBESITY DUE TO EXCESS CALORIES: ICD-10-CM

## 2025-03-10 DIAGNOSIS — Z86.73 HISTORY OF CVA (CEREBROVASCULAR ACCIDENT): ICD-10-CM

## 2025-03-10 DIAGNOSIS — K21.00 GASTROESOPHAGEAL REFLUX DISEASE WITH ESOPHAGITIS WITHOUT HEMORRHAGE: ICD-10-CM

## 2025-03-10 DIAGNOSIS — Z01.818 PREOP EXAMINATION: Primary | ICD-10-CM

## 2025-03-10 DIAGNOSIS — G47.33 OSA (OBSTRUCTIVE SLEEP APNEA): ICD-10-CM

## 2025-03-10 NOTE — PROGRESS NOTES
PRESURGICAL PSYCHOLOGICAL EVALUATION - BARIATRICS  Psychiatry Initial Visit (PhD/PsyD)   Psychological Intake and Assessment    Site:  telemedicine    The patient location is: Patient's home in [place], [place]  The chief complaint leading to consultation is: pre-surgical evaluation    Visit type: audiovisual    Face to Face time with patient: 30 minutes  45 minutes of total time spent on the encounter, which includes face to face time and non-face to face time preparing to see the patient (eg, review of tests), Obtaining and/or reviewing separately obtained history, Documenting clinical information in the electronic or other health record, Independently interpreting results (not separately reported) and communicating results to the patient/family/caregiver, or Care coordination (not separately reported).     Each patient to whom he or she provides medical services by telemedicine is:  (1) informed of the relationship between the physician and patient and the respective role of any other health care provider with respect to management of the patient; and (2) notified that he or she may decline to receive medical services by telemedicine and may withdraw from such care at any time.    CPT Codes:  80167 (1 hour): Psychiatric Diagnostic Evaluation  68607 (1 hour): Integration of patient data, interpretation of standardized test results and clinical data, clinical decision-making, treatment planning and report, and interactive feedback to the patient  27599 (1 hour): Psychological or neuropsychological test administration, with single automated instrument via electronic platform, with automated result only:  [place] Multiphasic Personality Inventory - 3 (MMPI-3)    NAME: Martha Shah  MRN: [id number]  : 1970    Date: 3/10/2025    Referral source: Leila Evans M.D.    Clinical status of patient: Outpatient     Met With: Patient    Chief complaint/reason for encounter: Routine psychological evaluation prior to  "bariatric surgery.     Before this evaluation was initiated, the purposes and process of the assessment and the limits of confidentiality were discussed with the patient who expressed understanding of these issues and verbally consented to proceed with the evaluation.     Type of surgery sought: RNY Bypass    History of present illness:   Ms. Shah is a 54-year-old  female who is pursuing bariatric surgery to improve her health and quality of life. She reported a history of individual psychotherapy over 20 years ago during transitioning. She denied any psychological difficulties currently. She has never taken psychotropic medication, has never been hospitalized for psychiatric reasons and denied any history of suicidality.  She has begun making positive lifestyle changes in anticipation for surgery, with good benefit. The patient has a Body Mass Index of 42.89 kg/m² as documented by the referring provider.    Ms. Shah has struggled with weight since childhood. Factors that have contributed to her weight gain over the years include "I eat too much, not binge. I always have." She denied a history of emotional eating. She has tried many weight loss methods on her own (i.e., calorie counting, GLP, sugar busters and Nutrisystem) with good success with Nutrisystem. She has thought about having bariatric surgery for years but thought it was drastic or extreme. However, after her heart attack last year, she decided to take her health more seriously. Her motivation for seeking surgery now is her overall health. Her postsurgical goals include to be more active and gave stamina.    Ms. Shah has met with Ms. Ghazala RD, bariatric dietician, and reports that she demonstrated knowledge of healthy eating behaviors and exercise patterns but admits to not eating healthy and not exercising at this point. She stated she is in a phase right now where she is trying to enjoy the foods while she can. She has been cleared by Ms. " "Ghazala to proceed with bariatric surgery.    Co-morbidities: essential hypertension, ALFREDO on CPAP, GERD (EOE) on daily medications, history of MI, and history of CVA     Knowledge of surgery information:  - Basics of procedure: They close off a section of the stomach and reroute the intestinal exit from the stomach. We chose that one because it would be a better option for me because I have GERD."  - Risks: understands  - Basics of diet: I'll be on liquid diet for quite a while and then a phase where I eat pureed food, and then it will be small portions from there on."    Pain: None reported.    Current Psychiatric Symptoms:   Depression - Denied depressed mood, loss of interest in pleasurable activities, anhedonia, sleep changes, decreased motivation, decreased concentration, feelings of excessive or irrational guilt, helplessness, hopelessness, increased or decreased appetite, weight changes, increased or decreased motor activity, decreased energy, suicidal ideations/thoughts of death.  Ayana/Hypomania - Denied increased goal directed activity, decreased need for sleep, pressured speech or increased talkative, racing thoughts, increased risk-taking behavior, episodic elevated or irritable mood, flight of ideas, distractibility, inflated self-esteem, grandiosity  Anxiety - Denied excessive worry, difficulty controlling worrying, feeling keyed up, easily fatigued, difficulty concentrating or mind going blank, irritability, muscle tension, sleep disturbance, racing thoughts, being unable to relax, specific phobia.  Panic Attacks: Denied palpitations, sweating, trembling, dyspnea, choking sensation, chest pain/discomfort, nausea, dizziness, chills or hot flashes, tingling, derealization, fear of losing control, fear of dying.  Thoughts - Denied any AVH, paranoia, delusions, ideas of reference, thought insertion or thought broadcasting  Suicidal thoughts/behaviors - denied passive or active SI, denied suicidal plans " "or intent  Self-injury - denied.  Substance abuse - denied abuse or dependence.   Sleep - Denied increased sleep latency, frequent nighttime awakenings, or early morning awakening with inability to return to sleep. She has sleep apnea and uses a cpap machine nightly.    Current psychiatric treatment:  Medications: Denied  Psychotherapy: Denied    Current Health Behaviors:  Compliant with medical regimens and appointments: Yes  Prescription medication misuse: No  Exercise: No  Adequate cognitive functioning: Yes    Past Psychiatric history:   Previous diagnosis: none.  Previous psychiatric hospitalizations/inpatient treatment: none  History of outpatient treatment: She has been in therapy a couple of times. When she was much younger, she had problems with anger and depression. She stated as she has gotten older, she has not struggled with this. She also saw a therapist about 20 years during her transition period from male to female.  Previous suicide attempt: none  Non-suicidal self-injury: none    Trauma history:  Denied.    PTSD: Denies re-experiencing trauma, nightmares, increased awareness of surroundings, hyperexcitability.    History of eating disorders:  History of bulimia: Denies recurrent episodes of eating then engaging in inappropriate compensatory behaviors.        History of binge-eating episodes: Denies eating excessive amounts of food within a discrete time period with a lack of control during eating.  She denied eating more rapidly than normal, eating until uncomfortably full, eating large amounts of food when not physically hungry, eating alone due to embarrassment, or negative emotions (i.e., disgusted, guilty, depressed) afterwards.    Family history of psychiatric illness: None reported.     Social history (marriage, employment, etc.): Ms. Shah was born in [place] and mostly grew up in [place], [place] by her biological parents along with one younger sister. She described her childhood as "fine." She " "reported her parents encouraged her to have intellectual interests and play sports. She denied childhood trauma, abuse, and neglect. She was "gifted" in school and played sports. She went to college for a year and had to stop to work to pay for school. She completed her degree in mathematics. She is currently employed as a . She is not on disability and finances are stable. She has been  for 4 years, and they have been together for 20 years. She has a stepdaughter (age 30). Her relationship with her wife is currently "happy and stable." She currently lives with her wife in [place], [place]. She identifies as unitarian universalist attends [facility] services with her wife. She enjoys making music and writing fiction.    Current psychosocial stressors: Denied.    Report of coping skills/recreational activities: talk to her wife, play video games    Support system: wife, sister, and a couple of friends    Substance use:   Alcohol: Denied. She had a heart attack last year and stopped all alcohol since.  Drugs: Denied current use; denied history of abuse or dependency.  Tobacco: None.   Caffeine: Stopped caffeine after the heart attack as well.     Current medications and drug reactions (include OTC, herbal): see medication list     PSYCHOLOGICAL ASSESSMENT/TESTING:   All tests were administered according to standardized procedures and were selected based on the reason for referral.  The MMPI-3 provides an assessment of personality and psychopathology with specific evaluation of psychosocial risk factors associated with outcomes of bariatric surgery.   Ms. Shah produced a valid and interpretable MMPI-3 protocol. Her test results should be considered a reasonably accurate reflection of her current psychological functioning.    TEST RESULTS. Ms. Shah scores do not indicate any somatic, cognitive, emotional, thought, or behavioral dysfunction. Her responses did indicate social avoidance, and she " reported she has been introverted her whole life. There are no specific treatment recommendations indicated by her profile.    FEEDBACK. Ms. Shah was provided with test results and offered the opportunity to respond to feedback and clarify results if needed.    Mental Status Exam:   General Appearance:  age appropriate, well dressed, neatly groomed, overweight    Speech:  normal tone, normal rate, normal pitch, normal volume    Level of Cooperation:  cooperative    Thought Processes:  normal and logical    Mood:  euthymic    Thought Content:  normal, no suicidality, no homicidality, delusions, or paranoia    Affect:  congruent and appropriate    Orientation:  oriented x3    Memory:  recent memory intact; immediate and delayed word recall 3/3  remote memory intact; able to recall remote personal events   Attention Span & Concentration:  appropriate   Fund of General Knowledge:  appropriate for education    Abstract Reasoning:  Not directly assessed   Judgment & Insight:  good    Language  intact        SUMMARY AND RECOMMENDATIONS:  Ms. Shah is a 54-year-old transgender female referred for presurgical psychological evaluation prior to bariatric surgery. Results of personality testing should be considered valid, and they indicate that she is experiencing no acute psychiatric symptoms or declines in functioning at this time. Test results do not reveal any evidence that psychological difficulties would play a role in her recovery from surgery. Ms. Shah's testing profile was largely consistent with her reports in the clinical interview. In the clinical interview, Ms. Shah reported a remote history of individual therapy due to depression, anger, and transitioning.    There are no overt psychological contraindications for proceeding with bariatric surgery. Overall, Ms. Shah is at LOW risk for adverse postsurgical outcomes based on the following considerations:  There are no indications of disabling psychopathology,  substance use/abuse, cognitive problems, or disabilities that would prevent understanding and competence with medical treatment.  There are no reports or major psychosocial stressors that would interfere with her adherence to treatment recommendations.  There is no evidence of suicidality.  She exhibits medium social stability and good social support.  She has good coping strategies to deal with stress and the demands of surgery and recovery.  She has excellent knowledge about the surgical procedure, good knowledge about the required dietary and lifestyle changes, and adequate understanding of possible risks of this treatment option. She reports adequate compliance with prior medical regimens.  There are no recommendations for psychological intervention at this time.    Diagnosis:    ICD-10-CM ICD-9-CM   1. Preop examination  Z01.818 V72.84   2. Morbid obesity due to excess calories  E66.01 278.01   3. History of CVA (cerebrovascular accident)  Z86.73 V12.54   4. Primary hypertension  I10 401.9   5. Gastroesophageal reflux disease with esophagitis without hemorrhage  K21.00 530.81     530.10   6. ALFREDO (obstructive sleep apnea)  G47.33 327.23   7. BMI 40.0-44.9, adult  Z68.41 V85.41     Plan: This report will be sent to the referring provider with impressions and recommendations. It will be the referring team's decision whether the patient proceeds with surgery. Services related to the presurgical psychological evaluation are now concluded.     Evaluation Length (direct face-to-face time): 30 minutes  Total Time including report writing, test scoring, chart review, integration of data and feedback: 45 minutes

## 2025-03-10 NOTE — TELEPHONE ENCOUNTER
Spoke to patient and scheduled her stress test and lab work tomorrow. Advised her that she must also get Cardiology Clearance from her Cardiologist. Patient states understanding and agrees.

## 2025-03-10 NOTE — Clinical Note
There are no overt psychological contraindications for proceeding with bariatric surgery. Overall, Ms. Shah is at LOW risk for adverse postsurgical outcomes.

## 2025-03-11 ENCOUNTER — HOSPITAL ENCOUNTER (OUTPATIENT)
Dept: CARDIOLOGY | Facility: HOSPITAL | Age: 55
Discharge: HOME | End: 2025-03-11
Attending: NURSE PRACTITIONER
Payer: COMMERCIAL

## 2025-03-11 DIAGNOSIS — K21.00 GASTROESOPHAGEAL REFLUX DISEASE WITH ESOPHAGITIS WITHOUT HEMORRHAGE: ICD-10-CM

## 2025-03-11 DIAGNOSIS — K20.0 EOSINOPHILIC ESOPHAGITIS: ICD-10-CM

## 2025-03-11 DIAGNOSIS — I25.10 CORONARY ARTERY DISEASE INVOLVING NATIVE CORONARY ARTERY OF NATIVE HEART WITHOUT ANGINA PECTORIS: ICD-10-CM

## 2025-03-11 DIAGNOSIS — E66.01 MORBID OBESITY WITH BMI OF 40.0-44.9, ADULT: ICD-10-CM

## 2025-03-11 DIAGNOSIS — I10 PRIMARY HYPERTENSION: ICD-10-CM

## 2025-03-11 LAB
ASCENDING AORTA: 3.15 CM
AV AREA BY CONTINUOUS VTI: 3.5 CM2
AV INDEX (PROSTH): 0.78
AV LVOT MEAN GRADIENT: 2 MMHG
AV LVOT PEAK GRADIENT: 3 MMHG
AV MEAN GRADIENT: 4 MMHG
AV PEAK GRADIENT: 7 MMHG
AV VALVE AREA BY VELOCITY RATIO: 3.1 CM²
AV VALVE AREA: 3.5 CM2
AV VELOCITY RATIO: 0.69
CV ECHO LV RWT: 0.4 CM
CV STRESS BASE HR: 66 BPM
DIASTOLIC BLOOD PRESSURE: 88 MMHG
DOP CALC AO PEAK VEL: 1.3 M/S
DOP CALC AO VTI: 28.1 CM
DOP CALC LVOT AREA: 4.5 CM2
DOP CALC LVOT DIAMETER: 2.4 CM
DOP CALC LVOT PEAK VEL: 0.9 M/S
DOP CALC LVOT STROKE VOLUME: 98.6 CM3
DOP CALCLVOT PEAK VEL VTI: 21.8 CM
E WAVE DECELERATION TIME: 170 MS
E/A RATIO: 1.27
E/E' RATIO: 9 M/S
ECHO EF ESTIMATED: 59 %
ECHO LV POSTERIOR WALL: 0.9 CM (ref 0.6–1.1)
FRACTIONAL SHORTENING: 31.1 % (ref 28–44)
INTERVENTRICULAR SEPTUM: 0.9 CM (ref 0.6–1.1)
IVC DIAMETER: 1.68 CM
IVRT: 88 MS
LA MAJOR: 5.8 CM
LA MINOR: 5.6 CM
LA WIDTH: 3.9 CM
LEFT ATRIUM SIZE: 4.3 CM
LEFT ATRIUM VOLUME MOD: 75 ML
LEFT ATRIUM VOLUME: 81 CM3
LEFT INTERNAL DIMENSION IN SYSTOLE: 3.1 CM (ref 2.1–4)
LEFT VENTRICLE DIASTOLIC VOLUME: 92 ML
LEFT VENTRICLE SYSTOLIC VOLUME: 38 ML
LEFT VENTRICULAR INTERNAL DIMENSION IN DIASTOLE: 4.5 CM (ref 3.5–6)
LEFT VENTRICULAR MASS: 132.8 G
LV LATERAL E/E' RATIO: 7.5
LV SEPTAL E/E' RATIO: 11.3
MV A" WAVE DURATION": 62.8 MS
MV PEAK A VEL: 0.71 M/S
MV PEAK E VEL: 0.9 M/S
OHS CV CPX 1 MINUTE RECOVERY HEART RATE: 126 BPM
OHS CV CPX 85 PERCENT MAX PREDICTED HEART RATE MALE: 140
OHS CV CPX ESTIMATED METS: 9
OHS CV CPX MAX PREDICTED HEART RATE: 165
OHS CV CPX PATIENT IS FEMALE: 0
OHS CV CPX PATIENT IS MALE: 1
OHS CV CPX PEAK DIASTOLIC BLOOD PRESSURE: 104 MMHG
OHS CV CPX PEAK HEAR RATE: 162 BPM
OHS CV CPX PEAK RATE PRESSURE PRODUCT: NORMAL
OHS CV CPX PEAK SYSTOLIC BLOOD PRESSURE: 162 MMHG
OHS CV CPX PERCENT MAX PREDICTED HEART RATE ACHIEVED: 98
OHS CV CPX RATE PRESSURE PRODUCT PRESENTING: 9108
OHS CV RV/LV RATIO: 0.78 CM
PISA TR MAX VEL: 2.5 M/S
PULM VEIN A" WAVE DURATION": 62.8 MS
PULM VEIN S/D RATIO: 1.23
PULMONIC VEIN PEAK A VELOCITY: 0.2 M/S
PV PEAK D VEL: 0.44 M/S
PV PEAK S VEL: 0.54 M/S
RA MAJOR: 4.85 CM
RA PRESSURE ESTIMATED: 3 MMHG
RA WIDTH: 3.6 CM
RIGHT VENTRICLE DIASTOLIC BASEL DIMENSION: 3.5 CM
RV TB RVSP: 6 MMHG
RV TISSUE DOPPLER FREE WALL SYSTOLIC VELOCITY 1 (APICAL 4 CHAMBER VIEW): 10.89 CM/S
SINUS: 3.38 CM
STJ: 2.65 CM
STRESS ECHO POST EXERCISE DUR MIN: 6 MINUTES
STRESS ECHO POST EXERCISE DUR SEC: 9 SECONDS
SYSTOLIC BLOOD PRESSURE: 138 MMHG
TDI LATERAL: 0.12 M/S
TDI SEPTAL: 0.08 M/S
TDI: 0.1 M/S
TRICUSPID ANNULAR PLANE SYSTOLIC EXCURSION: 2.91 CM
TV PEAK GRADIENT: 24 MMHG
TV REST PULMONARY ARTERY PRESSURE: 28 MMHG

## 2025-03-11 PROCEDURE — 93351 STRESS TTE COMPLETE: CPT | Mod: 26,,, | Performed by: INTERNAL MEDICINE

## 2025-03-11 PROCEDURE — 93351 STRESS TTE COMPLETE: CPT

## 2025-03-12 ENCOUNTER — CLINICAL SUPPORT (OUTPATIENT)
Dept: ENDOSCOPY | Facility: HOSPITAL | Age: 55
End: 2025-03-12
Payer: COMMERCIAL

## 2025-03-12 ENCOUNTER — TELEPHONE (OUTPATIENT)
Dept: UROLOGY | Facility: CLINIC | Age: 55
End: 2025-03-12
Payer: COMMERCIAL

## 2025-03-12 ENCOUNTER — PATIENT MESSAGE (OUTPATIENT)
Dept: BARIATRICS | Facility: CLINIC | Age: 55
End: 2025-03-12
Payer: COMMERCIAL

## 2025-03-12 ENCOUNTER — TELEPHONE (OUTPATIENT)
Dept: ENDOSCOPY | Facility: HOSPITAL | Age: 55
End: 2025-03-12
Payer: COMMERCIAL

## 2025-03-12 VITALS — BODY MASS INDEX: 43.49 KG/M2 | HEIGHT: 65 IN | WEIGHT: 261 LBS

## 2025-03-12 DIAGNOSIS — K21.00 GASTROESOPHAGEAL REFLUX DISEASE WITH ESOPHAGITIS WITHOUT HEMORRHAGE: ICD-10-CM

## 2025-03-12 DIAGNOSIS — K20.0 EOSINOPHILIC ESOPHAGITIS: ICD-10-CM

## 2025-03-12 DIAGNOSIS — E66.01 MORBID OBESITY WITH BMI OF 40.0-44.9, ADULT: ICD-10-CM

## 2025-03-12 NOTE — TELEPHONE ENCOUNTER
----- Message from NILTON Barr sent at 3/12/2025  4:20 PM CDT -----  Patient is concerned because she is having an endoscopy on 3/14 with Christelle, and then ureteroscopy on 3/18 .  She is concerned about the anesthesia. She asked that someone giver her a call.

## 2025-03-12 NOTE — TELEPHONE ENCOUNTER
Referral for procedure from PAT appointment      Spoke to patient to schedule procedure(s) Upper Endoscopy (EGD)       Physician to perform procedure(s) Dr. REMIGIO Bourgeois  Date of Procedure (s) 3/14/25  Arrival Time 8:45 AM  Time of Procedure(s) 9:45 AM   Location of Procedure(s) [facility] [address] Floor  Type of Rx Prep sent to patient: N/A  Instructions provided to patient via [facility]    Patient was informed on the following information and verbalized understanding. Screening questionnaire reviewed with patient and complete. If procedure requires anesthesia, a responsible adult needs to be present to accompany the patient home, patient cannot drive after receiving anesthesia. Appointment details are tentative, especially check-in time. Patient will receive a prep-op call 7 days prior to confirm check-in time for procedure. If applicable the patient should contact their pharmacy to verify Rx for procedure prep is ready for pick-up. Patient was advised to call the scheduling department at [phone] if pharmacy states no Rx is available. Patient was advised to call the endoscopy scheduling department if any questions or concerns arise.      [facility] Scheduling Department

## 2025-03-12 NOTE — TELEPHONE ENCOUNTER
Spoke with patient and told her to call Dr. Bourgeois's office and asked them if she will be able to still do the procedure scheduled with Dr. Leonardo 4 days later. I am not sure what they will be giving her and it would be best to call and see if it would be ok. Patient states verbal understanding and will call them tomorrow.

## 2025-03-13 ENCOUNTER — OFFICE VISIT (OUTPATIENT)
Dept: BARIATRICS | Facility: CLINIC | Age: 55
End: 2025-03-13
Payer: COMMERCIAL

## 2025-03-13 VITALS
SYSTOLIC BLOOD PRESSURE: 143 MMHG | OXYGEN SATURATION: 99 % | WEIGHT: 259.63 LBS | BODY MASS INDEX: 43.2 KG/M2 | HEART RATE: 66 BPM | DIASTOLIC BLOOD PRESSURE: 77 MMHG

## 2025-03-13 DIAGNOSIS — E66.01 CLASS 3 SEVERE OBESITY WITH SERIOUS COMORBIDITY AND BODY MASS INDEX (BMI) OF 40.0 TO 44.9 IN ADULT, UNSPECIFIED OBESITY TYPE: ICD-10-CM

## 2025-03-13 DIAGNOSIS — Z86.73 HISTORY OF CVA (CEREBROVASCULAR ACCIDENT): ICD-10-CM

## 2025-03-13 DIAGNOSIS — E66.813 CLASS 3 SEVERE OBESITY WITH SERIOUS COMORBIDITY AND BODY MASS INDEX (BMI) OF 40.0 TO 44.9 IN ADULT, UNSPECIFIED OBESITY TYPE: ICD-10-CM

## 2025-03-13 DIAGNOSIS — I25.2 HISTORY OF MI (MYOCARDIAL INFARCTION): ICD-10-CM

## 2025-03-13 PROCEDURE — 99999 PR PBB SHADOW E&M-EST. PATIENT-LVL V: CPT | Mod: PBBFAC,,, | Performed by: INTERNAL MEDICINE

## 2025-03-13 RX ORDER — SEMAGLUTIDE 0.25 MG/.5ML
0.25 INJECTION, SOLUTION SUBCUTANEOUS
Qty: 2 ML | Refills: 0 | Status: SHIPPED | OUTPATIENT
Start: 2025-03-13

## 2025-03-13 NOTE — PATIENT INSTRUCTIONS
Start Wegovy 0.25 mg once a week after procedures x 1 month. At the end of that month, the dose will continue to increase monthly.       Decrease portions as soon as you start wegovy. Avoid fried, rich or greasy foods.      Some nausea in the first 2 weeks is not unusual.     If you get pain across the upper abdomen and around to your back, please call the office.       [email or web address] to sign up for coupon card.       Diet per RACHEL De Dios.       Exercise should be some cardiovascular and some resistance training      Sample Weight Training Plan ( adapted from [facility]):    1.Goblet Squat  How to:    Stand with feet hip-width apart and hold a weight vertically in front of chest, elbows pointing toward the floor.  Push hips back and bend knees to lower into a squat.  Drive through heels to stand back up to starting position. That's 1 rep. Complete three to five reps with a heavy weight.      2.Bent-Over Row  How to:    With a dumbbell in each hand and feet under hips, hinge at hips with knees slightly bent and arms just in front of legs.  Drive one elbow back toward hips, feeling shoulder blades squeeze together, pulling weight toward side body.  Slowly lower weight back down, then repeat with other arm. That's 1 rep. Complete three to five reps with a medium-heavy weight.        3.Lateral Lunge  How to:    Holding a weight at chest or dumbbells at each side, stand up straight with feet hip-width apart.  Take a large step to the right, sit hips back, and lower down until right knee is nearly parallel with the floor. Your left leg should be straight.  Return to start. That's 1 rep. Complete 10 reps on each side.      4.Chest Press  How to:    Lie flat on back, or on a bench, with feet flat on the ground. With a dumbbell in each hand, extend arms directly over shoulders, palms facing toward feet.  Squeeze shoulder blades together and slowly bend elbows, lowering the weights out to the side, parallel with  shoulders, until elbows form 90-degree angles.  Slowly drive the dumbbells back up to start, squeezing shoulder blades the entire time. Thats 1 rep. Complete three to five reps with a medium-heavy weight.      5.Split Stance Shoulder Press    How to:    Grab a pair of dumbbells or a resistance band. Stagger stance into a wide step, one foot forward and one back with hips squared, and hold the weights or band just above shoulders, elbows close to sides.  Leaning forward ever so slightly, bend both knees, and press through front heel while simultaneously lifting the weights or band to the kay, keeping elbows forward and arms in line with your ears.  Lower weights or band back to shoulders. That's 1 rep. Do 10 reps, alternating side for each set.        6.Alternating Reverse Lunge To Bicep Curl  How to:    Grab a pair of dumbbells and hold them at arm's length next to sides, palms facing each other. Stand tall with feet hip-width apart.  Step backward with right leg and lower body until front knee is bent 90 degrees. At the same time as you lunge, curl both dumbbells up to shoulders.  Lower the dumbbells as you return to the starting position. Step back with the other leg and repeat.That's 1 rep. Complete 12 reps with a medium weight.

## 2025-03-13 NOTE — PROGRESS NOTES
Subjective     Patient ID: Martha Shah is a 54 y.o. adult.    Chief Complaint: Follow-up    CC:weight    Pt here today for follow-up. Has gained 3.6 lbs (+0.7 lbs muscle. +2.3 lbs fat).     New BMR: 1630    New PBF: 50.5%       BMR: 1616    PBF:  50.3%    History of medication for loss:   checked today. Was taking compounded terzepatide. Was hospitalized with gastritis Metformin- lost a few lbs, but it stopped.         Review of Systems       Objective   BP (!) 143/77 (BP Location: Left arm, Patient Position: Sitting)   Pulse 66   Wt 117.8 kg (259 lb 9.6 oz)   SpO2 99%   BMI 43.20 kg/m²    Physical Exam  Vitals reviewed.   Constitutional:       General: She is not in acute distress.     Appearance: She is well-developed.   HENT:      Head: Normocephalic and atraumatic.   Eyes:      General: No scleral icterus.     Pupils: Pupils are equal, round, and reactive to light.   Cardiovascular:      Rate and Rhythm: Normal rate.   Pulmonary:      Effort: Pulmonary effort is normal. No respiratory distress.   Musculoskeletal:         General: Normal range of motion.      Left lower leg: Edema present.   Skin:     General: Skin is warm and dry.      Findings: No erythema.   Neurological:      Mental Status: She is alert and oriented to person, place, and time.      Cranial Nerves: No cranial nerve deficit.   Psychiatric:         Behavior: Behavior normal.         Judgment: Judgment normal.            Assessment and Plan     1. History of CVA (cerebrovascular accident)  -     semaglutide, weight loss, (WEGOVY) 0.25 mg/0.5 mL PnIj; Inject 0.25 mg into the skin every 7 days.  Dispense: 2 mL; Refill: 0    2. History of MI (myocardial infarction)  -     semaglutide, weight loss, (WEGOVY) 0.25 mg/0.5 mL PnIj; Inject 0.25 mg into the skin every 7 days.  Dispense: 2 mL; Refill: 0    3. Class 3 severe obesity with serious comorbidity and body mass index (BMI) of 40.0 to 44.9 in adult, unspecified obesity type  -      semaglutide, weight loss, (WEGOVY) 0.25 mg/0.5 mL PnIj; Inject 0.25 mg into the skin every 7 days.  Dispense: 2 mL; Refill: 0      Martha was seen today for follow-up.    Diagnoses and all orders for this visit:    History of CVA (cerebrovascular accident)  -     semaglutide, weight loss, (WEGOVY) 0.25 mg/0.5 mL PnIj; Inject 0.25 mg into the skin every 7 days.    History of MI (myocardial infarction)  -     semaglutide, weight loss, (WEGOVY) 0.25 mg/0.5 mL PnIj; Inject 0.25 mg into the skin every 7 days.    Class 3 severe obesity with serious comorbidity and body mass index (BMI) of 40.0 to 44.9 in adult, unspecified obesity type  -     semaglutide, weight loss, (WEGOVY) 0.25 mg/0.5 mL PnIj; Inject 0.25 mg into the skin every 7 days.          Start Wegovy 0.25 mg once a week after procedures x 1 month. At the end of that month, the dose will continue to increase monthly.       Decrease portions as soon as you start wegovy. Avoid fried, rich or greasy foods.      Some nausea in the first 2 weeks is not unusual.     If you get pain across the upper abdomen and around to your back, please call the office.       [email or web address] to sign up for coupon card.       Diet per RACHEL De Dios.       Exercise should be some cardiovascular and some resistance training      Sample Weight Training Plan given .    No follow-ups on file.

## 2025-03-14 ENCOUNTER — ANESTHESIA EVENT (OUTPATIENT)
Dept: ENDOSCOPY | Facility: HOSPITAL | Age: 55
End: 2025-03-14
Payer: COMMERCIAL

## 2025-03-14 ENCOUNTER — ANESTHESIA (OUTPATIENT)
Dept: ENDOSCOPY | Facility: HOSPITAL | Age: 55
End: 2025-03-14
Payer: COMMERCIAL

## 2025-03-14 ENCOUNTER — HOSPITAL ENCOUNTER (OUTPATIENT)
Facility: HOSPITAL | Age: 55
Discharge: HOME | End: 2025-03-14
Attending: INTERNAL MEDICINE | Admitting: INTERNAL MEDICINE
Payer: COMMERCIAL

## 2025-03-14 VITALS
SYSTOLIC BLOOD PRESSURE: 131 MMHG | HEIGHT: 65 IN | TEMPERATURE: 98 F | RESPIRATION RATE: 14 BRPM | HEART RATE: 55 BPM | BODY MASS INDEX: 43.32 KG/M2 | WEIGHT: 260 LBS | OXYGEN SATURATION: 99 % | DIASTOLIC BLOOD PRESSURE: 70 MMHG

## 2025-03-14 DIAGNOSIS — Z09 FOLLOW-UP EXAM: ICD-10-CM

## 2025-03-14 PROCEDURE — 27201012 HC FORCEPS, HOT/COLD, DISP: Performed by: INTERNAL MEDICINE

## 2025-03-14 PROCEDURE — 63600175 PHARM REV CODE 636 W HCPCS: Performed by: NURSE ANESTHETIST, CERTIFIED REGISTERED

## 2025-03-14 PROCEDURE — 37000008 HC ANESTHESIA 1ST 15 MINUTES: Performed by: INTERNAL MEDICINE

## 2025-03-14 PROCEDURE — 88342 IMHCHEM/IMCYTCHM 1ST ANTB: CPT | Performed by: STUDENT IN AN ORGANIZED HEALTH CARE EDUCATION/TRAINING PROGRAM

## 2025-03-14 PROCEDURE — 43239 EGD BIOPSY SINGLE/MULTIPLE: CPT | Mod: ,,, | Performed by: INTERNAL MEDICINE

## 2025-03-14 PROCEDURE — 43239 EGD BIOPSY SINGLE/MULTIPLE: CPT | Performed by: INTERNAL MEDICINE

## 2025-03-14 PROCEDURE — 37000009 HC ANESTHESIA EA ADD 15 MINS: Performed by: INTERNAL MEDICINE

## 2025-03-14 PROCEDURE — 88342 IMHCHEM/IMCYTCHM 1ST ANTB: CPT | Mod: 26,,, | Performed by: STUDENT IN AN ORGANIZED HEALTH CARE EDUCATION/TRAINING PROGRAM

## 2025-03-14 PROCEDURE — 88305 TISSUE EXAM BY PATHOLOGIST: CPT | Mod: 26,,, | Performed by: STUDENT IN AN ORGANIZED HEALTH CARE EDUCATION/TRAINING PROGRAM

## 2025-03-14 PROCEDURE — 88305 TISSUE EXAM BY PATHOLOGIST: CPT | Performed by: STUDENT IN AN ORGANIZED HEALTH CARE EDUCATION/TRAINING PROGRAM

## 2025-03-14 RX ORDER — SODIUM CHLORIDE, SODIUM LACTATE, POTASSIUM CHLORIDE, CALCIUM CHLORIDE 600; 310; 30; 20 MG/100ML; MG/100ML; MG/100ML; MG/100ML
INJECTION, SOLUTION INTRAVENOUS CONTINUOUS PRN
Status: DISCONTINUED | OUTPATIENT
Start: 2025-03-14 | End: 2025-03-14

## 2025-03-14 RX ORDER — LOSARTAN POTASSIUM 25 MG/1
25 TABLET ORAL DAILY
Qty: 90 TABLET | Refills: 3 | Status: SHIPPED | OUTPATIENT
Start: 2025-03-14

## 2025-03-14 RX ORDER — SODIUM CHLORIDE 9 MG/ML
INJECTION, SOLUTION INTRAVENOUS CONTINUOUS
Status: DISCONTINUED | OUTPATIENT
Start: 2025-03-14 | End: 2025-03-14 | Stop reason: HOSPADM

## 2025-03-14 RX ORDER — PROPOFOL 10 MG/ML
INJECTION, EMULSION INTRAVENOUS
Status: DISCONTINUED | OUTPATIENT
Start: 2025-03-14 | End: 2025-03-14

## 2025-03-14 RX ORDER — SODIUM CHLORIDE 0.9 % (FLUSH) 0.9 %
20 SYRINGE (ML) INJECTION ONCE
Status: DISCONTINUED | OUTPATIENT
Start: 2025-03-14 | End: 2025-03-14 | Stop reason: HOSPADM

## 2025-03-14 RX ADMIN — SODIUM CHLORIDE, SODIUM LACTATE, POTASSIUM CHLORIDE, AND CALCIUM CHLORIDE: .6; .31; .03; .02 INJECTION, SOLUTION INTRAVENOUS at 09:03

## 2025-03-14 RX ADMIN — PROPOFOL 110 MG: 10 INJECTION, EMULSION INTRAVENOUS at 09:03

## 2025-03-14 RX ADMIN — PROPOFOL 100 MG: 10 INJECTION, EMULSION INTRAVENOUS at 09:03

## 2025-03-14 NOTE — TRANSFER OF CARE
"Anesthesia Transfer of Care Note    Patient: Martha Shah    Procedure(s) Performed: Procedure(s) (LRB):  EGD (ESOPHAGOGASTRODUODENOSCOPY) (N/A)    Patient location: GI    Anesthesia Type: general    Transport from OR: Transported from OR on room air with adequate spontaneous ventilation    Post pain: adequate analgesia    Post assessment: no apparent anesthetic complications    Post vital signs: stable    Level of consciousness: awake, alert and oriented    Nausea/Vomiting: no nausea/vomiting    Complications: none    Transfer of care protocol was followed      Last vitals: Visit Vitals  BP (!) 141/86   Pulse (!) 57   Temp 36.3 °C (97.3 °F)   Resp 18   Ht 5' 5" (1.651 m)   Wt 117.9 kg (260 lb)   SpO2 100%   BMI 43.27 kg/m²     "

## 2025-03-14 NOTE — H&P
Short Stay Endoscopy History and Physical    PCP - Estrada Partida MD    Procedure - EGD  ASA - II  Mallampati - per anesthesia  History of Anesthesia problems - no  Family history Anesthesia problems - no     HPI:  This is a 54 y.o. adult here for evaluation of : GERD. ? EoE    ROS:  Constitutional: No fevers, chills, No weight loss  ENT: No allergies  CV: No chest pain  Pulm: No shortness of breath  GI: see HPI  Derm: No rash    Medical History:  has a past medical history of Allergy, Hypertension, NSTEMI (non-ST elevated myocardial infarction) (01/07/2024), Sleep apnea, unspecified, and Urinary tract infection.    Surgical History:  has a past surgical history that includes Tonsillectomy; Biopsy of adenoids; Adenoidectomy; Colonoscopy (N/A, 09/14/2022); Esophagogastroduodenoscopy (N/A, 09/14/2022); Adenoidectomy; Esophagogastroduodenoscopy (12/19/2022); Esophagogastroduodenoscopy (N/A, 12/19/2022); egd, with closed biopsy (11/30/2023); Esophagogastroduodenoscopy (N/A, 11/30/2023); and Coronary angiography (Right, 1/8/2024).    Family History: family history includes Heart disease in her father; Lupus in her mother.. Otherwise no colon cancer, inflammatory bowel disease, or GI malignancies.    Social History:  reports that she has never smoked. She has never been exposed to tobacco smoke. She has never used smokeless tobacco. She reports that she does not currently use alcohol. She reports that she does not use drugs.    Review of patient's allergies indicates:   Allergen Reactions    Sulfa (sulfonamide antibiotics) Shortness Of Breath    Coconut     Guaifenesin     Milk containing products (dairy)     Prochlorperazine Hallucinations     Other reaction(s): Compazine  Note:  Adverse Reaction: Other    Statins-hmg-coa reductase inhibitors     Terbinafine hcl      Other reaction(s): terbinafine 250 mg tablet  Note:  Adverse Reaction: Upset Stomach    Wheat containing prod     Penicillins Rash     Other  reaction(s): Penicillins  Note:  Adverse Reaction: Rash       Medications:   Prescriptions Prior to Admission[1]      Objective Findings:    Vital Signs: see nursing notes  Physical Exam:  General Appearance: Well appearing in no acute distress  Eyes:    No scleral icterus  ENT: Neck supple  Lungs: CTA anteriorly  Heart:  S1, S2 normal, no murmurs heard  Abdomen: Soft, non tender, non distended with positive bowel sounds. No hepatosplenomegaly, ascites, or mass  Extremities: no edema  Skin: No rash      Labs:  Lab Results   Component Value Date    WBC 7.00 03/11/2025    HGB 13.4 (L) 03/11/2025    HCT 42.8 03/11/2025     03/11/2025    CHOL 190 03/11/2025    TRIG 129 03/11/2025    HDL 38 (L) 03/11/2025    ALT 17 03/11/2025    AST 26 03/11/2025     03/11/2025    K 4.2 03/11/2025     03/11/2025    CREATININE 1.1 03/11/2025    BUN 17 03/11/2025    CO2 27 03/11/2025    TSH 3.585 03/11/2025    INR 1.1 01/12/2024    HGBA1C 5.1 03/11/2025       I have explained the risks and benefits of endoscopy procedures to the patient including but not limited to bleeding, perforation, infection, and death.    Vinay Bourgeois MD         [1]   Medications Prior to Admission   Medication Sig Dispense Refill Last Dose/Taking    aspirin (ECOTRIN) 81 MG EC tablet Take 1 tablet (81 mg total) by mouth once daily. 90 tablet 4 3/13/2025    cetirizine (ZYRTEC) 10 MG tablet Take 10 mg by mouth once daily.   3/13/2025    losartan (COZAAR) 25 MG tablet Take 1 tablet (25 mg total) by mouth once daily. 90 tablet 3 3/14/2025 Morning    metoprolol tartrate (LOPRESSOR) 25 MG tablet Take 1 tablet (25 mg total) by mouth 2 (two) times daily. 180 tablet 3 3/14/2025 Morning    multivitamin (ONE DAILY MULTIVITAMIN) per tablet Take 1 tablet by mouth once daily.   3/13/2025    pantoprazole (PROTONIX) 40 MG tablet Take 1 tablet (40 mg total) by mouth 2 (two) times daily. 60 tablet 11 3/13/2025    tamsulosin (FLOMAX) 0.4 mg Cap TAKE 1  CAPSULE BY MOUTH EVERY DAY 90 capsule 1 3/13/2025    colchicine (COLCRYS) 0.6 mg tablet Take 1 tablet (0.6 mg total) by mouth daily as needed. 21 tablet 0     diclofenac sodium (VOLTAREN) 1 % Gel Apply 2 g topically 4 (four) times daily as needed. 350 g 2     indomethacin (INDOCIN) 50 MG capsule Take 1 capsule (50 mg total) by mouth 3 (three) times daily. 15 capsule 3     ketorolac (TORADOL) 10 mg tablet Take 1 tablet (10 mg total) by mouth every 6 (six) hours as needed for Pain. 20 tablet 1 Unknown    metFORMIN (GLUCOPHAGE) 500 MG tablet Take 1 tablet (500 mg total) by mouth 2 (two) times daily with meals. 180 tablet 3 Unknown    minoxidiL-finasteride 5-0.1 % Soln Apply 1 application  topically 2 (two) times a day. 60 mL 5     nitroGLYCERIN (NITROSTAT) 0.4 MG SL tablet Place 1 tablet (0.4 mg total) under the tongue every 5 (five) minutes as needed for Chest pain. Call the 911 after the 3rd dose. 25 tablet 3     semaglutide, weight loss, (WEGOVY) 0.25 mg/0.5 mL PnIj Inject 0.25 mg into the skin every 7 days. 2 mL 0 Unknown    semaglutide, weight loss, (WEGOVY) 0.5 mg/0.5 mL PnIj Inject 0.5 mg into the skin every 7 days. (Patient not taking: Reported on 2/27/2025) 2 mL 0 Unknown    [START ON 4/13/2025] semaglutide, weight loss, (WEGOVY) 1 mg/0.5 mL PnIj Inject 1 mg into the skin every 7 days. (Patient not taking: Reported on 2/27/2025) 2 mL 0 Unknown

## 2025-03-14 NOTE — ANESTHESIA PREPROCEDURE EVALUATION
03/14/2025  Martha Shah is a 54 y.o., adult  here for an egd.       Pre-op Assessment    I have reviewed the Patient Summary Reports.    I have reviewed the NPO Status.   I have reviewed the Medications.     Review of Systems  Anesthesia Hx:  No problems with previous Anesthesia               Denies Personal Hx of Anesthesia complications.                    Social:  Non-Smoker, No Alcohol Use       Cardiovascular:     Hypertension               Normal ef on echo; normal stress test                           Pulmonary:        Sleep Apnea                Hepatic/GI:     GERD   Eosinophilic esophagitis             Neurological:   CVA   Headaches                                 Endocrine:        Morbid Obesity / BMI > 40      Physical Exam  General: Well nourished, Cooperative, Alert and Oriented    Airway:  Mallampati: II   Mouth Opening: Normal  TM Distance: Normal  Tongue: Normal  Neck ROM: Normal ROM    Chest/Lungs:  Clear to auscultation, Normal Respiratory Rate    Heart:  Rate: Normal  Rhythm: Regular Rhythm        Anesthesia Plan  Type of Anesthesia, risks & benefits discussed:    Anesthesia Type: Gen Natural Airway  Intra-op Monitoring Plan: Standard ASA Monitors  Post Op Pain Control Plan: multimodal analgesia  Induction:  IV  Informed Consent: Informed consent signed with the Patient and all parties understand the risks and agree with anesthesia plan.  All questions answered.   ASA Score: 3    Ready For Surgery From Anesthesia Perspective.     .

## 2025-03-14 NOTE — PROVATION PATIENT INSTRUCTIONS
Discharge Summary/Instructions after an Endoscopic Procedure  Patient Name: Martha Shah  Patient MRN: [id number]  Patient YOB: 1970 Friday, March 14, 2025  Vinay Bourgeois MD  Dear patient,  As a result of recent federal legislation (The Federal Cures Act), you may   receive lab or pathology results from your procedure in your [facility]   account before your physician is able to contact you. Your physician or   their representative will relay the results to you with their   recommendations at their soonest availability.  Thank you,  Your health is very important to us during the Covid Crisis. Following your   procedure today, you will receive a daily text for 2 weeks asking about   signs or symptoms of Covid 19.  Please respond to this text when you   receive it so we can follow up and keep you as safe as possible.   RESTRICTIONS:  During your procedure today, you received medications for sedation.  These   medications may affect your judgment, balance and coordination.  Therefore,   for 24 hours, you have the following restrictions:   - DO NOT drive a car, operate machinery, make legal/financial decisions,   sign important papers or drink alcohol.    ACTIVITY:  Today: no heavy lifting, straining or running due to procedural   sedation/anesthesia.  The following day: return to full activity including work.  DIET:  Eat and drink normally unless instructed otherwise.     TREATMENT FOR COMMON SIDE EFFECTS:  - Mild abdominal pain, nausea, belching, bloating or excessive gas:  rest,   eat lightly and use a heating pad.  - Sore Throat: treat with throat lozenges and/or gargle with warm salt   water.  - Because air was used during the procedure, expelling large amounts of air   from your rectum or belching is normal.  - If a bowel prep was taken, you may not have a bowel movement for 1-3 days.    This is normal.  SYMPTOMS TO WATCH FOR AND REPORT TO YOUR PHYSICIAN:  1. Abdominal pain or bloating,  No chief complaint on file.    Date of Injury: 10/20/2024  Initial Treatment Date: 10/21/2024  Date Last Seen: 12/10/2024  Mechanism of Onset: Unknown  Occupation: Student  Referred by: Parent  Primary Care Physician: Abby Montgomery MD  Date informed consent expires: 2026  I have reviewed the past medical history, family history, social history, medications and allergies listed in the medical record as obtained by my nursing staff and support staff and agree with their documentation.  Sahara  reports that she has never smoked. She has never been exposed to tobacco smoke. She has never used smokeless tobacco.  Sahara is allergic to pineapple.  Visit Number of Current Episode: 6  Initial Pain Ratin/10      SUBJECTIVE:  (Sam) is a pleasant 13 year old female that presents to our office today for continued care and treatment of headache and neck pain. Patient rates her pain today at 0/10 with 10 being worst. Patient states she has been feeling good since her last visit. She reports having a headache yesterday. She also notes tightness on the right side of her neck. Her shoulders are feeling ok. She denies any headaches today.       Relevant Diagnostic Imaging:   None    OBJECTIVE FINDINGS:     Problem Focused Examination revealed:    (C-spine) Cervical spine facet joint function is within normal limits except for her RIGHT C 2/3 facet joints that exhibited limited passive range of motion and segmental restriction with tenderness upon palpation. The following muscles were examined for normal flexibility and tone; right and left upper trapezius muscle: right and left scalene muscle; right and left levator scapulae muscle; deep neck flexor muscle; right and left Sterno cleidomastoid muscle(SCM); right and left suboccipital muscle; these muscles were within normal limits except for her RIGHT suboccipital muscle, RIGHT scalene muscle that exhibited limited flexibility and were hypertonic at rest.    (Shoulder)  Scapulothoracic,acromioclavicular and glenohumeral joint range of motion is within normal limits; The following muscles were examined for normal flexibility and tone: right and left supraspinatus muscle; right and left subscapularis muscle; right and left biceps muscle; right and left teres muscle.; left and right pectoral minor muscle.; left and right pectoral major muscle.; left and right infraspinatus muscle. These muscles were found to be within normal limits except for her BILATERAL supraspinatus muscle that exhibited limited flexibility and were hypertonic at rest.      Orthopedic/Neurological tests:  None      Directional Preference:   None noted    Assessment:   No diagnosis found.      Complicating Factors/Comorbidities:   Hx headaches    Plan:  Patient was evaluated and then treated with manipulation to her cervical spine via diversified manipulation technique to improve function and passive range of motion of facet joints.  Patient also treated with contract/relax stretch to muscle noted as taut in objective findings to improve flexibility and decrease strain to spinal structures.       Therapeutic Exercise/Rehab/Modalities performed today:   None    Patient Instruction/Education: Educational resources through [employer] were provided to the patient to inform her of the nature of her condition, the treatment modalities used in office, and advice regarding lifestyle modifications and therapeutic exercises aimed at facilitating her healing and recovery.       Patient stated understanding of, and was in agreement with, the discussed instructions.  Goals of Care: Goal of care is to improve muscular and skeletal function and provide symptom relief.   Additional Goals Include and to be obtained by end of this plan of care.   To be obtained by end of this plan of care:  Patient independent with modified and progressed home exercise program.  Patient will decrease symptoms by 60-80% of current Visual Analog Pain  other than gas cramps.  2. Chest pain.  3. Back pain.  4. Signs of infection such as: chills or fever occurring within 24 hours   after the procedure.  5. Rectal bleeding, which would show as bright red, maroon, or black stools.   (A tablespoon of blood from the rectum is not serious, especially if   hemorrhoids are present.)  6. Vomiting.  7. Weakness or dizziness.  GO DIRECTLY TO THE NEAREST EMERGENCY ROOM IF YOU HAVE ANY OF THE FOLLOWING:      Difficulty breathing              Chills and/or fever over 101 F   Persistent vomiting and/or vomiting blood   Severe abdominal pain   Severe chest pain   Black, tarry stools   Bleeding- more than one tablespoon   Any other symptom or condition that you feel may need urgent attention  Your doctor recommends these additional instructions:  If any biopsies were taken, your doctors clinic will contact you in 1 to 2   weeks with any results.  - Discharge patient to home.   - Resume previous diet.   - Continue present medications.   - Await pathology results.   - Return to my office PRN.  For questions, problems or results please call your physician - Vinay Bourgeois MD.  EMERGENCY PHONE NUMBER: [phone],  LAB RESULTS: [phone]  IF A COMPLICATION OR EMERGENCY SITUATION ARISES AND YOU ARE UNABLE TO REACH   YOUR PHYSICIAN - GO DIRECTLY TO THE EMERGENCY ROOM.  Vinay Bourgeois MD  3/14/2025 9:53:58 AM  This report has been verified and signed electronically.  Dear patient,  As a result of recent federal legislation (The Federal Cures Act), you may   receive lab or pathology results from your procedure in your [facility]   account before your physician is able to contact you. Your physician or   their representative will relay the results to you with their   recommendations at their soonest availability.  Thank you,  PROVATION   Scale Score.  Patient’s active range-of-motion will be within normal limits with no/minimal pain.   Patient’s DOD/VA pain questionnaire will be decreased by 50-70%.       Other treatment options discussed with patient: None    Patient rates her pain post treatment at 0/10 with 10 being worst.    Patient is to return as needed for continued care and treatment of current condition    Length of Visit: 15 min.    On 12/23/2024, Ariana GARCIA scribed the services personally performed by Min Martinez DC

## 2025-03-14 NOTE — ANESTHESIA POSTPROCEDURE EVALUATION
Anesthesia Post Evaluation    Patient: Martha Shah    Procedure(s) Performed: Procedure(s) (LRB):  EGD (ESOPHAGOGASTRODUODENOSCOPY) (N/A)    Final Anesthesia Type: general      Patient location during evaluation: GI PACU  Patient participation: Yes- Able to Participate  Level of consciousness: awake and alert, oriented and awake  Post-procedure vital signs: reviewed and stable  Pain management: adequate  Airway patency: patent    PONV status at discharge: No PONV  Anesthetic complications: no      Cardiovascular status: stable  Respiratory status: unassisted and room air  Hydration status: euvolemic  Follow-up not needed.              Vitals Value Taken Time   /70 03/14/25 10:25   Temp 36.7 °C (98.1 °F) 03/14/25 09:56   Pulse 55 03/14/25 10:25   Resp 14 03/14/25 10:25   SpO2 99 % 03/14/25 10:25         Event Time   Out of Recovery 10:28:50         Pain/Issac Score: Issac Score: 10 (3/14/2025 10:25 AM)

## 2025-03-17 ENCOUNTER — PATIENT MESSAGE (OUTPATIENT)
Dept: CARDIOLOGY | Facility: CLINIC | Age: 55
End: 2025-03-17
Payer: COMMERCIAL

## 2025-03-18 ENCOUNTER — TELEPHONE (OUTPATIENT)
Dept: UROLOGY | Facility: CLINIC | Age: 55
End: 2025-03-18
Payer: COMMERCIAL

## 2025-03-18 DIAGNOSIS — I25.2 HISTORY OF MI (MYOCARDIAL INFARCTION): ICD-10-CM

## 2025-03-18 DIAGNOSIS — E66.01 CLASS 3 SEVERE OBESITY WITH SERIOUS COMORBIDITY AND BODY MASS INDEX (BMI) OF 40.0 TO 44.9 IN ADULT, UNSPECIFIED OBESITY TYPE: ICD-10-CM

## 2025-03-18 DIAGNOSIS — Z86.73 HISTORY OF CVA (CEREBROVASCULAR ACCIDENT): ICD-10-CM

## 2025-03-18 DIAGNOSIS — E66.813 CLASS 3 SEVERE OBESITY WITH SERIOUS COMORBIDITY AND BODY MASS INDEX (BMI) OF 40.0 TO 44.9 IN ADULT, UNSPECIFIED OBESITY TYPE: ICD-10-CM

## 2025-03-18 LAB
FINAL PATHOLOGIC DIAGNOSIS: NORMAL
GROSS: NORMAL
Lab: NORMAL
SUPPLEMENTAL DIAGNOSIS: NORMAL

## 2025-03-18 NOTE — TELEPHONE ENCOUNTER
Spoke with patient and scheduled follow up appointments.  Patient verbalized understanding.    YOUSIF Begum

## 2025-03-18 NOTE — TELEPHONE ENCOUNTER
----- Message from Wilton Leonardo MD sent at 3/18/2025 11:10 AM CDT -----  Can you please arrange for this patient to get a renal US and KUB in 3 months and see NP Vince after that?Thanks,Wilton Leonardo

## 2025-03-18 NOTE — TELEPHONE ENCOUNTER
No care due was identified.  [facility] Embedded Care Due Messages. Reference number: [id number].   3/18/2025 2:36:19 PM CDT

## 2025-03-19 ENCOUNTER — TELEPHONE (OUTPATIENT)
Dept: UROLOGY | Facility: CLINIC | Age: 55
End: 2025-03-19
Payer: COMMERCIAL

## 2025-03-19 ENCOUNTER — PATIENT MESSAGE (OUTPATIENT)
Dept: BARIATRICS | Facility: CLINIC | Age: 55
End: 2025-03-19
Payer: COMMERCIAL

## 2025-03-19 ENCOUNTER — TELEPHONE (OUTPATIENT)
Dept: BARIATRICS | Facility: CLINIC | Age: 55
End: 2025-03-19
Payer: COMMERCIAL

## 2025-03-19 DIAGNOSIS — K21.00 GASTROESOPHAGEAL REFLUX DISEASE WITH ESOPHAGITIS WITHOUT HEMORRHAGE: ICD-10-CM

## 2025-03-19 DIAGNOSIS — E78.5 HYPERLIPIDEMIA, UNSPECIFIED HYPERLIPIDEMIA TYPE: ICD-10-CM

## 2025-03-19 DIAGNOSIS — G47.33 OSA (OBSTRUCTIVE SLEEP APNEA): ICD-10-CM

## 2025-03-19 DIAGNOSIS — I10 PRIMARY HYPERTENSION: ICD-10-CM

## 2025-03-19 DIAGNOSIS — I25.2 HISTORY OF MI (MYOCARDIAL INFARCTION): ICD-10-CM

## 2025-03-19 DIAGNOSIS — I10 PRIMARY HYPERTENSION: Primary | ICD-10-CM

## 2025-03-19 DIAGNOSIS — Z86.73 HISTORY OF CVA (CEREBROVASCULAR ACCIDENT): ICD-10-CM

## 2025-03-19 DIAGNOSIS — E66.01 MORBID OBESITY WITH BMI OF 40.0-44.9, ADULT: Primary | ICD-10-CM

## 2025-03-19 RX ORDER — SEMAGLUTIDE 0.25 MG/.5ML
0.25 INJECTION, SOLUTION SUBCUTANEOUS
OUTPATIENT
Start: 2025-03-19

## 2025-03-19 NOTE — TELEPHONE ENCOUNTER
----- Message from Lupe sent at 3/19/2025  1:44 PM CDT -----  Regarding: call luis  Contact: [phone]  Hi,Who called:The pt Reason:Pt has a question about the procedure from yesterday. Pls call the pt at  [phone] to discuss. Provider's name:Dr. Toitional Information:Thank you.

## 2025-03-19 NOTE — LETTER
"  [facility] 2nd Fl  [address]  [facility] [address]  Phone: [phone]  Fax: [phone] 2025       Attn: Pre-determination Dept.    RE:  Martha Shah          OC #: [id number]          : 1970    To Whom It May Concern:  I am sending this letter on behalf of Martha Shah (a 54 y.o. adult) for pre-approval for Bariatric Surgery; specifically the Laparoscopic Chandler-en-Y Gastric Bypass. Martha has a past medical history of Morbid Obesity, Hypertension, Hyperlipidemia, GERD, and Obstructive Sleep Apnea on CPAP. She has made numerous attempts at dieting and exercise programs, and has failed to maintain any sustained weight loss.  Weight/Height/BMI  Estimated body mass index is 43.12 kg/m² as calculated from the following:    Height as of 3/18/25: 5' 5" (1.651 m).    Weight as of 3/18/25: 117.5 kg (259 lb 2.4 oz).   Martha Shah has been evaluated in our bariatric program by myself, the , and the program dietician and is felt to be an excellent candidate for surgery.  In addition, she has undergone a psychological evaluation, from which a letter is enclosed.  Martha Shah has undergone extensive pre-operative education and understands all the risks, benefits, and possible complications of surgery.  She has also undergone dietary education and thorough nutritional evaluation via a registered dietician.  Our program provides long term nutritional counseling with unlimited consults with the dietician.    Our team is sending this letter to receive pre-approval for the indicated procedure.  Please let us know if you have any questions or require any further information.  Sincerely,         Leila Siegel MD  General, Laparoscopic, and Bariatric Surgery  [facility] - [place], [place]              "

## 2025-03-19 NOTE — TELEPHONE ENCOUNTER
"Spoke with patient and confirmed the surgical procedure of Laparoscopic Bypass with Dr Evans on 4/28/25.  Scheduled preop appts/surgery date/2 week and 8 week post op appts. All dates and times agreed upon. Pt aware that if they are required to have PCP clearance, it must be within 6 months of surgery, unless their medical history has changed, it should be dated, signed and in chart for preop appointment. All medications have been reviewed regarding the necessity to be crushed or broken into pieces smaller that the tip of a pencil eraser for 2 weeks following gastric sleeve surgery and 4 weeks following gastric bypass surgery. Pt instructed to stop taking all NSAIDS 1 week before surgery and for life after surgery. Pt aware that protein liquid diet start date is 4/14/25. Patient is doing well with their diet. Patient was instructed about the progression of the diet phases. The patient's current weight is 259 lbs, height is 5'5", and BMI is 43.12. Refer to medical letter of necessity from Surgeon. Discussed the importance of increased physical activity and dieting lifestyle changes to improve weight loss and meet goals. Screened patient for history of UTI per protocol. Discussed with patient to avoid antibiotics and elective procedures involving sedation/anesthesia within 30 days of surgery unless cleared by the bariatric department. Patient instructed to call the bariatric clinic post op for any s/s of UTI. Patient's mailing address confirmed and informed to expect a manilla envelop containing bariatric surgery pre op booklet, appointment reminders, protein and fluid log sheets, and liquid diet and vitamin information sheets. Pt aware that all appts can be seen in my [facility] patient portal at this time. Confirmed email address and informed patient that they will be enrolled in the Patient Reported Outcomes program to track their progress and successes. The first email will be sent 2-3 weeks before surgery and then " every year on your surgery anniversary date. Office phone and fax number given to patient for any future questions/concerns. Discussed the pre-surgery complex carbohydrate beverage to purchase in [facility] pharmacy to drink 30 minutes before the surgery arrival time. Reviewed the policy of scheduling a covid test 72 hours prior to surgery if necessary.

## 2025-03-19 NOTE — TELEPHONE ENCOUNTER
Left voicemail for patient regarding Iron levels, Cardiology Referral, and EGD results. Consulted with Dr. Liang (Dr. Evans is out of town) who stated that, from the EGD perspective, we are ok to proceed. HILDA Liz NP is looking at iron results and will advise. Cardiology referral has been placed.

## 2025-03-19 NOTE — TELEPHONE ENCOUNTER
Refill Routing Note   Medication(s) are not appropriate for processing by [facility] for the following reason(s):        No active prescription written by provider  New or recently adjusted medication    [facility] action(s):  Defer               Appointments  past 12m or future 3m with PCP    Date Provider   Last Visit   12/9/2024 Estrada Partida MD   Next Visit   5/23/2025 Estrada Partida MD   ED visits in past 90 days: 0        Note composed:1:25 AM 03/19/2025

## 2025-03-19 NOTE — TELEPHONE ENCOUNTER
Called patient and answered all of her concerns and questions. Patient states verbal understanding of he stent.

## 2025-03-20 ENCOUNTER — PATIENT MESSAGE (OUTPATIENT)
Dept: BARIATRICS | Facility: CLINIC | Age: 55
End: 2025-03-20
Payer: COMMERCIAL

## 2025-03-25 ENCOUNTER — RESULTS FOLLOW-UP (OUTPATIENT)
Dept: GASTROENTEROLOGY | Facility: HOSPITAL | Age: 55
End: 2025-03-25

## 2025-03-25 ENCOUNTER — TELEPHONE (OUTPATIENT)
Dept: GASTROENTEROLOGY | Facility: CLINIC | Age: 55
End: 2025-03-25
Payer: COMMERCIAL

## 2025-03-25 NOTE — TELEPHONE ENCOUNTER
----- Message from Vinay Bourgeois MD sent at 3/25/2025  8:59 AM CDT -----  Reviewed. No cause of dysphagia noted. Proceed with HH repair as scheduled  ----- Message -----  From: Ismael [place] FFT Lab Interface  Sent: 3/17/2025   1:03 PM CDT  To: Vinay Bourgeois MD

## 2025-03-26 ENCOUNTER — OFFICE VISIT (OUTPATIENT)
Dept: CARDIOLOGY | Facility: CLINIC | Age: 55
End: 2025-03-26
Payer: COMMERCIAL

## 2025-03-26 VITALS — SYSTOLIC BLOOD PRESSURE: 137 MMHG | DIASTOLIC BLOOD PRESSURE: 76 MMHG

## 2025-03-26 DIAGNOSIS — Z86.73 HISTORY OF CVA (CEREBROVASCULAR ACCIDENT): ICD-10-CM

## 2025-03-26 DIAGNOSIS — Z01.810 PREOPERATIVE CARDIOVASCULAR EXAMINATION: Primary | ICD-10-CM

## 2025-03-26 DIAGNOSIS — I10 PRIMARY HYPERTENSION: ICD-10-CM

## 2025-03-26 DIAGNOSIS — I25.2 HISTORY OF MI (MYOCARDIAL INFARCTION): ICD-10-CM

## 2025-03-26 NOTE — PROGRESS NOTES
Cardiology Clinic Visit    History of Present Illness:       Martha Shah is a pleasant 54 y.o. adult with medical issues noted below in assessment/plan    3/26/25  3/26/25  Referred for evaluation prior to bariatric surgery 4/28/2025.   Had bladder stone removed last week without issues.  Last time she had any cp was 8/2024 requiring ng, nothing since.   Able to go up flight stairs w/o issues. Completes all chores/ADLs without issues.           The patient location is: [place]  The chief complaint leading to consultation is:  Preop evaluation prior to bariatric surgery    Visit type: audiovisual    Face to Face time with patient: 18  35  minutes of total time spent on the encounter, which includes face to face time and non-face to face time preparing to see the patient (eg, review of tests), Obtaining and/or reviewing separately obtained history, Documenting clinical information in the electronic or other health record, Independently interpreting results (not separately reported) and communicating results to the patient/family/caregiver, or Care coordination (not separately reported).         Each patient to whom he or she provides medical services by telemedicine is:  (1) informed of the relationship between the physician and patient and the respective role of any other health care provider with respect to management of the patient; and (2) notified that he or she may decline to receive medical services by telemedicine and may withdraw from such care at any time.      History obtained by patient interview and supplemented by nursing documentation and chart review.   Objective   PMHx:  has a past medical history of Allergy, Hypertension, NSTEMI (non-ST elevated myocardial infarction) (01/07/2024), Sleep apnea, unspecified, Stroke, and Urinary tract infection.   SurgHx:  has a past surgical history that includes Tonsillectomy; Biopsy of adenoids; Adenoidectomy; Colonoscopy (N/A, 09/14/2022);  Esophagogastroduodenoscopy (N/A, 09/14/2022); Adenoidectomy; Esophagogastroduodenoscopy (12/19/2022); Esophagogastroduodenoscopy (N/A, 12/19/2022); egd, with closed biopsy (11/30/2023); Esophagogastroduodenoscopy (N/A, 11/30/2023); Coronary angiography (Right, 01/08/2024); Esophagogastroduodenoscopy (N/A, 03/14/2025); CYSTOURETEROSCOPY, WITH HOLMIUM LASER LITHOTRIPSY OF URETERAL CALCULUS AND STENT INSERTION  CYSTOSCOPY, WITH URETERAL DILATION (03/18/2025); cystoureteroscopy, with holmium laser lithotripsy of ureteral calculus and stent insertion (3/18/2025); and cystoscopy, with ureteral dilation (3/18/2025).   FamHx: family history includes Heart disease in her father; Lupus in her mother.   SocialHx:  reports that she has never smoked. She has never been exposed to tobacco smoke. She has never used smokeless tobacco. She reports that she does not currently use alcohol. She reports that she does not use drugs.  Home Meds:  Current Outpatient Medications   Medication Instructions    aspirin (ECOTRIN) 81 mg, Oral, Daily    cetirizine (ZYRTEC) 10 mg, Daily    colchicine (COLCRYS) 0.6 mg, Oral, Daily PRN    diclofenac sodium (VOLTAREN) 2 g, Topical (Top), 4 times daily PRN    ibuprofen (ADVIL,MOTRIN) 600 mg, Oral, Every 8 hours PRN    indomethacin (INDOCIN) 50 mg, Oral, 3 times daily    ketorolac (TORADOL) 10 mg, Oral, Every 6 hours PRN    losartan (COZAAR) 25 mg, Oral, Daily    metFORMIN (GLUCOPHAGE) 500 mg, Oral, 2 times daily with meals    metoprolol tartrate (LOPRESSOR) 25 mg, Oral, 2 times daily    minoxidiL-finasteride 5-0.1 % Soln 1 application , Topical (Top), 2 times daily    multivitamin (ONE DAILY MULTIVITAMIN) per tablet 1 tablet, Daily    nitroGLYCERIN (NITROSTAT) 0.4 mg, Sublingual, Every 5 min PRN, Call the 911 after the 3rd dose.    oxybutynin (DITROPAN) 5 mg, Oral, 3 times daily PRN    pantoprazole (PROTONIX) 40 mg, Oral, 2 times daily    tamsulosin (FLOMAX) 0.4 mg, Oral    WEGOVY 0.5 mg, Subcutaneous,  "Every 7 days    [START ON 4/13/2025] WEGOVY 1 mg, Subcutaneous, Every 7 days    WEGOVY 0.25 mg, Subcutaneous, Every 7 days     Objective/Exam:   /76 (BP Location: Left arm)    Wt Readings from Last 4 Encounters:   03/18/25 117.5 kg (259 lb 2.4 oz)   03/14/25 117.9 kg (260 lb)   03/13/25 117.8 kg (259 lb 9.6 oz)   03/12/25 118.4 kg (261 lb)      Constitutional: NAD, Atraumatic, Conversant   HEENT: MMM, Sclera anicteric, No JVD   Cardiovascular: RRR, no murmurs noted, no chest tenderness to palpation, 2+ radial pulses b/l  Pulmonary: normal respiratory effort, CTAB, no crackles, wheezes  Abdominal: S/NT/ND  Musculoskeletal: No lower extremity edema noted b/l  Neurological: No gross neurological deficits  Skin: W/D/I  Psych: Appropriate affect, normal mood  Labs/Imaging/Procedures   Personally reviewed  Lab Results   Component Value Date     03/11/2025    K 4.2 03/11/2025     03/11/2025    CO2 27 03/11/2025    BUN 17 03/11/2025    CREATININE 1.1 03/11/2025    ANIONGAP 11 03/11/2025     Lab Results   Component Value Date    HGBA1C 5.1 03/11/2025     Lab Results   Component Value Date    BNP 39 12/16/2024    BNP 17 03/18/2024    BNP 45 01/07/2024      Lab Results   Component Value Date    WBC 7.00 03/11/2025    HGB 13.4 (L) 03/11/2025    HCT 42.8 03/11/2025     03/11/2025    GRAN 4.6 03/11/2025    GRAN 66.2 03/11/2025     Lab Results   Component Value Date    CHOL 190 03/11/2025    HDL 38 (L) 03/11/2025    LDLCALC 126.2 03/11/2025    LDLCALC 51.0 (L) 03/05/2024    LDLCALC 91.6 01/12/2024    TRIG 129 03/11/2025     Lab Results   Component Value Date    TSH 3.585 03/11/2025     No results found for: "APOLIPOPROTE"  No results found for: "LIPOA"       TTE:  Results for orders placed during the hospital encounter of 02/12/25    Echo    Interpretation Summary    Left Ventricle: The left ventricle is normal in size. Normal wall thickness. There is normal systolic function with a visually estimated " ejection fraction of 55 - 60%. There is normal diastolic function.    Right Ventricle: Normal right ventricular cavity size. Wall thickness is normal. Systolic function is normal.    IVC/SVC: Normal venous pressure at 3 mmHg.    Stress Testing:   No results found for this or any previous visit.     Coronary Angiogram:  Results for orders placed during the hospital encounter of 01/07/24    Cardiac catheterization    Conclusion    Mild-to-moderate nonobstructive coronary artery disease.    Ectasia noted involving the right coronary artery and distal left main.    The procedure log was documented by Documenter: Marianne Olsen RT and verified by Estrada Ojeda MD.    Date: 1/8/2024  Time: 12:08 PM      Personal: Works as .     Time spent on this visit included personally:  -Reviewing Medical records & lab results  -Independently reviewing and interpreting (if not documented by myself) EKGs, echocardiograms, catherizations   -Obtaining a history, performing a relevant exam, counseling/educating the patient/family  -Documenting clinical information in the EMR including ordering of tests  -Care coordination and communicating with other health care providers       Problem List:     1. Preoperative cardiovascular examination    2. Primary hypertension    3. History of MI (myocardial infarction)    4. History of CVA (cerebrovascular accident)      Assessment/Plan:   Martha Shah is a 54 y.o. adult with obesity, GERD,  CVA w/ left corona radiata 1/2024, ALFREDO, Questionable afib on holter 1/2024 w/ negative event monitor 5/2024 admission for NSTEMI s/p LHC w/ Mild nonobs dz/ w proximal vessel ectasia (1/2024). cMRI 3/2024 wnl/no LGE.  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    Here for preop evaluation prior to bariatric surgery.  Stress echo 02/13/2025 no evidence of ischemia, normal function.  Previous bubble study negative.  Able to complete >10 METS w/o issues. No cardiac symptoms.   Euvolemic.    -No no further cardiac workup needed prior proceeding with bariatric surgery.  She is intermediate risk for intermediate risk surgery.  -If sx develop/ECG changes can trial Sublingual NG in event there is any component of coronary vasospasm  -continue remainder of Current Medications[1] Fermin 1 seconds let me    -peripheral sx    Thank you for the opportunity to care for this patient. Please don't hesitate to reach out with any questions/concerns.   Speech recognition software used for parts of this note. Please excuse grammar, out of context phrases, and/or syntax issues.        Katharina Kee MD  Interventional Cardiology  [facility]             [1]   Current Outpatient Medications:     aspirin (ECOTRIN) 81 MG EC tablet, Take 1 tablet (81 mg total) by mouth once daily., Disp: 90 tablet, Rfl: 4    cetirizine (ZYRTEC) 10 MG tablet, Take 10 mg by mouth once daily., Disp: , Rfl:     diclofenac sodium (VOLTAREN) 1 % Gel, Apply 2 g topically 4 (four) times daily as needed., Disp: 350 g, Rfl: 2    indomethacin (INDOCIN) 50 MG capsule, Take 1 capsule (50 mg total) by mouth 3 (three) times daily., Disp: 15 capsule, Rfl: 3    losartan (COZAAR) 25 MG tablet, Take 1 tablet (25 mg total) by mouth once daily., Disp: 90 tablet, Rfl: 3    metoprolol tartrate (LOPRESSOR) 25 MG tablet, Take 1 tablet (25 mg total) by mouth 2 (two) times daily., Disp: 180 tablet, Rfl: 3    minoxidiL-finasteride 5-0.1 % Soln, Apply 1 application  topically 2 (two) times a day., Disp: 60 mL, Rfl: 5    multivitamin (ONE DAILY MULTIVITAMIN) per tablet, Take 1 tablet by mouth once daily., Disp: , Rfl:     nitroGLYCERIN (NITROSTAT) 0.4 MG SL tablet, Place 1 tablet (0.4 mg total) under the tongue every 5 (five) minutes as needed for Chest pain. Call the 911 after the 3rd dose., Disp: 25 tablet, Rfl: 3    pantoprazole (PROTONIX) 40 MG tablet, Take 1 tablet (40 mg total) by mouth 2 (two) times daily., Disp: 60 tablet, Rfl: 11     semaglutide, weight loss, (WEGOVY) 0.25 mg/0.5 mL PnIj, Inject 0.25 mg into the skin every 7 days., Disp: 2 mL, Rfl: 0    semaglutide, weight loss, (WEGOVY) 0.5 mg/0.5 mL PnIj, Inject 0.5 mg into the skin every 7 days., Disp: 2 mL, Rfl: 0    [START ON 4/13/2025] semaglutide, weight loss, (WEGOVY) 1 mg/0.5 mL PnIj, Inject 1 mg into the skin every 7 days., Disp: 2 mL, Rfl: 0    tamsulosin (FLOMAX) 0.4 mg Cap, TAKE 1 CAPSULE BY MOUTH EVERY DAY, Disp: 90 capsule, Rfl: 1    colchicine (COLCRYS) 0.6 mg tablet, Take 1 tablet (0.6 mg total) by mouth daily as needed., Disp: 21 tablet, Rfl: 0    ibuprofen (ADVIL,MOTRIN) 600 MG tablet, Take 1 tablet (600 mg total) by mouth every 8 (eight) hours as needed., Disp: 30 tablet, Rfl: 0    ketorolac (TORADOL) 10 mg tablet, Take 1 tablet (10 mg total) by mouth every 6 (six) hours as needed for Pain., Disp: 20 tablet, Rfl: 1    metFORMIN (GLUCOPHAGE) 500 MG tablet, Take 1 tablet (500 mg total) by mouth 2 (two) times daily with meals., Disp: 180 tablet, Rfl: 3    oxybutynin (DITROPAN) 5 MG Tab, Take 1 tablet (5 mg total) by mouth 3 (three) times daily as needed (bladder spasms (urge to urinate, pain in bladder))., Disp: 10 tablet, Rfl: 0

## 2025-04-08 ENCOUNTER — PATIENT MESSAGE (OUTPATIENT)
Dept: BARIATRICS | Facility: CLINIC | Age: 55
End: 2025-04-08
Payer: COMMERCIAL

## 2025-04-09 ENCOUNTER — OFFICE VISIT (OUTPATIENT)
Dept: BARIATRICS | Facility: CLINIC | Age: 55
End: 2025-04-09
Payer: COMMERCIAL

## 2025-04-09 ENCOUNTER — TELEPHONE (OUTPATIENT)
Dept: BARIATRICS | Facility: CLINIC | Age: 55
End: 2025-04-09

## 2025-04-09 VITALS
WEIGHT: 253.75 LBS | HEART RATE: 54 BPM | BODY MASS INDEX: 42.28 KG/M2 | OXYGEN SATURATION: 99 % | DIASTOLIC BLOOD PRESSURE: 66 MMHG | HEIGHT: 65 IN | TEMPERATURE: 98 F | SYSTOLIC BLOOD PRESSURE: 136 MMHG

## 2025-04-09 DIAGNOSIS — K21.00 GASTROESOPHAGEAL REFLUX DISEASE WITH ESOPHAGITIS WITHOUT HEMORRHAGE: ICD-10-CM

## 2025-04-09 DIAGNOSIS — G43.109 MIGRAINE WITH AURA AND WITHOUT STATUS MIGRAINOSUS, NOT INTRACTABLE: ICD-10-CM

## 2025-04-09 DIAGNOSIS — I47.29 NSVT (NONSUSTAINED VENTRICULAR TACHYCARDIA): ICD-10-CM

## 2025-04-09 DIAGNOSIS — I25.10 CORONARY ARTERY DISEASE INVOLVING NATIVE CORONARY ARTERY OF NATIVE HEART WITHOUT ANGINA PECTORIS: ICD-10-CM

## 2025-04-09 DIAGNOSIS — Z86.73 HISTORY OF CVA (CEREBROVASCULAR ACCIDENT): ICD-10-CM

## 2025-04-09 DIAGNOSIS — K20.0 EOSINOPHILIC ESOPHAGITIS: ICD-10-CM

## 2025-04-09 DIAGNOSIS — G47.33 OSA (OBSTRUCTIVE SLEEP APNEA): ICD-10-CM

## 2025-04-09 DIAGNOSIS — E78.5 HYPERLIPIDEMIA, UNSPECIFIED HYPERLIPIDEMIA TYPE: ICD-10-CM

## 2025-04-09 DIAGNOSIS — E66.01 MORBID OBESITY WITH BMI OF 40.0-44.9, ADULT: Primary | ICD-10-CM

## 2025-04-09 DIAGNOSIS — Z98.84 S/P BARIATRIC SURGERY: ICD-10-CM

## 2025-04-09 DIAGNOSIS — I10 PRIMARY HYPERTENSION: ICD-10-CM

## 2025-04-09 PROBLEM — K85.90 ACUTE PANCREATITIS: Status: RESOLVED | Noted: 2024-12-16 | Resolved: 2025-04-09

## 2025-04-09 PROBLEM — T46.6X5A MYALGIA DUE TO STATIN: Status: RESOLVED | Noted: 2024-11-20 | Resolved: 2025-04-09

## 2025-04-09 PROBLEM — M79.10 MYALGIA DUE TO STATIN: Status: RESOLVED | Noted: 2024-11-20 | Resolved: 2025-04-09

## 2025-04-09 PROBLEM — N39.0 ACUTE UTI: Status: RESOLVED | Noted: 2024-12-16 | Resolved: 2025-04-09

## 2025-04-09 PROBLEM — A41.9 SEPSIS: Status: RESOLVED | Noted: 2024-12-16 | Resolved: 2025-04-09

## 2025-04-09 PROBLEM — Z01.810 PREOPERATIVE CARDIOVASCULAR EXAMINATION: Status: RESOLVED | Noted: 2025-03-26 | Resolved: 2025-04-09

## 2025-04-09 PROCEDURE — 99999 PR PBB SHADOW E&M-EST. PATIENT-LVL IV: CPT | Mod: PBBFAC,,, | Performed by: SURGERY

## 2025-04-09 RX ORDER — DEXTROMETHORPHAN/PSEUDOEPHED 2.5-7.5/.8
40 DROPS ORAL 4 TIMES DAILY PRN
OUTPATIENT
Start: 2025-04-09

## 2025-04-09 RX ORDER — SODIUM CHLORIDE, SODIUM LACTATE, POTASSIUM CHLORIDE, CALCIUM CHLORIDE 600; 310; 30; 20 MG/100ML; MG/100ML; MG/100ML; MG/100ML
INJECTION, SOLUTION INTRAVENOUS CONTINUOUS
OUTPATIENT
Start: 2025-04-09

## 2025-04-09 RX ORDER — ACETAMINOPHEN 650 MG/20.3ML
1000 LIQUID ORAL EVERY 8 HOURS
OUTPATIENT
Start: 2025-04-09

## 2025-04-09 RX ORDER — ONDANSETRON HYDROCHLORIDE 2 MG/ML
8 INJECTION, SOLUTION INTRAVENOUS EVERY 6 HOURS
OUTPATIENT
Start: 2025-04-09

## 2025-04-09 RX ORDER — ACETAMINOPHEN 650 MG/20.3ML
500 LIQUID ORAL
OUTPATIENT
Start: 2025-04-09

## 2025-04-09 RX ORDER — PROCHLORPERAZINE EDISYLATE 5 MG/ML
5 INJECTION INTRAMUSCULAR; INTRAVENOUS EVERY 6 HOURS PRN
OUTPATIENT
Start: 2025-04-09

## 2025-04-09 RX ORDER — GABAPENTIN 250 MG/5ML
300 SOLUTION ORAL 2 TIMES DAILY
OUTPATIENT
Start: 2025-04-09

## 2025-04-09 RX ORDER — SCOPOLAMINE 1 MG/3D
1 PATCH, EXTENDED RELEASE TRANSDERMAL ONCE
OUTPATIENT
Start: 2025-04-09 | End: 2025-04-09

## 2025-04-09 RX ORDER — GABAPENTIN 300 MG/1
300 CAPSULE ORAL 2 TIMES DAILY
Qty: 10 CAPSULE | Refills: 0 | Status: SHIPPED | OUTPATIENT
Start: 2025-04-09

## 2025-04-09 RX ORDER — LIDOCAINE HYDROCHLORIDE 10 MG/ML
1 INJECTION, SOLUTION EPIDURAL; INFILTRATION; INTRACAUDAL; PERINEURAL ONCE
OUTPATIENT
Start: 2025-04-09 | End: 2025-04-09

## 2025-04-09 RX ORDER — OMEPRAZOLE 40 MG/1
40 CAPSULE, DELAYED RELEASE ORAL EVERY MORNING
Qty: 30 CAPSULE | Refills: 2 | Status: SHIPPED | OUTPATIENT
Start: 2025-04-09

## 2025-04-09 RX ORDER — OXYCODONE HCL 5 MG/5 ML
5 SOLUTION, ORAL ORAL EVERY 6 HOURS PRN
Refills: 0 | OUTPATIENT
Start: 2025-04-09

## 2025-04-09 RX ORDER — ONDANSETRON 8 MG/1
8 TABLET, ORALLY DISINTEGRATING ORAL EVERY 6 HOURS PRN
Qty: 30 TABLET | Refills: 0 | Status: SHIPPED | OUTPATIENT
Start: 2025-04-09

## 2025-04-09 RX ORDER — URSODIOL 500 MG/1
500 TABLET, FILM COATED ORAL DAILY
Qty: 30 TABLET | Refills: 5 | Status: SHIPPED | OUTPATIENT
Start: 2025-04-09 | End: 2025-10-06

## 2025-04-09 RX ORDER — MUPIROCIN 20 MG/G
OINTMENT TOPICAL
OUTPATIENT
Start: 2025-04-09

## 2025-04-09 RX ORDER — ACETAMINOPHEN 10 MG/ML
1000 INJECTION, SOLUTION INTRAVENOUS ONCE
OUTPATIENT
Start: 2025-04-09 | End: 2025-04-09

## 2025-04-09 RX ORDER — METRONIDAZOLE 500 MG/100ML
500 INJECTION, SOLUTION INTRAVENOUS
OUTPATIENT
Start: 2025-04-09

## 2025-04-09 RX ORDER — PANTOPRAZOLE SODIUM 40 MG/10ML
40 INJECTION, POWDER, LYOPHILIZED, FOR SOLUTION INTRAVENOUS 2 TIMES DAILY
OUTPATIENT
Start: 2025-04-09

## 2025-04-09 RX ORDER — KETOROLAC TROMETHAMINE 15 MG/ML
15 INJECTION, SOLUTION INTRAMUSCULAR; INTRAVENOUS ONCE
OUTPATIENT
Start: 2025-04-09 | End: 2025-04-09

## 2025-04-09 RX ORDER — FAMOTIDINE 10 MG/ML
20 INJECTION, SOLUTION INTRAVENOUS ONCE
OUTPATIENT
Start: 2025-04-09 | End: 2025-04-09

## 2025-04-09 RX ORDER — HEPARIN SODIUM 5000 [USP'U]/ML
5000 INJECTION, SOLUTION INTRAVENOUS; SUBCUTANEOUS ONCE
OUTPATIENT
Start: 2025-04-09 | End: 2025-04-09

## 2025-04-09 RX ORDER — ENOXAPARIN SODIUM 100 MG/ML
40 INJECTION SUBCUTANEOUS EVERY 12 HOURS
OUTPATIENT
Start: 2025-04-09

## 2025-04-09 RX ORDER — SODIUM CHLORIDE 9 MG/ML
INJECTION, SOLUTION INTRAVENOUS CONTINUOUS
OUTPATIENT
Start: 2025-04-09

## 2025-04-09 NOTE — PROGRESS NOTES
History & Physical    SUBJECTIVE:     History of Present Illness:  Martha Shah is a 54 year-old morbidly obese female with BMI 42.23 kg/m² and multiple associated comorbidities including HTN, DLD, CAD with h/o NSTEMI and h/o CVA, ALFREDO on CPAP, gout, GERD c/b EoE, and migraines, who presents for pre-operative exam for weight loss surgery. She has failed multiple attempts at non-surgical methods of weight loss and has completed the [facility] which she started on 2/13/25 at which time her BMI was 42.89 kg/m². She meets NIH consensus criteria for bariatric surgery and is interested in undergoing laparoscopic melony-en-y gastric bypass.     Chief Complaint   Patient presents with    Pre-op Exam     RYGB 4/28/25     Review of patient's allergies indicates:   Allergen Reactions    Sulfa (sulfonamide antibiotics) Shortness Of Breath    Coconut     Guaifenesin     Milk containing products (dairy)     Prochlorperazine Hallucinations     Other reaction(s): Compazine  Note:  Adverse Reaction: Other    Statins-hmg-coa reductase inhibitors     Terbinafine hcl      Other reaction(s): terbinafine 250 mg tablet  Note:  Adverse Reaction: Upset Stomach    Wheat containing prod     Penicillins Rash     Other reaction(s): Penicillins  Note:  Adverse Reaction: Rash     Current Outpatient Medications   Medication Sig    aspirin (ECOTRIN) 81 MG EC tablet Take 1 tablet (81 mg total) by mouth once daily.    cetirizine (ZYRTEC) 10 MG tablet Take 10 mg by mouth once daily.    colchicine (COLCRYS) 0.6 mg tablet Take 1 tablet (0.6 mg total) by mouth daily as needed.    diclofenac sodium (VOLTAREN) 1 % Gel Apply 2 g topically 4 (four) times daily as needed.    indomethacin (INDOCIN) 50 MG capsule Take 1 capsule (50 mg total) by mouth 3 (three) times daily.    losartan (COZAAR) 25 MG tablet Take 1 tablet (25 mg total) by mouth once daily.    metoprolol tartrate (LOPRESSOR) 25 MG tablet Take 1 tablet (25 mg total) by mouth 2 (two)  times daily.    multivitamin (ONE DAILY MULTIVITAMIN) per tablet Take 1 tablet by mouth once daily.    nitroGLYCERIN (NITROSTAT) 0.4 MG SL tablet Place 1 tablet (0.4 mg total) under the tongue every 5 (five) minutes as needed for Chest pain. Call the 911 after the 3rd dose.    pantoprazole (PROTONIX) 40 MG tablet Take 1 tablet (40 mg total) by mouth 2 (two) times daily.    semaglutide, weight loss, (WEGOVY) 0.25 mg/0.5 mL PnIj Inject 0.25 mg into the skin every 7 days.    tamsulosin (FLOMAX) 0.4 mg Cap TAKE 1 CAPSULE BY MOUTH EVERY DAY    gabapentin (NEURONTIN) 300 MG capsule Take 1 capsule (300 mg total) by mouth 2 (two) times daily. Open capsule and empty prior to taking. Take one dose on morning of surgery prior to arrival. Take 1 capsule (300 mg) twice a day for 3 days post-op. Continue for an additional 2 days as needed.    minoxidiL-finasteride 5-0.1 % Soln Apply 1 application  topically 2 (two) times a day. (Patient not taking: Reported on 4/9/2025)    minoxidiL-finasteride 5-0.1 % Soln Apply topically 2 (two) times a day. (Patient not taking: Reported on 4/9/2025)    semaglutide, weight loss, (WEGOVY) 0.5 mg/0.5 mL PnIj Inject 0.5 mg into the skin every 7 days. (Patient not taking: Reported on 4/9/2025)    [START ON 4/13/2025] semaglutide, weight loss, (WEGOVY) 1 mg/0.5 mL PnIj Inject 1 mg into the skin every 7 days. (Patient not taking: Reported on 4/9/2025)     No current facility-administered medications for this visit.     Past Medical History:   Diagnosis Date    Acute pancreatitis 12/16/2024    Allergy     Hypertension     Myalgia due to statin 11/20/2024    NSTEMI (non-ST elevated myocardial infarction) 01/07/2024    ALFREDO on CPAP     Sepsis 12/16/2024    Stroke     Urinary tract infection      Past Surgical History:   Procedure Laterality Date    ADENOIDECTOMY      BIOPSY OF ADENOIDS      COLONOSCOPY N/A 09/14/2022    Procedure: COLONOSCOPY;  Surgeon: Jordy Simons MD;  [facility];   Service: Endoscopy;  Laterality: N/A;    CORONARY ANGIOGRAPHY Right 01/08/2024    Procedure: ANGIOGRAM, CORONARY ARTERY;  Surgeon: Estrada Ojeda MD;  Location: [facility] CATH LAB;  Service: Cardiology;  Laterality: Right;    CYSTOSCOPY, WITH URETERAL DILATION  03/18/2025    Procedure: CYSTOSCOPY, WITH URETERAL DILATION;  Surgeon: Wilton Leonardo MD;  Location: [facility] OR;  Service: Urology;;    CYSTOURETEROSCOPY, WITH HOLMIUM LASER LITHOTRIPSY OF URETERAL CALCULUS AND STENT INSERTION  03/18/2025    Procedure: CYSTOURETEROSCOPY, WITH HOLMIUM LASER LITHOTRIPSY OF URETERAL CALCULUS AND STENT INSERTION;  Surgeon: Wilton Leonardo MD;  Location: [facility] OR;  Service: Urology;;    CYSTOURETEROSCOPY, WITH HOLMIUM LASER LITHOTRIPSY OF URETERAL CALCULUS AND STENT INSERTION  CYSTOSCOPY, WITH URETERAL DILATION  03/18/2025    ESOPHAGOGASTRODUODENOSCOPY N/A 09/14/2022    Procedure: EGD (ESOPHAGOGASTRODUODENOSCOPY);  Surgeon: Jordy Simons MD;  Location: [facility];  Service: Endoscopy;  Laterality: N/A;    ESOPHAGOGASTRODUODENOSCOPY N/A 12/19/2022    Procedure: EGD (ESOPHAGOGASTRODUODENOSCOPY);  Surgeon: Jordy Simons MD;  Location: [facility];  Service: Endoscopy;  Laterality: N/A;    ESOPHAGOGASTRODUODENOSCOPY N/A 11/30/2023    Procedure: EGD (ESOPHAGOGASTRODUODENOSCOPY);  Surgeon: Vinay Bourgeois MD;  Location: [facility];  Service: Endoscopy;  Laterality: N/A;    ESOPHAGOGASTRODUODENOSCOPY N/A 03/14/2025    Procedure: EGD (ESOPHAGOGASTRODUODENOSCOPY);  Surgeon: Vinay Bourgeois MD;  Location: [facility];  Service: Endoscopy;  Laterality: N/A;  ref A Bhandari NP- pt states not taking GLP-1 at this time- portal tmb    TONSILLECTOMY       Review of Systems   Constitutional:  Negative for chills, diaphoresis, fever and malaise/fatigue.   HENT:  Negative for congestion, hearing loss and sore throat.    Eyes:  Negative for blurred vision and double vision.   Respiratory:  Negative for cough and shortness of breath.   "  Cardiovascular:  Negative for chest pain, palpitations, orthopnea, claudication, leg swelling and PND.   Gastrointestinal:  Negative for abdominal pain, blood in stool, constipation, diarrhea, heartburn, nausea and vomiting.   Genitourinary:  Negative for dysuria and urgency.   Musculoskeletal:  Negative for back pain, falls, joint pain, myalgias and neck pain.   Skin:  Negative for itching and rash.   Neurological:  Negative for weakness and headaches.   Endo/Heme/Allergies:  Does not bruise/bleed easily.   Psychiatric/Behavioral:  Negative for depression and substance abuse.      OBJECTIVE:     Vitals:    04/09/25 0836   BP: 136/66   Pulse: (!) 54   Temp: 98 °F (36.7 °C)   SpO2: 99%   Weight: 115.1 kg (253 lb 12 oz)   Height: 5' 5" (1.651 m)     Physical Exam  Vitals reviewed.   Constitutional:       General: She is not in acute distress.     Appearance: Normal appearance. She is obese.   HENT:      Head: Normocephalic and atraumatic.   Eyes:      Conjunctiva/sclera: Conjunctivae normal.   Neck:      Thyroid: No thyromegaly.   Cardiovascular:      Rate and Rhythm: Normal rate and regular rhythm.      Heart sounds: Normal heart sounds.   Pulmonary:      Effort: Pulmonary effort is normal. No respiratory distress.      Breath sounds: Normal breath sounds.   Abdominal:      General: There is no distension.      Palpations: Abdomen is soft.      Tenderness: There is no abdominal tenderness. There is no guarding or rebound.   Musculoskeletal:         General: Normal range of motion.      Cervical back: Normal range of motion and neck supple.      Right lower leg: No edema.      Left lower leg: No edema.   Skin:     General: Skin is warm and dry.      Findings: No rash.   Neurological:      General: No focal deficit present.      Mental Status: She is alert and oriented to person, place, and time.      Gait: Gait is intact.   Psychiatric:         Mood and Affect: Mood and affect normal.         Behavior: Behavior " normal.         Cognition and Memory: Memory normal.        Laboratory  CBC: Reviewed  BMP: Reviewed  LFTs: Reviewed  Bariatric Labs: Reviewed    Diagnostic Results:  Labs: Reviewed  ECG: Reviewed  X-Ray: Reviewed  CT: Reviewed  Upper GI: Reviewed  EGD: Lynette      Dietitian: Patient has participated in pre-operative nutritional program with understanding of necessary lifelong dietary changes required with surgery.    Psych: No overt contraindications to bariatric surgery, patient has completed psychological evaluation and is able to give informed consent.    PCP: Medically cleared for surgery.     ASSESSMENT/PLAN:     Morbid obesity with failure of medical conservative therapy.    Co Morbid Conditions:   Problem List[1]    Patient wishes to undergo laparoscopic melony-en-y gastric bypass. She is scheduled for 4/28/25. She will start the pre-op liquid diet Monday 4/14/25.     The patient was informed that risks of bariatric surgery include, but are not limited to: bleeding, infection, injury to intraabdominal structures such as bowel, stomach, esophagus, liver, and spleen; DVT/PE, cardiopulmonary complications such as MI, stroke or PNA; marginal ulcers which can cause pain, bleed or perforate, stricture requiring dilations, incisional hernia, gallstones, exacerbation of mood disorders, vitamin/mineral deficiencies, dumping syndrome, failure of weight loss or weight regain, possible conversion to an open operation, and anastomotic or staple-line leak which may require surgical or endoscopic interventions, drains, antibiotics and NPO status with IV nutrition. Risks of general anesthesia with endotracheal intubation including but are not limited to heart attack, stroke, inability to wean off ventilator, and neurologic disability. We discussed increased risk of marginal ulcer in the setting of smoking, NSAID use, and immunosuppression including steroids.      We discussed that our goal is to ameliorate her medical  problems and not to obtain a specific body mass index. All questions were answered to her satisfaction and she has elected to proceed with surgery. Consent for surgery and blood transfusion were signed. Prescriptions for ondansetron, omeprazole, ursodiol, and acetaminophen were sent to the pharmacy for bedside delivery. Perioperative gabapentin was sent to the pharmacy for pick-up. Patient discussed perioperative instructions with the Bariatric RN.     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

## 2025-04-09 NOTE — TELEPHONE ENCOUNTER
Pt inquired about taking baby aspirin pre and post sx. Spoke to Dr. Evans and rec'd the following response. Pt notified via portal message.

## 2025-04-09 NOTE — H&P (VIEW-ONLY)
History & Physical    SUBJECTIVE:     History of Present Illness:  Martha Shah is a 54 year-old morbidly obese female with BMI 42.23 kg/m² and multiple associated comorbidities including HTN, DLD, CAD with h/o NSTEMI and h/o CVA, ALFREDO on CPAP, gout, GERD c/b EoE, and migraines, who presents for pre-operative exam for weight loss surgery. She has failed multiple attempts at non-surgical methods of weight loss and has completed the [facility]ariat[facility] Surgery program which she started on 2/13/25 at which time her BMI was 42.89 kg/m². She meets NIH consensus criteria for bariatric surgery and is interested in undergoing laparoscopic melony-en-y gastric bypass.     Chief Complaint   Patient presents with    Pre-op Exam     RYGB 4/28/25     Review of patient's allergies indicates:   Allergen Reactions    Sulfa (sulfonamide antibiotics) Shortness Of Breath    Coconut     Guaifenesin     Milk containing products (dairy)     Prochlorperazine Hallucinations     Other reaction(s): Compazine  Note:  Adverse Reaction: Other    Statins-hmg-coa reductase inhibitors     Terbinafine hcl      Other reaction(s): terbinafine 250 mg tablet  Note:  Adverse Reaction: Upset Stomach    Wheat containing prod     Penicillins Rash     Other reaction(s): Penicillins  Note:  Adverse Reaction: Rash     Current Outpatient Medications   Medication Sig    aspirin (ECOTRIN) 81 MG EC tablet Take 1 tablet (81 mg total) by mouth once daily.    cetirizine (ZYRTEC) 10 MG tablet Take 10 mg by mouth once daily.    colchicine (COLCRYS) 0.6 mg tablet Take 1 tablet (0.6 mg total) by mouth daily as needed.    diclofenac sodium (VOLTAREN) 1 % Gel Apply 2 g topically 4 (four) times daily as needed.    indomethacin (INDOCIN) 50 MG capsule Take 1 capsule (50 mg total) by mouth 3 (three) times daily.    losartan (COZAAR) 25 MG tablet Take 1 tablet (25 mg total) by mouth once daily.    metoprolol tartrate (LOPRESSOR) 25 MG tablet Take 1 tablet (25 mg total) by mouth 2 (two)  times daily.    multivitamin (ONE DAILY MULTIVITAMIN) per tablet Take 1 tablet by mouth once daily.    nitroGLYCERIN (NITROSTAT) 0.4 MG SL tablet Place 1 tablet (0.4 mg total) under the tongue every 5 (five) minutes as needed for Chest pain. Call the 911 after the 3rd dose.    pantoprazole (PROTONIX) 40 MG tablet Take 1 tablet (40 mg total) by mouth 2 (two) times daily.    semaglutide, weight loss, (WEGOVY) 0.25 mg/0.5 mL PnIj Inject 0.25 mg into the skin every 7 days.    tamsulosin (FLOMAX) 0.4 mg Cap TAKE 1 CAPSULE BY MOUTH EVERY DAY    gabapentin (NEURONTIN) 300 MG capsule Take 1 capsule (300 mg total) by mouth 2 (two) times daily. Open capsule and empty prior to taking. Take one dose on morning of surgery prior to arrival. Take 1 capsule (300 mg) twice a day for 3 days post-op. Continue for an additional 2 days as needed.    minoxidiL-finasteride 5-0.1 % Soln Apply 1 application  topically 2 (two) times a day. (Patient not taking: Reported on 4/9/2025)    minoxidiL-finasteride 5-0.1 % Soln Apply topically 2 (two) times a day. (Patient not taking: Reported on 4/9/2025)    semaglutide, weight loss, (WEGOVY) 0.5 mg/0.5 mL PnIj Inject 0.5 mg into the skin every 7 days. (Patient not taking: Reported on 4/9/2025)    [START ON 4/13/2025] semaglutide, weight loss, (WEGOVY) 1 mg/0.5 mL PnIj Inject 1 mg into the skin every 7 days. (Patient not taking: Reported on 4/9/2025)     No current facility-administered medications for this visit.     Past Medical History:   Diagnosis Date    Acute pancreatitis 12/16/2024    Allergy     Hypertension     Myalgia due to statin 11/20/2024    NSTEMI (non-ST elevated myocardial infarction) 01/07/2024    ALFREDO on CPAP     Sepsis 12/16/2024    Stroke     Urinary tract infection      Past Surgical History:   Procedure Laterality Date    ADENOIDECTOMY      BIOPSY OF ADENOIDS      COLONOSCOPY N/A 09/14/2022    Procedure: COLONOSCOPY;  Surgeon: Jordy Simons MD;  Location: [facility];   Service: Endoscopy;  Laterality: N/A;    CORONARY ANGIOGRAPHY Right 01/08/2024    Procedure: ANGIOGRAM, CORONARY ARTERY;  Surgeon: Estrada Ojeda MD;  Location: [facility] CATH LAB;  Service: Cardiology;  Laterality: Right;    CYSTOSCOPY, WITH URETERAL DILATION  03/18/2025    Procedure: CYSTOSCOPY, WITH URETERAL DILATION;  Surgeon: Wilton Leonardo MD;  Location: [facility] OR;  Service: Urology;;    CYSTOURETEROSCOPY, WITH HOLMIUM LASER LITHOTRIPSY OF URETERAL CALCULUS AND STENT INSERTION  03/18/2025    Procedure: CYSTOURETEROSCOPY, WITH HOLMIUM LASER LITHOTRIPSY OF URETERAL CALCULUS AND STENT INSERTION;  Surgeon: Wilton Leonardo MD;  Location: [facility] OR;  Service: Urology;;    CYSTOURETEROSCOPY, WITH HOLMIUM LASER LITHOTRIPSY OF URETERAL CALCULUS AND STENT INSERTION  CYSTOSCOPY, WITH URETERAL DILATION  03/18/2025    ESOPHAGOGASTRODUODENOSCOPY N/A 09/14/2022    Procedure: EGD (ESOPHAGOGASTRODUODENOSCOPY);  Surgeon: Jordy Simons MD;  Location: [facility];  Service: Endoscopy;  Laterality: N/A;    ESOPHAGOGASTRODUODENOSCOPY N/A 12/19/2022    Procedure: EGD (ESOPHAGOGASTRODUODENOSCOPY);  Surgeon: Jordy Siomns MD;  Location: [facility];  Service: Endoscopy;  Laterality: N/A;    ESOPHAGOGASTRODUODENOSCOPY N/A 11/30/2023    Procedure: EGD (ESOPHAGOGASTRODUODENOSCOPY);  Surgeon: Vinay Bourgeois MD;  Location: [facility];  Service: Endoscopy;  Laterality: N/A;    ESOPHAGOGASTRODUODENOSCOPY N/A 03/14/2025    Procedure: EGD (ESOPHAGOGASTRODUODENOSCOPY);  Surgeon: Vinay Bourgeois MD;  Location: [facility];  Service: Endoscopy;  Laterality: N/A;  ref A Bhandari NP- pt states not taking GLP-1 at this time- portal tmb    TONSILLECTOMY       Review of Systems   Constitutional:  Negative for chills, diaphoresis, fever and malaise/fatigue.   HENT:  Negative for congestion, hearing loss and sore throat.    Eyes:  Negative for blurred vision and double vision.   Respiratory:  Negative for cough and shortness of breath.   "  Cardiovascular:  Negative for chest pain, palpitations, orthopnea, claudication, leg swelling and PND.   Gastrointestinal:  Negative for abdominal pain, blood in stool, constipation, diarrhea, heartburn, nausea and vomiting.   Genitourinary:  Negative for dysuria and urgency.   Musculoskeletal:  Negative for back pain, falls, joint pain, myalgias and neck pain.   Skin:  Negative for itching and rash.   Neurological:  Negative for weakness and headaches.   Endo/Heme/Allergies:  Does not bruise/bleed easily.   Psychiatric/Behavioral:  Negative for depression and substance abuse.      OBJECTIVE:     Vitals:    04/09/25 0836   BP: 136/66   Pulse: (!) 54   Temp: 98 °F (36.7 °C)   SpO2: 99%   Weight: 115.1 kg (253 lb 12 oz)   Height: 5' 5" (1.651 m)     Physical Exam  Vitals reviewed.   Constitutional:       General: She is not in acute distress.     Appearance: Normal appearance. She is obese.   HENT:      Head: Normocephalic and atraumatic.   Eyes:      Conjunctiva/sclera: Conjunctivae normal.   Neck:      Thyroid: No thyromegaly.   Cardiovascular:      Rate and Rhythm: Normal rate and regular rhythm.      Heart sounds: Normal heart sounds.   Pulmonary:      Effort: Pulmonary effort is normal. No respiratory distress.      Breath sounds: Normal breath sounds.   Abdominal:      General: There is no distension.      Palpations: Abdomen is soft.      Tenderness: There is no abdominal tenderness. There is no guarding or rebound.   Musculoskeletal:         General: Normal range of motion.      Cervical back: Normal range of motion and neck supple.      Right lower leg: No edema.      Left lower leg: No edema.   Skin:     General: Skin is warm and dry.      Findings: No rash.   Neurological:      General: No focal deficit present.      Mental Status: She is alert and oriented to person, place, and time.      Gait: Gait is intact.   Psychiatric:         Mood and Affect: Mood and affect normal.         Behavior: Behavior " normal.         Cognition and Memory: Memory normal.        Laboratory  CBC: Reviewed  BMP: Reviewed  LFTs: Reviewed  Bariatric Labs: Reviewed    Diagnostic Results:  Labs: Reviewed  ECG: Reviewed  X-Ray: Reviewed  CT: Reviewed  Upper GI: Reviewed  EGD: Lynette      Dietitian: Patient has participated in pre-operative nutritional program with understanding of necessary lifelong dietary changes required with surgery.    Psych: No overt contraindications to bariatric surgery, patient has completed psychological evaluation and is able to give informed consent.    PCP: Medically cleared for surgery.     ASSESSMENT/PLAN:     Morbid obesity with failure of medical conservative therapy.    Co Morbid Conditions:   Problem List[1]    Patient wishes to undergo laparoscopic melony-en-y gastric bypass. She is scheduled for 4/28/25. She will start the pre-op liquid diet Monday 4/14/25.     The patient was informed that risks of bariatric surgery include, but are not limited to: bleeding, infection, injury to intraabdominal structures such as bowel, stomach, esophagus, liver, and spleen; DVT/PE, cardiopulmonary complications such as MI, stroke or PNA; marginal ulcers which can cause pain, bleed or perforate, stricture requiring dilations, incisional hernia, gallstones, exacerbation of mood disorders, vitamin/mineral deficiencies, dumping syndrome, failure of weight loss or weight regain, possible conversion to an open operation, and anastomotic or staple-line leak which may require surgical or endoscopic interventions, drains, antibiotics and NPO status with IV nutrition. Risks of general anesthesia with endotracheal intubation including but are not limited to heart attack, stroke, inability to wean off ventilator, and neurologic disability. We discussed increased risk of marginal ulcer in the setting of smoking, NSAID use, and immunosuppression including steroids.      We discussed that our goal is to ameliorate her medical  problems and not to obtain a specific body mass index. All questions were answered to her satisfaction and she has elected to proceed with surgery. Consent for surgery and blood transfusion were signed. Prescriptions for ondansetron, omeprazole, ursodiol, and acetaminophen were sent to the pharmacy for bedside delivery. Perioperative gabapentin was sent to the pharmacy for pick-up. Patient discussed perioperative instructions with the Bariatric RN.     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

## 2025-04-10 ENCOUNTER — PATIENT MESSAGE (OUTPATIENT)
Dept: BARIATRICS | Facility: CLINIC | Age: 55
End: 2025-04-10
Payer: COMMERCIAL

## 2025-04-14 ENCOUNTER — CLINICAL SUPPORT (OUTPATIENT)
Dept: BARIATRICS | Facility: CLINIC | Age: 55
End: 2025-04-14
Payer: COMMERCIAL

## 2025-04-14 ENCOUNTER — PATIENT MESSAGE (OUTPATIENT)
Dept: BARIATRICS | Facility: CLINIC | Age: 55
End: 2025-04-14

## 2025-04-14 DIAGNOSIS — I10 PRIMARY HYPERTENSION: ICD-10-CM

## 2025-04-14 DIAGNOSIS — Z71.3 DIETARY COUNSELING: Primary | ICD-10-CM

## 2025-04-14 DIAGNOSIS — E78.2 MIXED HYPERLIPIDEMIA: ICD-10-CM

## 2025-04-14 DIAGNOSIS — G47.33 OSA (OBSTRUCTIVE SLEEP APNEA): ICD-10-CM

## 2025-04-14 DIAGNOSIS — E66.01 MORBID OBESITY WITH BMI OF 40.0-44.9, ADULT: ICD-10-CM

## 2025-04-14 PROCEDURE — 99499 UNLISTED E&M SERVICE: CPT | Mod: 93,,, | Performed by: DIETITIAN, REGISTERED

## 2025-04-14 NOTE — PROGRESS NOTES
Audio Only Telehealth Visit     The patient location is: home  The chief complaint leading to consultation is: /start of liquid diet for bariatric surgery  Visit type: Virtual visit with audio only (telephone)  Total time spent in medical discussion with patient: 20 minutes  Total time spent on date of the encounter:30 minutes       The reason for the audio only service rather than synchronous audio and video virtual visit was related to technical difficulties or patient preference/necessity.       Each patient to whom I provide medical services by telemedicine is:  (1) informed of the relationship between the physician and patient and the respective role of any other health care provider with respect to management of the patient; and (2) notified that they may decline to receive medical services by telemedicine and may withdraw from such care at any time. Patient verbally consented to receive this service via voice-only telephone call.    NUTRITION NOTE    Bariatric Surgeon: Leila Evans M.D.  Reason for MNT Referral: Pre-op liquid diet and nutrition instructions  Bypass  Date of Surgery 4/28/2025    Pre-op liquid diet  Pt using Soylent complete 30 gm protein 250 calories or Owyn 20 gm and 4 gms of sugar and 180 calories  for preop liquid diet    Discussion:  -  gms of protein per day  - 600-800 calories per day   - Less than 4gm sugar per shake  - SF, Decaf, non-carbonated Fluids  - No Fruits, juices, yogurt or pudding on liquid diet  - No vitamins for 1 week prior to surgery  - No herbal supplements including green tea and fish oils for 2 weeks prior to surgery    Remind pt per nursing and medical team to inform our department if taking antibiotics within the 30 days post bariatric surgery or it any other surgeries/procedures are scheduled within 30 days after bariatric surgery.    2 week post-op instructions  Reviewed nutritional guidelines for protein and fluid requirements for week 1 and week 2  post-surgery.  Handout provided to log protein and fluid daily.  1 ounce medicine cups provided for patient to measure fluid intake after surgery.    Pt has the following vitamins and minerals to start taking 1 week after surgery.  Brought to Pre op  Multivitamin with 18 mg iron take one tablet or chewable twice a day  B-complex with 50 mg thiamin taken once daily  Calcium citrate 500 mg with vitamin D three times per day  Vitamin B-12 500 mcg  Sublingually daily  Reviewed dosage and timing of vitamin/mineral guidelines.    Reviewed common nutritional concerns and prevention tips after bariatric surgery.  Reminded not to lift anything greater than 10 lbs for 6 week post-surgery  Pt verbalized understanding of information provided with appropriate questions and comments.         SESSION TIME: 30 minutes                         This service was not originating from a related E/M service provided within the previous 7 days nor will  to an E/M service or procedure within the next 24 hours or my soonest available appointment.  Prevailing standard of care was able to be met in this audio-only visit.

## 2025-04-16 ENCOUNTER — OFFICE VISIT (OUTPATIENT)
Dept: RHEUMATOLOGY | Facility: CLINIC | Age: 55
End: 2025-04-16
Payer: COMMERCIAL

## 2025-04-16 VITALS
HEART RATE: 97 BPM | HEIGHT: 65 IN | BODY MASS INDEX: 41.91 KG/M2 | DIASTOLIC BLOOD PRESSURE: 74 MMHG | TEMPERATURE: 98 F | WEIGHT: 251.56 LBS | OXYGEN SATURATION: 99 % | SYSTOLIC BLOOD PRESSURE: 121 MMHG

## 2025-04-16 DIAGNOSIS — K29.00 ACUTE GASTRITIS WITHOUT HEMORRHAGE, UNSPECIFIED GASTRITIS TYPE: ICD-10-CM

## 2025-04-16 DIAGNOSIS — M1A.0710 IDIOPATHIC CHRONIC GOUT OF RIGHT FOOT WITHOUT TOPHUS: ICD-10-CM

## 2025-04-16 DIAGNOSIS — Z83.2 FAMILY HISTORY OF AUTOIMMUNE DISORDER: Primary | ICD-10-CM

## 2025-04-16 DIAGNOSIS — E66.01 MORBID OBESITY WITH BMI OF 40.0-44.9, ADULT: ICD-10-CM

## 2025-04-16 PROCEDURE — 1159F MED LIST DOCD IN RCRD: CPT | Mod: CPTII,S$GLB,, | Performed by: STUDENT IN AN ORGANIZED HEALTH CARE EDUCATION/TRAINING PROGRAM

## 2025-04-16 PROCEDURE — 99204 OFFICE O/P NEW MOD 45 MIN: CPT | Mod: S$GLB,,, | Performed by: STUDENT IN AN ORGANIZED HEALTH CARE EDUCATION/TRAINING PROGRAM

## 2025-04-16 PROCEDURE — 3008F BODY MASS INDEX DOCD: CPT | Mod: CPTII,S$GLB,, | Performed by: STUDENT IN AN ORGANIZED HEALTH CARE EDUCATION/TRAINING PROGRAM

## 2025-04-16 PROCEDURE — 3044F HG A1C LEVEL LT 7.0%: CPT | Mod: CPTII,S$GLB,, | Performed by: STUDENT IN AN ORGANIZED HEALTH CARE EDUCATION/TRAINING PROGRAM

## 2025-04-16 PROCEDURE — 3074F SYST BP LT 130 MM HG: CPT | Mod: CPTII,S$GLB,, | Performed by: STUDENT IN AN ORGANIZED HEALTH CARE EDUCATION/TRAINING PROGRAM

## 2025-04-16 PROCEDURE — 99999 PR PBB SHADOW E&M-EST. PATIENT-LVL V: CPT | Mod: PBBFAC,,, | Performed by: STUDENT IN AN ORGANIZED HEALTH CARE EDUCATION/TRAINING PROGRAM

## 2025-04-16 PROCEDURE — 4010F ACE/ARB THERAPY RXD/TAKEN: CPT | Mod: CPTII,S$GLB,, | Performed by: STUDENT IN AN ORGANIZED HEALTH CARE EDUCATION/TRAINING PROGRAM

## 2025-04-16 PROCEDURE — 3078F DIAST BP <80 MM HG: CPT | Mod: CPTII,S$GLB,, | Performed by: STUDENT IN AN ORGANIZED HEALTH CARE EDUCATION/TRAINING PROGRAM

## 2025-04-16 NOTE — PROGRESS NOTES
Mother-- Lupus and sepsis 69   Rash -- wrist  Ankle -- swelling -- lymphedema   Dry eyes   Dry mouth CPAP    Answers submitted by the patient for this visit:  Rheumatology Questionnaire (Submitted on 4/14/2025)  fever: No  eye redness: No  mouth sores: Yes  headaches: No  shortness of breath: No  chest pain: No  trouble swallowing: No  diarrhea: No  constipation: No  unexpected weight change: No  genital sore: No  dysuria: No  During the last 3 days, have you had a skin rash?: No  Bruises or bleeds easily: No  cough: No

## 2025-04-16 NOTE — PROGRESS NOTES
Answers submitted by the patient for this visit:  Rheumatology Questionnaire (Submitted on 4/14/2025)  fever: No  eye redness: No  mouth sores: Yes  headaches: No  shortness of breath: No  chest pain: No  trouble swallowing: No  diarrhea: No  constipation: No  unexpected weight change: No  genital sore: No  dysuria: No  During the last 3 days, have you had a skin rash?: No  Bruises or bleeds easily: No  cough: No         RHEUMATOLOGY OUTPATIENT CLINIC NOTE    4/16/2025    Attending Rheumatologist: Cyndi Merchant  Primary Care Provider: Estrada Partida MD   Physician Requesting Consultation: Devi Pascal DO  [address]  [address]  [place] [address]  Chief Complaint/Reason For Consultation:  Establish Care      Subjective:       HPI  Martha Shah is a 54 y.o. White adult with past medical history noted below referred for autoimmune workup.  Patient reports she was recently admitted in December 2024 for abdominal pain in sepsis.  Patient reports sepsis was deemed to be secondary to pancreatitis due to her use of Wegovy.  She feels there is something more autoimmune.  Mother had lupus and also sepsis at the age of 70.  No major rashes but she did had a rash in her left wrist the went away unclear etiology.  She reports she was diagnosed with lymphedema and later another doctor said not sure if lymphedema but she has chronic ankle swelling.  Dry eyes and dry mouth-she uses a CPAP machine every morning    Review of Systems   Constitutional:  Negative for fever and unexpected weight change.   HENT:  Positive for mouth sores. Negative for trouble swallowing.    Eyes:  Negative for redness.   Respiratory:  Negative for cough and shortness of breath.    Cardiovascular:  Negative for chest pain.   Gastrointestinal:  Negative for constipation and diarrhea.   Genitourinary:  Negative for dysuria and genital sores.   Integumentary:  Negative for rash.   Neurological:  Negative for headaches.    Hematological:  Does not bruise/bleed easily.        Chronic comorbid conditions affecting medical decision making today:  Past Medical History:   Diagnosis Date    Acute pancreatitis 12/16/2024    Allergy     Hypertension     Myalgia due to statin 11/20/2024    NSTEMI (non-ST elevated myocardial infarction) 01/07/2024    ALFREDO on CPAP     Sepsis 12/16/2024    Stroke     Urinary tract infection      Past Surgical History:   Procedure Laterality Date    ADENOIDECTOMY      BIOPSY OF ADENOIDS      COLONOSCOPY N/A 09/14/2022    Procedure: COLONOSCOPY;  Surgeon: Jordy Simons MD;  Location: [facility];  Service: Endoscopy;  Laterality: N/A;    CORONARY ANGIOGRAPHY Right 01/08/2024    Procedure: ANGIOGRAM, CORONARY ARTERY;  Surgeon: Estrada Ojeda MD;  Location: [facility] CATH LAB;  Service: Cardiology;  Laterality: Right;    CYSTOSCOPY, WITH URETERAL DILATION  03/18/2025    Procedure: CYSTOSCOPY, WITH URETERAL DILATION;  Surgeon: Wilton Leonardo MD;  Location: [facility] OR;  Service: Urology;;    CYSTOURETEROSCOPY, WITH HOLMIUM LASER LITHOTRIPSY OF URETERAL CALCULUS AND STENT INSERTION  03/18/2025    Procedure: CYSTOURETEROSCOPY, WITH HOLMIUM LASER LITHOTRIPSY OF URETERAL CALCULUS AND STENT INSERTION;  Surgeon: Wilton Leonardo MD;  Location: [facility];  Service: Urology;;    CYSTOURETEROSCOPY, WITH HOLMIUM LASER LITHOTRIPSY OF URETERAL CALCULUS AND STENT INSERTION  CYSTOSCOPY, WITH URETERAL DILATION  03/18/2025    ESOPHAGOGASTRODUODENOSCOPY N/A 09/14/2022    Procedure: EGD (ESOPHAGOGASTRODUODENOSCOPY);  Surgeon: Jordy Simons MD;  Location: [facility];  Service: Endoscopy;  Laterality: N/A;    ESOPHAGOGASTRODUODENOSCOPY N/A 12/19/2022    Procedure: EGD (ESOPHAGOGASTRODUODENOSCOPY);  Surgeon: Jordy Simons MD;  Location: [facility];  Service: Endoscopy;  Laterality: N/A;    ESOPHAGOGASTRODUODENOSCOPY N/A 11/30/2023    Procedure: EGD (ESOPHAGOGASTRODUODENOSCOPY);  Surgeon: Vinay Bourgeois MD;  Location: [facility];   Service: Endoscopy;  Laterality: N/A;    ESOPHAGOGASTRODUODENOSCOPY N/A 03/14/2025    Procedure: EGD (ESOPHAGOGASTRODUODENOSCOPY);  Surgeon: Vinay Bourgeois MD;  Location: [facility];  Service: Endoscopy;  Laterality: N/A;  ref A Thony NP- pt states not taking GLP-1 at this time- portal tmb    TONSILLECTOMY       Family History   Problem Relation Name Age of Onset    Lupus Mother      Heart disease Father      Prostate cancer Neg Hx      Kidney disease Neg Hx       Social History     Substance and Sexual Activity   Alcohol Use Not Currently    Comment: social     Tobacco Use History[1]  Social History     Substance and Sexual Activity   Drug Use No     Current Medications[2]     Objective:         Vitals:    04/16/25 0821   BP: 121/74   Pulse: 97   Temp: 98.1 °F (36.7 °C)     Physical Exam   Constitutional: She is oriented to person, place, and time.   HENT:   Head: Normocephalic and atraumatic.   Right Ear: External ear normal.   Left Ear: External ear normal.   Nose: Nose normal.   Mouth/Throat: Oropharynx is clear and moist.   Eyes: Pupils are equal, round, and reactive to light. Conjunctivae are normal.   Cardiovascular: Normal rate and regular rhythm.   Pulmonary/Chest: Effort normal and breath sounds normal.   Abdominal: Soft. Bowel sounds are normal.   Musculoskeletal:         General: No swelling or tenderness.      Cervical back: Normal range of motion and neck supple.   Neurological: She is alert and oriented to person, place, and time.   Skin: No rash noted. No erythema.   Psychiatric: Mood and affect normal.       Reviewed old and all outside pertinent medical records available.    All lab results personally reviewed and interpreted by me.  Lab Results   Component Value Date    WBC 7.00 03/11/2025    HGB 13.4 (L) 03/11/2025    HCT 42.8 03/11/2025    MCV 89 03/11/2025    MCH 28.0 03/11/2025    MCHC 31.3 (L) 03/11/2025    RDW 14.3 03/11/2025     03/11/2025    MPV 11.3 03/11/2025       Lab  "Results   Component Value Date     03/11/2025    K 4.2 03/11/2025     03/11/2025    CO2 27 03/11/2025    GLU 89 03/11/2025    BUN 17 03/11/2025    CALCIUM 9.4 03/11/2025    PROT 7.7 03/11/2025    ALBUMIN 3.9 03/11/2025    BILITOT 0.5 03/11/2025    AST 26 03/11/2025    ALKPHOS 103 03/11/2025    ALT 17 03/11/2025       Lab Results   Component Value Date    COLORU Yellow 01/29/2025    APPEARANCEUA Clear 01/03/2025    SPECGRAV 1.005 01/29/2025    PHUR 8.0 01/29/2025    PROTEINUA Trace (A) 01/03/2025    KETONESU Negative 01/29/2025    LEUKOCYTESUR 2+ (A) 01/03/2025    NITRITE Negative 01/29/2025    UROBILINOGEN Normal 01/29/2025       No results found for: "CRP"    No results found for: "SEDRATE", "ERYTHROCYTES"    No results found for: "MANJINDER", "RF", "SEDRATE"    No components found for: "25OHVITDTOT", "46HBDTKJ5", "66TGPOXW5", "METHODNOTE"    Lab Results   Component Value Date    URICACID 5.6 12/17/2024       No components found for: "TSPOTTB"    No results found for: "MANJINDER"     Imaging:  All imaging reviewed and independently interpreted by me.         ASSESSMENT / PLAN:     Martha Shah is a 54 y.o. White adult with:      1. Family history of autoimmune disorder (Primary)  Will evaluate for autoimmune diseases   - MANJINDER Screen w/Reflex; Future  - Anti-DNA Ab, Double-Stranded; Future  - Anti-Histone Antibody; Future  - Anti-Neutrophilic Cytoplasmic Antibody; Future  - Sjogrens syndrome-B extractable nuclear antibody; Future  - Sjogrens syndrome-A extractable nuclear antibody; Future  - Cardiolipin antibody; Future  - DRVVT; Future  - Beta-2 Glycoprotein Abs (IgA, IgG, IgM); Future  - C3 Complement; Future  - C4 Complement; Future  - Cryoglobulin; Future  - Cyclic Citrullinated Peptide Antibody, IgG; Future  - Rheumatoid Factor; Future  - RNA polymerase III Ab, IgG; Future  - Proteinase 3 Autoantibodies; Future  - MyoMarker Panel 3; Future  - Myeloperoxidase Antibody (MPO); Future  - Immunoglobulin G Subclass 4; " Future  - Anti-Centromere Antibody; Future  - Sedimentation rate; Future  - C-Reactive Protein; Future    2. Acute gastritis without hemorrhage, unspecified gastritis type  - Ambulatory referral/consult to Rheumatology    3. Morbid obesity with BMI of 40.0-44.9, adult  Patient has lost significant weight.   Will have bariatric surgery on 4/28/25    4. Idiopathic chronic gout of right foot without tophus  The nature of gout is fully explained, including dietary relationship, acute and interval phase and treatment of both. Long term complications such as kidney stones, tophi and arthritis are discussed. Avoidance of alcohol recommended, and written literature is given along with a low purine diet. Indications for the use of allopurinol for prophylaxis and the use of colchicine to prevent or treat flare-ups is also discussed. Proper use of indomethacin for acute attacks discussed, and its side effects. Call if further attacks occur, or this one does not resolve promptly.  - Not on Allopurinol   - colchicine PRN   Uric acid normal, will not start Allopurinol now but after surgery patients can develop gout attack.        Follow up if symptoms worsen or fail to improve.    Method of contact with patient concerns: [facility]    Disclaimer:  This note is prepared using voice recognition software and as such is likely to have errors and has not been proof read. Please contact me for questions.     Time spent: 45 minutes in face to face discussion concerning diagnosis, prognosis, review of lab and test results, benefits of treatment as well as management of disease, counseling of patient and coordination of care between various health care providers.  Greater than half the time spent was used for coordination of care and counseling of patient.    Cyndi Merchant M.D.  Rheumatology Department   [facility]          [1]   Social History  Tobacco Use   Smoking Status Never    Passive exposure: Never    Smokeless Tobacco Never   [2]   Current Outpatient Medications:     aspirin (ECOTRIN) 81 MG EC tablet, Take 1 tablet (81 mg total) by mouth once daily., Disp: 90 tablet, Rfl: 4    cetirizine (ZYRTEC) 10 MG tablet, Take 10 mg by mouth once daily., Disp: , Rfl:     diclofenac sodium (VOLTAREN) 1 % Gel, Apply 2 g topically 4 (four) times daily as needed., Disp: 350 g, Rfl: 2    indomethacin (INDOCIN) 50 MG capsule, Take 1 capsule (50 mg total) by mouth 3 (three) times daily., Disp: 15 capsule, Rfl: 3    losartan (COZAAR) 25 MG tablet, Take 1 tablet (25 mg total) by mouth once daily., Disp: 90 tablet, Rfl: 3    metoprolol tartrate (LOPRESSOR) 25 MG tablet, Take 1 tablet (25 mg total) by mouth 2 (two) times daily., Disp: 180 tablet, Rfl: 3    multivitamin (ONE DAILY MULTIVITAMIN) per tablet, Take 1 tablet by mouth once daily., Disp: , Rfl:     nitroGLYCERIN (NITROSTAT) 0.4 MG SL tablet, Place 1 tablet (0.4 mg total) under the tongue every 5 (five) minutes as needed for Chest pain. Call the 911 after the 3rd dose., Disp: 25 tablet, Rfl: 3    omeprazole (PRILOSEC) 40 MG capsule, Take 1 capsule (40 mg total) by mouth every morning. Open capsule and sprinkle granules for 2 weeks post-op, Disp: 30 capsule, Rfl: 2    ondansetron (ZOFRAN-ODT) 8 MG TbDL, Take 1 tablet (8 mg total) by mouth every 6 (six) hours as needed (nausea)., Disp: 30 tablet, Rfl: 0    pantoprazole (PROTONIX) 40 MG tablet, Take 1 tablet (40 mg total) by mouth 2 (two) times daily., Disp: 60 tablet, Rfl: 11    semaglutide, weight loss, (WEGOVY) 0.25 mg/0.5 mL PnIj, Inject 0.25 mg into the skin every 7 days., Disp: 2 mL, Rfl: 0    tamsulosin (FLOMAX) 0.4 mg Cap, TAKE 1 CAPSULE BY MOUTH EVERY DAY, Disp: 90 capsule, Rfl: 1    ursodioL (YULIA FORTE) 500 MG tablet, Take 1 tablet (500 mg total) by mouth once daily., Disp: 30 tablet, Rfl: 5    acetaminophen 500 mg PwPk, Take 1,000 mg by mouth every 8 (eight) hours. Take 1g (2 packets) by mouth every 8 hours for at  least 3 days and up to 5 days as needed. May take one additional 500mg dose (1 packet) per day for breakthrough pain. Do not exceed 4g in 24 hours. (Patient not taking: Reported on 4/16/2025), Disp: 32 packet, Rfl: 0    colchicine (COLCRYS) 0.6 mg tablet, Take 1 tablet (0.6 mg total) by mouth daily as needed., Disp: 21 tablet, Rfl: 0    gabapentin (NEURONTIN) 300 MG capsule, Take 1 capsule (300 mg total) by mouth 2 (two) times daily. Open capsule and empty prior to taking. Take one dose on morning of surgery prior to arrival. Take 1 capsule (300 mg) twice a day for 3 days post-op. Continue for an additional 2 days as needed. (Patient not taking: Reported on 4/16/2025), Disp: 10 capsule, Rfl: 0    minoxidiL-finasteride 5-0.1 % Soln, Apply 1 application  topically 2 (two) times a day., Disp: 60 mL, Rfl: 5    minoxidiL-finasteride 5-0.1 % Soln, Apply topically 2 (two) times a day. (Patient not taking: No sig reported), Disp: , Rfl:     semaglutide, weight loss, (WEGOVY) 0.5 mg/0.5 mL PnIj, Inject 0.5 mg into the skin every 7 days. (Patient not taking: Reported on 4/16/2025), Disp: 2 mL, Rfl: 0    semaglutide, weight loss, (WEGOVY) 1 mg/0.5 mL PnIj, Inject 1 mg into the skin every 7 days. (Patient not taking: Reported on 4/16/2025), Disp: 2 mL, Rfl: 0

## 2025-04-17 ENCOUNTER — PATIENT MESSAGE (OUTPATIENT)
Dept: BARIATRICS | Facility: CLINIC | Age: 55
End: 2025-04-17
Payer: COMMERCIAL

## 2025-04-17 ENCOUNTER — PATIENT MESSAGE (OUTPATIENT)
Dept: RHEUMATOLOGY | Facility: CLINIC | Age: 55
End: 2025-04-17
Payer: COMMERCIAL

## 2025-04-22 ENCOUNTER — PATIENT MESSAGE (OUTPATIENT)
Dept: BARIATRICS | Facility: CLINIC | Age: 55
End: 2025-04-22
Payer: COMMERCIAL

## 2025-04-23 ENCOUNTER — TELEPHONE (OUTPATIENT)
Dept: RHEUMATOLOGY | Facility: CLINIC | Age: 55
End: 2025-04-23
Payer: COMMERCIAL

## 2025-04-23 NOTE — TELEPHONE ENCOUNTER
Returned patients phone called and addressed her concerns. Pt was able to get labs drawn a few days earlier then when she was scheduled.

## 2025-04-24 ENCOUNTER — PATIENT MESSAGE (OUTPATIENT)
Dept: BARIATRICS | Facility: CLINIC | Age: 55
End: 2025-04-24
Payer: COMMERCIAL

## 2025-04-25 ENCOUNTER — TELEPHONE (OUTPATIENT)
Dept: BARIATRICS | Facility: CLINIC | Age: 55
End: 2025-04-25
Payer: COMMERCIAL

## 2025-04-25 NOTE — PRE-PROCEDURE INSTRUCTIONS
PreOp Instructions given:     -- Medication information (what to hold and what to take)   -- NPO guidelines as follows: (or as per your Surgeon)  Stop ALL solid food, gum, candy 8 hours before arrival time.  Stop all CLOUDY liquids: coffee with creamer, cloudy juices, 8 hours prior to arrival time.  The patient should be ENCOURAGED to drink carbohydrate-rich clear liquids (sports drinks, clear juices) until 2 hours prior to arrival time.  Stop clear liquids 2 hours prior to arrival time.  CLEAR liquids include water, black coffee NO creamer, clear oral rehydration drinks, clear sports drinks and clear fruit juices (no orange juice, no pulpy juices, no apple cider).   IF IN DOUBT, drink water instead.   NOTHING TO DRINK 2 hours before to surgery/procedure  time. If you are told to take medication on the morning of surgery, it may be taken with a sip of water.   -- *Arrival place and directions given*.  Time to be given the day before procedure by the Surgeon's Office   -- Bathe with antibacterial soap (dial or Hibiclens as instructed)  -- Don't wear any jewelry or valuables and not metals on skin or hair AM of surgery   -- No makeup or moisturizer to face   -- No perfume/cologne, powder, lotions, aftershave or deodorant    Pt's instructions given by surgeons office, verified with Pt that there were no further questions at this time. Pt verbalized understanding.           *If going to , see below:      Directions and Instructions for [facility]   At [facility], we have an outstanding team of physicians, anesthesiologists, CRNAs, Registered Nurses, Surgical Technologists, and other ancillary team members all focused on your surgical and procedural care.   Before Your Procedure:   The physician's office will call you with a specific arrival time and directions a day or two before your scheduled procedure. You may also receive these instructions through your [facility] portal.   Day  of Procedure:   Please be sure to arrive at the arrival time given or you may risk your surgery being delayed or canceled. The arrival time is earlier than your scheduled surgery or procedure time. In the winter months please dress warm and bring blankets for you or your child as the waiting room may be cold. If you have difficulty locating the facility, please give us a call at [phone].   Directions:   The [facility] is located on the 1st floor of the hospital building near the [place] entrance.   Parking:   You will park in the [facility] Parking Garage (note location on map). [facility] opens at 5:00 a.m. and has a drop off area by the entrance.  parking is available starting at 7:00 a.m. Please see below for further  parking instructions.   Directions from the parking garage elevators   Blue [facility] Elevators: From the parking garage, take the blue [place] elevators (located in the center of the parking garage) to the 1st floor of the garage. You will then take a right once off the elevators then another right to the outside of the parking garage. You will be across from the [facility]. You will walk down the sidewalk, pass the  curve at the [place] entrance and continue to follow the sidewalk. You will pass the radiation oncology entrance on your right. Continue to follow the sidewalk to the [facility] glass door entrance.   Hospital Entrance (Inside Route): If a mostly inside route is preferred: Take the inside elevator bank (located at the far north end of the garage) from the parking garage to the 1st floor. On the 1st floor walk past [employer]. Keep walking down the center of the hallway towards the hospital elevators. Once you reach the red brick lyndsey, take a left and go past the hospital elevators. Take another left and follow the blue and white [place] signs around the hallway to the end. Go outside of the door.  You will see the [facility] entrance to your right.   Drop Off:   There is a drop off area at the doors of the [facility] for your convenience. If utilized for pediatric patients, an adult must accompany the patient into the surgery center while another adult hammond the vehicle.    (at 7:00 a.m.):   Upon check-in, please let the  know that you are utilizing [employer] parking which is free. The . will then call [employer] for your car to be picked up. Your keys and phone number will be collected and given to [employer] services. You will then be given a ticket. Upon discharge, [employer] will be notified to bring your vehicle back when you are ready.           Directions to [facility]:  [phone]     From 1st floor garage elevators: go past [facility] [employer] Time shop. Look for Black piano on side of coffee shop. Take Atrium (gold) elevator by piano up to 2nd floor. When you exit elevator follow long hallway (you should see a sign hanging from the ceiling that says Day of Surgery Family Waiting Room. When hallway ends you will be entering the day of surgery waiting area. Check in at the desk for your procedure.      From [facility] entrance: Make a right after you enter the door to the hospital. On your Left, take Concourse elevator to 2nd floor. Check in at desk      From Lab desk on 2nd floor: Exit lab area toward hospital atrium. You should see a sign that says Day of Surgery [facility] Waiting Area. Make a Left and follow long hallway (you should see a sign hanging from the ceiling that says Day of Surgery [facility] Waiting Room. When hallway ends you will be entering the day of surgery waiting area. Check in at the desk for your procedure.

## 2025-04-25 NOTE — TELEPHONE ENCOUNTER
Notified patient of arrival time to the [facility] at 1100 with expected surgery start time 1250 on 4/28/25. Instructed patient regarding pre-op instructions including no protein shakes or sugar free clear liquids after midnight but can have a rare sip of water for comfort, showering and preop medications to hold/take per anesthesia/preop. Reminded pt to drink pre-surgery beverage or 8 oz water and take one dose of Gabapentin at 1030. Instructed patient on the s/s of dehydration and for patient to call at the first sign of dehydration. Informed patient that someone from bariatrics will call them 1 week post op to review diet/fluid intake and to ensure adequate hydration. Reminded patient not to take antibiotics for 30 days following surgery or schedule elective procedures involving anesthesia/sedation for 30 days following surgery unless checking with the bariatric clinic first. Pt verbalized understanding. Pt given office phone number for any additional questions/concerns.

## 2025-04-28 ENCOUNTER — ANESTHESIA (OUTPATIENT)
Dept: SURGERY | Facility: HOSPITAL | Age: 55
DRG: 621 | End: 2025-04-28
Payer: COMMERCIAL

## 2025-04-28 ENCOUNTER — HOSPITAL ENCOUNTER (INPATIENT)
Facility: HOSPITAL | Age: 55
LOS: 1 days | Discharge: HOME | DRG: 621 | End: 2025-04-29
Attending: SURGERY | Admitting: SURGERY
Payer: COMMERCIAL

## 2025-04-28 ENCOUNTER — ANESTHESIA EVENT (OUTPATIENT)
Dept: SURGERY | Facility: HOSPITAL | Age: 55
DRG: 621 | End: 2025-04-28
Payer: COMMERCIAL

## 2025-04-28 DIAGNOSIS — I10 PRIMARY HYPERTENSION: ICD-10-CM

## 2025-04-28 DIAGNOSIS — I47.29 NSVT (NONSUSTAINED VENTRICULAR TACHYCARDIA): ICD-10-CM

## 2025-04-28 DIAGNOSIS — K21.00 GASTROESOPHAGEAL REFLUX DISEASE WITH ESOPHAGITIS WITHOUT HEMORRHAGE: ICD-10-CM

## 2025-04-28 DIAGNOSIS — E66.01 MORBID OBESITY WITH BMI OF 40.0-44.9, ADULT: Primary | ICD-10-CM

## 2025-04-28 DIAGNOSIS — E78.5 HYPERLIPIDEMIA, UNSPECIFIED HYPERLIPIDEMIA TYPE: ICD-10-CM

## 2025-04-28 DIAGNOSIS — K20.0 EOSINOPHILIC ESOPHAGITIS: ICD-10-CM

## 2025-04-28 DIAGNOSIS — G47.33 OSA (OBSTRUCTIVE SLEEP APNEA): ICD-10-CM

## 2025-04-28 DIAGNOSIS — I25.10 CORONARY ARTERY DISEASE INVOLVING NATIVE CORONARY ARTERY OF NATIVE HEART WITHOUT ANGINA PECTORIS: ICD-10-CM

## 2025-04-28 DIAGNOSIS — G43.109 MIGRAINE WITH AURA AND WITHOUT STATUS MIGRAINOSUS, NOT INTRACTABLE: ICD-10-CM

## 2025-04-28 DIAGNOSIS — Z86.73 HISTORY OF CVA (CEREBROVASCULAR ACCIDENT): ICD-10-CM

## 2025-04-28 PROBLEM — E66.9 OBESITY: Status: ACTIVE | Noted: 2025-04-28

## 2025-04-28 PROCEDURE — 0BQT4ZZ REPAIR DIAPHRAGM, PERCUTANEOUS ENDOSCOPIC APPROACH: ICD-10-PCS | Performed by: SURGERY

## 2025-04-28 PROCEDURE — 37000008 HC ANESTHESIA 1ST 15 MINUTES: Performed by: SURGERY

## 2025-04-28 PROCEDURE — 25000003 PHARM REV CODE 250

## 2025-04-28 PROCEDURE — 63600175 PHARM REV CODE 636 W HCPCS: Performed by: ANESTHESIOLOGY

## 2025-04-28 PROCEDURE — 25000003 PHARM REV CODE 250: Performed by: SURGERY

## 2025-04-28 PROCEDURE — 94799 UNLISTED PULMONARY SVC/PX: CPT

## 2025-04-28 PROCEDURE — 63600175 PHARM REV CODE 636 W HCPCS

## 2025-04-28 PROCEDURE — 36000711: Performed by: SURGERY

## 2025-04-28 PROCEDURE — 71000033 HC RECOVERY, INTIAL HOUR: Performed by: SURGERY

## 2025-04-28 PROCEDURE — 94660 CPAP INITIATION&MGMT: CPT

## 2025-04-28 PROCEDURE — 25000003 PHARM REV CODE 250: Performed by: NURSE ANESTHETIST, CERTIFIED REGISTERED

## 2025-04-28 PROCEDURE — 37000009 HC ANESTHESIA EA ADD 15 MINS: Performed by: SURGERY

## 2025-04-28 PROCEDURE — 36000710: Performed by: SURGERY

## 2025-04-28 PROCEDURE — 80048 BASIC METABOLIC PNL TOTAL CA: CPT | Performed by: SURGERY

## 2025-04-28 PROCEDURE — 25000242 PHARM REV CODE 250 ALT 637 W/ HCPCS: Performed by: SURGERY

## 2025-04-28 PROCEDURE — 63600175 PHARM REV CODE 636 W HCPCS: Performed by: SURGERY

## 2025-04-28 PROCEDURE — 43644 LAP GASTRIC BYPASS/ROUX-EN-Y: CPT | Mod: ,,, | Performed by: SURGERY

## 2025-04-28 PROCEDURE — 0DJ08ZZ INSPECTION OF UPPER INTESTINAL TRACT, VIA NATURAL OR ARTIFICIAL OPENING ENDOSCOPIC: ICD-10-PCS | Performed by: SURGERY

## 2025-04-28 PROCEDURE — 36415 COLL VENOUS BLD VENIPUNCTURE: CPT | Performed by: SURGERY

## 2025-04-28 PROCEDURE — 63600175 PHARM REV CODE 636 W HCPCS: Performed by: NURSE ANESTHETIST, CERTIFIED REGISTERED

## 2025-04-28 PROCEDURE — 11000001 HC ACUTE MED/SURG PRIVATE ROOM

## 2025-04-28 PROCEDURE — 0D164ZA BYPASS STOMACH TO JEJUNUM, PERCUTANEOUS ENDOSCOPIC APPROACH: ICD-10-PCS | Performed by: SURGERY

## 2025-04-28 PROCEDURE — D9220A PRA ANESTHESIA: Mod: ANES,,, | Performed by: ANESTHESIOLOGY

## 2025-04-28 PROCEDURE — 71000015 HC POSTOP RECOV 1ST HR: Performed by: SURGERY

## 2025-04-28 PROCEDURE — 99900035 HC TECH TIME PER 15 MIN (STAT)

## 2025-04-28 PROCEDURE — 94761 N-INVAS EAR/PLS OXIMETRY MLT: CPT

## 2025-04-28 PROCEDURE — D9220A PRA ANESTHESIA: Mod: CRNA,,, | Performed by: NURSE ANESTHETIST, CERTIFIED REGISTERED

## 2025-04-28 PROCEDURE — 27201423 OPTIME MED/SURG SUP & DEVICES STERILE SUPPLY: Performed by: SURGERY

## 2025-04-28 RX ORDER — FAMOTIDINE 10 MG/ML
20 INJECTION, SOLUTION INTRAVENOUS ONCE
Status: COMPLETED | OUTPATIENT
Start: 2025-04-28 | End: 2025-04-28

## 2025-04-28 RX ORDER — ESMOLOL HYDROCHLORIDE 10 MG/ML
INJECTION INTRAVENOUS
Status: DISCONTINUED | OUTPATIENT
Start: 2025-04-28 | End: 2025-04-28

## 2025-04-28 RX ORDER — PROCHLORPERAZINE EDISYLATE 5 MG/ML
5 INJECTION INTRAMUSCULAR; INTRAVENOUS EVERY 6 HOURS PRN
Status: DISCONTINUED | OUTPATIENT
Start: 2025-04-28 | End: 2025-04-29

## 2025-04-28 RX ORDER — SODIUM CHLORIDE 9 MG/ML
INJECTION, SOLUTION INTRAVENOUS CONTINUOUS
Status: DISCONTINUED | OUTPATIENT
Start: 2025-04-28 | End: 2025-04-28

## 2025-04-28 RX ORDER — SODIUM CHLORIDE 0.9 % (FLUSH) 0.9 %
3 SYRINGE (ML) INJECTION
Status: DISCONTINUED | OUTPATIENT
Start: 2025-04-28 | End: 2025-04-28 | Stop reason: HOSPADM

## 2025-04-28 RX ORDER — ONDANSETRON HYDROCHLORIDE 2 MG/ML
8 INJECTION, SOLUTION INTRAVENOUS EVERY 6 HOURS
Status: DISCONTINUED | OUTPATIENT
Start: 2025-04-28 | End: 2025-04-29 | Stop reason: HOSPADM

## 2025-04-28 RX ORDER — LIDOCAINE HYDROCHLORIDE 20 MG/ML
INJECTION, SOLUTION EPIDURAL; INFILTRATION; INTRACAUDAL; PERINEURAL
Status: DISCONTINUED | OUTPATIENT
Start: 2025-04-28 | End: 2025-04-28

## 2025-04-28 RX ORDER — ACETAMINOPHEN 650 MG/20.3ML
500 LIQUID ORAL
Status: DISCONTINUED | OUTPATIENT
Start: 2025-04-28 | End: 2025-04-29 | Stop reason: HOSPADM

## 2025-04-28 RX ORDER — KETOROLAC TROMETHAMINE 15 MG/ML
15 INJECTION, SOLUTION INTRAMUSCULAR; INTRAVENOUS ONCE
Status: COMPLETED | OUTPATIENT
Start: 2025-04-28 | End: 2025-04-28

## 2025-04-28 RX ORDER — MUPIROCIN 20 MG/G
OINTMENT TOPICAL
Status: DISCONTINUED | OUTPATIENT
Start: 2025-04-28 | End: 2025-04-28

## 2025-04-28 RX ORDER — LIDOCAINE HYDROCHLORIDE 10 MG/ML
1 INJECTION, SOLUTION EPIDURAL; INFILTRATION; INTRACAUDAL; PERINEURAL ONCE
Status: DISCONTINUED | OUTPATIENT
Start: 2025-04-28 | End: 2025-04-28 | Stop reason: HOSPADM

## 2025-04-28 RX ORDER — OXYCODONE HCL 5 MG/5 ML
5 SOLUTION, ORAL ORAL EVERY 6 HOURS PRN
Status: DISCONTINUED | OUTPATIENT
Start: 2025-04-28 | End: 2025-04-29 | Stop reason: HOSPADM

## 2025-04-28 RX ORDER — HYDROMORPHONE HYDROCHLORIDE 2 MG/ML
0.5 INJECTION, SOLUTION INTRAMUSCULAR; INTRAVENOUS; SUBCUTANEOUS ONCE
Refills: 0 | Status: COMPLETED | OUTPATIENT
Start: 2025-04-28 | End: 2025-04-28

## 2025-04-28 RX ORDER — VASOPRESSIN 20 [USP'U]/ML
INJECTION, SOLUTION INTRAMUSCULAR; SUBCUTANEOUS
Status: DISCONTINUED | OUTPATIENT
Start: 2025-04-28 | End: 2025-04-28

## 2025-04-28 RX ORDER — EPHEDRINE SULFATE 50 MG/ML
INJECTION, SOLUTION INTRAVENOUS
Status: DISCONTINUED | OUTPATIENT
Start: 2025-04-28 | End: 2025-04-28

## 2025-04-28 RX ORDER — ONDANSETRON HYDROCHLORIDE 2 MG/ML
INJECTION, SOLUTION INTRAVENOUS
Status: DISCONTINUED | OUTPATIENT
Start: 2025-04-28 | End: 2025-04-28

## 2025-04-28 RX ORDER — GLUCAGON 1 MG
1 KIT INJECTION
Status: DISCONTINUED | OUTPATIENT
Start: 2025-04-28 | End: 2025-04-28 | Stop reason: HOSPADM

## 2025-04-28 RX ORDER — HEPARIN SODIUM 5000 [USP'U]/ML
5000 INJECTION, SOLUTION INTRAVENOUS; SUBCUTANEOUS ONCE
Status: COMPLETED | OUTPATIENT
Start: 2025-04-28 | End: 2025-04-28

## 2025-04-28 RX ORDER — DEXMEDETOMIDINE HYDROCHLORIDE 100 UG/ML
INJECTION, SOLUTION INTRAVENOUS
Status: DISCONTINUED | OUTPATIENT
Start: 2025-04-28 | End: 2025-04-28

## 2025-04-28 RX ORDER — SODIUM CHLORIDE, SODIUM LACTATE, POTASSIUM CHLORIDE, CALCIUM CHLORIDE 600; 310; 30; 20 MG/100ML; MG/100ML; MG/100ML; MG/100ML
INJECTION, SOLUTION INTRAVENOUS CONTINUOUS
Status: DISCONTINUED | OUTPATIENT
Start: 2025-04-28 | End: 2025-04-29

## 2025-04-28 RX ORDER — GABAPENTIN 250 MG/5ML
300 SOLUTION ORAL 2 TIMES DAILY
Status: DISCONTINUED | OUTPATIENT
Start: 2025-04-28 | End: 2025-04-29 | Stop reason: HOSPADM

## 2025-04-28 RX ORDER — ASPIRIN 81 MG/1
81 TABLET ORAL DAILY
Status: DISCONTINUED | OUTPATIENT
Start: 2025-04-29 | End: 2025-04-29 | Stop reason: HOSPADM

## 2025-04-28 RX ORDER — PANTOPRAZOLE SODIUM 40 MG/10ML
40 INJECTION, POWDER, LYOPHILIZED, FOR SOLUTION INTRAVENOUS 2 TIMES DAILY
Status: DISCONTINUED | OUTPATIENT
Start: 2025-04-28 | End: 2025-04-29 | Stop reason: HOSPADM

## 2025-04-28 RX ORDER — PROPOFOL 10 MG/ML
VIAL (ML) INTRAVENOUS
Status: DISCONTINUED | OUTPATIENT
Start: 2025-04-28 | End: 2025-04-28

## 2025-04-28 RX ORDER — ACETAMINOPHEN 650 MG/20.3ML
1000 LIQUID ORAL EVERY 8 HOURS
Status: DISCONTINUED | OUTPATIENT
Start: 2025-04-28 | End: 2025-04-29 | Stop reason: HOSPADM

## 2025-04-28 RX ORDER — BUPIVACAINE HYDROCHLORIDE 2.5 MG/ML
INJECTION, SOLUTION EPIDURAL; INFILTRATION; INTRACAUDAL; PERINEURAL
Status: DISCONTINUED | OUTPATIENT
Start: 2025-04-28 | End: 2025-04-28 | Stop reason: HOSPADM

## 2025-04-28 RX ORDER — METRONIDAZOLE 500 MG/100ML
500 INJECTION, SOLUTION INTRAVENOUS
Status: DISCONTINUED | OUTPATIENT
Start: 2025-04-28 | End: 2025-04-28

## 2025-04-28 RX ORDER — SCOPOLAMINE 1 MG/3D
1 PATCH, EXTENDED RELEASE TRANSDERMAL ONCE
Status: DISCONTINUED | OUTPATIENT
Start: 2025-04-28 | End: 2025-04-29 | Stop reason: HOSPADM

## 2025-04-28 RX ORDER — TAMSULOSIN HYDROCHLORIDE 0.4 MG/1
1 CAPSULE ORAL DAILY
Status: DISCONTINUED | OUTPATIENT
Start: 2025-04-29 | End: 2025-04-28

## 2025-04-28 RX ORDER — HALOPERIDOL LACTATE 5 MG/ML
0.5 INJECTION, SOLUTION INTRAMUSCULAR EVERY 10 MIN PRN
Status: DISCONTINUED | OUTPATIENT
Start: 2025-04-28 | End: 2025-04-28 | Stop reason: HOSPADM

## 2025-04-28 RX ORDER — MIDAZOLAM HYDROCHLORIDE 1 MG/ML
INJECTION INTRAMUSCULAR; INTRAVENOUS
Status: DISCONTINUED | OUTPATIENT
Start: 2025-04-28 | End: 2025-04-28

## 2025-04-28 RX ORDER — CEFAZOLIN SODIUM 1 G/3ML
INJECTION, POWDER, FOR SOLUTION INTRAMUSCULAR; INTRAVENOUS
Status: DISCONTINUED | OUTPATIENT
Start: 2025-04-28 | End: 2025-04-28

## 2025-04-28 RX ORDER — ROCURONIUM BROMIDE 10 MG/ML
INJECTION, SOLUTION INTRAVENOUS
Status: DISCONTINUED | OUTPATIENT
Start: 2025-04-28 | End: 2025-04-28

## 2025-04-28 RX ORDER — FENTANYL CITRATE 50 UG/ML
INJECTION, SOLUTION INTRAMUSCULAR; INTRAVENOUS
Status: DISCONTINUED | OUTPATIENT
Start: 2025-04-28 | End: 2025-04-28

## 2025-04-28 RX ORDER — ENOXAPARIN SODIUM 100 MG/ML
40 INJECTION SUBCUTANEOUS EVERY 12 HOURS
Status: DISCONTINUED | OUTPATIENT
Start: 2025-04-29 | End: 2025-04-29 | Stop reason: HOSPADM

## 2025-04-28 RX ORDER — ACETAMINOPHEN 10 MG/ML
1000 INJECTION, SOLUTION INTRAVENOUS ONCE
Status: COMPLETED | OUTPATIENT
Start: 2025-04-28 | End: 2025-04-28

## 2025-04-28 RX ORDER — FENTANYL CITRATE 50 UG/ML
25 INJECTION, SOLUTION INTRAMUSCULAR; INTRAVENOUS EVERY 5 MIN PRN
Status: COMPLETED | OUTPATIENT
Start: 2025-04-28 | End: 2025-04-28

## 2025-04-28 RX ORDER — PHENYLEPHRINE HCL IN 0.9% NACL 1 MG/10 ML
SYRINGE (ML) INTRAVENOUS
Status: DISCONTINUED | OUTPATIENT
Start: 2025-04-28 | End: 2025-04-28

## 2025-04-28 RX ORDER — DEXAMETHASONE SODIUM PHOSPHATE 4 MG/ML
INJECTION, SOLUTION INTRA-ARTICULAR; INTRALESIONAL; INTRAMUSCULAR; INTRAVENOUS; SOFT TISSUE
Status: DISCONTINUED | OUTPATIENT
Start: 2025-04-28 | End: 2025-04-28

## 2025-04-28 RX ORDER — METOPROLOL TARTRATE 25 MG/1
25 TABLET, FILM COATED ORAL 2 TIMES DAILY
Status: DISCONTINUED | OUTPATIENT
Start: 2025-04-28 | End: 2025-04-29 | Stop reason: HOSPADM

## 2025-04-28 RX ADMIN — LIDOCAINE HYDROCHLORIDE 100 MG: 20 INJECTION, SOLUTION EPIDURAL; INFILTRATION; INTRACAUDAL at 01:04

## 2025-04-28 RX ADMIN — ROCURONIUM BROMIDE 30 MG: 10 INJECTION, SOLUTION INTRAVENOUS at 03:04

## 2025-04-28 RX ADMIN — Medication 100 MCG: at 02:04

## 2025-04-28 RX ADMIN — PROPOFOL 200 MG: 10 INJECTION, EMULSION INTRAVENOUS at 01:04

## 2025-04-28 RX ADMIN — FENTANYL CITRATE 100 MCG: 50 INJECTION INTRAMUSCULAR; INTRAVENOUS at 01:04

## 2025-04-28 RX ADMIN — FENTANYL CITRATE 25 MCG: 50 INJECTION INTRAMUSCULAR; INTRAVENOUS at 05:04

## 2025-04-28 RX ADMIN — LIDOCAINE HYDROCHLORIDE 100 MG: 20 INJECTION, SOLUTION EPIDURAL; INFILTRATION; INTRACAUDAL at 02:04

## 2025-04-28 RX ADMIN — SODIUM CHLORIDE, POTASSIUM CHLORIDE, SODIUM LACTATE AND CALCIUM CHLORIDE: 600; 310; 30; 20 INJECTION, SOLUTION INTRAVENOUS at 04:04

## 2025-04-28 RX ADMIN — DEXMEDETOMIDINE 8 MCG: 100 INJECTION, SOLUTION, CONCENTRATE INTRAVENOUS at 04:04

## 2025-04-28 RX ADMIN — GABAPENTIN 300 MG: 250 SOLUTION ORAL at 08:04

## 2025-04-28 RX ADMIN — ROCURONIUM BROMIDE 50 MG: 10 INJECTION, SOLUTION INTRAVENOUS at 01:04

## 2025-04-28 RX ADMIN — MUPIROCIN: 20 OINTMENT TOPICAL at 11:04

## 2025-04-28 RX ADMIN — ONDANSETRON 8 MG: 2 INJECTION INTRAMUSCULAR; INTRAVENOUS at 04:04

## 2025-04-28 RX ADMIN — MIDAZOLAM 2 MG: 1 INJECTION INTRAMUSCULAR; INTRAVENOUS at 01:04

## 2025-04-28 RX ADMIN — HEPARIN SODIUM 5000 UNITS: 5000 INJECTION INTRAVENOUS; SUBCUTANEOUS at 11:04

## 2025-04-28 RX ADMIN — SUGAMMADEX 400 MG: 100 INJECTION, SOLUTION INTRAVENOUS at 04:04

## 2025-04-28 RX ADMIN — ESMOLOL HYDROCHLORIDE 10 MG: 100 INJECTION, SOLUTION INTRAVENOUS at 02:04

## 2025-04-28 RX ADMIN — SODIUM CHLORIDE: 0.9 INJECTION, SOLUTION INTRAVENOUS at 01:04

## 2025-04-28 RX ADMIN — FENTANYL CITRATE 25 MCG: 50 INJECTION INTRAMUSCULAR; INTRAVENOUS at 04:04

## 2025-04-28 RX ADMIN — ACETAMINOPHEN 999.01 MG: 160 SOLUTION ORAL at 08:04

## 2025-04-28 RX ADMIN — DEXMEDETOMIDINE 12 MCG: 100 INJECTION, SOLUTION, CONCENTRATE INTRAVENOUS at 03:04

## 2025-04-28 RX ADMIN — CEFAZOLIN 3 G: 330 INJECTION, POWDER, FOR SOLUTION INTRAMUSCULAR; INTRAVENOUS at 01:04

## 2025-04-28 RX ADMIN — ONDANSETRON 4 MG: 2 INJECTION INTRAMUSCULAR; INTRAVENOUS at 03:04

## 2025-04-28 RX ADMIN — METOPROLOL TARTRATE 25 MG: 25 TABLET, FILM COATED ORAL at 08:04

## 2025-04-28 RX ADMIN — SODIUM CHLORIDE, SODIUM GLUCONATE, SODIUM ACETATE, POTASSIUM CHLORIDE, MAGNESIUM CHLORIDE, SODIUM PHOSPHATE, DIBASIC, AND POTASSIUM PHOSPHATE: .53; .5; .37; .037; .03; .012; .00082 INJECTION, SOLUTION INTRAVENOUS at 04:04

## 2025-04-28 RX ADMIN — DEXAMETHASONE SODIUM PHOSPHATE 8 MG: 4 INJECTION INTRA-ARTICULAR; INTRALESIONAL; INTRAMUSCULAR; INTRAVENOUS; SOFT TISSUE at 02:04

## 2025-04-28 RX ADMIN — SODIUM CHLORIDE, SODIUM GLUCONATE, SODIUM ACETATE, POTASSIUM CHLORIDE, MAGNESIUM CHLORIDE, SODIUM PHOSPHATE, DIBASIC, AND POTASSIUM PHOSPHATE: .53; .5; .37; .037; .03; .012; .00082 INJECTION, SOLUTION INTRAVENOUS at 01:04

## 2025-04-28 RX ADMIN — FAMOTIDINE 20 MG: 10 INJECTION, SOLUTION INTRAVENOUS at 11:04

## 2025-04-28 RX ADMIN — ESMOLOL HYDROCHLORIDE 30 MG: 100 INJECTION, SOLUTION INTRAVENOUS at 02:04

## 2025-04-28 RX ADMIN — OXYCODONE HYDROCHLORIDE 5 MG: 5 SOLUTION ORAL at 04:04

## 2025-04-28 RX ADMIN — EPHEDRINE SULFATE 15 MG: 50 INJECTION INTRAVENOUS at 02:04

## 2025-04-28 RX ADMIN — ESMOLOL HYDROCHLORIDE 20 MG: 100 INJECTION, SOLUTION INTRAVENOUS at 03:04

## 2025-04-28 RX ADMIN — PHENYLEPHRINE HYDROCHLORIDE 0.2 MCG/KG/MIN: 10 INJECTION INTRAVENOUS at 03:04

## 2025-04-28 RX ADMIN — HYDROMORPHONE HYDROCHLORIDE 0.5 MG: 2 INJECTION INTRAMUSCULAR; INTRAVENOUS; SUBCUTANEOUS at 06:04

## 2025-04-28 RX ADMIN — SCOPOLAMINE 1 PATCH: 1.5 PATCH, EXTENDED RELEASE TRANSDERMAL at 11:04

## 2025-04-28 RX ADMIN — FENTANYL CITRATE 100 MCG: 50 INJECTION INTRAMUSCULAR; INTRAVENOUS at 02:04

## 2025-04-28 RX ADMIN — KETOROLAC TROMETHAMINE 15 MG: 15 INJECTION, SOLUTION INTRAMUSCULAR; INTRAVENOUS at 11:04

## 2025-04-28 RX ADMIN — ACETAMINOPHEN 1000 MG: 10 INJECTION, SOLUTION INTRAVENOUS at 11:04

## 2025-04-28 RX ADMIN — PANTOPRAZOLE SODIUM 40 MG: 40 INJECTION, POWDER, FOR SOLUTION INTRAVENOUS at 09:04

## 2025-04-28 RX ADMIN — VASOPRESSIN 2 UNITS: 20 INJECTION INTRAVENOUS at 02:04

## 2025-04-28 RX ADMIN — VASOPRESSIN 1 UNITS: 20 INJECTION INTRAVENOUS at 02:04

## 2025-04-28 NOTE — PLAN OF CARE
Patient arrived to unit ambulating without difficulty . Patient appears to be in no immediate distress. Patient prepared for surgery. Perioperative period discussed and all questions addressed. Family at the bedside, call bell within reach, and bed in lowest position.

## 2025-04-28 NOTE — ANESTHESIA PROCEDURE NOTES
Intubation    Date/Time: 4/28/2025 1:37 PM    Performed by: Karlene Vergara CRNA  Authorized by: Jeri Bartlett MD    Intubation:     Induction:  Intravenous    Intubated:  Postinduction    Mask Ventilation:  Easy mask    Attempts:  1    Attempted By:  CRNA    Method of Intubation:  Video laryngoscopy    Blade:  Jackson 3    Laryngeal View Grade: Grade IIA - cords partially seen      Difficult Airway Encountered?: No      Complications:  None    Airway Device:  Oral endotracheal tube    Airway Device Size:  7.0    Style/Cuff Inflation:  Cuffed    Inflation Amount (mL):  8    Tube secured:  23    Secured at:  The lips    Placement Verified By:  Capnometry    Complicating Factors:  Short neck, obesity and poor neck/head extension    Findings Post-Intubation:  BS equal bilateral and atraumatic/condition of teeth unchanged

## 2025-04-28 NOTE — TRANSFER OF CARE
Anesthesia Transfer of Care Note    Patient: Martha Shah    Procedure(s) Performed: Procedure(s) (LRB):  GASTROENTEROSTOMY, LAPAROSCOPIC with inraop EGD (N/A)  REPAIR, HERNIA, HIATAL, LAPAROSCOPIC    Patient location: PACU    Anesthesia Type: general    Transport from OR: Transported from OR on 6-10 L/min O2 by face mask with adequate spontaneous ventilation    Post pain: adequate analgesia    Post assessment: no apparent anesthetic complications    Post vital signs: stable    Level of consciousness: sedated    Nausea/Vomiting: no nausea/vomiting    Complications: none    Transfer of care protocol was followed      Last vitals: Visit Vitals  BP (!) 108/54   Pulse 106   Temp 36.6 °C (97.9 °F) (Temporal)   Resp 20   SpO2 98%

## 2025-04-28 NOTE — BRIEF OP NOTE
[facility] - Surgery ([facility])  Brief Operative Note    SUMMARY     Surgery Date: 4/28/2025     Surgeons and Role:     * Leila Cummings MD - Primary     * Kathie Medina MD - Resident - Assisting        Pre-op Diagnosis:  ALFREDO (obstructive sleep apnea) [G47.33]  Morbid obesity with BMI of 40.0-44.9, adult [E66.01, Z68.41]  Primary hypertension [I10]  Gastroesophageal reflux disease with esophagitis without hemorrhage [K21.00]  Hyperlipidemia, unspecified hyperlipidemia type [E78.5]    Post-op Diagnosis:  Post-Op Diagnosis Codes:     * ALFREDO (obstructive sleep apnea) [G47.33]     * Morbid obesity with BMI of 40.0-44.9, adult [E66.01, Z68.41]     * Primary hypertension [I10]     * Gastroesophageal reflux disease with esophagitis without hemorrhage [K21.00]     * Hyperlipidemia, unspecified hyperlipidemia type [E78.5]    Procedure(s) (LRB):  GASTROENTEROSTOMY, LAPAROSCOPIC with inraop EGD (N/A)  REPAIR, HERNIA, HIATAL, LAPAROSCOPIC    Anesthesia: General/Regional    Implants:  * No implants in log *    Operative Findings: laparoscopic melony n y bypass with intra op EGD, gj anastomosis patent and open without stricture on EGD. Hemostatic    Estimated Blood Loss: * No values recorded between 4/28/2025  1:21 PM and 4/28/2025  4:31 PM *    Estimated Blood Loss has not been documented. EBL = under 20 ml.         Specimens:   Specimen (24h ago, onward)      None          * No specimens in log *    [id number]

## 2025-04-29 ENCOUNTER — DOCUMENTATION ONLY (OUTPATIENT)
Dept: BARIATRICS | Facility: CLINIC | Age: 55
End: 2025-04-29
Payer: COMMERCIAL

## 2025-04-29 VITALS
OXYGEN SATURATION: 94 % | SYSTOLIC BLOOD PRESSURE: 101 MMHG | RESPIRATION RATE: 18 BRPM | TEMPERATURE: 97 F | DIASTOLIC BLOOD PRESSURE: 72 MMHG | HEART RATE: 79 BPM

## 2025-04-29 LAB
ABSOLUTE EOSINOPHIL (OHS): 0 K/UL
ABSOLUTE MONOCYTE (OHS): 0.47 K/UL (ref 0.3–1)
ABSOLUTE NEUTROPHIL COUNT (OHS): 9.25 K/UL (ref 1.8–7.7)
ANION GAP (OHS): 12 MMOL/L (ref 8–16)
ANION GAP (OHS): 9 MMOL/L (ref 8–16)
BASOPHILS # BLD AUTO: 0.02 K/UL
BASOPHILS NFR BLD AUTO: 0.2 %
BUN SERPL-MCNC: 17 MG/DL (ref 6–20)
BUN SERPL-MCNC: 19 MG/DL (ref 6–20)
CALCIUM SERPL-MCNC: 8.7 MG/DL (ref 8.7–10.5)
CALCIUM SERPL-MCNC: 8.7 MG/DL (ref 8.7–10.5)
CHLORIDE SERPL-SCNC: 104 MMOL/L (ref 95–110)
CHLORIDE SERPL-SCNC: 106 MMOL/L (ref 95–110)
CO2 SERPL-SCNC: 22 MMOL/L (ref 23–29)
CO2 SERPL-SCNC: 23 MMOL/L (ref 23–29)
CREAT SERPL-MCNC: 0.9 MG/DL (ref 0.5–1.4)
CREAT SERPL-MCNC: 1 MG/DL (ref 0.5–1.4)
ERYTHROCYTE [DISTWIDTH] IN BLOOD BY AUTOMATED COUNT: 14.1 % (ref 11.5–14.5)
GFR SERPLBLD CREATININE-BSD FMLA CKD-EPI: >60 ML/MIN/1.73/M2
GFR SERPLBLD CREATININE-BSD FMLA CKD-EPI: >60 ML/MIN/1.73/M2
GLUCOSE SERPL-MCNC: 133 MG/DL (ref 70–110)
GLUCOSE SERPL-MCNC: 158 MG/DL (ref 70–110)
HCT VFR BLD AUTO: 38.6 % (ref 40–54)
HGB BLD-MCNC: 12.7 GM/DL (ref 14–18)
IMM GRANULOCYTES # BLD AUTO: 0.05 K/UL (ref 0–0.04)
IMM GRANULOCYTES NFR BLD AUTO: 0.5 % (ref 0–0.5)
LYMPHOCYTES # BLD AUTO: 0.61 K/UL (ref 1–4.8)
MAGNESIUM SERPL-MCNC: 1.7 MG/DL (ref 1.6–2.6)
MCH RBC QN AUTO: 28.3 PG (ref 27–31)
MCHC RBC AUTO-ENTMCNC: 32.9 G/DL (ref 32–36)
MCV RBC AUTO: 86 FL (ref 82–98)
NUCLEATED RBC (/100WBC) (OHS): 0 /100 WBC
PHOSPHATE SERPL-MCNC: 3.4 MG/DL (ref 2.7–4.5)
PLATELET # BLD AUTO: 177 K/UL (ref 150–450)
PMV BLD AUTO: 12.7 FL (ref 9.2–12.9)
POTASSIUM SERPL-SCNC: 4.2 MMOL/L (ref 3.5–5.1)
POTASSIUM SERPL-SCNC: 4.5 MMOL/L (ref 3.5–5.1)
RBC # BLD AUTO: 4.48 M/UL (ref 4.6–6.2)
RELATIVE EOSINOPHIL (OHS): 0 %
RELATIVE LYMPHOCYTE (OHS): 5.9 % (ref 18–48)
RELATIVE MONOCYTE (OHS): 4.5 % (ref 4–15)
RELATIVE NEUTROPHIL (OHS): 88.9 % (ref 38–73)
SODIUM SERPL-SCNC: 138 MMOL/L (ref 136–145)
SODIUM SERPL-SCNC: 138 MMOL/L (ref 136–145)
WBC # BLD AUTO: 10.4 K/UL (ref 3.9–12.7)

## 2025-04-29 PROCEDURE — 25000242 PHARM REV CODE 250 ALT 637 W/ HCPCS: Performed by: SURGERY

## 2025-04-29 PROCEDURE — 84100 ASSAY OF PHOSPHORUS: CPT | Performed by: SURGERY

## 2025-04-29 PROCEDURE — 63600175 PHARM REV CODE 636 W HCPCS: Performed by: SURGERY

## 2025-04-29 PROCEDURE — 83735 ASSAY OF MAGNESIUM: CPT | Performed by: SURGERY

## 2025-04-29 PROCEDURE — 36415 COLL VENOUS BLD VENIPUNCTURE: CPT | Performed by: SURGERY

## 2025-04-29 PROCEDURE — 94761 N-INVAS EAR/PLS OXIMETRY MLT: CPT

## 2025-04-29 PROCEDURE — 27100171 HC OXYGEN HIGH FLOW UP TO 24 HOURS

## 2025-04-29 PROCEDURE — 25000003 PHARM REV CODE 250: Performed by: SURGERY

## 2025-04-29 PROCEDURE — 94660 CPAP INITIATION&MGMT: CPT

## 2025-04-29 PROCEDURE — 80048 BASIC METABOLIC PNL TOTAL CA: CPT | Performed by: SURGERY

## 2025-04-29 PROCEDURE — 85025 COMPLETE CBC W/AUTO DIFF WBC: CPT | Performed by: SURGERY

## 2025-04-29 PROCEDURE — 25000003 PHARM REV CODE 250

## 2025-04-29 PROCEDURE — 99900035 HC TECH TIME PER 15 MIN (STAT)

## 2025-04-29 RX ORDER — OXYCODONE HCL 5 MG/5 ML
5 SOLUTION, ORAL ORAL EVERY 6 HOURS PRN
Qty: 50 ML | Refills: 0 | Status: SHIPPED | OUTPATIENT
Start: 2025-04-29 | End: 2025-05-13

## 2025-04-29 RX ORDER — PROMETHAZINE HYDROCHLORIDE 12.5 MG/1
12.5 TABLET ORAL EVERY 6 HOURS PRN
Status: DISCONTINUED | OUTPATIENT
Start: 2025-04-29 | End: 2025-04-29 | Stop reason: HOSPADM

## 2025-04-29 RX ADMIN — METOPROLOL TARTRATE 25 MG: 25 TABLET, FILM COATED ORAL at 08:04

## 2025-04-29 RX ADMIN — ACETAMINOPHEN 999.01 MG: 160 SOLUTION ORAL at 05:04

## 2025-04-29 RX ADMIN — SIMETHICONE 40 MG: 20 SUSPENSION/ DROPS ORAL at 11:04

## 2025-04-29 RX ADMIN — ONDANSETRON 8 MG: 2 INJECTION INTRAMUSCULAR; INTRAVENOUS at 05:04

## 2025-04-29 RX ADMIN — ENOXAPARIN SODIUM 40 MG: 100 INJECTION SUBCUTANEOUS at 08:04

## 2025-04-29 RX ADMIN — PANTOPRAZOLE SODIUM 40 MG: 40 INJECTION, POWDER, FOR SOLUTION INTRAVENOUS at 08:04

## 2025-04-29 RX ADMIN — GABAPENTIN 300 MG: 250 SOLUTION ORAL at 08:04

## 2025-04-29 RX ADMIN — OXYCODONE HYDROCHLORIDE 5 MG: 5 SOLUTION ORAL at 12:04

## 2025-04-29 RX ADMIN — ONDANSETRON 8 MG: 2 INJECTION INTRAMUSCULAR; INTRAVENOUS at 12:04

## 2025-04-29 RX ADMIN — ONDANSETRON 8 MG: 2 INJECTION INTRAMUSCULAR; INTRAVENOUS at 11:04

## 2025-04-29 RX ADMIN — ASPIRIN 81 MG: 81 TABLET, COATED ORAL at 08:04

## 2025-04-29 RX ADMIN — ACETAMINOPHEN 999.01 MG: 160 SOLUTION ORAL at 03:04

## 2025-04-29 NOTE — SUBJECTIVE & OBJECTIVE
Interval History: No major changes over night doing well this morning. Tolerated her 15 mL water challenge but experiencing pain with 30 mL water challenge. Working on it when rounding. Otherwise nausea controlled, pain controlled, ambulating.     Medications:  Continuous Infusions:   lactated ringers   Intravenous Continuous 125 mL/hr at 04/28/25 1639 New Bag at 04/28/25 1639     Scheduled Meds:   acetaminophen  999.0148 mg Oral Q8H    aspirin  81 mg Oral Daily    enoxparin  40 mg Subcutaneous Q12H    gabapentin  300 mg Oral BID    metoprolol tartrate  25 mg Oral BID    ondansetron  8 mg Intravenous Q6H    pantoprazole  40 mg Intravenous BID    scopolamine  1 patch Transdermal Once     PRN Meds:  Current Facility-Administered Medications:     acetaminophen, 499.5074 mg, Oral, Q24H PRN    oxyCODONE, 5 mg, Oral, Q6H PRN    promethazine, 12.5 mg, Oral, Q6H PRN    simethicone, 40 mg, Oral, QID PRN     Review of patient's allergies indicates:   Allergen Reactions    Sulfa (sulfonamide antibiotics) Shortness Of Breath    Coconut     Guaifenesin     Milk containing products (dairy)     Prochlorperazine Hallucinations     Other reaction(s): Compazine  Note:  Adverse Reaction: Other    Statins-hmg-coa reductase inhibitors     Terbinafine hcl      Other reaction(s): terbinafine 250 mg tablet  Note:  Adverse Reaction: Upset Stomach    Wheat containing prod     Penicillins Rash     Other reaction(s): Penicillins  Note:  Adverse Reaction: Rash     Objective:     Vital Signs (Most Recent):  Temp: 97.8 °F (36.6 °C) (04/29/25 0434)  Pulse: 70 (04/29/25 0446)  Resp: 16 (04/29/25 0446)  BP: 115/64 (04/29/25 0434)  SpO2: 95 % (04/29/25 0446) Vital Signs (24h Range):  Temp:  [97.6 °F (36.4 °C)-98.8 °F (37.1 °C)] 97.8 °F (36.6 °C)  Pulse:  [] 70  Resp:  [10-25] 16  SpO2:  [94 %-100 %] 95 %  BP: (108-138)/(54-74) 115/64        There is no height or weight on file to calculate BMI.    Intake/Output - Last 3 Shifts         04/27  "0700 04/28 0659 04/28 0700 04/29 0659 04/29 0700 04/30 0659    I.V.  1000     IV Piggyback  1250     Total Intake  2250     Net  +2250                     Physical Exam  HENT:      Head: Normocephalic and atraumatic.   Eyes:      Conjunctiva/sclera: Conjunctivae normal.   Cardiovascular:      Rate and Rhythm: Normal rate.      Pulses: Normal pulses.   Pulmonary:      Effort: Pulmonary effort is normal.   Abdominal:      General: Abdomen is flat.      Palpations: Abdomen is soft.      Comments: Incisions c,d,i   Musculoskeletal:         General: Normal range of motion.      Cervical back: Normal range of motion.   Skin:     General: Skin is warm and dry.   Neurological:      General: No focal deficit present.      Mental Status: She is alert and oriented to person, place, and time.   Psychiatric:         Mood and Affect: Mood normal.         Behavior: Behavior normal.          Significant Labs:  I have reviewed all pertinent lab results within the past 24 hours.  CBC: No results for input(s): "WBC", "RBC", "HGB", "HCT", "PLT", "MCV", "MCH", "MCHC" in the last 168 hours.  BMP:   Recent Labs   Lab 04/29/25  0314 04/29/25  0527     --    K 4.5  --      --    CO2 23  --    BUN 17  --    CREATININE 0.9  --    CALCIUM 8.7  --    MG  --  1.7       Significant Diagnostics:  I have reviewed all pertinent imaging results/findings within the past 24 hours.  "

## 2025-04-29 NOTE — OP NOTE
DATE OF PROCEDURE: 4/28/2025    PRE OP DIAGNOSIS: Morbid obesity with BMI of 40.0-44.9, adult [E66.01, Z68.41]  ALFREDO (obstructive sleep apnea) [G47.33]  Primary hypertension [I10]  Gastroesophageal reflux disease with esophagitis without hemorrhage [K21.00]  Hiatal hernia  Hyperlipidemia, unspecified hyperlipidemia type [E78.5]  Coronary artery disease with history of NSTEMI  History of CVA  Gout  Migraines    POST OP DIAGNOSIS: Morbid obesity with BMI of 40.0-44.9, adult [E66.01, Z68.41]  ALFREDO (obstructive sleep apnea) [G47.33]  Primary hypertension [I10]  Gastroesophageal reflux disease with esophagitis without hemorrhage [K21.00]  Hiatal hernia  Hyperlipidemia, unspecified hyperlipidemia type [E78.5]  Coronary artery disease with history of NSTEMI  History of CVA  Gout  Migraines    PROCEDURE: Procedure(s) (LRB):  GASTROENTEROSTOMY, LAPAROSCOPIC with inraop EGD (N/A)  REPAIR, HERNIA, HIATAL, LAPAROSCOPIC  BLOCK, TRANSVERSUS ABDOMINIS PLANE (Bilateral)     Surgeons and Role:     * Leila Cummings MD - Primary     * Kathie Medina MD - Resident - Assisting    ANESTHESIA: General    INDICATION: Patient is a 54 year-old morbidly obese FAMAB with BMI 42.23 kg/m² and multiple associated comorbidities including HTN, DLD, CAD with h/o NSTEMI and h/o CVA, ALFREDO on CPAP, gout, GERD c/b EoE, and migraines. After discussing the risks, benefits and alternatives to bariatric surgery, she elected to proceed with laparoscopic melony-en-y gastric bypass. Informed consent was obtained.     FINDINGS:  Sliding type 1 hiatal hernia, reduced and repaired  3 cm of intra-abdominal esophagus following reduction  Antecolic-antegastric melony-en-y gastroenterostomy with 40-cm BP limb and 150-cm melony-limb  Negative gastrojejunostomy air-leak test under saline     DESCRIPTION OF PROCEDURE: The patient was met in the pre-operative area and her identity and consent confirmed. She was then brought back to the Operating Room and  placed in the supine position. General anesthesia was induced and she was intubated without incident. SCDs and a warming blanket were placed and pre-operative antibiotics were administered within 30 minutes of the incision. Her abdomen was prepped and draped in sterile fashion and a pre-procedural pause was performed by all members of the surgical and anesthesia teams. Access to the abdomen was gained via 11-mm Optiview trocar approximately 18-cm below the xiphoid to the left of midline. Pneumoperitoneum to 15 mmHg was obtained and inspection yielded no injury. Bilateral transversus abdominal plane blocks were performed under direct vision instilling 10 mL 0.25% bupivacaine on each side. Two RUQ 5-mm and two LUQ 5-mm ports were placed under direct vision as well as a 12-mm port to the right of midline. The liver retractor was placed via the right lateral trocar exposing the hiatus. A sliding-type hiatal hernia was noted. The pars flaccida was opened up to the hiatus. Using judicial use of the Harmonic scalpel and blunt dissection, the hernia sac was  from the crura and brought down into the abdomen. Posterior perigastric fat was reduced. The EGJ was freed circumferentially. The mediastinal attachments to the esophagus were bluntly divided. The anterior and posterior vagus nerves were identified and preserved with the esophagus throughout. After dissection about 3 cm of esophagus laid within the abdominal cavity without tension. The crural opening was small in size and was reapproximated with one 0-Ticron suture posteriorly. A grasper could just be placed between the crura and esophagus.  An area on the lesser curve at the second bridging vein was identified and using the Harmonic scalpel, the gastrohepatic ligament was opened and the lesser sac was exposed. A blue load stapler was placed transversely across the stomach after ensuring there was nothing in the stomach from anesthesia perspective and fired.  Two additional blue staple loads were fired towards the angle of His completing the pouch. Oozing from the staple lines was controlled with small hemoclips. The transverse colon and omentum were elevated and the ligament of Treitz identified. The small bowel was transected 40 cm from the ligament of Treitz with a white staple load. A couple centimeters of mesentery were divided with the Harmonic to allow extra length on the Chandler limb. The small bowel was run 150 cm distal to the transection. At this level the bowel was elevated and a small enterotomy was made on adjacent antimesenteric sides of the BP limb and distal jejunum. A white load stapler was placed in either limb of the small bowel and fired creating a jejunojejunostomy. The jejunojejunostomy was then elevated and the common enterotomy was closed with a 2-0 Vicryl suture in running 2-layer fashion with full-thickness inner layer and Lembert outer layer. We then turned our attention to the creation of the gastrojejunostomy. The proximal chandler limb was brought up to the pouch ensuring there were no kinks or twists in the mesentery and secured with two 2-0 Ticron stay-sutures. The gastrojejunostomy was then made in a similar fashion to the jejunojejunostomy on the posterior surface of the pouch using a 30-mm firing of the blue staple load. A 30Fr Bougie was advanced through the gastroenterotomy into the chandler limb. The common gastroenterotomy was closed over the Bougie with a 2-0 Vicryl suture in a 2-layer running fashion. EGD was performed and revealed an appropriately sized pouch and patent gastrojejunostomy. The upper abdomen was filled with saline to submerge the gastrojejunostomy and the proximal chandler-limb held closed with the bowel clamp. The bowel was insufflated such that it was distended and air escaped from the patient's mouth. Given the negative leak test, the intraluminal air was evacuated and the intraperitoneal saline aspirated. The  jejunojejunostomy mesenteric defect was closed with a running 2-0 Ticron suture. The liver retractor was removed. The trocars were removed under direct visualization, allowing the abdomen to desufflate prior to removing the last. The skin incisions were closed with 4-0 Monocryl and reinforced with Dermabond. The patient was awoken from anesthesia and extubated without complication. She was brought to the PACU in good condition having tolerated the operation well.     EBL: 5 mL  Specimens: None  Drains: None  Complications: None apparent  Dispo: Surgical floor     Surgical sponge and needle count was correct at the end of the case. I was present and scrubbed for the entirety of the operation.     Leila Evans  4/28/25

## 2025-04-29 NOTE — PLAN OF CARE
[facility]  Initial Discharge Assessment       Primary Care Provider: Estrada Partida MD    Admission Diagnosis: ALFREDO (obstructive sleep apnea) [G47.33]  Morbid obesity with BMI of 40.0-44.9, adult [E66.01, Z68.41]  Primary hypertension [I10]  Gastroesophageal reflux disease with esophagitis without hemorrhage [K21.00]  Hyperlipidemia, unspecified hyperlipidemia type [E78.5]  Obesity [E66.9]    Admission Date: 4/28/2025  Expected Discharge Date: 4/29/2025    Transition of Care Barriers: None    Payor: BLUE CROSS BLUE SHIELD / Plan: BCBS ALL OUT OF STATE / Product Type: PPO /     Extended Emergency Contact Information  Primary Emergency Contact: Nancy Shah  Address: [address] [place] [address]  Mobile Phone: [phone]  Relation: Spouse    Discharge Plan A: Home with family  Discharge Plan B: Home with family, Home Health      [id number] - [place], [place] - [address] [facility]  [address] [facility] [address]  Phone: [phone] Fax: [phone]    [facility] [address] [facility], [address]  [address] [facility], [address] [address]  Phone: [phone] Fax: [phone]      Initial Assessment (most recent)       Adult Discharge Assessment - 04/29/25 1513          Discharge Assessment    Assessment Type Discharge Planning Assessment     Confirmed/corrected address, phone number and insurance Yes     Confirmed Demographics Correct on Facesheet     Source of Information patient;family   spouse Nancy    Communicated SHENA with patient/caregiver Yes     People in Home spouse     Do you expect to return to your current living situation? Yes     Do you have help at home or someone to help you manage your care at home? Yes     Who are your caregiver(s) and their phone number(s)? spouse Nancy     Prior to hospitilization cognitive status: Alert/Oriented     Current cognitive status:  Alert/Oriented     Home Layout Able to live on 1st floor     Equipment Currently Used at Home CPAP     Do you currently have service(s) that help you manage your care at home? No     Do you take prescription medications? Yes     Do you have prescription coverage? Yes     Do you have any problems affording any of your prescribed medications? No     Is the patient taking medications as prescribed? yes     How do you get to doctors appointments? car, drives self     Are you on dialysis? No     Do you take coumadin? No     Discharge Plan A Home with family     Discharge Plan B Home with family;Home Health     DME Needed Upon Discharge  none     Discharge Plan discussed with: Spouse/sig other;Adult children     Name(s) and Number(s) spouse Nancy     Transition of Care Barriers None                   Patient lives with spouse in a single story home with four entry steps and bilateral hand railings. Patient does not feel she will need any post acute services at time of dc. Patient spouse is primary caregiver and she will provide assistance in care and transportation home (Lyft).     Discharge Plan A and Plan B have been determined by review of patient's clinical status, future medical and therapeutic needs, and coverage/benefits for post-acute care in coordination with multidisciplinary team members.

## 2025-04-29 NOTE — ASSESSMENT & PLAN NOTE
Martha Shah is an 54 y.o. adult that is s/p lap meloyn-en-y gastric bypass on 4/28/25. On pathway, working on her  water protocol yesterday. Doing well this morning.      - Diet: Bariatric clear liquid  - Pain: multimodal, minimize narcotics  - OK for pills < pencil eraser, liquids, or crushed  - Nausea control w/ scheduled and PRN antiemetics   - Home meds as appropriate  - Replete lytes PRN  - Daily labs  - OOB to chair and ambulate in halls   - Encourage IS  - DVT ppx     Dispo: Will plan for possible discharge this afternoon if tolerating diet, able to remain well hydrated. Post-op instructions given and discussed with patient this morning.

## 2025-04-29 NOTE — PROGRESS NOTES
[facility]  General Surgery  Progress Note    Subjective:     History of Present Illness:  No notes on file    Post-Op Info:  Procedure(s) (LRB):  GASTROENTEROSTOMY, LAPAROSCOPIC with inraop EGD (N/A)  REPAIR, HERNIA, HIATAL, LAPAROSCOPIC   1 Day Post-Op     Interval History: No major changes over night doing well this morning. Tolerated her 15 mL water challenge but experiencing pain with 30 mL water challenge. Working on it when rounding. Otherwise nausea controlled, pain controlled, ambulating.     Medications:  Continuous Infusions:   lactated ringers   Intravenous Continuous 125 mL/hr at 04/28/25 1639 New Bag at 04/28/25 1639     Scheduled Meds:   acetaminophen  999.0148 mg Oral Q8H    aspirin  81 mg Oral Daily    enoxparin  40 mg Subcutaneous Q12H    gabapentin  300 mg Oral BID    metoprolol tartrate  25 mg Oral BID    ondansetron  8 mg Intravenous Q6H    pantoprazole  40 mg Intravenous BID    scopolamine  1 patch Transdermal Once     PRN Meds:  Current Facility-Administered Medications:     acetaminophen, 499.5074 mg, Oral, Q24H PRN    oxyCODONE, 5 mg, Oral, Q6H PRN    promethazine, 12.5 mg, Oral, Q6H PRN    simethicone, 40 mg, Oral, QID PRN     Review of patient's allergies indicates:   Allergen Reactions    Sulfa (sulfonamide antibiotics) Shortness Of Breath    Coconut     Guaifenesin     Milk containing products (dairy)     Prochlorperazine Hallucinations     Other reaction(s): Compazine  Note:  Adverse Reaction: Other    Statins-hmg-coa reductase inhibitors     Terbinafine hcl      Other reaction(s): terbinafine 250 mg tablet  Note:  Adverse Reaction: Upset Stomach    Wheat containing prod     Penicillins Rash     Other reaction(s): Penicillins  Note:  Adverse Reaction: Rash     Objective:     Vital Signs (Most Recent):  Temp: 97.8 °F (36.6 °C) (04/29/25 0434)  Pulse: 70 (04/29/25 0446)  Resp: 16 (04/29/25 0446)  BP: 115/64 (04/29/25 0434)  SpO2: 95 % (04/29/25 0446) Vital Signs (24h  "Range):  Temp:  [97.6 °F (36.4 °C)-98.8 °F (37.1 °C)] 97.8 °F (36.6 °C)  Pulse:  [] 70  Resp:  [10-25] 16  SpO2:  [94 %-100 %] 95 %  BP: (108-138)/(54-74) 115/64        There is no height or weight on file to calculate BMI.    Intake/Output - Last 3 Shifts         04/27 0700 04/28 0659 04/28 0700 04/29 0659 04/29 0700 04/30 0659    I.V.  1000     IV Piggyback  1250     Total Intake  2250     Net  +2250                     Physical Exam  HENT:      Head: Normocephalic and atraumatic.   Eyes:      Conjunctiva/sclera: Conjunctivae normal.   Cardiovascular:      Rate and Rhythm: Normal rate.      Pulses: Normal pulses.   Pulmonary:      Effort: Pulmonary effort is normal.   Abdominal:      General: Abdomen is flat.      Palpations: Abdomen is soft.      Comments: Incisions c,d,i   Musculoskeletal:         General: Normal range of motion.      Cervical back: Normal range of motion.   Skin:     General: Skin is warm and dry.   Neurological:      General: No focal deficit present.      Mental Status: She is alert and oriented to person, place, and time.   Psychiatric:         Mood and Affect: Mood normal.         Behavior: Behavior normal.          Significant Labs:  I have reviewed all pertinent lab results within the past 24 hours.  CBC: No results for input(s): "WBC", "RBC", "HGB", "HCT", "PLT", "MCV", "MCH", "MCHC" in the last 168 hours.  BMP:   Recent Labs   Lab 04/29/25  0314 04/29/25  0527     --    K 4.5  --      --    CO2 23  --    BUN 17  --    CREATININE 0.9  --    CALCIUM 8.7  --    MG  --  1.7       Significant Diagnostics:  I have reviewed all pertinent imaging results/findings within the past 24 hours.  Assessment/Plan:     * Obesity  Martha Shah is an 54 y.o. adult that is s/p lap melony-en-y gastric bypass on 4/28/25. On pathway, working on her  water protocol yesterday. Doing well this morning.      - Diet: Bariatric clear liquid  - Pain: multimodal, minimize narcotics  - OK for pills " < pencil eraser, liquids, or crushed  - Nausea control w/ scheduled and PRN antiemetics   - Home meds as appropriate  - Replete lytes PRN  - Daily labs  - OOB to chair and ambulate in halls   - Encourage IS  - DVT ppx     Dispo: Will plan for possible discharge this afternoon if tolerating diet, able to remain well hydrated. Post-op instructions given and discussed with patient this morning.           Funmi Hawkins MD  General Surgery  [facility] - Surgery

## 2025-04-29 NOTE — PLAN OF CARE
Problem: Adult Inpatient Plan of Care  Goal: Plan of Care Review  Outcome: Met  Goal: Patient-Specific Goal (Individualized)  Outcome: Met  Goal: Absence of Hospital-Acquired Illness or Injury  Outcome: Met  Goal: Optimal Comfort and Wellbeing  Outcome: Met  Goal: Readiness for Transition of Care  Outcome: Met     Problem: Bariatric Environmental Safety  Goal: Safety Maintained with Care  Outcome: Met     Problem: Wound  Goal: Optimal Coping  Outcome: Met  Goal: Optimal Functional Ability  Outcome: Met  Goal: Absence of Infection Signs and Symptoms  Outcome: Met  Goal: Improved Oral Intake  Outcome: Met  Goal: Optimal Pain Control and Function  Outcome: Met  Goal: Skin Health and Integrity  Outcome: Met  Goal: Optimal Wound Healing  Outcome: Met     Problem: Fall Injury Risk  Goal: Absence of Fall and Fall-Related Injury  Outcome: Met     Problem: Bariatric Surgery  Goal: Optimal Coping with Surgery  Outcome: Met  Goal: Absence of Bleeding  Outcome: Met  Goal: Fluid and Electrolyte Balance  Outcome: Met  Goal: Effective Gastrointestinal Motility and Elimination  Outcome: Met  Goal: Blood Glucose Level Within Desired Range  Outcome: Met  Goal: Absence of Infection Signs and Symptoms  Outcome: Met  Goal: Anesthesia/Sedation Recovery  Outcome: Met  Goal: Optimal Pain Control and Function  Outcome: Met  Goal: Nausea and Vomiting Relief  Outcome: Met  Goal: Effective Urinary Elimination  Outcome: Met  Goal: Effective Oxygenation and Ventilation  Outcome: Met

## 2025-04-29 NOTE — DISCHARGE SUMMARY
[facility]  General Surgery  Discharge Summary      Patient Name: Martha Shah  MRN: [id number]  Admission Date: 4/28/2025  Hospital Length of Stay: 1 days  Discharge Date and Time: 04/29/2025 2:34 PM  Attending Physician: Leila Cummings   Discharging Provider: Kathie Bustamante MD  Primary Care Provider: Estrada Partida MD     HPI: Martha Shah is a 54 year-old morbidly obese female with BMI 42.23 kg/m² and multiple associated comorbidities including HTN, DLD, CAD with h/o NSTEMI and h/o CVA, ALFREDO on CPAP, gout, GERD c/b EoE, and migraines, who presents  for weight loss surgery. She has failed multiple attempts at non-surgical methods of weight loss and has completed the [facility] program which she started on 2/13/25 at which time her BMI was 42.89 kg/m². She meets NIH consensus criteria for bariatric surgery and is interested in undergoing laparoscopic melony-en-y gastric bypass.      Procedure(s) (LRB):  GASTROENTEROSTOMY, LAPAROSCOPIC with inraop EGD (N/A)  REPAIR, HERNIA, HIATAL, LAPAROSCOPIC     Hospital Course: Martha Shah presented to [facility] on the morning of 4/28/2025 for lap melony-en-y and hiatal hernia repair. The procedure was performed without complication and she was transferred to the floor for further post op care and monitoring. Her postoperative course was uneventful and she progressed according to plan. Her pain was controlled with a combination of IV and PO multimodal pain medications. She tolerated her water protocol and was advanced to CLD. She is now ambulating without assistance, tolerating a clear liquid diet, pain is well controlled with PO pain medication, and she is voiding appropriately. She now meets all criteria for discharge     Consults:     Significant Diagnostic Studies: N/A    Pending Diagnostic Studies:       None          Final Active Diagnoses:    Diagnosis Date Noted POA    PRINCIPAL PROBLEM:  Obesity [E66.9] 04/28/2025 Yes      Problems Resolved  During this Admission:      Discharged Condition: good    Disposition: Home or Self Care    Follow Up:    Patient Instructions:      Diet Bariatric High Protein Clear Liquid   Order Comments: No soft drinks     Lifting restrictions (nothing greater than 10lbs)   Scheduling Instructions: Lifting restrictions:  nothing greater than 10lbs     No driving until:   Order Comments: No driving until off narcotic pain medication.  Can turn around without pain off narcotics.  At least 1 week.     Notify your health care provider if you experience any of the following:   Order Comments: temperature > 100.4, persistent nausea and vomiting or diarrhea, severe uncontrolled pain, redness, tenderness, or signs of infection (pain, swelling, redness, odor or green/yellow discharge around incision site), difficulty breathing or increased cough, severe persistent headache, worsening rash, persistent dizziness, light-headedness, or visual disturbances, increased confusion or weakness     Activity as tolerated     Shower on day two and no bath     Medications:  Reconciled Home Medications:      Medication List        START taking these medications      acetaminophen 500 mg Pwpk  Take 1,000 mg by mouth every 8 (eight) hours. Take 1g (2 packets) by mouth every 8 hours for at least 3 days and up to 5 days as needed. May take one additional 500mg dose (1 packet) per day for breakthrough pain. Do not exceed 4g in 24 hours.     oxyCODONE 5 mg/5 mL Soln  Commonly known as: ROXICODONE  Take 5 mLs (5 mg total) by mouth every 6 (six) hours as needed (pain).            CONTINUE taking these medications      aspirin 81 MG EC tablet  Commonly known as: ECOTRIN  Take 1 tablet (81 mg total) by mouth once daily.     cetirizine 10 MG tablet  Commonly known as: ZYRTEC  Take 10 mg by mouth once daily.     colchicine 0.6 mg tablet  Commonly known as: COLCRYS  Take 1 tablet (0.6 mg total) by mouth daily as needed.     diclofenac sodium 1 % Gel  Commonly  known as: VOLTAREN  Apply 2 g topically 4 (four) times daily as needed.     indomethacin 50 MG capsule  Commonly known as: INDOCIN  Take 1 capsule (50 mg total) by mouth 3 (three) times daily.     losartan 25 MG tablet  Commonly known as: COZAAR  Take 1 tablet (25 mg total) by mouth once daily.     metoprolol tartrate 25 MG tablet  Commonly known as: LOPRESSOR  Take 1 tablet (25 mg total) by mouth 2 (two) times daily.     nitroGLYCERIN 0.4 MG SL tablet  Commonly known as: NITROSTAT  Place 1 tablet (0.4 mg total) under the tongue every 5 (five) minutes as needed for Chest pain. Call the 911 after the 3rd dose.     omeprazole 40 MG capsule  Commonly known as: PRILOSEC  Take 1 capsule (40 mg total) by mouth every morning. Open capsule and sprinkle granules for 2 weeks post-op     ondansetron 8 MG Tbdl  Commonly known as: ZOFRAN-ODT  Take 1 tablet (8 mg total) by mouth every 6 (six) hours as needed (nausea).     ONE DAILY MULTIVITAMIN per tablet  Generic drug: multivitamin  Take 1 tablet by mouth once daily.     pantoprazole 40 MG tablet  Commonly known as: PROTONIX  Take 1 tablet (40 mg total) by mouth 2 (two) times daily.     tamsulosin 0.4 mg Cap  Commonly known as: FLOMAX  TAKE 1 CAPSULE BY MOUTH EVERY DAY     ursodioL 500 MG tablet  Commonly known as: YULIA FORTE  Take 1 tablet (500 mg total) by mouth once daily.     * WEGOVY 0.25 mg/0.5 mL Pnij  Generic drug: semaglutide (weight loss)  Inject 0.25 mg into the skin every 7 days.           * This list has 1 medication(s) that are the same as other medications prescribed for you. Read the directions carefully, and ask your doctor or other care provider to review them with you.                ASK your doctor about these medications      gabapentin 300 MG capsule  Commonly known as: NEURONTIN  Take 1 capsule (300 mg total) by mouth 2 (two) times daily. Open capsule and empty prior to taking. Take one dose on morning of surgery prior to arrival. Take 1 capsule (300 mg)  twice a day for 3 days post-op. Continue for an additional 2 days as needed.     minoxidiL-finasteride 5-0.1 % Soln  Apply topically 2 (two) times a day.     * WEGOVY 0.5 mg/0.5 mL Pnij  Generic drug: semaglutide (weight loss)  Inject 0.5 mg into the skin every 7 days.     * WEGOVY 1 mg/0.5 mL Pnij  Generic drug: semaglutide (weight loss)  Inject 1 mg into the skin every 7 days.           * This list has 2 medication(s) that are the same as other medications prescribed for you. Read the directions carefully, and ask your doctor or other care provider to review them with you.                  Kathie Bustamante MD  General Surgery  [facility] - Surgery

## 2025-04-30 ENCOUNTER — TELEPHONE (OUTPATIENT)
Dept: BARIATRICS | Facility: CLINIC | Age: 55
End: 2025-04-30
Payer: COMMERCIAL

## 2025-04-30 NOTE — ANESTHESIA PREPROCEDURE EVALUATION
04/30/2025  Martha Shah is a 54 y.o., adult.      Pre-op Assessment    I have reviewed the Patient Summary Reports.     I have reviewed the Nursing Notes.    I have reviewed the Medications.     Review of Systems  Anesthesia Hx:  No problems with previous Anesthesia   History of prior surgery of interest to airway management or planning:          Denies Family Hx of Anesthesia complications.    Denies Personal Hx of Anesthesia complications.                    Social:  Non-Smoker       Hematology/Oncology:  Hematology Normal   Oncology Normal                                   EENT/Dental:  EENT/Dental Normal           Cardiovascular:     Hypertension  Past MI CAD                                          Pulmonary:        Sleep Apnea                Renal/:  Renal/ Normal                 Hepatic/GI:  Hepatic/GI Normal                    Musculoskeletal:  Musculoskeletal Normal                Neurological:   CVA                                    Endocrine:  Endocrine Normal          Morbid Obesity / BMI > 40  Psych:  Psychiatric Normal                    Physical Exam  General: Well nourished and Cooperative    Airway:  Mallampati: II   Mouth Opening: Normal  TM Distance: Normal  Tongue: Normal  Neck ROM: Normal ROM    Dental:  Intact    Chest/Lungs:  Clear to auscultation, Normal Respiratory Rate    Heart:  Rate: Normal  Rhythm: Regular Rhythm  Sounds: Normal        Anesthesia Plan  Type of Anesthesia, risks & benefits discussed:    Anesthesia Type: Gen ETT  Intra-op Monitoring Plan: Standard ASA Monitors  Post Op Pain Control Plan: multimodal analgesia  Induction:  IV  Airway Plan: , Post-Induction  Informed Consent: Informed consent signed with the Patient and all parties understand the risks and agree with anesthesia plan.  All questions answered.   ASA Score: 3  Day of Surgery Review of History &  Physical: H&P Update referred to the surgeon/provider.    Ready For Surgery From Anesthesia Perspective.     .

## 2025-04-30 NOTE — TELEPHONE ENCOUNTER
----- Message from Cassie sent at 4/30/2025 11:09 AM CDT -----  Regarding: pt advice  .Type:  Needs Medical AdviceWho Called: pt Symptoms (please be specific): Pt wants to know if she is supposed to keep taking pretonix since pt was prescribed omeprozale since they are the same class of meds. One of surgery wound is red and tender and the others are not, wants to know if that is significant. Pls call to discuss. Would the patient rather a call back or a response via [facility]? Call Best Call Back Number: [phone] Information: n/a

## 2025-04-30 NOTE — TELEPHONE ENCOUNTER
Called and spoke with the pt. Pt usually take Zyrtec.  Advised Zyrtec in the morning, Benadryl at night and can use Benadryl or Cortizone cream. Pt to keep abdomen clean and dry.  Gently clean with antibacterial soap.     Pt will reach out if condition worsens.  If no change or improvement, pt will send updated picture Friday, 5/2/25.  Pt has after hours/weekend number if needed.

## 2025-04-30 NOTE — ANESTHESIA POSTPROCEDURE EVALUATION
Anesthesia Post Evaluation    Patient: Martha Shah    Procedure(s) Performed: Procedure(s) (LRB):  GASTROENTEROSTOMY, LAPAROSCOPIC with inraop EGD (N/A)  REPAIR, HERNIA, HIATAL, LAPAROSCOPIC    Final Anesthesia Type: general      Patient location during evaluation: PACU  Patient participation: Yes- Able to Participate  Level of consciousness: awake and alert  Post-procedure vital signs: reviewed and stable  Pain management: adequate  Airway patency: patent    PONV status at discharge: No PONV  Anesthetic complications: no      Cardiovascular status: blood pressure returned to baseline and hemodynamically stable  Respiratory status: unassisted and spontaneous ventilation  Hydration status: euvolemic  Follow-up not needed.              Vitals Value Taken Time   /72 04/29/25 15:29   Temp 36.1 °C (97 °F) 04/29/25 15:29   Pulse 79 04/29/25 15:29   Resp 18 04/29/25 15:29   SpO2 94 % 04/29/25 15:29         Event Time   Out of Recovery 17:10:00         Pain/Issac Score: Pain Rating Prior to Med Admin: 3 (4/29/2025  3:00 PM)  Pain Rating Post Med Admin: 1 (4/29/2025  1:10 AM)

## 2025-04-30 NOTE — PLAN OF CARE
[facility]  Discharge Final Note    Primary Care Provider: Estrada Partida MD    Expected Discharge Date: 4/29/2025    Final Discharge Note (most recent)       Final Note - 04/29/25 1806          Final Note    Assessment Type Final Discharge Note     Hospital Resources/Appts/Education Provided Provided patient/caregiver with written discharge plan information;Provided education on problems/symptoms using teachback;Appointments scheduled and added to AVS                   Future Appointments   Date Time Provider Department Center   5/5/2025 11:00 AM [facility]   5/13/2025  9:00 AM LAB, APPOINTMENT [facility]   5/13/2025  9:30 AM Marianne Bhandari, NP JULIETA Gil [facility]   5/13/2025 10:00 AM [facility]   5/23/2025  7:45 AM Estrada Partida MD [facility] PRCAR [facility]   6/2/2025 10:00 AM Jesenia Mason FNP-C [facility]PC[facility] CARDIO [facility]   6/18/2025  8:00 AM SBPH XR1 [facility] XRAY [facility]   6/18/2025  8:30 AM SBPH US2 SBPH ULSND [facility]   6/20/2025  8:30 AM Asia Clarke NP SBPCO UROLGY [facility]   6/24/2025  9:00 AM LAB, APPOINTMENT [place] [facility] LAB [facility]   6/24/2025  9:30 AM Marianne Bhandari, NP JULIETA Gil [facility]   6/24/2025 10:00 AM [facility]   7/8/2025  8:10 AM Marianne Patino MD NOM[facility] BARIAT Jeff [facility]   10/28/2025  2:00 PM Marianne Bhandari, LINDSAY Gil nikole

## 2025-04-30 NOTE — TELEPHONE ENCOUNTER
Called and spoke with the pt.  Advised the pt to take Omeprazole OR Protonix daily for the first 3 months post surgery.    Pt stated her incision is red and tender but no discharge or fever. Will send follow up portal message and requested pt respond to it with a picture of her incision.

## 2025-05-01 NOTE — TELEPHONE ENCOUNTER
Called and spoke with the pt. Provided information for Marianne Bhandari NP.  Pt to let blister break on it's own and tenderness should be relieved after.

## 2025-05-01 NOTE — TELEPHONE ENCOUNTER
Please see her pictures.  Her original picture looked more puffy.  Advised Zyrtec in the morning, Benadryl at night and can use Benadryl or Cortizone cream. Pt to keep abdomen clean and dry. Gently clean with antibacterial soap. Anything else?    Thanks,  Blanche CALLAWAY

## 2025-05-05 ENCOUNTER — CLINICAL SUPPORT (OUTPATIENT)
Dept: BARIATRICS | Facility: CLINIC | Age: 55
End: 2025-05-05
Payer: COMMERCIAL

## 2025-05-05 DIAGNOSIS — Z71.3 DIETARY COUNSELING AND SURVEILLANCE: Primary | ICD-10-CM

## 2025-05-05 PROCEDURE — 99499 UNLISTED E&M SERVICE: CPT | Mod: 93,,, | Performed by: DIETITIAN, REGISTERED

## 2025-05-05 NOTE — PROGRESS NOTES
Audio Only Telehealth Visit     The patient location is: Home ([place])  The chief complaint leading to consultation is: s/p bariatric sx  Visit type: Virtual visit with audio only (telephone)  Total time spent in medical discussion with patient: 15 minutes  Total time spent on date of the encounter:15 minutes       The reason for the audio only service rather than synchronous audio and video virtual visit was related to technical difficulties or patient preference/necessity.       Each patient to whom I provide medical services by telemedicine is:  (1) informed of the relationship between the physician and patient and the respective role of any other health care provider with respect to management of the patient; and (2) notified that they may decline to receive medical services by telemedicine and may withdraw from such care at any time. Patient verbally consented to receive this service via voice-only telephone call.       NUTRITION NOTE    Referring Physician: Leila Evans M.D.   Reason for MNT Referral: Follow-up 1 Week s/p laproscopic Chandler-en-Y gastric bypass    CURRENT DIET:  Bariatric Liquid Diet    Dehydration assessment:  Urine output/color: normal  Chest pain:n  Persistent increased heart rate:n  Fatigue:n  Dizzy/weak: n  N/V: some nausea  BM: no, plans to start Miralax    Protein and fluid intake assessment: (food diary)    Fluids include: variety, including a Protein 2.0  Fluid intake yesterday: 64floz  Protein supplements: Protein 2.0 1/day, Premier shake 1-2/day  Protein intake yesterday: 50-80g    Vitamins - plans to start  As directed    Medications  Omeprazole: no but taking Protonix daily  How are you tolerating pain at this time? (rate on a scale from 1 to 10; >7 notify PA/MD): 1  Did you take your acetaminophen and gabapentin for 3 days? y  Did you have to take additional acetaminophen for break through pain (pain scale 4-6)? y  Did you have any severe pain that required oxycodone? Y. If  yes, how much did you use and do you have left? More than half    How is the support system at home? good  Exercise reminder (light exercise at this time, no lifting above 10 lbs)     Questions for nurse/MA/PA: no    BARIATRIC DIET DISCUSSION:  Reinforced post-op nutrition guidelines.  Continue to work on fluid and protein intake.    PLAN/RECOMMONDATIONS:  Continue bariatric high protein liquid diet.  Maintain protein intake or increase gradually to goal of  g prot/day.  Maintain fluid intake or increase gradually to goal of 64 floz/day.  Begin appropriate vitamins & minerals.  Begin or continue light exercise.     Confirmed date and time for 2 week po labs and clinic visit     SESSION TIME: 15 minutes                          This service was not originating from a related E/M service provided within the previous 7 days nor will  to an E/M service or procedure within the next 24 hours or my soonest available appointment.  Prevailing standard of care was able to be met in this audio-only visit.

## 2025-05-07 ENCOUNTER — TELEPHONE (OUTPATIENT)
Dept: BARIATRICS | Facility: CLINIC | Age: 55
End: 2025-05-07
Payer: COMMERCIAL

## 2025-05-07 NOTE — TELEPHONE ENCOUNTER
----- Message from Mion sent at 5/7/2025  4:17 PM CDT -----  Regarding: Urgent Consult/Advisory  Contact: [phone]  Consult/Advisory Name Of Caller:pt  Contact Preference:[phone]  Nature of call:  pt states while in shower she started having pain in left arm with heart pepitation and felt faint. Pain in left arm is happening frequently.pt wants to know what she should do about episodes and will they continue. Please call to advise thank you

## 2025-05-07 NOTE — TELEPHONE ENCOUNTER
S/p BILLIENY with Dr. Evans 4/28/25.    Called and spoke with the pt.  Pt stated history of MI.  The left arm pain was not exactly the same as experienced with the MI but still concerning.  She stated her left arm pain started during her preop liquid diet and continued.  Today, pt took a shower and felt faint with heart palpitations.  Pt was able to complete her shower, sat down and episode resolved.  I requested pt take her v/s.    5/7/25, 1634:  B/p 121/86 left arm  HR: 75    Pt has had no change to b/p meds and is taking them as prescribed prior to her Bariatric surgery. Will reach out to pt's Cardiology team. She stated her Cardiologist is Jesenia HAY. Pt has surgery after hours contact number if needed.  Discussed if the episode returns and does not resolve quickly, pt is aware to go to ED.

## 2025-05-13 ENCOUNTER — OFFICE VISIT (OUTPATIENT)
Dept: BARIATRICS | Facility: CLINIC | Age: 55
End: 2025-05-13
Payer: COMMERCIAL

## 2025-05-13 ENCOUNTER — PATIENT MESSAGE (OUTPATIENT)
Dept: BARIATRICS | Facility: CLINIC | Age: 55
End: 2025-05-13

## 2025-05-13 ENCOUNTER — LAB VISIT (OUTPATIENT)
Dept: LAB | Facility: HOSPITAL | Age: 55
End: 2025-05-13
Payer: COMMERCIAL

## 2025-05-13 ENCOUNTER — CLINICAL SUPPORT (OUTPATIENT)
Dept: BARIATRICS | Facility: CLINIC | Age: 55
End: 2025-05-13
Payer: COMMERCIAL

## 2025-05-13 VITALS
WEIGHT: 237.19 LBS | HEIGHT: 65 IN | OXYGEN SATURATION: 98 % | BODY MASS INDEX: 39.52 KG/M2 | HEART RATE: 60 BPM | DIASTOLIC BLOOD PRESSURE: 58 MMHG | SYSTOLIC BLOOD PRESSURE: 119 MMHG

## 2025-05-13 DIAGNOSIS — E78.5 HYPERLIPIDEMIA, UNSPECIFIED HYPERLIPIDEMIA TYPE: ICD-10-CM

## 2025-05-13 DIAGNOSIS — Z98.84 S/P BARIATRIC SURGERY: Primary | ICD-10-CM

## 2025-05-13 DIAGNOSIS — Z71.3 DIETARY COUNSELING: Primary | ICD-10-CM

## 2025-05-13 DIAGNOSIS — K21.00 GASTROESOPHAGEAL REFLUX DISEASE WITH ESOPHAGITIS WITHOUT HEMORRHAGE: ICD-10-CM

## 2025-05-13 DIAGNOSIS — E66.01 MORBID OBESITY WITH BMI OF 40.0-44.9, ADULT: ICD-10-CM

## 2025-05-13 DIAGNOSIS — Z98.84 S/P BARIATRIC SURGERY: ICD-10-CM

## 2025-05-13 DIAGNOSIS — E66.9 OBESITY (BMI 30-39.9): ICD-10-CM

## 2025-05-13 DIAGNOSIS — E78.2 MIXED HYPERLIPIDEMIA: ICD-10-CM

## 2025-05-13 DIAGNOSIS — G47.33 OSA (OBSTRUCTIVE SLEEP APNEA): ICD-10-CM

## 2025-05-13 DIAGNOSIS — I10 PRIMARY HYPERTENSION: ICD-10-CM

## 2025-05-13 LAB
ABSOLUTE EOSINOPHIL (OHS): 0.15 K/UL
ABSOLUTE MONOCYTE (OHS): 0.39 K/UL (ref 0.3–1)
ABSOLUTE NEUTROPHIL COUNT (OHS): 4.26 K/UL (ref 1.8–7.7)
ALBUMIN SERPL BCP-MCNC: 3.7 G/DL (ref 3.5–5.2)
ALP SERPL-CCNC: 89 UNIT/L (ref 40–150)
ALT SERPL W/O P-5'-P-CCNC: 18 UNIT/L (ref 10–44)
ANION GAP (OHS): 9 MMOL/L (ref 8–16)
AST SERPL-CCNC: 18 UNIT/L (ref 11–45)
BASOPHILS # BLD AUTO: 0.04 K/UL
BASOPHILS NFR BLD AUTO: 0.6 %
BILIRUB SERPL-MCNC: 0.4 MG/DL (ref 0.1–1)
BUN SERPL-MCNC: 19 MG/DL (ref 6–20)
CALCIUM SERPL-MCNC: 9.6 MG/DL (ref 8.7–10.5)
CHLORIDE SERPL-SCNC: 103 MMOL/L (ref 95–110)
CO2 SERPL-SCNC: 30 MMOL/L (ref 23–29)
CREAT SERPL-MCNC: 0.8 MG/DL (ref 0.5–1.4)
ERYTHROCYTE [DISTWIDTH] IN BLOOD BY AUTOMATED COUNT: 14.3 % (ref 11.5–14.5)
GFR SERPLBLD CREATININE-BSD FMLA CKD-EPI: >60 ML/MIN/1.73/M2
GLUCOSE SERPL-MCNC: 77 MG/DL (ref 70–110)
HCT VFR BLD AUTO: 41.7 % (ref 40–54)
HGB BLD-MCNC: 13.5 GM/DL (ref 14–18)
IMM GRANULOCYTES # BLD AUTO: 0.02 K/UL (ref 0–0.04)
IMM GRANULOCYTES NFR BLD AUTO: 0.3 % (ref 0–0.5)
LYMPHOCYTES # BLD AUTO: 1.96 K/UL (ref 1–4.8)
MCH RBC QN AUTO: 28.2 PG (ref 27–31)
MCHC RBC AUTO-ENTMCNC: 32.4 G/DL (ref 32–36)
MCV RBC AUTO: 87 FL (ref 82–98)
NUCLEATED RBC (/100WBC) (OHS): 0 /100 WBC
PLATELET # BLD AUTO: 190 K/UL (ref 150–450)
PMV BLD AUTO: 12.8 FL (ref 9.2–12.9)
POTASSIUM SERPL-SCNC: 4.2 MMOL/L (ref 3.5–5.1)
PROT SERPL-MCNC: 7.4 GM/DL (ref 6–8.4)
RBC # BLD AUTO: 4.78 M/UL (ref 4.6–6.2)
RELATIVE EOSINOPHIL (OHS): 2.2 %
RELATIVE LYMPHOCYTE (OHS): 28.7 % (ref 18–48)
RELATIVE MONOCYTE (OHS): 5.7 % (ref 4–15)
RELATIVE NEUTROPHIL (OHS): 62.5 % (ref 38–73)
SODIUM SERPL-SCNC: 142 MMOL/L (ref 136–145)
VIT B12 SERPL-MCNC: >2000 PG/ML (ref 210–950)
WBC # BLD AUTO: 6.82 K/UL (ref 3.9–12.7)

## 2025-05-13 PROCEDURE — 4010F ACE/ARB THERAPY RXD/TAKEN: CPT | Mod: CPTII,S$GLB,, | Performed by: NURSE PRACTITIONER

## 2025-05-13 PROCEDURE — 1160F RVW MEDS BY RX/DR IN RCRD: CPT | Mod: CPTII,S$GLB,, | Performed by: NURSE PRACTITIONER

## 2025-05-13 PROCEDURE — 84425 ASSAY OF VITAMIN B-1: CPT

## 2025-05-13 PROCEDURE — 99999 PR PBB SHADOW E&M-EST. PATIENT-LVL I: CPT | Mod: PBBFAC,,, | Performed by: DIETITIAN, REGISTERED

## 2025-05-13 PROCEDURE — 3044F HG A1C LEVEL LT 7.0%: CPT | Mod: CPTII,S$GLB,, | Performed by: NURSE PRACTITIONER

## 2025-05-13 PROCEDURE — 99999 PR PBB SHADOW E&M-EST. PATIENT-LVL V: CPT | Mod: PBBFAC,,, | Performed by: NURSE PRACTITIONER

## 2025-05-13 PROCEDURE — 85025 COMPLETE CBC W/AUTO DIFF WBC: CPT

## 2025-05-13 PROCEDURE — 99024 POSTOP FOLLOW-UP VISIT: CPT | Mod: S$GLB,,, | Performed by: NURSE PRACTITIONER

## 2025-05-13 PROCEDURE — 3074F SYST BP LT 130 MM HG: CPT | Mod: CPTII,S$GLB,, | Performed by: NURSE PRACTITIONER

## 2025-05-13 PROCEDURE — 3078F DIAST BP <80 MM HG: CPT | Mod: CPTII,S$GLB,, | Performed by: NURSE PRACTITIONER

## 2025-05-13 PROCEDURE — 82607 VITAMIN B-12: CPT

## 2025-05-13 PROCEDURE — 36415 COLL VENOUS BLD VENIPUNCTURE: CPT

## 2025-05-13 PROCEDURE — 1159F MED LIST DOCD IN RCRD: CPT | Mod: CPTII,S$GLB,, | Performed by: NURSE PRACTITIONER

## 2025-05-13 PROCEDURE — 82040 ASSAY OF SERUM ALBUMIN: CPT

## 2025-05-13 NOTE — PATIENT INSTRUCTIONS
High Protein Pureed Diet    2 weeks after gastric bypass and sleeve you may be ready to add pureed food to your diet.  All food should be the consistency of baby food, or thinner.  Follow pureed diet for the next 2 weeks.    Protein - It is very important to pay attention to protein intake during this time.      Inadequate protein intake can cause:  Delayed Wound Healing  Hair Loss  Muscle Breakdown    Meal Plan - Eat 3-4 meals per day (2-4 tbsp each), with protein supplements in between to meet protein needs.  Meeting protein needs daily will help increase healing, decrease muscle loss, and increase weight loss.  Your goal is  grams of protein a day.    Protein First - Always eat the foods with the highest protein first.  Foods high in protein include milk, yogurt, cheese, egg whites, and blenderized meat, seafood, and beans.    Fluids - Keep track in your journal of how much you are drinking; you should try to drink at least 64oz of fluids every day.      Foods allowed: Portion size Protein (g)   Sugar-free clear liquids As desired 0   Skim or 1% milk ½ cup 4   Sugar free pudding, light yogurt, custard (use skim or 1% milk in preparation) 3 oz 2.5   Strained baby food meats, or home-made pureed lean meats and shrimp 1 oz 7   Beans (red, white, black, lima, silva, fat free refried, hummus) and lentils ¼ cup 4   Low-fat/fat free cheese.(cottage cheese, mozzarella string cheese, ricotta cheese, Laughing Cow, Baby Bell, cheddar, etc) ¼ cup 7-8   Scrambled eggs or Egg Beaters 1 or ¼ cup 6   Edamame or Tofu, mashed ¼ cup 5   Unflavored protein powder (add to 1 scoop to  98% fat free soups or SF pudding) 3 Tbsp 9   *PB2: peanut powder (45 calories) 2 Tbsp 5     *PB2 powdered peanut butter: 45 calories vs. 190 calories in 2 tbsp of regular peanut butter. Purchase online at [employer], or  at various [employer], [employer], [employer], [employer] and [employer].        Bariatric Liquid/Pureed Sample Menu    3-4 small meals  plus 2-3 protein drinks per day.    8am 1 egg or ¼ cup Egg Beaters   9am 1 cup water, or decaf coffee or tea   10am Protein drink, 30g protein   11am 2 tbsp low-fat cottage cheese, and 1 tbsp pureed peaches   12pm 1 cup water, or sugar-free lemonade    1pm 2 tbsp pureed chicken, and 1 tbsp pureed carrots    2pm 1 cup water, or sugar-free lemonade   3pm Protein drink, 30g protein   5pm 1 cup water    6pm 1 cup hi-protein creamy chicken soup 14g protein (see Recipe below)   7pm 1 cup water, or sugar-free fruit punch    8pm 1 cup water     This sample menu provides approx. 80g protein and 64oz fluids.  Liquid protein supplements should contain 20-30g protein and less than 4 grams of sugar each.    Sip fluids continuously in between meals.  Drink at least ¼ cup every 15 minutes.  For fluids: ¼ cup = 2 oz = 4 tbsp       RECIPE IDEAS for Bariatric Pureed Diet:    Hi-Protein Creamy Chicken Soup: (10g protein per 1 cup serving)  Empty 1 can of 98% fat free cream of chicken soup into saucepan. Then  blend 1 scoop of unflavored protein powder with 1 can of skim milk until smooth.  Add protein milk to saucepan and heat to warm. (Note: Do NOT boil. Protein powder may clump if heated too hot).     Hi-Protein Pudding: (14g protein per ½ cup serving)  Add 2 scoops protein powder to 2 cups cold skim milk and mix well.  Stir in dry Jell-O Sugar-Free Instant Pudding mix.  Chill and Enjoy!    Tuna Mousse (12g protein per ¼ cup serving) Page 135 in book Eating Well After Weight Loss Surgery.  In a  or , combine all ingredients and pulse until smooth.  2 6-ounce cans tuna packed in water, drained  2 tbsp low-fat mayonnaise  2 tbsp fat-free sour cream  2 tbsp fat-free cream cheese, softened  ½ cup shallots, finely chopped  1 tbsp lemon juice  ¼ tsp ground pepper  ½ tsp celery seed    Chocolate Peanut Butter Mousse  (28g protein total)  6oz plain Greek yogurt  4 tbsp chocolate PB2

## 2025-05-13 NOTE — PROGRESS NOTES
NUTRITION NOTE    Referring Physician: Dr. Evans  Reason for MNT Referral: Follow-up 2 Weeks s/p Bypass    Denies N/V/C/D.  Reports doing well.     CURRENT DIET:  Bariatric Liquid Diet    Diet Recall:  grams of protein/day; 48+ oz of fluids/day    Diet Includes:   Protein Supplements: 3/day Premier shakes and occ Protein 2.0 in place of one Premier shake. Occ Unjury chicken soup.    EXERCISE:  A little Walking     Restrictions to Exercise: None.     VITAMINS/MINERALS:  As directed    ASSESSMENT:  Doing well overall.  Adequate protein intake.  Adequate fluid intake.    BARIATRIC DIET DISCUSSION:  Instructed and provided written materials on bariatric pureed and soft diet plans.  Reinforced post-op nutrition guidelines.    PLAN/RECOMMONDATIONS:  Advance to bariatric pureed diet.  May advance to bariatric soft diet in 2 weeks as zachary.  Maintain protein intake.  Maintain fluid intake.  Continue light exercise.  Continue appropriate vitamins & minerals.  - may swallow whole pills as zachary    Return to clinic in 6 weeks.    SESSION TIME: 15 minutes

## 2025-05-13 NOTE — PROGRESS NOTES
BARIATRIC POST-OPERATIVE VISIT:    HPI:  Martha Shah is a 54 y.o. year old adult presents for 2 week post op visit following LRNY.  she is doing well and tolerating the diet without difficulty.  she has no complaints.    Denies: nausea, vomiting, abdominal pain, changes in bowel movement pattern, fever, chills, dysphagia, chest pain, and shortness of breath.    Review of Systems   Constitutional:  Negative for activity change and fatigue.   Respiratory:  Negative for shortness of breath.    Cardiovascular:  Negative for chest pain, palpitations and leg swelling.   Gastrointestinal:  Negative for abdominal pain, diarrhea and vomiting.   Endocrine: Negative for polydipsia, polyphagia and polyuria.   Genitourinary:  Negative for dysuria.   Musculoskeletal:  Negative for gait problem.   Skin:  Negative for rash.   Allergic/Immunologic: Negative for immunocompromised state.   Neurological:  Negative for dizziness, syncope and weakness.   Hematological:  Does not bruise/bleed easily.   Psychiatric/Behavioral:  Negative for behavioral problems.        EXERCISE & VITAMINS:  See Bariatric Assessment    MEDICATIONS/ALLERGIES:  Have been reviewed.    DIET: Liquid Bariatric Diet.  3 protein shakes daily, ~90 grams protein.  32 fl oz SF clear beverage.  See Dietician note from today for a more detailed assessment.      Physical Exam  Vitals and nursing note reviewed.   Constitutional:       Appearance: She is well-developed. She is morbidly obese.   HENT:      Head: Normocephalic.      Nose: Nose normal.      Mouth/Throat:      Mouth: Mucous membranes are moist.   Eyes:      Extraocular Movements: Extraocular movements intact.   Cardiovascular:      Rate and Rhythm: Normal rate and regular rhythm.      Heart sounds: Normal heart sounds.   Pulmonary:      Effort: Pulmonary effort is normal.      Breath sounds: Normal breath sounds.   Abdominal:      General: Bowel sounds are normal.      Palpations: Abdomen is soft.    Musculoskeletal:         General: Normal range of motion.      Cervical back: Normal range of motion.   Skin:     General: Skin is warm and dry.      Capillary Refill: Capillary refill takes less than 2 seconds.   Neurological:      Mental Status: She is alert and oriented to person, place, and time.   Psychiatric:         Mood and Affect: Mood normal.         ASSESSMENT:  - Morbid obesity s/p laparoscopic Chandler-en-Y on 4/28/25.  - Co-morbidities: dyslipidemia, hypertension, obstructive sleep apnea, and CAD  -  Weight loss, 15.6#'s and 15% EWL  -  Exercise routine walking   - Good Diet  - Good Vitamin regimen    PLAN:  - Ursodiol 500 mg daily for 6 months  - Anti-Acid medication,  daily for 3 months  - No lifting more than 10 lbs for 6 weeks   - Miralax daily for constipation  - Emphasized the importance of regular exercise and adherence to bariatric diet to achieve maximum weight loss.  - Encouraged patient to start regular exercise.  - Follow-up with dietician to advance diet.  - Continue daily vitamins and medications.  - RTC in 6 weeks or sooner if needed.  - Call the office for any issues.  - Check labs today.    Post Discharge     2 Weeks     Total Opioids Used (Outpatient): 25 tabsml: mLs  Unused Opioids Returned: No Educated on safe opioid disposal location at [facility] pharmacy.   Additional Opioids Provided: no

## 2025-05-15 ENCOUNTER — PATIENT MESSAGE (OUTPATIENT)
Dept: BARIATRICS | Facility: CLINIC | Age: 55
End: 2025-05-15
Payer: COMMERCIAL

## 2025-05-15 NOTE — LETTER
May 15, 2025      [facility] 2nd Fl  [address]  [facility] [address]  Phone: [phone]  Fax: [phone]       Patient: Martha Shah   YOB: 1970  Date of Visit: 05/15/2025    To Whom It May Concern,     Ms. Martha Shah  was at [facility] for surgery on 4/28/2025. The patient may return to work on 5/19/2025 with restrictions of not being able to lift, pull or push anything over 10lbs for the first 6 weeks after surgery.  Please excuse all days leading up to her return to work date. Ms. Shah may return to work on  6/9/2025 with no restrictions. If you have any questions or concerns, or if I can be of further assistance, please do not hesitate to contact me.     Sincerely,    Leila Siegel MD  General, Laparoscopic, and Bariatric Surgery  [facility] - [place], [place]

## 2025-05-16 ENCOUNTER — TELEPHONE (OUTPATIENT)
Dept: CARDIOLOGY | Facility: CLINIC | Age: 55
End: 2025-05-16
Payer: COMMERCIAL

## 2025-05-16 NOTE — TELEPHONE ENCOUNTER
----- Message from Marley sent at 5/16/2025 10:10 AM CDT -----  Regarding: pt advice  Contact: [phone]  Pt is calling to speak with someone in provider office in regards to pain in pts chest and arm heart palpitations pt stated it stops with nitro, pt stated the last time was in the middle of the night last night, pt stated reached out to bariatric and due to recently having bariatric surgery and was told to reach out to Cardiology for pt advice pt   is asking for a return call please call pt at [phone]

## 2025-05-16 NOTE — TELEPHONE ENCOUNTER
NOV 06/02/2025  Bariatric Surgery 04/28/2025  LOV 02/03/2025    Spoke with patient, for about a month, pain starts in Left arm- ache/burning, intermittent, sometimes radiates to chest pain - ache/burning and sometimes feel palpitations in throat, usually is relieved by nitro. Last night had to take 3 nitro for relief.    Patient spoke with bariatric team who recommended to reach out to cardiology.  Advised patient to report to ER for increased pain not relieved by nitro.  Patient verbalized understanding.    Please advise.

## 2025-05-18 DIAGNOSIS — I63.9 CEREBROVASCULAR ACCIDENT (CVA), UNSPECIFIED MECHANISM: ICD-10-CM

## 2025-05-19 LAB — W VITAMIN B1: 68 UG/L

## 2025-05-19 RX ORDER — ASPIRIN 81 MG/1
81 TABLET ORAL
Qty: 90 TABLET | Refills: 4 | Status: SHIPPED | OUTPATIENT
Start: 2025-05-19

## 2025-05-19 NOTE — TELEPHONE ENCOUNTER
Refill Routing Note   Medication(s) are not appropriate for processing by [facility] for the following reason(s):        Outside of protocol    [facility] action(s):  Route             Appointments  past 12m or future 3m with PCP    Date Provider   Last Visit   12/9/2024 Estrada Partida MD   Next Visit   5/23/2025 Estrada Partida MD   ED visits in past 90 days: 0        Note composed:9:44 AM 05/19/2025

## 2025-05-23 ENCOUNTER — OFFICE VISIT (OUTPATIENT)
Dept: PRIMARY CARE CLINIC | Facility: CLINIC | Age: 55
End: 2025-05-23
Payer: COMMERCIAL

## 2025-05-23 VITALS
RESPIRATION RATE: 18 BRPM | BODY MASS INDEX: 39.41 KG/M2 | OXYGEN SATURATION: 99 % | WEIGHT: 236.56 LBS | HEART RATE: 55 BPM | TEMPERATURE: 98 F | DIASTOLIC BLOOD PRESSURE: 80 MMHG | HEIGHT: 65 IN | SYSTOLIC BLOOD PRESSURE: 112 MMHG

## 2025-05-23 DIAGNOSIS — Z23 NEED FOR VACCINATION: ICD-10-CM

## 2025-05-23 DIAGNOSIS — T46.6X5A MYALGIA DUE TO STATIN: ICD-10-CM

## 2025-05-23 DIAGNOSIS — K20.0 EOSINOPHILIC ESOPHAGITIS: ICD-10-CM

## 2025-05-23 DIAGNOSIS — E66.01 SEVERE OBESITY (BMI 35.0-39.9) WITH COMORBIDITY: ICD-10-CM

## 2025-05-23 DIAGNOSIS — G47.00 INSOMNIA, UNSPECIFIED TYPE: ICD-10-CM

## 2025-05-23 DIAGNOSIS — M79.10 MYALGIA DUE TO STATIN: ICD-10-CM

## 2025-05-23 DIAGNOSIS — Z98.84 S/P GASTRIC BYPASS: ICD-10-CM

## 2025-05-23 DIAGNOSIS — Z00.00 ANNUAL PHYSICAL EXAM: Primary | ICD-10-CM

## 2025-05-23 PROBLEM — E66.9 OBESITY: Status: RESOLVED | Noted: 2025-04-28 | Resolved: 2025-05-23

## 2025-05-23 PROCEDURE — 99999 PR PBB SHADOW E&M-EST. PATIENT-LVL IV: CPT | Mod: PBBFAC,,, | Performed by: FAMILY MEDICINE

## 2025-05-23 RX ORDER — TRAZODONE HYDROCHLORIDE 50 MG/1
50-100 TABLET ORAL NIGHTLY PRN
Qty: 60 TABLET | Refills: 2 | Status: SHIPPED | OUTPATIENT
Start: 2025-05-23

## 2025-05-23 RX ORDER — HYOSCYAMINE SULFATE 0.12 MG/1
0.12 TABLET SUBLINGUAL EVERY 6 HOURS PRN
COMMUNITY
End: 2025-05-23 | Stop reason: SDUPTHER

## 2025-05-23 RX ORDER — HYOSCYAMINE SULFATE 0.12 MG/1
0.12 TABLET SUBLINGUAL EVERY 6 HOURS PRN
Qty: 30 TABLET | Refills: 2 | Status: SHIPPED | OUTPATIENT
Start: 2025-05-23

## 2025-05-23 NOTE — PROGRESS NOTES
Verified pt by name and . NKDA. Per physician orders pt was administered Prevnar IM to right deltoid using aseptic technique. Pt tolerated well. No adverse effects or pain reported. MD notified.

## 2025-05-23 NOTE — PROGRESS NOTES
Assessment:       1. Annual physical exam    2. S/P gastric bypass    3. Insomnia, unspecified type    4. Myalgia due to statin    5. Severe obesity (BMI 35.0-39.9) with comorbidity    6. Eosinophilic esophagitis    7. Need for vaccination         Plan:       Annual physical exam    S/P gastric bypass    Insomnia, unspecified type  -     traZODone (DESYREL) 50 MG tablet; Take 1-2 tablets ( mg total) by mouth nightly as needed for Insomnia.  Dispense: 60 tablet; Refill: 2    Myalgia due to statin    Severe obesity (BMI 35.0-39.9) with comorbidity    Eosinophilic esophagitis  -     hyoscyamine 0.125 mg Subl; Place 1 tablet (0.125 mg total) under the tongue every 6 (six) hours as needed.  Dispense: 30 tablet; Refill: 2    Need for vaccination  -     pneumoc 20-mellisa conj-dip cr(PF) (PREVNAR-20 (PF)) injection Syrg 0.5 mL      Assessment & Plan    - Reviewed recent hospitalization and surgical history, including UTI with sepsis, bladder stone removal, and gastric bypass.  - Assessed esophageal spasms as likely cause of left arm and chest pain, responding to nitroglycerin and Lexapro.  - Evaluated weight loss progress post-bariatric surgery and BP management.  - Reviewed rheumatology lab results, noting slightly elevated inflammatory markers but no significant abnormalities.  - Discussed that constipation is likely normal given current limited solid food intake post-bariatric surgery.  - Explained that slow healing of ankle injury could be due to scar tissue formation from a hematoma.    ## ESSENTIAL HYPERTENSION:   Patient's blood pressure today is 112/80, usually 120/80 at home.   Continued losartan and metoprolol at current doses for BP management.   Instructed patient to monitor BP at home, especially for signs of low BP such as dizziness or lightheadedness.   Discussed potentially reducing blood pressure medication as weight loss continues.   Advised to contact the office if experiencing dizzy spells or  lightheadedness, as medication adjustments may be needed.    ## ESOPHAGEAL DYSKINESIA AND RELATED SYMPTOMS:   Patient experiences pain in left arm and chest, sometimes with palpitations, and difficulty swallowing (especially with large amounts or big pills).   These symptoms are suspected to be esophageal spasms.   Pain responds to nitroglycerin and Lexapro, indicating esophageal involvement.   Explained that nitroglycerin can relax smooth muscle, including in the esophagus, which may account for its effectiveness.   Refilled nitroglycerin 0.125 mg sublingual tablets for symptom management.   Will message bariatric team for a new prescription for Lexapro and advised patient to follow up with them regarding potential esophageal spasms.    ## INSOMNIA:   Patient reports insomnia since starting liquid diet, with difficulty initiating and maintaining sleep.   Prescribed trazodone 50 mg tablets, instructing patient to take 1-2 tablets at bedtime as needed, 30 minutes to 1 hour before bed.   Advised to start with 1 tablet and increase if necessary.    ## CONSTIPATION:   Patient reports constipation, likely due to low intake of solid food.    ## ACUTE PANCREATITIS AND LIVER DISEASE:   Patient's pancreas and liver were inflamed during hospitalization.   Imaging confirmed inflammation of both organs.    ## KIDNEY ATROPHY:   Imaging revealed patient's kidney has atrophied slightly.    ## SEPSIS:   Patient was hospitalized for a UTI and suspected sepsis.    ## ANKLE SPRAIN SEQUELA:   Patient has an area on the ankle that remains discolored and occasionally painful after trauma a few months ago.   Assessed as healing slowly, possibly due to a hematoma.    ## BARIATRIC SURGERY STATUS:   Patient had bariatric surgery (bypass) in April and has lost a significant amount of weight.   Currently on a restricted diet post-surgery and experiencing a weight loss plateau.   Advised to follow up with the bariatric team regarding diet,  weight loss plateau, and management of esophageal symptoms.    ## HISTORY OF URINARY SYSTEM DISEASE:   Patient had a stone in the bladder removed in March with no subsequent issues.    ## DRUG ALLERGY:   Patient has an intolerance to statins causing severe myalgia, which patient describes as worse than the myocardial infarction.    ## FOLLOW-UP AND PREVENTIVE CARE:   Administered pneumonia vaccination in office.   Ordered labs for next month.   Patient to send a portal message to the rheumatologist's office to follow up on lab results.   Consider the bivalent COVID-19 booster shot.   Foll  ow up in 1 year for annual check-up, barring any unforeseen issues.       Medication List with Changes/Refills   New Medications    TRAZODONE (DESYREL) 50 MG TABLET    Take 1-2 tablets ( mg total) by mouth nightly as needed for Insomnia.   Current Medications    ACETAMINOPHEN 500 MG PWPK    Take 1,000 mg by mouth every 8 (eight) hours. Take 1g (2 packets) by mouth every 8 hours for at least 3 days and up to 5 days as needed. May take one additional 500mg dose (1 packet) per day for breakthrough pain. Do not exceed 4g in 24 hours.    ASPIRIN (ECOTRIN) 81 MG EC TABLET    TAKE 1 TABLET BY MOUTH EVERY DAY    CETIRIZINE (ZYRTEC) 10 MG TABLET    Take 10 mg by mouth once daily.    COLCHICINE (COLCRYS) 0.6 MG TABLET    Take 1 tablet (0.6 mg total) by mouth daily as needed.    INDOMETHACIN (INDOCIN) 50 MG CAPSULE    Take 1 capsule (50 mg total) by mouth 3 (three) times daily.    LOSARTAN (COZAAR) 25 MG TABLET    Take 1 tablet (25 mg total) by mouth once daily.    METOPROLOL TARTRATE (LOPRESSOR) 25 MG TABLET    Take 1 tablet (25 mg total) by mouth 2 (two) times daily.    MULTIVITAMIN (ONE DAILY MULTIVITAMIN) PER TABLET    Take 1 tablet by mouth once daily.    NITROGLYCERIN (NITROSTAT) 0.4 MG SL TABLET    Place 1 tablet (0.4 mg total) under the tongue every 5 (five) minutes as needed for Chest pain. Call the 911 after the 3rd dose.     ONDANSETRON (ZOFRAN-ODT) 8 MG TBDL    Take 1 tablet (8 mg total) by mouth every 6 (six) hours as needed (nausea).    PANTOPRAZOLE (PROTONIX) 40 MG TABLET    Take 1 tablet (40 mg total) by mouth 2 (two) times daily.    TAMSULOSIN (FLOMAX) 0.4 MG CAP    TAKE 1 CAPSULE BY MOUTH EVERY DAY    URSODIOL (YULIA FORTE) 500 MG TABLET    Take 1 tablet (500 mg total) by mouth once daily.   Changed and/or Refilled Medications    Modified Medication Previous Medication    HYOSCYAMINE 0.125 MG SUBL hyoscyamine 0.125 mg Subl       Place 1 tablet (0.125 mg total) under the tongue every 6 (six) hours as needed.    Place 0.125 mg under the tongue every 6 (six) hours as needed.   Discontinued Medications    DICLOFENAC SODIUM (VOLTAREN) 1 % GEL    Apply 2 g topically 4 (four) times daily as needed.    GABAPENTIN (NEURONTIN) 300 MG CAPSULE    Take 1 capsule (300 mg total) by mouth 2 (two) times daily. Open capsule and empty prior to taking. Take one dose on morning of surgery prior to arrival. Take 1 capsule (300 mg) twice a day for 3 days post-op. Continue for an additional 2 days as needed.    MINOXIDIL-FINASTERIDE 5-0.1 % SOLN    Apply topically 2 (two) times a day.    SEMAGLUTIDE, WEIGHT LOSS, (WEGOVY) 0.25 MG/0.5 ML PNIJ    Inject 0.25 mg into the skin every 7 days.    SEMAGLUTIDE, WEIGHT LOSS, (WEGOVY) 0.5 MG/0.5 ML PNIJ    Inject 0.5 mg into the skin every 7 days.    SEMAGLUTIDE, WEIGHT LOSS, (WEGOVY) 1 MG/0.5 ML PNIJ    Inject 1 mg into the skin every 7 days.         Subjective:    Patient ID: Martha Shah is a 54 y.o. adult.  Chief Complaint: Annual Exam    HPI  History of Present Illness    CHIEF COMPLAINT:  Patient presents today for annual check-up    CURRENT SYMPTOMS:  She reports difficulty swallowing, particularly with large amounts or pills. She experiences left arm and chest pain with occasional palpitations. She has insomnia since starting liquid diet, with difficulty both falling and staying asleep. Benadryl and  "melatonin have been ineffective. She reports constipation.    RECENT SURGICAL HISTORY:  She underwent gastric bypass surgery on April 28th. In March, she had bladder stone removal without complications. In December, she was hospitalized for possible pancreatitis with inflammation of multiple organs including pancreas and liver, and an atrophied kidney. The cause was suspected to be sepsis from a urinary tract infection but was never conclusively determined.    MUSCULOSKELETAL:  She sustained an ankle injury in December after losing balance on a rowing machine and striking it against equipment. The area remains slightly discolored with intermittent soreness and slow healing.    MEDICATIONS:  She continues losartan and metoprolol for blood pressure management. Wegovy has been paused for 6 months due to recent surgery. She reports using previous prescription of Lexapro which has been effective for esophageal spasms.    ALLERGIES:  She reports statin intolerance manifesting as severe muscle aches, which she describes as more severe than her heart attack pain.      ROS:  Constitutional: -fevers, -chills, +sleep disturbances  ENT: +difficulty swallowing  Respiratory: -shortness of breath, -cough, -wheezing  Cardiovascular: +chest pain, +palpitations  Gastrointestinal: -vomiting, +constipation  Musculoskeletal: +limb pain  Neurological: +difficulty falling asleep, +sleep difficulty  Hematologic/Lymphatic: -easy bleeding tendency, -easy bruising       Review of Systems    Objective:      Vitals:    05/23/25 0752   BP: 112/80   BP Location: Left arm   Patient Position: Sitting   Pulse: (!) 55   Resp: 18   Temp: 97.8 °F (36.6 °C)   TempSrc: Oral   SpO2: 99%   Weight: 107.3 kg (236 lb 8.9 oz)   Height: 5' 5" (1.651 m)     BP Readings from Last 5 Encounters:   05/23/25 112/80   05/13/25 (!) 119/58   04/29/25 101/72   04/16/25 121/74   04/09/25 136/66     Wt Readings from Last 5 Encounters:   05/23/25 107.3 kg (236 lb 8.9 oz) "   05/13/25 107.6 kg (237 lb 3.4 oz)   04/16/25 114.1 kg (251 lb 8.7 oz)   04/09/25 115.1 kg (253 lb 12 oz)   03/18/25 117.5 kg (259 lb 2.4 oz)     Physical Exam  Physical Exam    Vitals: Blood pressure: 112/80.  General: Well-developed. Well-nourished. No acute distress.  Eyes: EOMI. Sclerae anicteric.  HENT: Normocephalic. Atraumatic. Nares patent. Moist oral mucosa.  Cardiovascular: Regular rate. Regular rhythm. No murmurs. No rubs. No gallops. Normal S1, S2.  Respiratory: Normal respiratory effort. Clear to auscultation bilaterally. No rales. No rhonchi. No wheezing.  Musculoskeletal: No  obvious deformity.  Extremities: No lower extremity edema.  Neurological: Alert & oriented x3. No slurred speech. Normal gait.  Psychiatric: Normal mood. Normal affect. Good insight. Good judgment.  Skin: Warm. Dry. No rash.         Lab Results   Component Value Date    WBC 6.82 05/13/2025    HGB 13.5 (L) 05/13/2025    HCT 41.7 05/13/2025     05/13/2025    CHOL 190 03/11/2025    TRIG 129 03/11/2025    HDL 38 (L) 03/11/2025    ALT 18 05/13/2025    AST 18 05/13/2025     05/13/2025    K 4.2 05/13/2025     05/13/2025    CREATININE 0.8 05/13/2025    BUN 19 05/13/2025    CO2 30 (H) 05/13/2025    TSH 3.585 03/11/2025    INR 1.1 01/12/2024    HGBA1C 5.1 03/11/2025      This note was generated with the assistance of ambient listening technology. Verbal consent was obtained by the patient and accompanying visitor(s) for the recording of patient appointment to facilitate this note. I attest to having reviewed and edited the generated note for accuracy, though some syntax or spelling errors may persist. Please contact the author of this note for any clarification.

## 2025-05-27 DIAGNOSIS — Z98.84 S/P BARIATRIC SURGERY: ICD-10-CM

## 2025-05-27 RX ORDER — URSODIOL 500 MG/1
500 TABLET, FILM COATED ORAL DAILY
Qty: 90 TABLET | Refills: 1 | Status: SHIPPED | OUTPATIENT
Start: 2025-05-27 | End: 2025-11-23

## 2025-06-02 ENCOUNTER — OFFICE VISIT (OUTPATIENT)
Dept: CARDIOLOGY | Facility: CLINIC | Age: 55
End: 2025-06-02
Payer: COMMERCIAL

## 2025-06-02 VITALS
WEIGHT: 234.25 LBS | SYSTOLIC BLOOD PRESSURE: 110 MMHG | HEIGHT: 65 IN | HEART RATE: 59 BPM | BODY MASS INDEX: 39.03 KG/M2 | DIASTOLIC BLOOD PRESSURE: 70 MMHG | OXYGEN SATURATION: 98 %

## 2025-06-02 DIAGNOSIS — Z98.84 S/P GASTRIC BYPASS: ICD-10-CM

## 2025-06-02 DIAGNOSIS — Z86.73 HISTORY OF CVA (CEREBROVASCULAR ACCIDENT): Primary | ICD-10-CM

## 2025-06-02 DIAGNOSIS — I10 PRIMARY HYPERTENSION: ICD-10-CM

## 2025-06-02 DIAGNOSIS — E78.2 MIXED HYPERLIPIDEMIA: ICD-10-CM

## 2025-06-02 DIAGNOSIS — E66.01 SEVERE OBESITY (BMI 35.0-39.9) WITH COMORBIDITY: ICD-10-CM

## 2025-06-02 DIAGNOSIS — I77.819 AORTIC DILATATION: ICD-10-CM

## 2025-06-02 DIAGNOSIS — I25.10 CORONARY ARTERY DISEASE INVOLVING NATIVE CORONARY ARTERY OF NATIVE HEART WITHOUT ANGINA PECTORIS: ICD-10-CM

## 2025-06-02 DIAGNOSIS — G47.33 OSA (OBSTRUCTIVE SLEEP APNEA): ICD-10-CM

## 2025-06-02 PROCEDURE — 99999 PR PBB SHADOW E&M-EST. PATIENT-LVL IV: CPT | Mod: PBBFAC,,,

## 2025-06-03 ENCOUNTER — PATIENT MESSAGE (OUTPATIENT)
Dept: BARIATRICS | Facility: CLINIC | Age: 55
End: 2025-06-03
Payer: COMMERCIAL

## 2025-06-09 ENCOUNTER — PATIENT MESSAGE (OUTPATIENT)
Dept: BARIATRICS | Facility: CLINIC | Age: 55
End: 2025-06-09
Payer: COMMERCIAL

## 2025-06-09 ENCOUNTER — TELEPHONE (OUTPATIENT)
Dept: BARIATRICS | Facility: CLINIC | Age: 55
End: 2025-06-09
Payer: COMMERCIAL

## 2025-06-09 DIAGNOSIS — K21.9 GASTROESOPHAGEAL REFLUX DISEASE, UNSPECIFIED WHETHER ESOPHAGITIS PRESENT: ICD-10-CM

## 2025-06-09 DIAGNOSIS — R11.0 NAUSEA: ICD-10-CM

## 2025-06-09 DIAGNOSIS — Z98.84 S/P BARIATRIC SURGERY: ICD-10-CM

## 2025-06-09 DIAGNOSIS — R47.02 DYSPHASIA: Primary | ICD-10-CM

## 2025-06-09 NOTE — TELEPHONE ENCOUNTER
Per Dr. Evans: Yes let's please get a FL UGI with 13-mm tablet, as well as schedule and EGD (perhaps with Dr. Liang in 2 weeks?)    Called pt and provided the tests recommended from Dr. Evans. Pt prefers [place] or [facility] location.      Called [facility] location and assisted with placing the correct order.  Staff stated she will reach out to the pt and schedule her.     EGD referral placed and will message Endoscopy staff.

## 2025-06-09 NOTE — TELEPHONE ENCOUNTER
Called PT per NILTON Springer. PT reports increasing problems with heartburn, trouble swallowing, denies vomiting, taking zofran, esophageal spasms, and chest pain. PT says she has problems drinking protein shakes (Premier Protein) and has tried plant-based shakes without success. PT says [id number] was ok. PT reports being able to eat fish, refried beans, chickpeas, eggs, without problems. Suggested PT increase use of protein thorpe, suggested PT also try ISOpure Clear and Ryse protein water, and mixing unflavored protein powder into foods or beverages. PT expressed understanding and appreciation.

## 2025-06-09 NOTE — TELEPHONE ENCOUNTER
S/p LRNY with Dr. Evans on 4/28/25.    Called and spoke with the pt. Requested when her symptoms started. She stated a few days ago and it is worse at night.  I requested if she was taking her Protonix.  She said she is taking it twice a day. I also asked if the symptoms start from any particular food or drinks.  She stated it is worse with protein shakes and protein pudding but has symptoms with even just water. I asked her what foods ore drinks she is able to have without symptoms and she said yogurt.  She stated that when she swallows, she feels as though it is difficult to get down and she can feel it in her lower esophagus and then it passes. Tentatively placed her on the clinic schedule for the next available.  Will discuss with Dr. Evans and the dietitians.

## 2025-06-10 ENCOUNTER — TELEPHONE (OUTPATIENT)
Dept: ENDOSCOPY | Facility: HOSPITAL | Age: 55
End: 2025-06-10
Payer: COMMERCIAL

## 2025-06-10 VITALS — HEIGHT: 65 IN | WEIGHT: 234 LBS | BODY MASS INDEX: 38.99 KG/M2

## 2025-06-10 DIAGNOSIS — K21.9 GASTROESOPHAGEAL REFLUX DISEASE, UNSPECIFIED WHETHER ESOPHAGITIS PRESENT: ICD-10-CM

## 2025-06-10 DIAGNOSIS — R13.10 DYSPHAGIA, UNSPECIFIED TYPE: Primary | ICD-10-CM

## 2025-06-10 DIAGNOSIS — R11.0 NAUSEA: ICD-10-CM

## 2025-06-10 DIAGNOSIS — Z98.84 H/O BARIATRIC SURGERY: ICD-10-CM

## 2025-06-10 NOTE — TELEPHONE ENCOUNTER
"Patient is scheduled for a Upper Endoscopy (EGD) on 6/25/25 with Dr. AMY Liang  Referral for procedure from [facility]        Procedure: (EGD) Endoscopy     Diagnosis: s/p bariatric surgery, dysphagia, GERD, nausea      Procedure Timing: Within 4 weeks (Urgent) Request for in 2 weeks.     *If within 4 weeks selected, please linda as high priority*     *If greater than 12 weeks, please select "5-12 weeks" and delay sending until 3 months prior to requested date*        Additional Scheduling Information: Per Dr. Evans: schedule and EGD (perhaps with Dr. Liang in 2 weeks?)        Is the patient taking a GLP-1 Agonist and/or blood thinner :no (pt on baby aspirin)     Have you attached a patient to this message: yes      Thanks,  Blanche CALLAWAY  "

## 2025-06-17 ENCOUNTER — HOSPITAL ENCOUNTER (OUTPATIENT)
Dept: RADIOLOGY | Facility: HOSPITAL | Age: 55
Discharge: HOME | End: 2025-06-17
Attending: SURGERY
Payer: COMMERCIAL

## 2025-06-17 ENCOUNTER — PATIENT MESSAGE (OUTPATIENT)
Dept: BARIATRICS | Facility: CLINIC | Age: 55
End: 2025-06-17
Payer: COMMERCIAL

## 2025-06-17 ENCOUNTER — RESULTS FOLLOW-UP (OUTPATIENT)
Dept: BARIATRICS | Facility: CLINIC | Age: 55
End: 2025-06-17

## 2025-06-17 DIAGNOSIS — Z98.84 S/P BARIATRIC SURGERY: ICD-10-CM

## 2025-06-17 DIAGNOSIS — R11.0 NAUSEA: ICD-10-CM

## 2025-06-17 DIAGNOSIS — R47.02 DYSPHASIA: ICD-10-CM

## 2025-06-17 PROCEDURE — 74240 X-RAY XM UPR GI TRC 1CNTRST: CPT | Mod: 26,,, | Performed by: RADIOLOGY

## 2025-06-17 PROCEDURE — A9698 NON-RAD CONTRAST MATERIALNOC: HCPCS | Performed by: SURGERY

## 2025-06-17 PROCEDURE — 25500020 PHARM REV CODE 255: Performed by: SURGERY

## 2025-06-17 PROCEDURE — 74240 X-RAY XM UPR GI TRC 1CNTRST: CPT | Mod: TC,FY

## 2025-06-17 RX ADMIN — BARIUM SULFATE 100 ML: 0.6 SUSPENSION ORAL at 08:06

## 2025-06-20 ENCOUNTER — OFFICE VISIT (OUTPATIENT)
Dept: UROLOGY | Facility: CLINIC | Age: 55
End: 2025-06-20
Payer: COMMERCIAL

## 2025-06-20 DIAGNOSIS — R39.11 BENIGN PROSTATIC HYPERPLASIA WITH URINARY HESITANCY: Primary | ICD-10-CM

## 2025-06-20 DIAGNOSIS — N20.1 CALCULUS OF RIGHT URETER: ICD-10-CM

## 2025-06-20 DIAGNOSIS — N40.1 BENIGN PROSTATIC HYPERPLASIA WITH URINARY HESITANCY: Primary | ICD-10-CM

## 2025-06-20 RX ORDER — FINASTERIDE 5 MG/1
5 TABLET, FILM COATED ORAL DAILY
Qty: 30 TABLET | Refills: 11 | Status: SHIPPED | OUTPATIENT
Start: 2025-06-20 | End: 2026-06-20

## 2025-06-20 NOTE — PROGRESS NOTES
The patient location is: [place]  The chief complaint leading to consultation is: imaging and LUTS     Visit type: audiovisual    Face to Face time with patient: 12  10 minutes of total time spent on the encounter, which includes face to face time and non-face to face time preparing to see the patient (eg, review of tests), Obtaining and/or reviewing separately obtained history, Documenting clinical information in the electronic or other health record, Independently interpreting results (not separately reported) and communicating results to the patient/family/caregiver, or Care coordination (not separately reported).         Each patient to whom he or she provides medical services by telemedicine is:  (1) informed of the relationship between the physician and patient and the respective role of any other health care provider with respect to management of the patient; and (2) notified that he or she may decline to receive medical services by telemedicine and may withdraw from such care at any time.    Notes:   Subjective:      Martha Shah is a 54 y.o. adult who returns today regarding imaging and LUTS.    S/p URS with Dr. Leonardo on 3/18/2025 for distal right ureter stone. Dilation subcoronal penile hypospadias (10 to 24 F) also performed at that time to accommodate scope.   She denies pain, f/c/n/v.   Continues to report urinary hesitancy and need to press on perineum to fully empty bladder.   Currently on Flomax. Denies se        The following portions of the patient's history were reviewed and updated as appropriate: allergies, current medications, past family history, past medical history, past social history, past surgical history and problem list.    Review of Systems  A comprehensive multipoint review of systems was negative except as otherwise stated in the HPI.     Objective:   Vitals: There were no vitals taken for this visit.      Physical Exam   General: alert and oriented, no acute distress      Lab  Review   Urinalysis demonstrates no ua   Lab Results   Component Value Date    WBC 6.82 05/13/2025    HGB 13.5 (L) 05/13/2025    HGB 13.4 (L) 03/11/2025    HCT 41.7 05/13/2025    HCT 42.8 03/11/2025    MCV 87 05/13/2025    MCV 89 03/11/2025     05/13/2025     03/11/2025     Lab Results   Component Value Date    CREATININE 0.8 05/13/2025    BUN 19 05/13/2025       Imaging   US RETROPERITONEAL COMPLETE     CLINICAL HISTORY:  Calculus of ureter     TECHNIQUE:  Ultrasound of the kidneys and urinary bladder was performed including color flow and Doppler evaluation of the kidneys.     COMPARISON:  None.     FINDINGS:  Right kidney: Measures 8.8 cm.  Resistive index of 0.66.  Corticomedullary distinction appears maintained.  No hydronephrosis.     Left kidney: Measures 12.6 cm.  Resistive index of 0.54.  Corticomedullary distinction appears maintained.  No hydronephrosis.     Urinary bladder is incompletely fluid-filled at the time of scanning which limits assessment, otherwise unremarkable.     Impression:     No hydronephrosis.        Electronically signed by:Jose Matias  Date:                                            06/18/ XR ABDOMEN AP 1 VIEW     CLINICAL HISTORY:  Calculus of ureter     TECHNIQUE:  AP View(s) of the abdomen was performed.     COMPARISON:  CT renal stone study dated 02/25/2025     FINDINGS:  No definite attenuating stone at the expected renal shadow level or course of the ureters noting partial obscuration by bowel.  Residual oral contrast at the colon from recent prior administration.  Lung bases are clear.  Intact osseous structures.     Impression:     As above.        Electronically signed by:Jose Matias  Date:                                            06/18/2025  Time:                                           08:51    Assessment and Plan:   1. Benign prostatic hyperplasia with urinary hesitancy  -- Discussed medical treatment options for BPH such as alpha-blockers  (flomax) and 5-alpha reductase inhibitors (proscar).    -- Discussed surgical options for treatment of BPH including bipolar TURP and greenlight laser, including the risks and benefits of each.    --continue Flomax; add finasteride  - finasteride (PROSCAR) 5 mg tablet; Take 1 tablet (5 mg total) by mouth once daily.  Dispense: 30 tablet; Refill: 11    2. Calculus of right ureter   --s/p URS  --US neg for hydro  --KUB neg for stones     --consider cysto/UDS; rtc in 3 months       This note is dictated on M*Modal word recognition program.  There are word recognition mistakes that are occasionally missed on review.

## 2025-06-23 ENCOUNTER — RESULTS FOLLOW-UP (OUTPATIENT)
Dept: CARDIOLOGY | Facility: CLINIC | Age: 55
End: 2025-06-23

## 2025-06-25 ENCOUNTER — ANESTHESIA EVENT (OUTPATIENT)
Dept: ENDOSCOPY | Facility: HOSPITAL | Age: 55
End: 2025-06-25
Payer: COMMERCIAL

## 2025-06-25 ENCOUNTER — HOSPITAL ENCOUNTER (OUTPATIENT)
Facility: HOSPITAL | Age: 55
Discharge: HOME | End: 2025-06-25
Attending: SURGERY | Admitting: SURGERY
Payer: COMMERCIAL

## 2025-06-25 ENCOUNTER — ANESTHESIA (OUTPATIENT)
Dept: ENDOSCOPY | Facility: HOSPITAL | Age: 55
End: 2025-06-25
Payer: COMMERCIAL

## 2025-06-25 VITALS
DIASTOLIC BLOOD PRESSURE: 56 MMHG | HEART RATE: 57 BPM | OXYGEN SATURATION: 99 % | HEIGHT: 65 IN | RESPIRATION RATE: 13 BRPM | WEIGHT: 225 LBS | TEMPERATURE: 98 F | SYSTOLIC BLOOD PRESSURE: 112 MMHG | BODY MASS INDEX: 37.49 KG/M2

## 2025-06-25 DIAGNOSIS — Z98.84 S/P GASTRIC BYPASS: Primary | ICD-10-CM

## 2025-06-25 DIAGNOSIS — R13.10 DYSPHAGIA: ICD-10-CM

## 2025-06-25 PROCEDURE — 37000009 HC ANESTHESIA EA ADD 15 MINS: Performed by: SURGERY

## 2025-06-25 PROCEDURE — 37000008 HC ANESTHESIA 1ST 15 MINUTES: Performed by: SURGERY

## 2025-06-25 PROCEDURE — C1726 CATH, BAL DIL, NON-VASCULAR: HCPCS | Performed by: SURGERY

## 2025-06-25 PROCEDURE — 43245 EGD DILATE STRICTURE: CPT | Mod: ,,, | Performed by: SURGERY

## 2025-06-25 PROCEDURE — 63600175 PHARM REV CODE 636 W HCPCS: Performed by: NURSE ANESTHETIST, CERTIFIED REGISTERED

## 2025-06-25 PROCEDURE — 43245 EGD DILATE STRICTURE: CPT | Performed by: SURGERY

## 2025-06-25 PROCEDURE — 25000003 PHARM REV CODE 250: Performed by: COMMUNITY HEALTH WORKER

## 2025-06-25 PROCEDURE — 25000003 PHARM REV CODE 250: Performed by: SURGERY

## 2025-06-25 RX ORDER — GLUCAGON 1 MG
1 KIT INJECTION
Status: DISCONTINUED | OUTPATIENT
Start: 2025-06-25 | End: 2025-06-25 | Stop reason: HOSPADM

## 2025-06-25 RX ORDER — SODIUM CHLORIDE 9 MG/ML
INJECTION, SOLUTION INTRAVENOUS CONTINUOUS
Status: DISCONTINUED | OUTPATIENT
Start: 2025-06-25 | End: 2025-06-25 | Stop reason: HOSPADM

## 2025-06-25 RX ORDER — PROPOFOL 10 MG/ML
VIAL (ML) INTRAVENOUS CONTINUOUS PRN
Status: DISCONTINUED | OUTPATIENT
Start: 2025-06-25 | End: 2025-06-25

## 2025-06-25 RX ORDER — SUCRALFATE 1 G/10ML
1 SUSPENSION ORAL ONCE
Status: COMPLETED | OUTPATIENT
Start: 2025-06-25 | End: 2025-06-25

## 2025-06-25 RX ORDER — SODIUM CHLORIDE 0.9 % (FLUSH) 0.9 %
10 SYRINGE (ML) INJECTION
Status: DISCONTINUED | OUTPATIENT
Start: 2025-06-25 | End: 2025-06-25 | Stop reason: HOSPADM

## 2025-06-25 RX ORDER — LIDOCAINE HYDROCHLORIDE 20 MG/ML
INJECTION INTRAVENOUS
Status: DISCONTINUED | OUTPATIENT
Start: 2025-06-25 | End: 2025-06-25

## 2025-06-25 RX ADMIN — LIDOCAINE HYDROCHLORIDE 100 MG: 20 INJECTION INTRAVENOUS at 12:06

## 2025-06-25 RX ADMIN — SUCRALFATE 1 G: 1 SUSPENSION ORAL at 01:06

## 2025-06-25 RX ADMIN — PROPOFOL 175 MCG/KG/MIN: 10 INJECTION, EMULSION INTRAVENOUS at 12:06

## 2025-06-25 RX ADMIN — SODIUM CHLORIDE: 0.9 INJECTION, SOLUTION INTRAVENOUS at 12:06

## 2025-06-25 NOTE — TRANSFER OF CARE
"Anesthesia Transfer of Care Note    Patient: Martha Shah    Procedure(s) Performed: Procedure(s) (LRB):  EGD (ESOPHAGOGASTRODUODENOSCOPY) (N/A)    Patient location: [facility]    Anesthesia Type: general    Transport from OR: Transported from OR on room air with adequate spontaneous ventilation    Post pain: adequate analgesia    Post assessment: no apparent anesthetic complications and tolerated procedure well    Post vital signs: stable    Level of consciousness: awake, alert and oriented    Nausea/Vomiting: no nausea/vomiting    Complications: none    Transfer of care protocol was followed    Last vitals: Visit Vitals  /63 (BP Location: Left arm, Patient Position: Lying)   Pulse (!) 54   Temp 36.7 °C (98.1 °F) (Oral)   Resp 16   Ht 5' 5" (1.651 m)   Wt 102.1 kg (225 lb)   SpO2 99%   BMI 37.44 kg/m²     "

## 2025-06-25 NOTE — PROVATION PATIENT INSTRUCTIONS
Discharge Summary/Instructions after an Endoscopic Procedure  Patient Name: Martha Shah  Patient MRN: [id number]  Patient YOB: 1970 Wednesday, June 25, 2025  Izabela Liang MD  Dear patient,  As a result of recent federal legislation (The Federal Cures Act), you may   receive lab or pathology results from your procedure in your [facility]   account before your physician is able to contact you. Your physician or   their representative will relay the results to you with their   recommendations at their soonest availability.  Thank you,  RESTRICTIONS:  During your procedure today, you received medications for sedation.  These   medications may affect your judgment, balance and coordination.  Therefore,   for 24 hours, you have the following restrictions:   - DO NOT drive a car, operate machinery, make legal/financial decisions,   sign important papers or drink alcohol.    ACTIVITY:  Today: no heavy lifting, straining or running due to procedural   sedation/anesthesia.  The following day: return to full activity including work.  DIET:  Eat and drink normally unless instructed otherwise.     TREATMENT FOR COMMON SIDE EFFECTS:  - Mild abdominal pain, nausea, belching, bloating or excessive gas:  rest,   eat lightly and use a heating pad.  - Sore Throat: treat with throat lozenges and/or gargle with warm salt   water.  - Because air was used during the procedure, expelling large amounts of air   from your rectum or belching is normal.  - If a bowel prep was taken, you may not have a bowel movement for 1-3 days.    This is normal.  SYMPTOMS TO WATCH FOR AND REPORT TO YOUR PHYSICIAN:  1. Abdominal pain or bloating, other than gas cramps.  2. Chest pain.  3. Back pain.  4. Signs of infection such as: chills or fever occurring within 24 hours   after the procedure.  5. Rectal bleeding, which would show as bright red, maroon, or black stools.   (A tablespoon of blood from the rectum is not serious, especially if    hemorrhoids are present.)  6. Vomiting.  7. Weakness or dizziness.  GO DIRECTLY TO THE NEAREST EMERGENCY ROOM IF YOU HAVE ANY OF THE FOLLOWING:      Difficulty breathing              Chills and/or fever over 101 F   Persistent vomiting and/or vomiting blood   Severe abdominal pain   Severe chest pain   Black, tarry stools   Bleeding- more than one tablespoon   Any other symptom or condition that you feel may need urgent attention  Your doctor recommends these additional instructions:  If any biopsies were taken, your doctors clinic will contact you in 1 to 2   weeks with any results.  - Discharge patient to home.   - Mechanical soft diet.   - Continue present medications.   - Return to Bariatric clinic in 2 weeks.  For questions, problems or results please call your physician - Izabela Liang MD at Work:  [phone].  [facility] PHONE NUMBER: [phone]  IF A COMPLICATION OR EMERGENCY SITUATION ARISES AND YOU ARE UNABLE TO REACH   YOUR PHYSICIAN - GO DIRECTLY TO THE EMERGENCY ROOM.  MD Izabela Bernstein MD  6/25/2025 1:10:55 PM  This report has been verified and signed electronically.  Dear patient,  As a result of recent federal legislation (The Federal Cures Act), you may   receive lab or pathology results from your procedure in your [facility]   account before your physician is able to contact you. Your physician or   their representative will relay the results to you with their   recommendations at their soonest availability.  Thank you,  PROVATION

## 2025-06-25 NOTE — H&P
FOCUSED SURGICAL H&P    Martha Shah is a 54 y.o. adult. MRN is [id number].    CC: Here today for the following surgical procedure(s):  EGD (ESOPHAGOGASTRODUODENOSCOPY) (Abdomen)    HPI: For a detailed history of the patients history of present illness please refer to the last progress note. In brief, this is a 54 y.o. adult with a known history of dysphagia, here today for EGD evaluation. There has been no recent changes in the patients health, including fevers, chest pain, or shortness of breath, and no new medications have been started. The patient has not had anything to eat or drink for the last 8 hours. The patient has held all blood thinners.       Past Medical History:   Past Medical History:   Diagnosis Date    Acute pancreatitis 12/16/2024    Allergy     Hypertension     Myalgia due to statin 11/20/2024    NSTEMI (non-ST elevated myocardial infarction) 01/07/2024    ALFREDO on CPAP     Sepsis 12/16/2024    Stroke     Urinary tract infection        Past Surgical History:   Past Surgical History:   Procedure Laterality Date    ADENOIDECTOMY      BIOPSY OF ADENOIDS      COLONOSCOPY N/A 09/14/2022    Procedure: COLONOSCOPY;  Surgeon: Jordy Simons MD;  Location: [facility] ENDO;  Service: Endoscopy;  Laterality: N/A;    CORONARY ANGIOGRAPHY Right 01/08/2024    Procedure: ANGIOGRAM, CORONARY ARTERY;  Surgeon: Estrada Ojeda MD;  Location: [facility] CATH LAB;  Service: Cardiology;  Laterality: Right;    CYSTOSCOPY, WITH URETERAL DILATION  03/18/2025    Procedure: CYSTOSCOPY, WITH URETERAL DILATION;  Surgeon: Wilton Leonardo MD;  Location: [facility] OR;  Service: Urology;;    CYSTOURETEROSCOPY, WITH HOLMIUM LASER LITHOTRIPSY OF URETERAL CALCULUS AND STENT INSERTION  03/18/2025    Procedure: CYSTOURETEROSCOPY, WITH HOLMIUM LASER LITHOTRIPSY OF URETERAL CALCULUS AND STENT INSERTION;  Surgeon: Wilton Leonardo MD;  Location: [facility] OR;  Service: Urology;;    CYSTOURETEROSCOPY, WITH HOLMIUM LASER LITHOTRIPSY OF URETERAL CALCULUS AND  STENT INSERTION  CYSTOSCOPY, WITH URETERAL DILATION  03/18/2025    ESOPHAGOGASTRODUODENOSCOPY N/A 09/14/2022    Procedure: EGD (ESOPHAGOGASTRODUODENOSCOPY);  Surgeon: Jordy Simons MD;  Location: [facility];  Service: Endoscopy;  Laterality: N/A;    ESOPHAGOGASTRODUODENOSCOPY N/A 12/19/2022    Procedure: EGD (ESOPHAGOGASTRODUODENOSCOPY);  Surgeon: Jordy Simons MD;  Location: [facility];  Service: Endoscopy;  Laterality: N/A;    ESOPHAGOGASTRODUODENOSCOPY N/A 11/30/2023    Procedure: EGD (ESOPHAGOGASTRODUODENOSCOPY);  Surgeon: Vinay Bourgeois MD;  Location: [facility];  Service: Endoscopy;  Laterality: N/A;    ESOPHAGOGASTRODUODENOSCOPY N/A 03/14/2025    Procedure: EGD (ESOPHAGOGASTRODUODENOSCOPY);  Surgeon: Vinay Bourgeois MD;  Location: [facility];  Service: Endoscopy;  Laterality: N/A;  ref A Thony NP- pt states not taking GLP-1 at this time- portal tmb    LAPAROSCOPIC GASTROENTEROSTOMY N/A 4/28/2025    Procedure: GASTROENTEROSTOMY, LAPAROSCOPIC with inraop EGD;  Surgeon: Leila Cummings MD;  Location: [facility] OR [address] [place] [address];  Service: General;  Laterality: N/A;  Surgeon to complete Tap Block Intraoperatively    REPAIR, HERNIA, HIATAL, LAPAROSCOPIC  4/28/2025    Procedure: REPAIR, HERNIA, HIATAL, LAPAROSCOPIC;  Surgeon: Leila Cummings MD;  Location: [facility] OR [address] [place] [address];  Service: General;;    TONSILLECTOMY         Social History: Social History[1]    Family History:   Family History   Problem Relation Name Age of Onset    Lupus Mother      Heart disease Father      Prostate cancer Neg Hx      Kidney disease Neg Hx            Allergies:  Review of patient's allergies indicates:   Allergen Reactions    Sulfa (sulfonamide antibiotics) Shortness Of Breath    Coconut     Guaifenesin     Milk containing products (dairy)     Prochlorperazine Hallucinations     Other reaction(s): Compazine  Note:  Adverse Reaction: Other    Statins-hmg-coa reductase inhibitors     Terbinafine hcl       "Other reaction(s): terbinafine 250 mg tablet  Note:  Adverse Reaction: Upset Stomach    Wheat containing prod     Penicillins Rash     Other reaction(s): Penicillins  Note:  Adverse Reaction: Rash         Medications:  Current Medications[2]                  Vital Signs:  Vitals:    06/25/25 1155   BP: 105/63   Pulse: (!) 54   Resp: 16   Temp: 98.1 °F (36.7 °C)         Physical Exam:  Neuro: awake, alert, no acute distress.  HEENT: PERRLA, neck supple, no lymphadenopathy.  Heart: regular rate/rhythm  Lungs: equal chest expansion bilaterally, no increased work of breathing on RA  Abdomen: soft, non-distended, non-tender to palpation.  Extremities: warm, well-perfused       Labs:  Lab Results   Component Value Date/Time    WBC 6.82 05/13/2025 08:30 AM    HGB 13.5 (L) 05/13/2025 08:30 AM    HGB 13.4 (L) 03/11/2025 08:47 AM    HCT 41.7 05/13/2025 08:30 AM    HCT 42.8 03/11/2025 08:47 AM     05/13/2025 08:30 AM     03/11/2025 08:47 AM    MCV 87 05/13/2025 08:30 AM    MCV 89 03/11/2025 08:47 AM     Lab Results   Component Value Date/Time     05/13/2025 08:30 AM     03/11/2025 08:47 AM    K 4.2 05/13/2025 08:30 AM    K 4.2 03/11/2025 08:47 AM     05/13/2025 08:30 AM     03/11/2025 08:47 AM    CO2 30 (H) 05/13/2025 08:30 AM    CO2 27 03/11/2025 08:47 AM    BUN 19 05/13/2025 08:30 AM    GLU 77 05/13/2025 08:30 AM    GLU 89 03/11/2025 08:47 AM    MG 1.7 04/29/2025 05:27 AM    PHOS 3.4 04/29/2025 05:27 AM    PHOS 2.8 03/11/2025 08:47 AM     Lab Results   Component Value Date/Time    INR 1.1 01/12/2024 03:26 AM     No components found for: "TROPI"  Lab Results   Component Value Date/Time    ALT 18 05/13/2025 08:30 AM    ALT 17 03/11/2025 08:47 AM    AST 18 05/13/2025 08:30 AM    AST 26 03/11/2025 08:47 AM    LIPASE 19 12/17/2024 04:12 AM          Assessment/Plan:  54 y.o. adult here today for the following surgical procedure:    EGD (ESOPHAGOGASTRODUODENOSCOPY) (Abdomen)       The " indications for surgery, highlighting the risks and benefits of the procedure were discussed with the patient. These included but are not limited to swelling, bleeding, pain, infection, and adverse anesthesia-related event. I also discussed risk of injury to nearby structures. The patient seems to understand the risks, as well as the alternatives including nonoperative observation/survellience and wishes to proceed with the surgical intervention.    Patient has been examined and consented.      Plan discussed with and agreed by Dr. Carrion.      Valeria Shepherd MD  General Surgery, PGY-1         [1]   Social History  Socioeconomic History    Marital status:    Tobacco Use    Smoking status: Never     Passive exposure: Never    Smokeless tobacco: Never   Vaping Use    Vaping status: Never Used   Substance and Sexual Activity    Alcohol use: Not Currently     Comment: social    Drug use: No    Sexual activity: Yes     Partners: Female     Social Drivers of Health     Financial Resource Strain: Low Risk  (4/28/2025)    Overall Financial Resource Strain (CARDIA)     Difficulty of Paying Living Expenses: Not hard at all   Food Insecurity: No Food Insecurity (4/28/2025)    Hunger Vital Sign     Worried About Running Out of Food in the Last Year: Never true     Ran Out of Food in the Last Year: Never true   Transportation Needs: No Transportation Needs (4/28/2025)    PRAPARE - Transportation     Lack of Transportation (Medical): No     Lack of Transportation (Non-Medical): No   Physical Activity: Inactive (1/31/2025)    Exercise Vital Sign     Days of Exercise per Week: 0 days     Minutes of Exercise per Session: 20 min   Stress: Stress Concern Present (4/28/2025)    [employer] - Occupational Stress Questionnaire     Feeling of Stress : To some extent   Housing Stability: Low Risk  (4/28/2025)    Housing Stability Vital Sign     Unable to Pay for Housing in the Last Year: No     Homeless  in the Last Year: No   [2]   Current Facility-Administered Medications:     0.9% NaCl infusion, , Intravenous, Continuous, Izabela Liang MD

## 2025-06-25 NOTE — PLAN OF CARE
Patient tolerating oral liquids without difficulty. No apparent s&s of distress noted at this time, no complaints voiced at this time. Discharge instructions reviewed with patient and spouse with good verbal feedback received. Patient ready for discharge.

## 2025-06-25 NOTE — ANESTHESIA PREPROCEDURE EVALUATION
06/25/2025  Martha Shah is a 54 y.o., adult.      Pre-op Assessment    I have reviewed the Patient Summary Reports.     I have reviewed the Nursing Notes.    I have reviewed the Medications.     Review of Systems  Anesthesia Hx:  No problems with previous Anesthesia             Denies Family Hx of Anesthesia complications.     Cardiovascular:  Exercise tolerance: good   Hypertension  Past MI CAD                                          Pulmonary:        Sleep Apnea                Neurological:   CVA, residual symptoms                                      Past Medical History:   Diagnosis Date    Acute pancreatitis 12/16/2024    Allergy     Hypertension     Myalgia due to statin 11/20/2024    NSTEMI (non-ST elevated myocardial infarction) 01/07/2024    ALFREDO on CPAP     Sepsis 12/16/2024    Stroke     Urinary tract infection      Past Surgical History:   Procedure Laterality Date    ADENOIDECTOMY      BIOPSY OF ADENOIDS      COLONOSCOPY N/A 09/14/2022    Procedure: COLONOSCOPY;  Surgeon: Jordy Simons MD;  Location: [facility] ENDO;  Service: Endoscopy;  Laterality: N/A;    CORONARY ANGIOGRAPHY Right 01/08/2024    Procedure: ANGIOGRAM, CORONARY ARTERY;  Surgeon: Estrada Ojeda MD;  Location: [facility] CATH LAB;  Service: Cardiology;  Laterality: Right;    CYSTOSCOPY, WITH URETERAL DILATION  03/18/2025    Procedure: CYSTOSCOPY, WITH URETERAL DILATION;  Surgeon: Wilton Leonardo MD;  Location: [facility] OR;  Service: Urology;;    CYSTOURETEROSCOPY, WITH HOLMIUM LASER LITHOTRIPSY OF URETERAL CALCULUS AND STENT INSERTION  03/18/2025    Procedure: CYSTOURETEROSCOPY, WITH HOLMIUM LASER LITHOTRIPSY OF URETERAL CALCULUS AND STENT INSERTION;  Surgeon: Wilton Leonardo MD;  Location: [facility] OR;  Service: Urology;;    CYSTOURETEROSCOPY, WITH HOLMIUM LASER LITHOTRIPSY OF URETERAL CALCULUS AND STENT INSERTION  CYSTOSCOPY, WITH URETERAL  DILATION  03/18/2025    ESOPHAGOGASTRODUODENOSCOPY N/A 09/14/2022    Procedure: EGD (ESOPHAGOGASTRODUODENOSCOPY);  Surgeon: Jordy Simons MD;  Location: [facility];  Service: Endoscopy;  Laterality: N/A;    ESOPHAGOGASTRODUODENOSCOPY N/A 12/19/2022    Procedure: EGD (ESOPHAGOGASTRODUODENOSCOPY);  Surgeon: Jordy Simons MD;  Location: [facility];  Service: Endoscopy;  Laterality: N/A;    ESOPHAGOGASTRODUODENOSCOPY N/A 11/30/2023    Procedure: EGD (ESOPHAGOGASTRODUODENOSCOPY);  Surgeon: Vinay Bourgeois MD;  Location: [facility];  Service: Endoscopy;  Laterality: N/A;    ESOPHAGOGASTRODUODENOSCOPY N/A 03/14/2025    Procedure: EGD (ESOPHAGOGASTRODUODENOSCOPY);  Surgeon: Vinay Bourgeois MD;  Location: [facility];  Service: Endoscopy;  Laterality: N/A;  ref MARCELLUS Bhandari NP- pt states not taking GLP-1 at this time- portal tmb    LAPAROSCOPIC GASTROENTEROSTOMY N/A 4/28/2025    Procedure: GASTROENTEROSTOMY, LAPAROSCOPIC with inraop EGD;  Surgeon: Leila Cummings MD;  Location: [address];  Service: General;  Laterality: N/A;  Surgeon to complete Tap Block Intraoperatively    REPAIR, HERNIA, HIATAL, LAPAROSCOPIC  4/28/2025    Procedure: REPAIR, HERNIA, HIATAL, LAPAROSCOPIC;  Surgeon: Leila Cummings MD;  Location: [facility] OR [address] [place] [address];  Service: General;;    TONSILLECTOMY       Problem List[1]  Medications Ordered Prior to Encounter[2]   Review of patient's allergies indicates:   Allergen Reactions    Sulfa (sulfonamide antibiotics) Shortness Of Breath    Coconut     Guaifenesin     Milk containing products (dairy)     Prochlorperazine Hallucinations     Other reaction(s): Compazine  Note:  Adverse Reaction: Other    Statins-hmg-coa reductase inhibitors     Terbinafine hcl      Other reaction(s): terbinafine 250 mg tablet  Note:  Adverse Reaction: Upset Stomach    Wheat containing prod     Penicillins Rash     Other reaction(s): Penicillins  Note:  Adverse Reaction: Rash        Physical  Exam  General: Well nourished, Cooperative, Alert and Oriented    Airway:  Mallampati: II   Mouth Opening: Normal  TM Distance: Normal  Tongue: Normal  Neck ROM: Normal ROM    Chest/Lungs:  Normal Respiratory Rate    Heart:  Rate: Normal      Wt Readings from Last 3 Encounters:   06/25/25 102.1 kg (225 lb)   06/10/25 106.1 kg (234 lb)   06/02/25 106.3 kg (234 lb 3.8 oz)     Temp Readings from Last 3 Encounters:   06/25/25 36.7 °C (98.1 °F) (Oral)   05/23/25 36.6 °C (97.8 °F) (Oral)   04/29/25 36.1 °C (97 °F) (Oral)     BP Readings from Last 3 Encounters:   06/25/25 105/63   06/02/25 110/70   05/23/25 112/80     Pulse Readings from Last 3 Encounters:   06/25/25 (!) 54   06/02/25 (!) 59   05/23/25 (!) 55     Lab Results   Component Value Date    WBC 6.82 05/13/2025    HGB 13.5 (L) 05/13/2025    HCT 41.7 05/13/2025    MCV 87 05/13/2025     05/13/2025         Chemistry        Component Value Date/Time     05/13/2025 0830     03/11/2025 0847    K 4.2 05/13/2025 0830    K 4.2 03/11/2025 0847     05/13/2025 0830     03/11/2025 0847    CO2 30 (H) 05/13/2025 0830    CO2 27 03/11/2025 0847    BUN 19 05/13/2025 0830    CREATININE 0.8 05/13/2025 0830    GLU 77 05/13/2025 0830    GLU 89 03/11/2025 0847        Component Value Date/Time    CALCIUM 9.6 05/13/2025 0830    CALCIUM 9.4 03/11/2025 0847    ALKPHOS 89 05/13/2025 0830    ALKPHOS 103 03/11/2025 0847    AST 18 05/13/2025 0830    AST 26 03/11/2025 0847    ALT 18 05/13/2025 0830    ALT 17 03/11/2025 0847    BILITOT 0.4 05/13/2025 0830    BILITOT 0.5 03/11/2025 0847        Results for orders placed or performed during the hospital encounter of 12/16/24   EKG 12-lead    Collection Time: 12/16/24 12:31 PM   Result Value Ref Range    QRS Duration 82 ms    OHS QTC Calculation 450 ms    Narrative    Test Reason : R52,    Vent. Rate : 130 BPM     Atrial Rate : 130 BPM     P-R Int : 142 ms          QRS Dur :  82 ms      QT Int : 306 ms       P-R-T Axes  :  79  10  74 degrees    QTcB Int : 450 ms    Sinus tachycardia  Otherwise normal ECG    Confirmed by Olga Billingsley ([id number]) on 12/16/2024 3:48:47 PM    Referred By:            Confirmed By: Olga Billingsley    Echo 3/11/2025    Summary  Show Result Comparison     Technically challenging study with poor endocardial visualization.    Post-stress Conclusion: The study is negative with no echocardiographic evidence of stress induced ischemia.    ECG Conclusion: The ECG portion of the study is negative for ischemia.    Stress Protocol: The patient was stressed using an exercise protocol. The patient exercised for 6 minutes 9 seconds on a high ramp protocol, achieving a peak heart rate of 162 bpm, which is 98% of the age predicted maximum heart rate. Their exercise capacity was average. The patient reported no symptoms during the stress test. The test was stopped because the patient experienced fatigue.    Left Ventricle: The left ventricle is normal in size. Normal wall thickness. There is normal systolic function with a visually estimated ejection fraction of 55 - 60%. There is normal diastolic function.    Right Ventricle: The right ventricle is normal in size. Wall thickness is normal. Systolic function is normal.    Left Atrium: Left atrium is dilated.    Pulmonary Artery: The estimated pulmonary artery systolic pressure is 28 mmHg.    IVC/SVC: Normal venous pressure at 3 mmHg.       Anesthesia Plan  Type of Anesthesia, risks & benefits discussed:    Anesthesia Type: Gen ETT  Intra-op Monitoring Plan: Standard ASA Monitors  Post Op Pain Control Plan: multimodal analgesia and IV/PO Opioids PRN  Induction:  IV  Informed Consent: Informed consent signed with the Patient and all parties understand the risks and agree with anesthesia plan.  All questions answered.   ASA Score: 3  Day of Surgery Review of History & Physical: H&P Update referred to the surgeon/provider.    Ready For Surgery From Anesthesia Perspective.      .           [1]   Patient Active Problem List  Diagnosis    ALFREDO (obstructive sleep apnea)    Severe obesity (BMI 35.0-39.9) with comorbidity    Primary hypertension    Migraine with aura and without status migrainosus, not intractable    Eosinophilic esophagitis    Gastroesophageal reflux disease with esophagitis without hemorrhage    Hyperlipidemia    History of CVA (cerebrovascular accident)    Aortic dilatation    Coronary artery disease involving native coronary artery of native heart without angina pectoris    Idiopathic chronic gout of right foot without tophus    Tachycardia    NSVT (nonsustained ventricular tachycardia)    Myalgia due to statin    S/P gastric bypass   [2]   No current facility-administered medications on file prior to encounter.     Current Outpatient Medications on File Prior to Encounter   Medication Sig Dispense Refill    aspirin (ECOTRIN) 81 MG EC tablet TAKE 1 TABLET BY MOUTH EVERY DAY 90 tablet 4    losartan (COZAAR) 25 MG tablet Take 1 tablet (25 mg total) by mouth once daily. 90 tablet 3    metoprolol tartrate (LOPRESSOR) 25 MG tablet Take 1 tablet (25 mg total) by mouth 2 (two) times daily. 180 tablet 3    pantoprazole (PROTONIX) 40 MG tablet Take 1 tablet (40 mg total) by mouth 2 (two) times daily. 60 tablet 11    tamsulosin (FLOMAX) 0.4 mg Cap TAKE 1 CAPSULE BY MOUTH EVERY DAY 90 capsule 1    ursodioL (YULIA FORTE) 500 MG tablet Take 1 tablet (500 mg total) by mouth once daily. 90 tablet 1    cetirizine (ZYRTEC) 10 MG tablet Take 10 mg by mouth once daily.      colchicine (COLCRYS) 0.6 mg tablet Take 1 tablet (0.6 mg total) by mouth daily as needed. 21 tablet 0    hyoscyamine 0.125 mg Subl Place 1 tablet (0.125 mg total) under the tongue every 6 (six) hours as needed. 30 tablet 2    indomethacin (INDOCIN) 50 MG capsule Take 1 capsule (50 mg total) by mouth 3 (three) times daily. 15 capsule 3    multivitamin (ONE DAILY MULTIVITAMIN) per tablet Take 1 tablet by mouth once daily.       nitroGLYCERIN (NITROSTAT) 0.4 MG SL tablet Place 1 tablet (0.4 mg total) under the tongue every 5 (five) minutes as needed for Chest pain. Call the 911 after the 3rd dose. 25 tablet 3    ondansetron (ZOFRAN-ODT) 8 MG TbDL Take 1 tablet (8 mg total) by mouth every 6 (six) hours as needed (nausea). 30 tablet 0    traZODone (DESYREL) 50 MG tablet Take 1-2 tablets ( mg total) by mouth nightly as needed for Insomnia. 60 tablet 2

## 2025-06-25 NOTE — ANESTHESIA POSTPROCEDURE EVALUATION
Anesthesia Post Evaluation    Patient: Martha Shah    Procedure(s) Performed: Procedure(s) (LRB):  EGD (ESOPHAGOGASTRODUODENOSCOPY) (N/A)    Final Anesthesia Type: general      Patient location during evaluation: [facility]  Patient participation: Yes- Able to Participate  Level of consciousness: awake and alert  Post-procedure vital signs: reviewed and stable  Pain management: adequate  Airway patency: patent    PONV status at discharge: No PONV  Anesthetic complications: no      Cardiovascular status: blood pressure returned to baseline  Respiratory status: unassisted  Hydration status: euvolemic  Follow-up not needed.          Vitals Value Taken Time   /56 06/25/25 13:42   Temp 36.5 °C (97.7 °F) 06/25/25 13:42   Pulse 57 06/25/25 13:42   Resp 13 06/25/25 13:42   SpO2 99 % 06/25/25 13:42         No case tracking events are documented in the log.      Pain/Issac Score: Issac Score: 10 (6/25/2025  1:42 PM)

## 2025-06-27 ENCOUNTER — TELEPHONE (OUTPATIENT)
Dept: BARIATRICS | Facility: CLINIC | Age: 55
End: 2025-06-27
Payer: COMMERCIAL

## 2025-06-27 NOTE — TELEPHONE ENCOUNTER
LVM for patient regarding making a follow up appointment with Dr. Evans. Dr. Liang performed her EGD w/dilation on 6/25/25 and wanted her to follow up with Dr. Evans in two weeks. Target appointment date is 7/9/25 if patient is able.

## 2025-06-30 ENCOUNTER — PATIENT MESSAGE (OUTPATIENT)
Dept: PRIMARY CARE CLINIC | Facility: CLINIC | Age: 55
End: 2025-06-30
Payer: COMMERCIAL

## 2025-06-30 ENCOUNTER — OFFICE VISIT (OUTPATIENT)
Dept: BARIATRICS | Facility: CLINIC | Age: 55
End: 2025-06-30
Payer: COMMERCIAL

## 2025-06-30 ENCOUNTER — PATIENT MESSAGE (OUTPATIENT)
Dept: CARDIOLOGY | Facility: CLINIC | Age: 55
End: 2025-06-30
Payer: COMMERCIAL

## 2025-06-30 ENCOUNTER — TELEPHONE (OUTPATIENT)
Dept: BARIATRICS | Facility: CLINIC | Age: 55
End: 2025-06-30

## 2025-06-30 ENCOUNTER — LAB VISIT (OUTPATIENT)
Dept: LAB | Facility: HOSPITAL | Age: 55
End: 2025-06-30
Payer: COMMERCIAL

## 2025-06-30 ENCOUNTER — CLINICAL SUPPORT (OUTPATIENT)
Dept: BARIATRICS | Facility: CLINIC | Age: 55
End: 2025-06-30
Payer: COMMERCIAL

## 2025-06-30 VITALS
SYSTOLIC BLOOD PRESSURE: 97 MMHG | DIASTOLIC BLOOD PRESSURE: 51 MMHG | TEMPERATURE: 98 F | BODY MASS INDEX: 37.76 KG/M2 | HEIGHT: 65 IN | WEIGHT: 226.63 LBS | OXYGEN SATURATION: 99 % | HEART RATE: 56 BPM

## 2025-06-30 DIAGNOSIS — G47.33 OSA (OBSTRUCTIVE SLEEP APNEA): ICD-10-CM

## 2025-06-30 DIAGNOSIS — Z71.3 DIETARY COUNSELING: Primary | ICD-10-CM

## 2025-06-30 DIAGNOSIS — E78.2 MIXED HYPERLIPIDEMIA: ICD-10-CM

## 2025-06-30 DIAGNOSIS — I10 PRIMARY HYPERTENSION: ICD-10-CM

## 2025-06-30 DIAGNOSIS — K20.0 EOSINOPHILIC ESOPHAGITIS: ICD-10-CM

## 2025-06-30 DIAGNOSIS — E78.5 HYPERLIPIDEMIA, UNSPECIFIED HYPERLIPIDEMIA TYPE: ICD-10-CM

## 2025-06-30 DIAGNOSIS — Z98.84 S/P BARIATRIC SURGERY: ICD-10-CM

## 2025-06-30 DIAGNOSIS — K21.00 GASTROESOPHAGEAL REFLUX DISEASE WITH ESOPHAGITIS WITHOUT HEMORRHAGE: ICD-10-CM

## 2025-06-30 DIAGNOSIS — Z98.84 S/P BARIATRIC SURGERY: Primary | ICD-10-CM

## 2025-06-30 DIAGNOSIS — E66.01 MORBID OBESITY WITH BMI OF 40.0-44.9, ADULT: ICD-10-CM

## 2025-06-30 DIAGNOSIS — E66.9 OBESITY (BMI 30-39.9): ICD-10-CM

## 2025-06-30 DIAGNOSIS — I25.10 CORONARY ARTERY DISEASE INVOLVING NATIVE CORONARY ARTERY OF NATIVE HEART WITHOUT ANGINA PECTORIS: Primary | ICD-10-CM

## 2025-06-30 LAB
25(OH)D3+25(OH)D2 SERPL-MCNC: 57 NG/ML (ref 30–96)
ABSOLUTE EOSINOPHIL (OHS): 0.18 K/UL
ABSOLUTE MONOCYTE (OHS): 0.49 K/UL (ref 0.3–1)
ABSOLUTE NEUTROPHIL COUNT (OHS): 4.72 K/UL (ref 1.8–7.7)
ALBUMIN SERPL BCP-MCNC: 3.8 G/DL (ref 3.5–5.2)
ALP SERPL-CCNC: 105 UNIT/L (ref 40–150)
ALT SERPL W/O P-5'-P-CCNC: 9 UNIT/L (ref 10–44)
ANION GAP (OHS): 8 MMOL/L (ref 8–16)
AST SERPL-CCNC: 14 UNIT/L (ref 11–45)
BASOPHILS # BLD AUTO: 0.03 K/UL
BASOPHILS NFR BLD AUTO: 0.4 %
BILIRUB SERPL-MCNC: 0.5 MG/DL (ref 0.1–1)
BUN SERPL-MCNC: 18 MG/DL (ref 6–20)
CALCIUM SERPL-MCNC: 9.3 MG/DL (ref 8.7–10.5)
CHLORIDE SERPL-SCNC: 105 MMOL/L (ref 95–110)
CHOLEST SERPL-MCNC: 152 MG/DL (ref 120–199)
CHOLEST/HDLC SERPL: 6.3 {RATIO} (ref 2–5)
CO2 SERPL-SCNC: 28 MMOL/L (ref 23–29)
CREAT SERPL-MCNC: 0.9 MG/DL (ref 0.5–1.4)
ERYTHROCYTE [DISTWIDTH] IN BLOOD BY AUTOMATED COUNT: 15.2 % (ref 11.5–14.5)
GFR SERPLBLD CREATININE-BSD FMLA CKD-EPI: >60 ML/MIN/1.73/M2
GLUCOSE SERPL-MCNC: 83 MG/DL (ref 70–110)
HCT VFR BLD AUTO: 41.7 % (ref 40–54)
HDLC SERPL-MCNC: 24 MG/DL (ref 40–75)
HDLC SERPL: 15.8 % (ref 20–50)
HGB BLD-MCNC: 13.5 GM/DL (ref 14–18)
IMM GRANULOCYTES # BLD AUTO: 0.02 K/UL (ref 0–0.04)
IMM GRANULOCYTES NFR BLD AUTO: 0.3 % (ref 0–0.5)
IRON SATN MFR SERPL: 19 % (ref 20–50)
IRON SERPL-MCNC: 56 UG/DL (ref 45–160)
LDLC SERPL CALC-MCNC: 106.2 MG/DL (ref 63–159)
LYMPHOCYTES # BLD AUTO: 2.14 K/UL (ref 1–4.8)
MCH RBC QN AUTO: 28.6 PG (ref 27–31)
MCHC RBC AUTO-ENTMCNC: 32.4 G/DL (ref 32–36)
MCV RBC AUTO: 88 FL (ref 82–98)
NONHDLC SERPL-MCNC: 128 MG/DL
NUCLEATED RBC (/100WBC) (OHS): 0 /100 WBC
PLATELET # BLD AUTO: 177 K/UL (ref 150–450)
PMV BLD AUTO: 12.7 FL (ref 9.2–12.9)
POTASSIUM SERPL-SCNC: 4.5 MMOL/L (ref 3.5–5.1)
PROT SERPL-MCNC: 7.2 GM/DL (ref 6–8.4)
RBC # BLD AUTO: 4.72 M/UL (ref 4.6–6.2)
RELATIVE EOSINOPHIL (OHS): 2.4 %
RELATIVE LYMPHOCYTE (OHS): 28.2 % (ref 18–48)
RELATIVE MONOCYTE (OHS): 6.5 % (ref 4–15)
RELATIVE NEUTROPHIL (OHS): 62.2 % (ref 38–73)
SODIUM SERPL-SCNC: 141 MMOL/L (ref 136–145)
TIBC SERPL-MCNC: 295 UG/DL (ref 250–450)
TRANSFERRIN SERPL-MCNC: 199 MG/DL (ref 200–375)
TRIGL SERPL-MCNC: 109 MG/DL (ref 30–150)
VIT B12 SERPL-MCNC: 1273 PG/ML (ref 210–950)
WBC # BLD AUTO: 7.58 K/UL (ref 3.9–12.7)

## 2025-06-30 PROCEDURE — 1160F RVW MEDS BY RX/DR IN RCRD: CPT | Mod: CPTII,95,, | Performed by: NURSE PRACTITIONER

## 2025-06-30 PROCEDURE — 3078F DIAST BP <80 MM HG: CPT | Mod: CPTII,95,, | Performed by: NURSE PRACTITIONER

## 2025-06-30 PROCEDURE — 99499 UNLISTED E&M SERVICE: CPT | Mod: S$GLB,,, | Performed by: DIETITIAN, REGISTERED

## 2025-06-30 PROCEDURE — 85025 COMPLETE CBC W/AUTO DIFF WBC: CPT

## 2025-06-30 PROCEDURE — 80061 LIPID PANEL: CPT

## 2025-06-30 PROCEDURE — 36415 COLL VENOUS BLD VENIPUNCTURE: CPT

## 2025-06-30 PROCEDURE — 82306 VITAMIN D 25 HYDROXY: CPT

## 2025-06-30 PROCEDURE — 82040 ASSAY OF SERUM ALBUMIN: CPT

## 2025-06-30 PROCEDURE — 4010F ACE/ARB THERAPY RXD/TAKEN: CPT | Mod: CPTII,95,, | Performed by: NURSE PRACTITIONER

## 2025-06-30 PROCEDURE — 1159F MED LIST DOCD IN RCRD: CPT | Mod: CPTII,95,, | Performed by: NURSE PRACTITIONER

## 2025-06-30 PROCEDURE — 99999 PR PBB SHADOW E&M-EST. PATIENT-LVL I: CPT | Mod: PBBFAC,,, | Performed by: DIETITIAN, REGISTERED

## 2025-06-30 PROCEDURE — 99024 POSTOP FOLLOW-UP VISIT: CPT | Mod: 95,,, | Performed by: NURSE PRACTITIONER

## 2025-06-30 PROCEDURE — 84466 ASSAY OF TRANSFERRIN: CPT

## 2025-06-30 PROCEDURE — 82607 VITAMIN B-12: CPT

## 2025-06-30 PROCEDURE — 84425 ASSAY OF VITAMIN B-1: CPT

## 2025-06-30 PROCEDURE — 3044F HG A1C LEVEL LT 7.0%: CPT | Mod: CPTII,95,, | Performed by: NURSE PRACTITIONER

## 2025-06-30 PROCEDURE — 3074F SYST BP LT 130 MM HG: CPT | Mod: CPTII,95,, | Performed by: NURSE PRACTITIONER

## 2025-06-30 RX ORDER — METOPROLOL SUCCINATE 25 MG/1
25 TABLET, EXTENDED RELEASE ORAL DAILY
Qty: 90 TABLET | Refills: 3 | Status: SHIPPED | OUTPATIENT
Start: 2025-06-30 | End: 2026-06-30

## 2025-06-30 RX ORDER — HYOSCYAMINE SULFATE 0.12 MG/1
0.12 TABLET SUBLINGUAL EVERY 6 HOURS PRN
Qty: 30 TABLET | Refills: 5 | Status: SHIPPED | OUTPATIENT
Start: 2025-06-30

## 2025-06-30 NOTE — TELEPHONE ENCOUNTER
NOV 12/02/2025  LOV 06/02/2025    Patient reports low BP 97/51 P 59 while at bariatric appointment today.  Wants to discuss possibly adjusting medication.

## 2025-06-30 NOTE — PROGRESS NOTES
The patient location is: [place]  The chief complaint leading to consultation is: 8 week post op    Visit type: audiovisual    Face to Face time with patient: 10  15 minutes of total time spent on the encounter, which includes face to face time and non-face to face time preparing to see the patient (eg, review of tests), Obtaining and/or reviewing separately obtained history, Documenting clinical information in the electronic or other health record, Independently interpreting results (not separately reported) and communicating results to the patient/family/caregiver, or Care coordination (not separately reported).         Each patient to whom he or she provides medical services by telemedicine is:  (1) informed of the relationship between the physician and patient and the respective role of any other health care provider with respect to management of the patient; and (2) notified that he or she may decline to receive medical services by telemedicine and may withdraw from such care at any time.    Notes:           BARIATRIC POST-OPERATIVE VISIT:    HPI:  Martha Shah is a 54 y.o. year old adult presents for 8 week post op visit following LRNY.  she is doing well and tolerating the diet without difficulty.  she has no complaints.    Denies: nausea, vomiting, abdominal pain, changes in bowel movement pattern, fever, chills, dysphagia, chest pain, and shortness of breath.      EGD 6/25/25   Impression:     - Normal esophagus.                          - Chandler-en-Y gastrojejunostomy with gastrojejunal                          anastomosis characterized by healthy appearing                          mucosa, erythema, moderate stenosis and no stomal                          ulceration. Dilated with a 15-16.5-18 mm x 8 cm                          CRE balloon (to a maximum balloon size of 16.5 mm).                          - Normal examined jejunum.                          - No specimens collected.     Pt states she is  feeling better and tolerating more foods after dilation     Review of Systems   Constitutional:  Negative for activity change and fatigue.   Respiratory:  Negative for shortness of breath.    Cardiovascular:  Negative for chest pain, palpitations and leg swelling.   Gastrointestinal:  Negative for abdominal pain, diarrhea and vomiting.   Endocrine: Negative for polydipsia, polyphagia and polyuria.   Genitourinary:  Negative for dysuria.   Musculoskeletal:  Negative for gait problem.   Skin:  Negative for rash.   Allergic/Immunologic: Negative for immunocompromised state.   Neurological:  Negative for dizziness, syncope and weakness.   Hematological:  Does not bruise/bleed easily.   Psychiatric/Behavioral:  Negative for behavioral problems.        EXERCISE & VITAMINS:  See Bariatric Assessment    MEDICATIONS/ALLERGIES:  Have been reviewed.    DIET: Soft/Regular  Bariatric Diet.  2 protein shakes daily, ~90 grams protein.~ 60 fl oz SF clear beverage.      Tolerating fish, eggs, chicken, yogurt, cheese    See Dietician note from today for a more detailed assessment.      Physical Exam  Vitals and nursing note reviewed.   Constitutional:       Appearance: Normal appearance.   Neurological:      Mental Status: She is alert.         ASSESSMENT:  - Morbid obesity s/p laparoscopic Chandler-en-Y on 4/28/25.  - Co-morbidities: dyslipidemia, hypertension, obstructive sleep apnea, and CAD  -  Weight loss, 27#'s and 26% EWL  -  Exercise routine walking   - Good Diet  - Good Vitamin regimen     PLAN:  - Ursodiol 500 mg daily for 6 months  - Anti-Acid medication daily for EOE  - Miralax daily for constipation prn   - Emphasized the importance of regular exercise and adherence to bariatric diet to achieve maximum weight loss.  - Encouraged patient to start regular exercise.  - Follow-up with dietician to advance diet.  - Continue daily vitamins and medications.  - RTC in 4 months or sooner if needed.  - Call the office for any  issues.  - Check labs today.  - F/u with card to stop B/P meds    Post Discharge     8 Weeks     Total Opioids Used (Outpatient): 0 tabsml: mLs  Unused Opioids Returned: No Educated on safe opioid disposal location at [facility] outpatient pharmacy.   Additional Opioids Provided: no

## 2025-06-30 NOTE — TELEPHONE ENCOUNTER
No care due was identified.  [facility] Embedded Care Due Messages. Reference number: [id number].   6/30/2025 12:27:49 PM CDT

## 2025-06-30 NOTE — TELEPHONE ENCOUNTER
Pt is requesting PCP increase the quantity of the medication, states that her insurance will not pay for a 5 day supply, requesting 90 day supply. States she hasn't needed 4 pills in a day. Pended last rx, did not alter the quantity. Unsure how or if you wanted to change anything     LOV with Estrada Partida MD , 5/23/2025

## 2025-06-30 NOTE — PROGRESS NOTES
NUTRITION NOTE  Referring Physician: Leila Evans M.D.   Reason for MNT Referral: Follow-up 8 Weeks s/p Gastric Bypass    PAST MEDICAL HISTORY:  Denies vomiting, constipation, and diarrhea.  Reports problems, including nausea.takes zofran for nausea usually occurs after eating or drinking protein water 22 gm per bottle    Past Medical History:   Diagnosis Date    Acute pancreatitis 12/16/2024    Allergy     Hypertension     Myalgia due to statin 11/20/2024    NSTEMI (non-ST elevated myocardial infarction) 01/07/2024    ALFREDO on CPAP     Sepsis 12/16/2024    Stroke     Urinary tract infection          CLINICAL DATA:  54 y.o.-year-old White adult.      Current Weight: 226 lbs  BMI: 37.71  Total Weight Loss:   27  Excess Weight Loss:   26    LABS:  Reviewed.    CURRENT DIET:  Bariatric Soft Diet    Diet Recall: 80 grams of protein/day; 64 oz of fluids/day  B: greek yogurt 15 gm per container  L: skips due to pain  D: bariwise or 21 gm of protein   Diet Includes: high protein low fat low carb meal plan  Meal Pattern: 2 meal(s) + 0 snack(s) + 2 protein supplement(s) Ryse protein shake  Adequate protein supplement intake.  Adequate dairy intake.  Adequate vegetable intake. Vegetable puree  Inadequate fruit intake.  Starchy CHO: peas  Beverages water and protein water Ryse , hot tea    EXERCISE:   Felt weak and blood pressre      Restrictions to Exercise: Shortness of breath. Tired and weak    VITAMINS/MINERALS:   Taking barimelts for mvi, calcium melts three times per day, B-12 once a week, b-1 melts daily    ASSESSMENT:  Doing well overall.  Adequate protein intake.  Adequate fluid intake.  Advancing diet appropriately.  Exercising.  Adequate vitamins & minerals.    BARIATRIC DIET DISCUSSION:  Instructed and provided written materials on bariatric regular diet plan.  Reinforced post-op nutrition guidelines.    PLAN / RECOMMENDATIONS:  Advance to bariatric soft then regular diet.  Maintain protein  intake.  Maintain fluid intake.  Continue light exercise.  Continue appropriate vitamins & minerals.      Return to clinic in 4 months.    SESSION TIME: 30 minutes

## 2025-06-30 NOTE — PROGRESS NOTES
"Patient reporting "low BP 97/51 P 59 while at bariatric appointment today" and lightheadedness. Discontinuing Lopressor 25 mg BID.  Starting patient on Toprol XL 25 mg daily.  Please keep blood pressure log and report blood pressure readings <90/60.  Return in 2 weeks for blood pressure and heart rate check.    "

## 2025-06-30 NOTE — PATIENT INSTRUCTIONS
Meal Ideas for Regular Bariatric Diet  *Recipes and products available at [email or web address].com      Breakfast: (15-20g protein)    - Egg white omelet: 2 egg whites or ½ cup Egg Beaters. (Optional proteins: cheese, shrimp, black beans, chicken, sliced turkey) (Optional veggies: tomatoes, salsa, spinach, mushrooms, onions, green peppers, or small slice avocado)     - Egg and sausage: 1 egg or ¼ cup Egg Beaters (any variety), with 1 filippo or 2 links of Turkey sausage or Veggie breakfast sausage (Hudgeons & Temple or Meme)    - Crust-less breakfast quiche: To make a glass pie dish, mix 4oz part skim Ricotta, 1 cup skim milk, and 2 eggs as your base. Add protein: shredded cheese, sliced lean ham or turkey, turkey parker/sausage. Add veggies: tomato, onion, green onion, mushroom, green pepper, spinach, etc.    - Yogurt parfait: Mix 1 - 6oz container Dannon Light N Fit vanilla yogurt, with ¼ cup crushed unsalted nuts    - Cottage cheese and fruit: ½ cup part-skim cottage cheese or ricotta cheese topped with fresh fruit or sugar free preserves     - Meg Ramey's Vanilla Egg custard* (add 2 Tbsp instant coffee granules to make Cappuccino Custard*)    - Hi-Protein café latte (skim milk, decaf coffee, 1 scoop protein powder). Optional to add Sugar free syrup or extract flavoring.    - Breakfast Lox: spread fat free cream cheese on slices of smoked salmon. Serve over scrambled or egg over easy (sauteed with nonstick cookspray) OR on a cucumber slice    - Eggwhich: Scramble or cook 1 large egg over easy using nonstick cookspray. Place between 2 slices of [place] parker and low fat cheese.     Lunch: (20-30g protein)    - ½ cup Black bean soup (Homemade or Progresso), with ¼ cup shredded low-fat cheese. Top with chopped tomato or fresh salsa.     - Lean deli turkey breast and low-fat sliced cheese, mustard or light cazares to moisten, rolled up together, or wrapped in a Andreas lettuce leaf    - Chicken salad made from dinner  leftovers, moisten with low-fat salad dressing or light cazares. Also try leftover salmon, shrimp, tuna or boiled eggs. Serve ½ cup over dark green salad    - Fat-free canned refried beans, topped with ¼ cup shredded low-fat cheese. Top with chopped tomato or fresh salsa.     - Greek salad: Top mixed greens with 1-2oz grilled chicken, tomatoes, red onions, 2-3 kalamata olives, and sprinkle lightly with feta cheese. Spritz with Balsamic vinegar to taste.     - Crust-less lunch quiche: To make a glass pie dish, mix 4oz part skim Ricotta, 1 cup skim milk, and 2 eggs as your base. Add protein: shredded cheese, sliced lean ham or turkey, shrimp, chicken. Add veggies: tomato, onion, green onion, mushroom, green pepper, spinach, artichoke, broccoli, etc.    - Pizza bake: spread a  diann tree mushroom with tomato sauce, low-fat shredded mozzarella and turkey pepperoni or [place] parker. Add any veggies. Roast for 10-15 minutes, until cheese melted.     - Cucumber crab bites: Spread ¼ cup crab dip (lump crabmeat + light cream cheese and green onions) over sliced cucumber.     - Chicken with light spinach and artichoke dip*: Puree in : 6oz cooked and drained spinach, 2 cloves garlic, 1 can cannelloni beans, ½ cup chopped green onions, 1 can drained artichoke hearts (not marinated in oil), lemon juice and basil. Mix in 2oz chopped up chicken.    Supper: (20-30g protein)    - Serve grilled fish over dark green salad tossed with low-fat dressing, served with grilled asparagus gamboa     - Rotisserie chicken salad: served with sliced strawberries, walnuts, fat-free feta cheese crumbles and 1 tbsp [place]s Own Light Raspberry [place] Vinaigrette    - Shrimp cocktail: Dip cold boiled shrimp in homemade low-sugar cocktail sauce (1/2 cup David One Carb ketchup, 2 tbsp horseradish, 1/4 tsp hot sauce, 1 tsp Worcestershire sauce, 1 tbsp freshly-squeezed lemon juice). Serve with dark green salad, walnuts, and crumbled blue  cheese drizzled with olive oil and Balsamic vinegar    - Tuna Melt: Spread tuna salad onto 2 thick slices of tomato. Top with low-fat cheese and broil until cheese is melted. May also be made with chicken salad of shrimp salad. [place] with different types of cheeses.    - Chicken or beef fajitas (no tortilla, rice, beans, chips). Top meat and veggies w/ fresh salsa, fat free sour cream.     - Homemade low-fat Chili using extra lean ground beef or ground turkey. Top with shredded cheese and salsa as desired. May add dollop fat-free sour cream if desired    - Chicken parmesan: Top chicken breast w/ low sugar marinara sauce, mozzarella cheese and bake until chicken reaches 165*.  Serve w/ spaghetti SQUASH or Chilean cut green beans    - Dinner Omelet with shrimp or chicken and onion, green peppers and chives.    - No noodle lasagna: Use sliced zucchini or eggplant in place of noodles.  Layer with part skim ricotta cheese and low sugar meat sauce (use very lean ground beef or ground turkey).    - Mexican chicken bake: Bake chunks of chicken breast or thigh with taco seasoning, Pace brand enchilada sauce, green onions and low-fat cheese. Serve with ¼ cup black beans or fat free refried beans topped with chopped tomatoes or salsa.    - Jass frozen meatballs, simmered in Classico Marinara sauce. Different flavors of salsa or spaghetti sauce create different dishes! Sprinkle with parmesan cheese. Serve with grilled or steamed veggies, or a dark green salad.    - Simmer boneless skinless chicken thigh chunks in Classico Marinara sauce or roasted salsa until tender with chopped onion, bell pepper, garlic, mushrooms, spinach, etc.     - Hamburger or veggie burger, without the bun, dressed the way you like. Served with grilled or steamed veggies.    - Eggplant parmesan: Bake slices of eggplant at 350 degrees for 15 minutes. Layer tomato sauce, sliced eggplant and low-fat mozzarella cheese in a baking dish and cover with  foil. Bake 30-40 more minutes or until bubbly. Uncover and bake at 400 degrees for about 15 more minutes, or until top is slightly crisp.    - Fish tacos: grilled/baked white fish, wrapped in Andreas lettuce leaf, topped with salsa, shredded low-fat cheese, and light coleslaw.    - Chicken logan: Sprinkle chicken w/ 1 tsp of hidden valley ranch dip mix. Then grill chicken and top with black beans, salsa and 1 tsp fat free sour cream.     - Cauliflower pizza crust: Use cauliflower as crust (see recipe on pinterest, no flour!). Top w/ low fat cheese, turkey pepperoni and veggies and bake again    - chicken or turkey crust pizza: use ground chicken or turkey instead of cauliflower, spread in [place] and bake at 350 for about 20-30 minutes(may want to add garlic, black pepper, oregano and other herbs to ground meat mixture).  Remove and top w/ low fat cheese, turkey pepperoni and veggies and bake again for another 10 minutes or until cheese is browned.     Snacks: (100-200 calories; >5g protein)    - 1 low-fat cheese stick with 8 cherry tomatoes or 1 serving fresh fruit  - 4 thin slices fat-free turkey breast and 1 slice low-fat cheese  - 4 thin slices fat-free honey ham with wedge of melon  - 6-8 edamame pods (equivalent to about 1/4 cup edamame without pods).   - 1/4 cup unsalted nuts with ½ cup fruit  - 6-oz container Dannon Light n Fit vanilla yogurt, topped with 1oz unsalted nuts         - apple, celery or baby carrots spread with 2 Tbsp PB2  - apple slices with 1 oz slice low-fat cheese  - Apple slices dipped in 2 Tbsp of PB2  - celery, cucumber, bell pepper or baby carrots dipped in ¼ cup hummus bean spread or light spinach and artichoke dip (*recipe in lunch section)  - celery, cucumber, baby carrots dipped in high protein greek yogurt (Mix 16 oz plain greek yogurt + 1 packet of hidden valley ranch dip mix)  - Richard Links Beef Steak - 14g protein! (similar to beef jerky)  - 2 wedges Laughing Cow - Light Herb  & Garlic Cheese with sliced cucumber or green bell pepper  - 1/2 cup low-fat cottage cheese with ¼ cup fruit or ¼ cup salsa  - RTD Protein drinks: Atkins, Low Carb Slim Fast, EAS light, Muscle Milk Light, etc.  - Homemade Protein drinks: GNC Soy95, Isopure, Nectar, UNJURY, Whey Gourmet, etc. Mix 1 scoop powder with 8oz skim/1% milk or light soymilk.  - Protein bars: Atkins, EAS, Pure Protein, Think Thin, Detour, etc. Must have 0-4 grams sugar - Read the label.    Takeout Options: No more than twice/week  Deli - Salads (no pasta or rice), meats, cheeses. Roasted chicken. Lox (salmon)    Mexican - Platters which don't include tortillas, chips, or rice. Go easy on the beans. Example: Fajitas without the tortillas. Ask the  not to bring chips to the table if they are too tempting.    Greek - Meat or fish and vegetable, but no bread or rice. Including hummus, baba ganoush, etc, is OK. Most sit-down Greek restaurants can provide you with cucumber slices for dipping instead of jessenia bread.    Fast Food (Avoid as much as possible) - Salads (no croutons and limit salad dressing to 2 tbsp), grilled chicken sandwich without the bun and ask for no cazares. Sharis low fat chili or Taco Bell pintos and cheese.    BBQ - The meats are fine if you ask for sauces on the side, but most of the traditional side dishes are loaded with carbs. Navi slaw, baked beans and BBQ sauce are typically made with sugar.    Chinese - Nothing deep-fried, no rice or noodles. Many Chinese sauces have starch and sugar in them, so you'll have to use your judgement. If you find that these sauces trigger cravings, or cause Dumping, you can ask for the sauce to be made without sugar or just use soy sauce.     How to taper off of Omeprazole:  - Take 1 Tablet every other day for 2 weeks.  If you do not experience any heartburn, indigestion, nausea symptoms, the following week, take 1 tablet every 3 days.  Again if you remain without the above symptoms, you  may completely discontinue the medication.  If symptoms return at any point, please restart the medication.

## 2025-07-03 LAB — W VITAMIN B1: 75 UG/L

## 2025-07-07 ENCOUNTER — PATIENT MESSAGE (OUTPATIENT)
Dept: BARIATRICS | Facility: CLINIC | Age: 55
End: 2025-07-07
Payer: COMMERCIAL

## 2025-07-07 ENCOUNTER — PATIENT MESSAGE (OUTPATIENT)
Dept: CARDIOLOGY | Facility: CLINIC | Age: 55
End: 2025-07-07
Payer: COMMERCIAL

## 2025-07-07 NOTE — TELEPHONE ENCOUNTER
LOV 06/02/2025 06/30/2025 metoprolol tartrate 25 mg twice daily changed to metoprolol succinate 25 mg daily.

## 2025-07-08 ENCOUNTER — PATIENT MESSAGE (OUTPATIENT)
Dept: BARIATRICS | Facility: CLINIC | Age: 55
End: 2025-07-08
Payer: COMMERCIAL

## 2025-07-08 ENCOUNTER — TELEPHONE (OUTPATIENT)
Dept: BARIATRICS | Facility: CLINIC | Age: 55
End: 2025-07-08
Payer: COMMERCIAL

## 2025-07-08 NOTE — TELEPHONE ENCOUNTER
Per Dr. Evans, rescheduled patient to 7/23 due to Dr. Evans not being available for clinic tomorrow.     Patient states that she's swallowing much better but still finds it challenging to consume the calories that she needs. Additionally stated that she is working on titrating her blood pressure medication, as her blood pressure had been running low and causing some symptoms of chest pain and dizziness. Advised her to continue to reach out aggressively to her cardiologist for close monitoring.

## 2025-07-10 ENCOUNTER — OFFICE VISIT (OUTPATIENT)
Dept: CARDIOLOGY | Facility: CLINIC | Age: 55
End: 2025-07-10
Payer: COMMERCIAL

## 2025-07-10 VITALS
HEIGHT: 65 IN | OXYGEN SATURATION: 100 % | SYSTOLIC BLOOD PRESSURE: 110 MMHG | DIASTOLIC BLOOD PRESSURE: 72 MMHG | HEART RATE: 65 BPM | BODY MASS INDEX: 37.5 KG/M2 | WEIGHT: 225.06 LBS

## 2025-07-10 DIAGNOSIS — R00.2 PALPITATIONS: ICD-10-CM

## 2025-07-10 DIAGNOSIS — R07.89 ATYPICAL CHEST PAIN: Primary | ICD-10-CM

## 2025-07-10 DIAGNOSIS — R07.9 CHEST PAIN, UNSPECIFIED TYPE: ICD-10-CM

## 2025-07-10 PROCEDURE — 99999 PR PBB SHADOW E&M-EST. PATIENT-LVL III: CPT | Mod: PBBFAC,,,

## 2025-07-10 PROCEDURE — 4010F ACE/ARB THERAPY RXD/TAKEN: CPT | Mod: CPTII,S$GLB,,

## 2025-07-10 PROCEDURE — 3008F BODY MASS INDEX DOCD: CPT | Mod: CPTII,S$GLB,,

## 2025-07-10 PROCEDURE — G2211 COMPLEX E/M VISIT ADD ON: HCPCS | Mod: S$GLB,,,

## 2025-07-10 PROCEDURE — 3078F DIAST BP <80 MM HG: CPT | Mod: CPTII,S$GLB,,

## 2025-07-10 PROCEDURE — 3074F SYST BP LT 130 MM HG: CPT | Mod: CPTII,S$GLB,,

## 2025-07-10 PROCEDURE — 99215 OFFICE O/P EST HI 40 MIN: CPT | Mod: S$GLB,,,

## 2025-07-10 PROCEDURE — 3044F HG A1C LEVEL LT 7.0%: CPT | Mod: CPTII,S$GLB,,

## 2025-07-10 NOTE — PROGRESS NOTES
[facility] - Cardiology [address]  Cardiology Clinic Note      7/14/2025  2:44 PM    Problem list  Problem List[1]    History of Present Illness    CHIEF COMPLAINT:  Patient presents today for chest pain.    HISTORY OF PRESENT ILLNESS:  She reports new onset of chest pain beginning during the second week of pre-surgical liquid diet for CABG. The chest pain is intermittent with increasing frequency, requiring multiple nitroglycerin doses for relief, including two doses this morning with pain continuing in the waiting room. She was previously chest pain-free for several months prior to this recent onset. She suspects pain may be related to esophageal spasms, noting that nitroglycerin provides the best relief.      ROS:  General: -fever, -chills, -fatigue, -weight gain, -weight loss  Eyes: -vision changes, -redness, -discharge  ENT: -ear pain, -nasal congestion, -sore throat  Cardiovascular: +chest pain, +palpitations, -lower extremity edema  Respiratory: -cough, -shortness of breath  Gastrointestinal: -abdominal pain, -nausea, -vomiting, -diarrhea, -constipation, -blood in stool  Genitourinary: -dysuria, -hematuria, -frequency  Musculoskeletal: -joint pain, -muscle pain  Skin: -rash, -lesion  Neurological: -headache, -dizziness, -numbness, -tingling  Psychiatric: -anxiety, -depression, -sleep difficulty           Medications  Current Medications[2]   Prior to Admission medications    Medication Sig Start Date End Date Taking? Authorizing Provider   aspirin (ECOTRIN) 81 MG EC tablet TAKE 1 TABLET BY MOUTH EVERY DAY 5/19/25  Yes Marianne Monsalve NP   cetirizine (ZYRTEC) 10 MG tablet Take 10 mg by mouth once daily.   Yes Provider, Historical   finasteride (PROSCAR) 5 mg tablet Take 1 tablet (5 mg total) by mouth once daily. 6/20/25 6/20/26 Yes Asia Clarke NP   hyoscyamine 0.125 mg Subl Place 1 tablet (0.125 mg total) under the tongue every 6 (six) hours as needed. 6/30/25  Yes Estrada Partida MD    indomethacin (INDOCIN) 50 MG capsule Take 1 capsule (50 mg total) by mouth 3 (three) times daily. 1/3/25  Yes Marianne Monsalve NP   losartan (COZAAR) 25 MG tablet Take 1 tablet (25 mg total) by mouth once daily. 3/14/25  Yes Jesenia Mason FNP-C   multivitamin (ONE DAILY MULTIVITAMIN) per tablet Take 1 tablet by mouth once daily.   Yes Provider, Historical   nitroGLYCERIN (NITROSTAT) 0.4 MG SL tablet Place 1 tablet (0.4 mg total) under the tongue every 5 (five) minutes as needed for Chest pain. Call the 911 after the 3rd dose. 2/3/25 2/3/26 Yes Jesenia Mason FNP-C   ondansetron (ZOFRAN-ODT) 8 MG TbDL Take 1 tablet (8 mg total) by mouth every 6 (six) hours as needed (nausea). 4/9/25  Yes Leila Cummings MD   pantoprazole (PROTONIX) 40 MG tablet Take 1 tablet (40 mg total) by mouth 2 (two) times daily. 1/30/25 1/30/26 Yes Ginger Houston FNP-C   tamsulosin (FLOMAX) 0.4 mg Cap TAKE 1 CAPSULE BY MOUTH EVERY DAY 2/21/25  Yes Asia Clarke NP   traZODone (DESYREL) 50 MG tablet Take 1-2 tablets ( mg total) by mouth nightly as needed for Insomnia. 5/23/25  Yes Estrada Partida MD   ursodioL (YULIA FORTE) 500 MG tablet Take 1 tablet (500 mg total) by mouth once daily. 5/27/25 11/23/25 Yes Marianne Bhandari NP   colchicine (COLCRYS) 0.6 mg tablet Take 1 tablet (0.6 mg total) by mouth daily as needed. 12/18/24 6/30/25  Devi Pascal DO   metoprolol succinate (TOPROL-XL) 25 MG 24 hr tablet Take 1 tablet (25 mg total) by mouth once daily.  Patient not taking: Reported on 7/10/2025 6/30/25 6/30/26  Marlon, Jesenia, FNP-C         History  Past Medical History:   Diagnosis Date    Acute pancreatitis 12/16/2024    Allergy     Hypertension     Myalgia due to statin 11/20/2024    NSTEMI (non-ST elevated myocardial infarction) 01/07/2024    ALFREDO on CPAP     Sepsis 12/16/2024    Stroke     Urinary tract infection      Past Surgical History:   Procedure Laterality Date    ADENOIDECTOMY      BIOPSY OF  ADENOIDS      COLONOSCOPY N/A 09/14/2022    Procedure: COLONOSCOPY;  Surgeon: Jordy Simons MD;  Location: [facility];  Service: Endoscopy;  Laterality: N/A;    CORONARY ANGIOGRAPHY Right 01/08/2024    Procedure: ANGIOGRAM, CORONARY ARTERY;  Surgeon: Estrada Ojeda MD;  Location: [facility] CATH LAB;  Service: Cardiology;  Laterality: Right;    CYSTOSCOPY, WITH URETERAL DILATION  03/18/2025    Procedure: CYSTOSCOPY, WITH URETERAL DILATION;  Surgeon: Wilton Leonardo MD;  Location: [facility] OR;  Service: Urology;;    CYSTOURETEROSCOPY, WITH HOLMIUM LASER LITHOTRIPSY OF URETERAL CALCULUS AND STENT INSERTION  03/18/2025    Procedure: CYSTOURETEROSCOPY, WITH HOLMIUM LASER LITHOTRIPSY OF URETERAL CALCULUS AND STENT INSERTION;  Surgeon: Wilton Leonardo MD;  Location: [facility] OR;  Service: Urology;;    CYSTOURETEROSCOPY, WITH HOLMIUM LASER LITHOTRIPSY OF URETERAL CALCULUS AND STENT INSERTION  CYSTOSCOPY, WITH URETERAL DILATION  03/18/2025    ESOPHAGOGASTRODUODENOSCOPY N/A 09/14/2022    Procedure: EGD (ESOPHAGOGASTRODUODENOSCOPY);  Surgeon: Jordy Simons MD;  Location: [facility];  Service: Endoscopy;  Laterality: N/A;    ESOPHAGOGASTRODUODENOSCOPY N/A 12/19/2022    Procedure: EGD (ESOPHAGOGASTRODUODENOSCOPY);  Surgeon: Jordy Simons MD;  Location: [facility];  Service: Endoscopy;  Laterality: N/A;    ESOPHAGOGASTRODUODENOSCOPY N/A 11/30/2023    Procedure: EGD (ESOPHAGOGASTRODUODENOSCOPY);  Surgeon: Vinay Bourgeois MD;  Location: [facility];  Service: Endoscopy;  Laterality: N/A;    ESOPHAGOGASTRODUODENOSCOPY N/A 03/14/2025    Procedure: EGD (ESOPHAGOGASTRODUODENOSCOPY);  Surgeon: Vinay Bourgeois MD;  Location: [facility];  Service: Endoscopy;  Laterality: N/A;  ref A Bhandari NP- pt states not taking GLP-1 at this time- portal tmb    ESOPHAGOGASTRODUODENOSCOPY N/A 6/25/2025    Procedure: EGD (ESOPHAGOGASTRODUODENOSCOPY);  Surgeon: Izabela Liang MD;  Location: [facility] ([address] [place] [address]);  Service: General;  Laterality:  N/A;  Referral: Dr. Evans / arabella portal - LW  6/17 precall complete/mleone    LAPAROSCOPIC GASTROENTEROSTOMY N/A 4/28/2025    Procedure: GASTROENTEROSTOMY, LAPAROSCOPIC with inraop EGD;  Surgeon: Leila Cummings MD;  Location: [facility] OR [address] [place] [address];  Service: General;  Laterality: N/A;  Surgeon to complete Tap Block Intraoperatively    REPAIR, HERNIA, HIATAL, LAPAROSCOPIC  4/28/2025    Procedure: REPAIR, HERNIA, HIATAL, LAPAROSCOPIC;  Surgeon: Leila Cummings MD;  Location: [facility] OR [address] [place] [address];  Service: General;;    TONSILLECTOMY       Social History[3]      Allergies  Review of patient's allergies indicates:   Allergen Reactions    Sulfa (sulfonamide antibiotics) Shortness Of Breath    Coconut     Guaifenesin     Milk containing products (dairy)     Prochlorperazine Hallucinations     Other reaction(s): Compazine  Note:  Adverse Reaction: Other    Statins-hmg-coa reductase inhibitors     Terbinafine hcl      Other reaction(s): terbinafine 250 mg tablet  Note:  Adverse Reaction: Upset Stomach    Wheat containing prod     Penicillins Rash     Other reaction(s): Penicillins  Note:  Adverse Reaction: Rash         Review of Systems   ROS      Physical Exam  Wt Readings from Last 1 Encounters:   07/10/25 102.1 kg (225 lb 1.4 oz)     BP Readings from Last 3 Encounters:   07/10/25 110/72   06/30/25 (!) 97/51   06/25/25 (!) 112/56     Pulse Readings from Last 1 Encounters:   07/10/25 65     Body mass index is 37.46 kg/m².    Physical Exam      Assessment & Plan    R07.89 Atypical chest pain  R00.2 Palpitations  R07.9 Chest pain, unspecified type    IMPRESSION:  - Considered esophageal spasms as potential cause of chest pain.  - Ordered cardiac tests to rule out cardiac causes.    ATYPICAL CHEST PAIN:  - Continued nitroglycerin as needed for chest pain.  - Ordered cardiac tests to rule out cardiac causes.  - Follow up with Arron (surgeon) in 2 weeks for chest pain.    PALPITATIONS:  - Contact the office if  palpitations persist to consider cardiac event monitor and referral to electrophysiology.    CHEST PAIN, UNSPECIFIED TYPE:  - Continued nitroglycerin as needed for chest pain.  - Ordered cardiac tests to rule out cardiac causes.  - Follow up with Arron (surgeon) in 2 weeks for chest pain.                Brandi R. Carter, FNP-C [facility] - Cardiology.      Total professional time spent for the encounter: 40 minutes  Time was spent preparing to see the patient, reviewing results of prior testing, obtaining and/or reviewing separately obtained history, performing a medically appropriate examination and interview, counseling and educating the patient/family, ordering medications/tests/procedures, referring and communicating with other health care professionals, documenting clinical information in the electronic health record, and independently interpreting results.    This note was generated with the assistance of ambient listening technology. Verbal consent was obtained by the patient and accompanying visitor(s) for the recording of patient appointment to facilitate this note. I attest to having reviewed and edited the generated note for accuracy, though some syntax or spelling errors may persist. Please contact the author of this note for any clarification.           [1]   Patient Active Problem List  Diagnosis    ALFREDO (obstructive sleep apnea)    Severe obesity (BMI 35.0-39.9) with comorbidity    Primary hypertension    Migraine with aura and without status migrainosus, not intractable    Eosinophilic esophagitis    Gastroesophageal reflux disease with esophagitis without hemorrhage    Hyperlipidemia    History of CVA (cerebrovascular accident)    Aortic dilatation    Coronary artery disease involving native coronary artery of native heart without angina pectoris    Idiopathic chronic gout of right foot without tophus    Tachycardia    NSVT (nonsustained ventricular tachycardia)    Myalgia due to statin    S/P  gastric bypass   [2]   Current Outpatient Medications   Medication Sig Dispense Refill    aspirin (ECOTRIN) 81 MG EC tablet TAKE 1 TABLET BY MOUTH EVERY DAY 90 tablet 4    cetirizine (ZYRTEC) 10 MG tablet Take 10 mg by mouth once daily.      finasteride (PROSCAR) 5 mg tablet Take 1 tablet (5 mg total) by mouth once daily. 30 tablet 11    hyoscyamine 0.125 mg Subl Place 1 tablet (0.125 mg total) under the tongue every 6 (six) hours as needed. 30 tablet 5    indomethacin (INDOCIN) 50 MG capsule Take 1 capsule (50 mg total) by mouth 3 (three) times daily. 15 capsule 3    losartan (COZAAR) 25 MG tablet Take 1 tablet (25 mg total) by mouth once daily. 90 tablet 3    multivitamin (ONE DAILY MULTIVITAMIN) per tablet Take 1 tablet by mouth once daily.      nitroGLYCERIN (NITROSTAT) 0.4 MG SL tablet Place 1 tablet (0.4 mg total) under the tongue every 5 (five) minutes as needed for Chest pain. Call the 911 after the 3rd dose. 25 tablet 3    ondansetron (ZOFRAN-ODT) 8 MG TbDL Take 1 tablet (8 mg total) by mouth every 6 (six) hours as needed (nausea). 30 tablet 0    pantoprazole (PROTONIX) 40 MG tablet Take 1 tablet (40 mg total) by mouth 2 (two) times daily. 60 tablet 11    tamsulosin (FLOMAX) 0.4 mg Cap TAKE 1 CAPSULE BY MOUTH EVERY DAY 90 capsule 1    traZODone (DESYREL) 50 MG tablet Take 1-2 tablets ( mg total) by mouth nightly as needed for Insomnia. 60 tablet 2    ursodioL (YULIA FORTE) 500 MG tablet Take 1 tablet (500 mg total) by mouth once daily. 90 tablet 1    colchicine (COLCRYS) 0.6 mg tablet Take 1 tablet (0.6 mg total) by mouth daily as needed. 21 tablet 0    metoprolol succinate (TOPROL-XL) 25 MG 24 hr tablet Take 1 tablet (25 mg total) by mouth once daily. (Patient not taking: Reported on 7/10/2025) 90 tablet 3     No current facility-administered medications for this visit.   [3]   Social History  Socioeconomic History    Marital status:    Tobacco Use    Smoking status: Never     Passive exposure:  Never    Smokeless tobacco: Never   Vaping Use    Vaping status: Never Used   Substance and Sexual Activity    Alcohol use: Not Currently     Comment: social    Drug use: No    Sexual activity: Yes     Partners: Female     Social Drivers of Health     Financial Resource Strain: Low Risk  (4/28/2025)    Overall Financial Resource Strain (CARDIA)     Difficulty of Paying Living Expenses: Not hard at all   Food Insecurity: No Food Insecurity (4/28/2025)    Hunger Vital Sign     Worried About Running Out of Food in the Last Year: Never true     Ran Out of Food in the Last Year: Never true   Transportation Needs: No Transportation Needs (4/28/2025)    PRAPARE - Transportation     Lack of Transportation (Medical): No     Lack of Transportation (Non-Medical): No   Physical Activity: Inactive (1/31/2025)    Exercise Vital Sign     Days of Exercise per Week: 0 days     Minutes of Exercise per Session: 20 min   Stress: Stress Concern Present (4/28/2025)    [employer] - Occupational Stress Questionnaire     Feeling of Stress : To some extent   Housing Stability: Low Risk  (4/28/2025)    Housing Stability Vital Sign     Unable to Pay for Housing in the Last Year: No     Homeless in the Last Year: No

## 2025-07-22 ENCOUNTER — TELEPHONE (OUTPATIENT)
Dept: BARIATRICS | Facility: CLINIC | Age: 55
End: 2025-07-22
Payer: COMMERCIAL

## 2025-07-22 NOTE — TELEPHONE ENCOUNTER
Called patient to ask her if she could please come earlier for her follow up appointment. It was agreed that she'll be here at 9:30 am.

## 2025-07-23 ENCOUNTER — OFFICE VISIT (OUTPATIENT)
Dept: BARIATRICS | Facility: CLINIC | Age: 55
End: 2025-07-23
Payer: COMMERCIAL

## 2025-07-23 VITALS
WEIGHT: 222 LBS | SYSTOLIC BLOOD PRESSURE: 108 MMHG | DIASTOLIC BLOOD PRESSURE: 74 MMHG | HEIGHT: 65 IN | OXYGEN SATURATION: 98 % | TEMPERATURE: 94 F | HEART RATE: 50 BPM | BODY MASS INDEX: 36.99 KG/M2

## 2025-07-23 DIAGNOSIS — I10 PRIMARY HYPERTENSION: ICD-10-CM

## 2025-07-23 DIAGNOSIS — I25.10 CORONARY ARTERY DISEASE INVOLVING NATIVE CORONARY ARTERY OF NATIVE HEART WITHOUT ANGINA PECTORIS: ICD-10-CM

## 2025-07-23 DIAGNOSIS — K44.9 HIATAL HERNIA WITH GERD: ICD-10-CM

## 2025-07-23 DIAGNOSIS — K21.9 HIATAL HERNIA WITH GERD: ICD-10-CM

## 2025-07-23 DIAGNOSIS — Z87.19 HX OF GALLSTONES: ICD-10-CM

## 2025-07-23 DIAGNOSIS — E66.812 CLASS 2 SEVERE OBESITY DUE TO EXCESS CALORIES WITH SERIOUS COMORBIDITY AND BODY MASS INDEX (BMI) OF 36.0 TO 36.9 IN ADULT: ICD-10-CM

## 2025-07-23 DIAGNOSIS — E66.01 CLASS 2 SEVERE OBESITY DUE TO EXCESS CALORIES WITH SERIOUS COMORBIDITY AND BODY MASS INDEX (BMI) OF 36.0 TO 36.9 IN ADULT: ICD-10-CM

## 2025-07-23 DIAGNOSIS — E78.2 MIXED HYPERLIPIDEMIA: ICD-10-CM

## 2025-07-23 DIAGNOSIS — G47.33 OSA (OBSTRUCTIVE SLEEP APNEA): ICD-10-CM

## 2025-07-23 DIAGNOSIS — R10.9 ABDOMINAL PAIN, UNSPECIFIED ABDOMINAL LOCATION: Primary | ICD-10-CM

## 2025-07-23 DIAGNOSIS — Z98.84 S/P GASTRIC BYPASS: Primary | ICD-10-CM

## 2025-07-23 DIAGNOSIS — I25.2 HISTORY OF MI (MYOCARDIAL INFARCTION): ICD-10-CM

## 2025-07-23 PROCEDURE — 99999 PR PBB SHADOW E&M-EST. PATIENT-LVL IV: CPT | Mod: PBBFAC,,, | Performed by: SURGERY

## 2025-07-23 RX ORDER — METOPROLOL TARTRATE 25 MG/1
25 TABLET, FILM COATED ORAL 2 TIMES DAILY
COMMUNITY

## 2025-07-23 RX ORDER — ONDANSETRON 8 MG/1
8 TABLET, ORALLY DISINTEGRATING ORAL EVERY 8 HOURS PRN
Qty: 20 TABLET | Refills: 3 | Status: SHIPPED | OUTPATIENT
Start: 2025-07-23

## 2025-07-23 NOTE — PROGRESS NOTES
BARIATRIC FOLLOW UP:    Chief Complaint   Patient presents with    Follow-up    Abdominal Pain    Chest Pain     HISTORY OF PRESENT ILLNESS: Martha Shah is a 54 year-old morbidly obese FAMAB with current BMI 36.94 kg/m² and multiple associated comorbidities including HTN, HLD, ALFREDO, CAD with h/o NSTEMI, h/o CVA, gout, and migraines, who presents for 12-week post-op follow up s/p laparoscopic melony-en-y gastric bypass with sliding type 1 hiatal hernia repair 4/28/25. She developed acute-onset recurrent heartburn with associated nausea and esophageal spasm about 6 weeks post-op for which she underwent EGD with TTS 16.5 mm CRE balloon dilation of gastrojejunostomy (which appeared otherwise healthy) 6/25/25. Her heartburn resolved thereafter and she is currently tolerating a regular bariatric diet without dysphagia, retrosternal pain, retching, or vomiting - though she continues to take her previously prescribed pantoprazole. She also continues to have intermittent nausea for which she takes ondansetron PRN and requests a refill. More concerning, since week 2 of the pre-op liquid diet she has had frequent episodes of chest pain with radiation to her LUE without obvious inciting or aggravating factors. She sometimes experiences nausea and SOB with these episodes. She does not feel they are related to eating though she will keep a food log and track going forward. It can occur in the middle of the night, early in the morning before eating, or after drinking water. It usually resolves after taking nitroglycerin though she did have an event yesterday that lasted for hours and didn't resolve despite NTG, PPI, Levsin, and Gas-Ex; it eventually subsided hours later. She was evaluated by Cardiology who scheduled a nuclear stress tests however on first attempt she had a syncopal episode 2/2 hypotension; this is rescheduled for tomorrow. She has had trouble with her BP and was recently switched from metoprolol succinate to  tartrate to try to mitigate this. She continues to take losartan, metoprolol, finasteride, and tamsulosin daily. She states that many years ago she had a single episode that was very similar and believes it was attributed to her gallbladder at the time. She denies fevers, chills, diarrhea, constipation, or dysuria. She is having regular BMs. She has lost 31 lbs, approximately 29.52% of her excess weight.     Pre-op weight: 253 lbs  Ideal BW: 148 lbs  Excess weight: 105 lbs  2-week post-op: 237 lbs  8-weeks post-op: 226  12-week post-op: 222 lbs    Review of Systems   Constitutional:  Positive for weight loss (12-weeks s/p gastric bypass).   HENT: Negative.     Eyes: Negative.    Respiratory: Negative.     Cardiovascular:  Positive for chest pain.   Gastrointestinal:  Positive for nausea. Negative for constipation, diarrhea, heartburn and vomiting.   Genitourinary: Negative.    Musculoskeletal: Negative.    Skin: Negative.    Neurological: Negative.    Endo/Heme/Allergies: Negative.    Psychiatric/Behavioral: Negative.       EXERCISE:  Treadmill walking 2-3 x/week for 20 minutes (goal to reach 1 mile)     MEDICATIONS/ALLERGIES:  Reviewed and updated in EMR    Vitals:    07/23/25 0938   BP: 108/74   Pulse: (!) 50   Temp: (!) 94.4 °F (34.7 °C)     Physical Exam  Vitals reviewed.   Constitutional:       General: She is not in acute distress.     Appearance: Normal appearance. She is well-developed. She is obese. She is not ill-appearing.   HENT:      Head: Normocephalic and atraumatic.   Eyes:      General: No scleral icterus.     Conjunctiva/sclera: Conjunctivae normal.   Cardiovascular:      Rate and Rhythm: Normal rate and regular rhythm.   Pulmonary:      Effort: Pulmonary effort is normal. No respiratory distress.   Abdominal:      General: There is no distension.      Palpations: Abdomen is soft.      Tenderness: There is no abdominal tenderness. There is no guarding.   Musculoskeletal:         General: Normal  range of motion.      Cervical back: Normal range of motion and neck supple.   Skin:     General: Skin is warm and dry.      Findings: No erythema.   Neurological:      General: No focal deficit present.      Mental Status: She is alert and oriented to person, place, and time.   Psychiatric:         Mood and Affect: Mood normal.         Behavior: Behavior normal.       DIAGNOSTIC STUDIES:  Barium UGI 6/17/25: No significant abnormality. 13-mm tablet passed easily into the stomach without e/o obstruction. Normal swallowing. Normal esophageal countor and motility. No STEPHANY observed either spontaneously or with stress maneuvers. Post-operative changes of chandler-en-y gastric bypass. Normal gastric pouch emptying without e/o obstruction. Chandler limb is unremarkable. Normal passage into distal small bowel without evidence of inflammation or obstruction.    EGD 6/25/25: Normal esophagus, gastrojejunal anastomosis characterized by healthy appearing mucosa but mild erythema, 13-mm stoma, with no stomal ulceration, healthy pouch-to-jejunum limb, appropriately short candycane limb, gastrojejunostomy TTS dilation with CRE balloon to maximum diameter of 16.5 with mild disruption, normal examined jejunum    ASSESSMENT:  - Chest pain that started 1 week prior to surgery with LUE radiation of unknown etiology   - Patient states she had a similar event remotely that was attributed to her gallbladder at the time  - Class 2 morbid obesity with current body mass index 36.94 kg/m² s/p laparoscopic chandler-en-y gastric bypass 4/28/25  - Estimated goal weight: 175-200 lbs which corresponds to approx 50-75% EWL  - Co-morbidities: HTN, HLD, ALFREDO, CAD, h/o CVA, gout, and migraines   - I recommend close follow-up and frequent measurements of her Blood Pressure as she may need ongoing titration and/or changes to her antihypertensives   - She recently had a syncopal event   - Nuclear cardiac stress test scheduled for tomorrow  - Weight loss of 31 lbs,  approximately 29.52% EWL  - Tolerating regular bariatric diet     PLAN:  - Agree with nuclear cardiac stress test.  - Close follow-up with PCP and Cardiologist regarding BP and medications.  - Abdominal US to evaluation for cholelithiasis or other cause of epigastric pain.  - May need HREM or other testing if negative - [place] [facility] [place].  - Emphasized the importance of regular exercise and adherence to bariatric diet to achieve maximum weight loss.  - Follow-up with [facility] and dietician per routine.     Leila Evans  7/23/25

## 2025-08-12 ENCOUNTER — PATIENT MESSAGE (OUTPATIENT)
Dept: BARIATRICS | Facility: CLINIC | Age: 55
End: 2025-08-12
Payer: COMMERCIAL

## 2025-08-16 DIAGNOSIS — N21.0 BLADDER STONE: ICD-10-CM

## 2025-08-17 DIAGNOSIS — G47.00 INSOMNIA, UNSPECIFIED TYPE: ICD-10-CM

## 2025-08-18 RX ORDER — TAMSULOSIN HYDROCHLORIDE 0.4 MG/1
1 CAPSULE ORAL
Qty: 90 CAPSULE | Refills: 1 | Status: SHIPPED | OUTPATIENT
Start: 2025-08-18

## 2025-08-18 RX ORDER — TRAZODONE HYDROCHLORIDE 50 MG/1
TABLET ORAL
Qty: 180 TABLET | Refills: 3 | Status: SHIPPED | OUTPATIENT
Start: 2025-08-18

## 2025-08-20 ENCOUNTER — TELEPHONE (OUTPATIENT)
Dept: CARDIOLOGY | Facility: HOSPITAL | Age: 55
End: 2025-08-20
Payer: COMMERCIAL

## 2025-08-20 ENCOUNTER — CLINICAL SUPPORT (OUTPATIENT)
Dept: CARDIOLOGY | Facility: HOSPITAL | Age: 55
End: 2025-08-20
Payer: COMMERCIAL

## 2025-08-20 DIAGNOSIS — R00.2 PALPITATIONS: ICD-10-CM

## 2025-09-03 ENCOUNTER — ON-DEMAND VIRTUAL (OUTPATIENT)
Dept: URGENT CARE | Facility: CLINIC | Age: 55
End: 2025-09-03
Payer: COMMERCIAL

## 2025-09-03 DIAGNOSIS — R09.81 NASAL CONGESTION: ICD-10-CM

## 2025-09-03 DIAGNOSIS — U07.1 COVID-19: Primary | ICD-10-CM

## 2025-09-03 DIAGNOSIS — R05.1 ACUTE COUGH: ICD-10-CM

## 2025-09-03 PROCEDURE — 98005 SYNCH AUDIO-VIDEO EST LOW 20: CPT | Mod: 95,,, | Performed by: NURSE PRACTITIONER

## 2025-09-03 RX ORDER — NIRMATRELVIR AND RITONAVIR 300-100 MG
KIT ORAL
Qty: 30 TABLET | Refills: 0 | Status: SHIPPED | OUTPATIENT
Start: 2025-09-03

## 2025-09-03 RX ORDER — AZELASTINE 1 MG/ML
2 SPRAY, METERED NASAL 2 TIMES DAILY
Qty: 30 ML | Refills: 0 | Status: SHIPPED | OUTPATIENT
Start: 2025-09-03 | End: 2026-09-03

## 2025-09-03 RX ORDER — BENZONATATE 100 MG/1
100 CAPSULE ORAL 3 TIMES DAILY PRN
Qty: 60 CAPSULE | Refills: 0 | Status: SHIPPED | OUTPATIENT
Start: 2025-09-03 | End: 2025-09-23

## (undated) DEVICE — SHEARS HARMONIC ADV CRV 45CM

## (undated) DEVICE — ELECTRODE MEGADYNE RETURN DUAL

## (undated) DEVICE — SOL NACL 0.9% IV INJ 1000ML

## (undated) DEVICE — TROCAR ENDOPATH XCEL 12MM 10CM

## (undated) DEVICE — CANNULA ENDOPATH XCEL 5X100MM

## (undated) DEVICE — IRRIGATOR ENDOSCOPY DISP.

## (undated) DEVICE — LUBRICANT SURGILUBE 2 OZ

## (undated) DEVICE — TUBING HF INSUFFLATION W/ FLTR

## (undated) DEVICE — GOWN POLY REINF BRTH SLV XL

## (undated) DEVICE — STAPLER ECHELON FLEX 60MM 44CM

## (undated) DEVICE — SUT TICRON BLUE 2-0 48 SK

## (undated) DEVICE — TOWEL OR DISP STRL BLUE 4/PK

## (undated) DEVICE — DRAPE ABDOMINAL TIBURON 14X11

## (undated) DEVICE — SCISSOR 5MMX35CM DIRECT DRIVE

## (undated) DEVICE — TROCAR ENDOPATH XCEL 11MM 10CM

## (undated) DEVICE — TRAY MINOR GEN SURG OMC

## (undated) DEVICE — ELECTRODE REM PLYHSV RETURN 9

## (undated) DEVICE — APPLICATOR CHLORAPREP ORN 26ML

## (undated) DEVICE — BLADE SURG CARBON STEEL SZ11

## (undated) DEVICE — PENCIL ROCKER SWITCH 10FT CORD

## (undated) DEVICE — NDL HYPO REG 25G X 1 1/2

## (undated) DEVICE — SUT VICRYL CTD 2-0 GI 27 SH

## (undated) DEVICE — BOWL STERILE LARGE 32OZ

## (undated) DEVICE — SUT MONOCRYL 4-0 PS-2

## (undated) DEVICE — SYR 10CC LUER LOCK

## (undated) DEVICE — TROCAR ENDOPATH XCEL 5X100MM

## (undated) DEVICE — RELOAD ECHELON FLEX WHT 60MM

## (undated) DEVICE — RELOAD ECHELON FLEX BLU 60MM

## (undated) DEVICE — GOWN SURGICAL X-LARGE

## (undated) DEVICE — ADHESIVE DERMABOND ADVANCED